# Patient Record
Sex: FEMALE | Race: WHITE | NOT HISPANIC OR LATINO | Employment: OTHER | URBAN - METROPOLITAN AREA
[De-identification: names, ages, dates, MRNs, and addresses within clinical notes are randomized per-mention and may not be internally consistent; named-entity substitution may affect disease eponyms.]

---

## 2023-10-03 ENCOUNTER — TELEPHONE (OUTPATIENT)
Dept: PRIMARY CARE | Facility: CLINIC | Age: 68
End: 2023-10-03
Payer: MEDICARE

## 2023-10-03 NOTE — TELEPHONE ENCOUNTER
Pt ended up getting an appt for Friday but is requesting something be called in if possible until then for the pain

## 2023-10-04 PROBLEM — N17.9 ACUTE RENAL FAILURE SYNDROME (CMS-HCC): Status: ACTIVE | Noted: 2023-10-04

## 2023-10-04 PROBLEM — J44.9 COPD (CHRONIC OBSTRUCTIVE PULMONARY DISEASE) (MULTI): Status: ACTIVE | Noted: 2023-10-04

## 2023-10-04 PROBLEM — Z51.5 PALLIATIVE CARE PATIENT: Status: ACTIVE | Noted: 2023-10-04

## 2023-10-04 PROBLEM — K21.9 CHRONIC GERD: Status: ACTIVE | Noted: 2023-10-04

## 2023-10-04 PROBLEM — S42.309A CLOSED FRACTURE OF HUMERUS: Status: ACTIVE | Noted: 2023-10-04

## 2023-10-04 PROBLEM — S49.90XA SHOULDER INJURY: Status: ACTIVE | Noted: 2023-10-04

## 2023-10-04 PROBLEM — S06.6XAA TRAUMATIC INTRACRANIAL SUBARACHNOID HEMORRHAGE (MULTI): Status: ACTIVE | Noted: 2023-10-04

## 2023-10-04 PROBLEM — J20.9 ACUTE BRONCHITIS WITH CHRONIC OBSTRUCTIVE PULMONARY DISEASE (COPD) (MULTI): Status: ACTIVE | Noted: 2023-10-04

## 2023-10-04 PROBLEM — R11.2 NAUSEA & VOMITING: Status: ACTIVE | Noted: 2023-10-04

## 2023-10-04 PROBLEM — I65.23 CAROTID STENOSIS, BILATERAL: Status: ACTIVE | Noted: 2023-10-04

## 2023-10-04 PROBLEM — R10.9 ABDOMINAL PAIN: Status: ACTIVE | Noted: 2023-10-04

## 2023-10-04 PROBLEM — G89.29 OTHER CHRONIC PAIN: Status: ACTIVE | Noted: 2023-10-04

## 2023-10-04 PROBLEM — D50.9 IRON DEFICIENCY ANEMIA: Status: ACTIVE | Noted: 2023-10-04

## 2023-10-04 PROBLEM — G25.0 ESSENTIAL TREMOR: Status: ACTIVE | Noted: 2023-10-04

## 2023-10-04 PROBLEM — M54.50 BACK PAIN, LUMBOSACRAL: Status: ACTIVE | Noted: 2023-10-04

## 2023-10-04 PROBLEM — R06.00 DYSPNEA: Status: ACTIVE | Noted: 2023-10-04

## 2023-10-04 PROBLEM — F34.1 DYSTHYMIA: Status: ACTIVE | Noted: 2023-10-04

## 2023-10-04 PROBLEM — J96.00 ACUTE RESPIRATORY FAILURE (MULTI): Status: ACTIVE | Noted: 2023-10-04

## 2023-10-04 PROBLEM — R20.0 LOWER EXTREMITY NUMBNESS: Status: ACTIVE | Noted: 2023-10-04

## 2023-10-04 PROBLEM — I10 PRIMARY HYPERTENSION: Status: ACTIVE | Noted: 2023-10-04

## 2023-10-04 PROBLEM — I47.29 NONSUSTAINED VENTRICULAR TACHYCARDIA (MULTI): Status: ACTIVE | Noted: 2023-10-04

## 2023-10-04 PROBLEM — F32.A DEPRESSION: Status: ACTIVE | Noted: 2023-10-04

## 2023-10-04 PROBLEM — R63.4 ABNORMAL WEIGHT LOSS: Status: ACTIVE | Noted: 2023-10-04

## 2023-10-04 PROBLEM — J96.12 CHRONIC HYPERCAPNIC RESPIRATORY FAILURE (MULTI): Status: ACTIVE | Noted: 2023-10-04

## 2023-10-04 PROBLEM — F10.20 CONTINUOUS CHRONIC ALCOHOLISM (MULTI): Status: ACTIVE | Noted: 2023-10-04

## 2023-10-04 PROBLEM — F41.1 GAD (GENERALIZED ANXIETY DISORDER): Status: ACTIVE | Noted: 2023-10-04

## 2023-10-04 PROBLEM — Z78.9 MEDICAL HOME PATIENT: Status: ACTIVE | Noted: 2023-10-04

## 2023-10-04 PROBLEM — N39.0 URINARY TRACT INFECTION: Status: ACTIVE | Noted: 2023-10-04

## 2023-10-04 PROBLEM — J43.9 PULMONARY EMPHYSEMA (MULTI): Status: ACTIVE | Noted: 2023-10-04

## 2023-10-04 PROBLEM — J44.1 ACUTE EXACERBATION OF CHRONIC OBSTRUCTIVE AIRWAYS DISEASE (MULTI): Status: ACTIVE | Noted: 2023-10-04

## 2023-10-04 PROBLEM — F41.9 ANXIETY: Status: ACTIVE | Noted: 2023-10-04

## 2023-10-04 PROBLEM — T50.902A SUICIDE BY DRUG OVERDOSE (MULTI): Status: ACTIVE | Noted: 2023-10-04

## 2023-10-04 PROBLEM — J44.0 ACUTE BRONCHITIS WITH CHRONIC OBSTRUCTIVE PULMONARY DISEASE (COPD) (MULTI): Status: ACTIVE | Noted: 2023-10-04

## 2023-10-04 PROBLEM — Z86.79 HISTORY OF HYPERTENSION: Status: ACTIVE | Noted: 2023-10-04

## 2023-10-04 PROBLEM — R91.1 PULMONARY NODULE: Status: ACTIVE | Noted: 2023-10-04

## 2023-10-04 PROBLEM — I35.9 AORTIC VALVE DISORDER: Status: ACTIVE | Noted: 2023-10-04

## 2023-10-04 PROBLEM — I35.0 AORTIC VALVE STENOSIS: Status: ACTIVE | Noted: 2023-10-04

## 2023-10-04 PROBLEM — F17.200 TOBACCO DEPENDENCY: Status: ACTIVE | Noted: 2023-10-04

## 2023-10-04 PROBLEM — E87.1 HYPONATREMIA: Status: ACTIVE | Noted: 2023-10-04

## 2023-10-04 PROBLEM — D64.9 ANEMIA: Status: ACTIVE | Noted: 2023-10-04

## 2023-10-04 PROBLEM — K27.9 PEPTIC ULCER DISEASE: Status: ACTIVE | Noted: 2023-10-04
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
## 2023-10-04 PROBLEM — R09.02 HYPOXIA: Status: ACTIVE | Noted: 2023-10-04

## 2023-10-04 PROBLEM — F39 MOOD DISORDER (CMS-HCC): Status: ACTIVE | Noted: 2023-10-04

## 2023-10-04 PROBLEM — I82.4Z1 ACUTE DEEP VEIN THROMBOSIS (DVT) OF DISTAL VEIN OF RIGHT LOWER EXTREMITY (MULTI): Status: ACTIVE | Noted: 2023-10-04

## 2023-10-04 PROBLEM — T50.902A INTENTIONAL DRUG OVERDOSE (MULTI): Status: ACTIVE | Noted: 2023-10-04

## 2023-10-04 PROBLEM — F41.9 CHRONIC ANXIETY: Status: ACTIVE | Noted: 2023-10-04

## 2023-10-04 PROBLEM — E87.6 HYPOKALEMIA: Status: ACTIVE | Noted: 2023-10-04

## 2023-10-04 RX ORDER — MIRTAZAPINE 15 MG/1
15 TABLET, FILM COATED ORAL NIGHTLY
COMMUNITY
End: 2024-01-28 | Stop reason: HOSPADM

## 2023-10-04 RX ORDER — PRIMIDONE 50 MG/1
4 TABLET ORAL 2 TIMES DAILY
COMMUNITY
End: 2023-11-15

## 2023-10-04 RX ORDER — VARENICLINE TARTRATE 1 MG/1
TABLET, FILM COATED ORAL
COMMUNITY
End: 2024-01-28 | Stop reason: HOSPADM

## 2023-10-04 RX ORDER — AMLODIPINE BESYLATE 10 MG/1
10 TABLET ORAL DAILY
COMMUNITY
Start: 2023-06-09

## 2023-10-04 RX ORDER — ESCITALOPRAM OXALATE 10 MG/1
10 TABLET ORAL DAILY
COMMUNITY
End: 2024-05-02

## 2023-10-04 RX ORDER — ERGOCALCIFEROL 1.25 MG/1
50000 CAPSULE ORAL
COMMUNITY
Start: 2023-01-08 | End: 2024-02-06 | Stop reason: WASHOUT

## 2023-10-04 RX ORDER — FLUTICASONE FUROATE, UMECLIDINIUM BROMIDE AND VILANTEROL TRIFENATATE 200; 62.5; 25 UG/1; UG/1; UG/1
1 POWDER RESPIRATORY (INHALATION) DAILY
COMMUNITY
End: 2024-01-28 | Stop reason: HOSPADM

## 2023-10-04 RX ORDER — ACETAMINOPHEN 325 MG/1
325 TABLET ORAL EVERY 6 HOURS
COMMUNITY
End: 2024-02-06 | Stop reason: WASHOUT

## 2023-10-04 RX ORDER — GABAPENTIN 300 MG/1
300 CAPSULE ORAL 3 TIMES DAILY
COMMUNITY
End: 2024-05-09 | Stop reason: SDUPTHER

## 2023-10-04 RX ORDER — ONDANSETRON 4 MG/1
4 TABLET, ORALLY DISINTEGRATING ORAL EVERY 8 HOURS PRN
COMMUNITY
Start: 2022-05-03 | End: 2024-01-28 | Stop reason: HOSPADM

## 2023-10-04 RX ORDER — ALBUTEROL SULFATE 90 UG/1
1 AEROSOL, METERED RESPIRATORY (INHALATION) EVERY 4 HOURS PRN
COMMUNITY
End: 2024-01-28 | Stop reason: HOSPADM

## 2023-10-04 RX ORDER — LISINOPRIL 10 MG/1
1 TABLET ORAL DAILY
COMMUNITY
Start: 2017-06-15 | End: 2024-01-28 | Stop reason: HOSPADM

## 2023-10-04 RX ORDER — FAMOTIDINE 20 MG/1
20 TABLET, FILM COATED ORAL NIGHTLY
COMMUNITY
End: 2024-03-18 | Stop reason: ALTCHOICE

## 2023-10-04 RX ORDER — ASPIRIN 81 MG/1
81 TABLET ORAL DAILY
COMMUNITY
End: 2024-01-28 | Stop reason: HOSPADM

## 2023-10-04 RX ORDER — ATORVASTATIN CALCIUM 40 MG/1
40 TABLET, FILM COATED ORAL DAILY
COMMUNITY
Start: 2017-11-07 | End: 2024-01-28 | Stop reason: HOSPADM

## 2023-10-04 RX ORDER — ACETAMINOPHEN 500 MG
500 TABLET ORAL 4 TIMES DAILY
COMMUNITY
Start: 2017-06-15

## 2023-10-04 RX ORDER — ACLIDINIUM BROMIDE 400 UG/1
1 POWDER, METERED RESPIRATORY (INHALATION)
COMMUNITY
End: 2024-01-28 | Stop reason: HOSPADM

## 2023-10-04 RX ORDER — AMLODIPINE BESYLATE 5 MG/1
5 TABLET ORAL EVERY 12 HOURS
COMMUNITY
End: 2024-01-28 | Stop reason: HOSPADM

## 2023-10-04 RX ORDER — AZITHROMYCIN 250 MG/1
TABLET, FILM COATED ORAL
COMMUNITY
Start: 2023-06-09 | End: 2024-01-28 | Stop reason: HOSPADM

## 2023-10-04 RX ORDER — BUDESONIDE, GLYCOPYRROLATE, AND FORMOTEROL FUMARATE 160; 9; 4.8 UG/1; UG/1; UG/1
2 AEROSOL, METERED RESPIRATORY (INHALATION)
COMMUNITY
Start: 2023-03-13 | End: 2024-01-28 | Stop reason: HOSPADM

## 2023-10-04 RX ORDER — LANOLIN ALCOHOL/MO/W.PET/CERES
100 CREAM (GRAM) TOPICAL DAILY
COMMUNITY
End: 2024-01-28 | Stop reason: HOSPADM

## 2023-10-04 RX ORDER — FOLIC ACID 1 MG/1
1 TABLET ORAL DAILY
COMMUNITY
End: 2024-01-28 | Stop reason: HOSPADM

## 2023-10-04 RX ORDER — MOMETASONE FUROATE AND FORMOTEROL FUMARATE DIHYDRATE 200; 5 UG/1; UG/1
2 AEROSOL RESPIRATORY (INHALATION) 2 TIMES DAILY
COMMUNITY
End: 2024-01-28 | Stop reason: HOSPADM

## 2023-10-04 RX ORDER — OMEPRAZOLE 40 MG/1
40 CAPSULE, DELAYED RELEASE ORAL
COMMUNITY
Start: 2023-04-24 | End: 2024-01-28 | Stop reason: HOSPADM

## 2023-10-06 ENCOUNTER — OFFICE VISIT (OUTPATIENT)
Dept: PRIMARY CARE | Facility: CLINIC | Age: 68
End: 2023-10-06
Payer: MEDICARE

## 2023-10-06 VITALS
OXYGEN SATURATION: 94 % | HEART RATE: 98 BPM | HEIGHT: 66 IN | WEIGHT: 119 LBS | DIASTOLIC BLOOD PRESSURE: 78 MMHG | BODY MASS INDEX: 19.13 KG/M2 | SYSTOLIC BLOOD PRESSURE: 142 MMHG

## 2023-10-06 DIAGNOSIS — Z23 NEED FOR VACCINATION: ICD-10-CM

## 2023-10-06 DIAGNOSIS — M16.11 PRIMARY LOCALIZED OSTEOARTHRITIS OF RIGHT HIP: ICD-10-CM

## 2023-10-06 DIAGNOSIS — J42 CHRONIC BRONCHITIS, UNSPECIFIED CHRONIC BRONCHITIS TYPE (MULTI): Primary | ICD-10-CM

## 2023-10-06 PROCEDURE — 1159F MED LIST DOCD IN RCRD: CPT | Performed by: FAMILY MEDICINE

## 2023-10-06 PROCEDURE — 1125F AMNT PAIN NOTED PAIN PRSNT: CPT | Performed by: FAMILY MEDICINE

## 2023-10-06 PROCEDURE — 90662 IIV NO PRSV INCREASED AG IM: CPT | Performed by: FAMILY MEDICINE

## 2023-10-06 PROCEDURE — 3078F DIAST BP <80 MM HG: CPT | Performed by: FAMILY MEDICINE

## 2023-10-06 PROCEDURE — 4004F PT TOBACCO SCREEN RCVD TLK: CPT | Performed by: FAMILY MEDICINE

## 2023-10-06 PROCEDURE — 99214 OFFICE O/P EST MOD 30 MIN: CPT | Mod: 25 | Performed by: FAMILY MEDICINE

## 2023-10-06 PROCEDURE — 99214 OFFICE O/P EST MOD 30 MIN: CPT | Performed by: FAMILY MEDICINE

## 2023-10-06 PROCEDURE — 3077F SYST BP >= 140 MM HG: CPT | Performed by: FAMILY MEDICINE

## 2023-10-06 RX ORDER — VARENICLINE TARTRATE 1 MG/1
TABLET, FILM COATED ORAL
Qty: 60 TABLET | Refills: 0 | Status: SHIPPED | OUTPATIENT
Start: 2023-10-06 | End: 2023-10-26

## 2023-10-06 RX ORDER — ACETAMINOPHEN AND CODEINE PHOSPHATE 300; 30 MG/1; MG/1
1 TABLET ORAL EVERY 6 HOURS PRN
Qty: 28 TABLET | Refills: 0 | Status: SHIPPED | OUTPATIENT
Start: 2023-10-06 | End: 2023-10-13

## 2023-10-06 ASSESSMENT — ENCOUNTER SYMPTOMS
LOSS OF SENSATION IN FEET: 0
INABILITY TO BEAR WEIGHT: 1
DEPRESSION: 0
OCCASIONAL FEELINGS OF UNSTEADINESS: 1
WHEEZING: 1
LOSS OF MOTION: 1
HIP PAIN: 1
CHEST TIGHTNESS: 1
SHORTNESS OF BREATH: 1
COUGH: 1

## 2023-10-06 ASSESSMENT — PATIENT HEALTH QUESTIONNAIRE - PHQ9
SUM OF ALL RESPONSES TO PHQ9 QUESTIONS 1 AND 2: 0
2. FEELING DOWN, DEPRESSED OR HOPELESS: NOT AT ALL
1. LITTLE INTEREST OR PLEASURE IN DOING THINGS: NOT AT ALL

## 2023-10-06 ASSESSMENT — PAIN SCALES - GENERAL: PAINLEVEL: 10-WORST PAIN EVER

## 2023-10-06 NOTE — PROGRESS NOTES
"Subjective   Patient ID: Kamila Jones is a 68 y.o. female who presents for Hip Pain (Pt is having severe hip pain in both hips/dmw).    Pt here with her daughter.      COPD-  has reduced smoking.  3L NC.  No recent illness. Did she wants to try chantix.     Hip Pain   The pain is present in the right hip. The quality of the pain is described as stabbing. The pain is severe. The pain has been Constant since onset. Associated symptoms include an inability to bear weight and a loss of motion. The symptoms are aggravated by movement. She has tried NSAIDs, acetaminophen and non-weight bearing for the symptoms. The treatment provided mild relief.        Review of Systems   Respiratory:  Positive for cough, chest tightness, shortness of breath and wheezing.    Cardiovascular:  Negative for chest pain.       Objective   /78   Pulse 98   Ht 1.676 m (5' 6\")   Wt 54 kg (119 lb) Comment: 119lbs  SpO2 94%   BMI 19.21 kg/m²     Physical Exam  Constitutional:       Comments: Chronic ill appearing.    Cardiovascular:      Rate and Rhythm: Normal rate and regular rhythm.   Pulmonary:      Effort: Respiratory distress present.   Neurological:      Mental Status: She is alert.         Assessment/Plan   Diagnoses and all orders for this visit:  Chronic bronchitis, unspecified chronic bronchitis type (CMS/HCC)  -     varenicline (Chantix) 1 mg tablet; Take 0.5 tablets (0.5 mg) by mouth once daily for 3 days, THEN 0.5 tablets (0.5 mg) 2 times a day for 4 days, THEN 1 tablet (1 mg) 2 times a day for 23 days. Take with full glass of water..  Primary localized osteoarthritis of right hip  -     acetaminophen-codeine (Tylenol w/ Codeine #3) 300-30 mg tablet; Take 1 tablet by mouth every 6 hours if needed for severe pain (7 - 10) for up to 7 days.  -     Referral to Orthopaedic Surgery; Future    Oarrs reviewed.  Minimal use only for codeine.  Risk of respiratory supression reviewd.      "

## 2023-10-25 ENCOUNTER — TELEPHONE (OUTPATIENT)
Dept: PRIMARY CARE | Facility: CLINIC | Age: 68
End: 2023-10-25
Payer: MEDICARE

## 2023-10-25 DIAGNOSIS — M16.11 PRIMARY LOCALIZED OSTEOARTHRITIS OF RIGHT HIP: Primary | ICD-10-CM

## 2023-10-25 RX ORDER — HYDROCODONE BITARTRATE AND ACETAMINOPHEN 5; 325 MG/1; MG/1
1 TABLET ORAL EVERY 6 HOURS PRN
Qty: 12 TABLET | Refills: 0 | Status: SHIPPED | OUTPATIENT
Start: 2023-10-25 | End: 2023-10-28

## 2023-10-25 NOTE — TELEPHONE ENCOUNTER
Pt said she has an appointment with orthopedics on 11/21/2023 but is in severe pain (right hip), the Tylenol takes the edge off but is still not helping that much. She said she has been taking two tablets in the morning and one in the evening - she is requesting something stronger for the pain.

## 2023-10-26 DIAGNOSIS — J42 CHRONIC BRONCHITIS, UNSPECIFIED CHRONIC BRONCHITIS TYPE (MULTI): ICD-10-CM

## 2023-10-26 RX ORDER — VARENICLINE TARTRATE 1 MG/1
1 TABLET, FILM COATED ORAL 2 TIMES DAILY
Qty: 60 TABLET | Refills: 1 | Status: SHIPPED | OUTPATIENT
Start: 2023-10-26 | End: 2024-01-18

## 2023-11-06 ENCOUNTER — TELEPHONE (OUTPATIENT)
Dept: PRIMARY CARE | Facility: CLINIC | Age: 68
End: 2023-11-06
Payer: MEDICARE

## 2023-11-06 DIAGNOSIS — M16.11 PRIMARY LOCALIZED OSTEOARTHRITIS OF RIGHT HIP: Primary | ICD-10-CM

## 2023-11-06 RX ORDER — HYDROCODONE BITARTRATE AND ACETAMINOPHEN 5; 325 MG/1; MG/1
1 TABLET ORAL EVERY 6 HOURS PRN
Qty: 28 TABLET | Refills: 0 | Status: SHIPPED | OUTPATIENT
Start: 2023-11-06 | End: 2023-11-13

## 2023-11-06 NOTE — TELEPHONE ENCOUNTER
Patient has a pending appointment with orthopedics on 11/21/2023, she is asking for something else to help her with the pain until then?

## 2023-11-13 DIAGNOSIS — G25.0 ESSENTIAL TREMOR: ICD-10-CM

## 2023-11-15 RX ORDER — PRIMIDONE 50 MG/1
TABLET ORAL
Qty: 720 TABLET | Refills: 1 | Status: SHIPPED | OUTPATIENT
Start: 2023-11-15 | End: 2024-05-14

## 2023-12-06 ENCOUNTER — TELEPHONE (OUTPATIENT)
Dept: PRIMARY CARE | Facility: CLINIC | Age: 68
End: 2023-12-06
Payer: MEDICARE

## 2023-12-06 DIAGNOSIS — M16.11 PRIMARY LOCALIZED OSTEOARTHRITIS OF RIGHT HIP: Primary | ICD-10-CM

## 2023-12-06 DIAGNOSIS — M16.11 PRIMARY LOCALIZED OSTEOARTHRITIS OF RIGHT HIP: ICD-10-CM

## 2023-12-06 RX ORDER — HYDROCODONE BITARTRATE AND ACETAMINOPHEN 5; 325 MG/1; MG/1
1 TABLET ORAL EVERY 6 HOURS PRN
Qty: 20 TABLET | Refills: 0 | Status: SHIPPED | OUTPATIENT
Start: 2023-12-06 | End: 2023-12-11 | Stop reason: SDUPTHER

## 2023-12-06 NOTE — TELEPHONE ENCOUNTER
Patient is requesting another refill on her pain medication, she said she had her xray done and is pending CT for possible fracure, pharmacy verified.

## 2023-12-11 RX ORDER — HYDROCODONE BITARTRATE AND ACETAMINOPHEN 5; 325 MG/1; MG/1
1 TABLET ORAL EVERY 6 HOURS PRN
Qty: 20 TABLET | Refills: 0 | Status: SHIPPED | OUTPATIENT
Start: 2023-12-11 | End: 2023-12-16

## 2023-12-15 ENCOUNTER — HOSPITAL ENCOUNTER (OUTPATIENT)
Dept: RADIOLOGY | Facility: HOSPITAL | Age: 68
Discharge: HOME | End: 2023-12-15
Payer: MEDICARE

## 2023-12-15 DIAGNOSIS — M25.551 PAIN IN RIGHT HIP: ICD-10-CM

## 2023-12-15 PROCEDURE — 73700 CT LOWER EXTREMITY W/O DYE: CPT | Mod: RT

## 2024-01-03 DIAGNOSIS — M54.50 BACK PAIN, LUMBOSACRAL: Primary | ICD-10-CM

## 2024-01-03 RX ORDER — HYDROCODONE BITARTRATE AND ACETAMINOPHEN 5; 325 MG/1; MG/1
1 TABLET ORAL EVERY 8 HOURS PRN
COMMUNITY
Start: 2023-12-22 | End: 2024-01-03 | Stop reason: SDUPTHER

## 2024-01-03 NOTE — TELEPHONE ENCOUNTER
See attached rx, last appt was 10/06/2023. She said she scheduled hip surgery for 04/03/2024, has a pending appt with you on 03/04/2024.

## 2024-01-06 RX ORDER — HYDROCODONE BITARTRATE AND ACETAMINOPHEN 5; 325 MG/1; MG/1
1 TABLET ORAL EVERY 8 HOURS PRN
Qty: 15 TABLET | Refills: 0 | Status: SHIPPED | OUTPATIENT
Start: 2024-01-06 | End: 2024-01-11

## 2024-01-18 DIAGNOSIS — J42 CHRONIC BRONCHITIS, UNSPECIFIED CHRONIC BRONCHITIS TYPE (MULTI): ICD-10-CM

## 2024-01-18 RX ORDER — VARENICLINE TARTRATE 1 MG/1
1 TABLET, FILM COATED ORAL 2 TIMES DAILY
Qty: 60 TABLET | Refills: 1 | Status: SHIPPED | OUTPATIENT
Start: 2024-01-18 | End: 2024-01-28 | Stop reason: HOSPADM

## 2024-01-25 ENCOUNTER — HOSPITAL ENCOUNTER (INPATIENT)
Facility: HOSPITAL | Age: 69
LOS: 3 days | Discharge: HOME | DRG: 378 | End: 2024-01-28
Attending: EMERGENCY MEDICINE | Admitting: INTERNAL MEDICINE
Payer: MEDICARE

## 2024-01-25 ENCOUNTER — APPOINTMENT (OUTPATIENT)
Dept: RADIOLOGY | Facility: HOSPITAL | Age: 69
DRG: 378 | End: 2024-01-25
Payer: MEDICARE

## 2024-01-25 ENCOUNTER — TELEPHONE (OUTPATIENT)
Dept: PRIMARY CARE | Facility: CLINIC | Age: 69
End: 2024-01-25
Payer: MEDICARE

## 2024-01-25 DIAGNOSIS — K92.1 HEMATOCHEZIA: Primary | ICD-10-CM

## 2024-01-25 DIAGNOSIS — D64.9 ANEMIA, UNSPECIFIED TYPE: ICD-10-CM

## 2024-01-25 DIAGNOSIS — K57.31 DIVERTICULAR HEMORRHAGE: ICD-10-CM

## 2024-01-25 LAB
ABO GROUP (TYPE) IN BLOOD: NORMAL
ABO GROUP (TYPE) IN BLOOD: NORMAL
ALBUMIN SERPL-MCNC: 3.8 G/DL (ref 3.5–5)
ALP BLD-CCNC: 70 U/L (ref 35–125)
ALT SERPL-CCNC: 12 U/L (ref 5–40)
ANION GAP SERPL CALC-SCNC: 11 MMOL/L
ANTIBODY SCREEN: NORMAL
APPEARANCE UR: ABNORMAL
AST SERPL-CCNC: 15 U/L (ref 5–40)
BACTERIA #/AREA URNS AUTO: ABNORMAL /HPF
BILIRUB SERPL-MCNC: <0.2 MG/DL (ref 0.1–1.2)
BILIRUB UR STRIP.AUTO-MCNC: NEGATIVE MG/DL
BLOOD EXPIRATION DATE: NORMAL
BLOOD EXPIRATION DATE: NORMAL
BUN SERPL-MCNC: 31 MG/DL (ref 8–25)
CALCIUM SERPL-MCNC: 8.3 MG/DL (ref 8.5–10.4)
CHLORIDE SERPL-SCNC: 103 MMOL/L (ref 97–107)
CO2 SERPL-SCNC: 22 MMOL/L (ref 24–31)
COLOR UR: ABNORMAL
CREAT SERPL-MCNC: 1 MG/DL (ref 0.4–1.6)
DISPENSE STATUS: NORMAL
DISPENSE STATUS: NORMAL
EGFRCR SERPLBLD CKD-EPI 2021: 61 ML/MIN/1.73M*2
ERYTHROCYTE [DISTWIDTH] IN BLOOD BY AUTOMATED COUNT: 15.3 % (ref 11.5–14.5)
GLUCOSE SERPL-MCNC: 121 MG/DL (ref 65–99)
GLUCOSE UR STRIP.AUTO-MCNC: NORMAL MG/DL
HCT VFR BLD AUTO: 21.9 % (ref 36–46)
HCT VFR BLD AUTO: 29.3 % (ref 36–46)
HGB BLD-MCNC: 7.3 G/DL (ref 12–16)
HGB BLD-MCNC: 9.9 G/DL (ref 12–16)
HYALINE CASTS #/AREA URNS AUTO: ABNORMAL /LPF
INR PPP: 1.1 (ref 0.9–1.2)
KETONES UR STRIP.AUTO-MCNC: NEGATIVE MG/DL
LEUKOCYTE ESTERASE UR QL STRIP.AUTO: NEGATIVE
LIPASE SERPL-CCNC: 45 U/L (ref 16–63)
MCH RBC QN AUTO: 35.3 PG (ref 26–34)
MCHC RBC AUTO-ENTMCNC: 33.3 G/DL (ref 32–36)
MCV RBC AUTO: 106 FL (ref 80–100)
MUCOUS THREADS #/AREA URNS AUTO: ABNORMAL /LPF
NITRITE UR QL STRIP.AUTO: NEGATIVE
NRBC BLD-RTO: 0 /100 WBCS (ref 0–0)
PH UR STRIP.AUTO: 5 [PH]
PLATELET # BLD AUTO: 250 X10*3/UL (ref 150–450)
POTASSIUM SERPL-SCNC: 4.2 MMOL/L (ref 3.4–5.1)
PRODUCT BLOOD TYPE: 6200
PRODUCT BLOOD TYPE: 6200
PRODUCT CODE: NORMAL
PRODUCT CODE: NORMAL
PROT SERPL-MCNC: 6.1 G/DL (ref 5.9–7.9)
PROT UR STRIP.AUTO-MCNC: ABNORMAL MG/DL
PROTHROMBIN TIME: 11.4 SECONDS (ref 9.3–12.7)
RBC # BLD AUTO: 2.07 X10*6/UL (ref 4–5.2)
RBC # UR STRIP.AUTO: ABNORMAL /UL
RBC #/AREA URNS AUTO: ABNORMAL /HPF
RH FACTOR (ANTIGEN D): NORMAL
RH FACTOR (ANTIGEN D): NORMAL
SODIUM SERPL-SCNC: 136 MMOL/L (ref 133–145)
SP GR UR STRIP.AUTO: 1.03
UNIT ABO: NORMAL
UNIT ABO: NORMAL
UNIT NUMBER: NORMAL
UNIT NUMBER: NORMAL
UNIT RH: NORMAL
UNIT RH: NORMAL
UNIT VOLUME: 350
UNIT VOLUME: 350
UROBILINOGEN UR STRIP.AUTO-MCNC: NORMAL MG/DL
WBC # BLD AUTO: 9.7 X10*3/UL (ref 4.4–11.3)
WBC #/AREA URNS AUTO: ABNORMAL /HPF
XM INTEP: NORMAL
XM INTEP: NORMAL

## 2024-01-25 PROCEDURE — 81001 URINALYSIS AUTO W/SCOPE: CPT | Performed by: NURSE PRACTITIONER

## 2024-01-25 PROCEDURE — 83690 ASSAY OF LIPASE: CPT | Performed by: NURSE PRACTITIONER

## 2024-01-25 PROCEDURE — 2550000001 HC RX 255 CONTRASTS: Performed by: EMERGENCY MEDICINE

## 2024-01-25 PROCEDURE — 74177 CT ABD & PELVIS W/CONTRAST: CPT

## 2024-01-25 PROCEDURE — P9016 RBC LEUKOCYTES REDUCED: HCPCS

## 2024-01-25 PROCEDURE — 36430 TRANSFUSION BLD/BLD COMPNT: CPT

## 2024-01-25 PROCEDURE — 85018 HEMOGLOBIN: CPT | Mod: 91 | Performed by: EMERGENCY MEDICINE

## 2024-01-25 PROCEDURE — 80053 COMPREHEN METABOLIC PANEL: CPT | Performed by: NURSE PRACTITIONER

## 2024-01-25 PROCEDURE — 36415 COLL VENOUS BLD VENIPUNCTURE: CPT | Performed by: NURSE PRACTITIONER

## 2024-01-25 PROCEDURE — 99285 EMERGENCY DEPT VISIT HI MDM: CPT | Mod: 25 | Performed by: EMERGENCY MEDICINE

## 2024-01-25 PROCEDURE — 86920 COMPATIBILITY TEST SPIN: CPT | Mod: 91

## 2024-01-25 PROCEDURE — 2060000001 HC INTERMEDIATE ICU ROOM DAILY

## 2024-01-25 PROCEDURE — 86900 BLOOD TYPING SEROLOGIC ABO: CPT | Mod: 91 | Performed by: NURSE PRACTITIONER

## 2024-01-25 PROCEDURE — 85027 COMPLETE CBC AUTOMATED: CPT | Performed by: NURSE PRACTITIONER

## 2024-01-25 PROCEDURE — 85610 PROTHROMBIN TIME: CPT | Performed by: INTERNAL MEDICINE

## 2024-01-25 RX ORDER — IPRATROPIUM BROMIDE AND ALBUTEROL SULFATE 2.5; .5 MG/3ML; MG/3ML
3 SOLUTION RESPIRATORY (INHALATION) EVERY 4 HOURS PRN
Status: DISCONTINUED | OUTPATIENT
Start: 2024-01-25 | End: 2024-01-28 | Stop reason: HOSPADM

## 2024-01-25 RX ORDER — PANTOPRAZOLE SODIUM 40 MG/10ML
40 INJECTION, POWDER, LYOPHILIZED, FOR SOLUTION INTRAVENOUS EVERY 12 HOURS
Status: DISCONTINUED | OUTPATIENT
Start: 2024-01-26 | End: 2024-01-26

## 2024-01-25 RX ADMIN — PANTOPRAZOLE SODIUM 40 MG: 40 INJECTION, POWDER, FOR SOLUTION INTRAVENOUS at 23:41

## 2024-01-25 RX ADMIN — IOHEXOL 75 ML: 350 INJECTION, SOLUTION INTRAVENOUS at 19:04

## 2024-01-25 ASSESSMENT — COLUMBIA-SUICIDE SEVERITY RATING SCALE - C-SSRS
1. IN THE PAST MONTH, HAVE YOU WISHED YOU WERE DEAD OR WISHED YOU COULD GO TO SLEEP AND NOT WAKE UP?: NO
2. HAVE YOU ACTUALLY HAD ANY THOUGHTS OF KILLING YOURSELF?: NO
6. HAVE YOU EVER DONE ANYTHING, STARTED TO DO ANYTHING, OR PREPARED TO DO ANYTHING TO END YOUR LIFE?: NO

## 2024-01-25 ASSESSMENT — ENCOUNTER SYMPTOMS
HEMATOLOGIC/LYMPHATIC NEGATIVE: 1
ENDOCRINE NEGATIVE: 1
NEUROLOGICAL NEGATIVE: 1
CONSTITUTIONAL NEGATIVE: 1
RESPIRATORY NEGATIVE: 1
ALLERGIC/IMMUNOLOGIC NEGATIVE: 1
EYES NEGATIVE: 1
PSYCHIATRIC NEGATIVE: 1
CARDIOVASCULAR NEGATIVE: 1
MUSCULOSKELETAL NEGATIVE: 1
ANAL BLEEDING: 1

## 2024-01-25 ASSESSMENT — PAIN - FUNCTIONAL ASSESSMENT: PAIN_FUNCTIONAL_ASSESSMENT: 0-10

## 2024-01-25 ASSESSMENT — PAIN DESCRIPTION - PROGRESSION: CLINICAL_PROGRESSION: NOT CHANGED

## 2024-01-25 ASSESSMENT — PAIN SCALES - GENERAL
PAINLEVEL_OUTOF10: 0 - NO PAIN
PAINLEVEL_OUTOF10: 0 - NO PAIN

## 2024-01-25 NOTE — TELEPHONE ENCOUNTER
Patient has been trying to move around without using much pain medication, however she is having a terrible time trying to get around her home. She has only been taking extra strength Tylenol (taking up to 12 pills per day)

## 2024-01-25 NOTE — TELEPHONE ENCOUNTER
Pt called back stating she is having rectal bleeding and diarrhea.Pt is scared and do not know what to do. That started today.

## 2024-01-25 NOTE — ED PROVIDER NOTES
HPI   No chief complaint on file.      HPI  See my MDM                  No data recorded                Patient History   Past Medical History:   Diagnosis Date    Alcohol dependence, in remission (CMS/HCC)     History of alcohol dependence    Personal history of other malignant neoplasm of large intestine     History of malignant neoplasm of colon    Personal history of other venous thrombosis and embolism     History of deep venous thrombosis    Personal history of suicidal behavior     History of suicide attempt     Past Surgical History:   Procedure Laterality Date    MR HEAD ANGIO WO IV CONTRAST  10/24/2017    MR HEAD ANGIO WO IV CONTRAST [facility]    MR NECK ANGIO WO IV CONTRAST  10/24/2017    MR NECK ANGIO WO IV CONTRAST [facility]    OTHER SURGICAL HISTORY  06/15/2017    Treatment Of Ankle Fracture    TONSILLECTOMY  06/15/2017    Tonsillectomy     Family History   Problem Relation Name Age of Onset    Accidental death Mother      Stroke Father      Other (cerbral hemmorrhage) Father      Cancer Sister      Lung cancer Sister      Colon cancer Brother      Cancer Brother      Lung disease Brother       Social History     Tobacco Use    Smoking status: Every Day     Packs/day: 0.25     Years: 15.00     Additional pack years: 0.00     Total pack years: 3.75     Types: Cigarettes     Passive exposure: Past    Smokeless tobacco: Never   Substance Use Topics    Alcohol use: Yes     Alcohol/week: 10.0 standard drinks of alcohol     Types: 10 Cans of beer per week     Comment: per week    Drug use: Never       Physical Exam   ED Triage Vitals   Temp Pulse Resp BP   -- -- -- --      SpO2 Temp src Heart Rate Source Patient Position   -- -- -- --      BP Location FiO2 (%)     -- --       Physical Exam  CONSTITUTIONAL: Vital signs reviewed as charted, well-developed and in no distress  Eyes: Extraocular muscles are intact. Pupils equal round and reactive to light. Conjunctiva are pink.    ENT:  Mucous membranes are moist. Tongue in the midline. Pharynx was without erythema or exudates, uvula midline  LUNGS: Breath sounds equal and clear to auscultation. Good air exchange, no wheezes rales or retractions, pulse oximetry is charted.  HEART: Regular rate and rhythm without murmur thrill or rub, strong tones, auscultation is normal.  ABDOMEN: Soft and nontender without guarding rebound rigidity or mass. Bowel sounds are present and normal in all quadrants. There is no palpable masses or aneurysms identified. No hepatosplenomegaly, normal abdominal exam.  Neuro: The patient is awake, alert and oriented ×3. Moving all 4 extremities and answering questions appropriately.   MUSCULOSKELETAL: The calves are nontender to palpation. Full gross active range of motion.   PSYCH: Awake alert oriented, normal mood and affect.  Skin:  Dry, normal color, warm to the touch, no rash present.      ED Course & MDM   ED Course as of 01/25/24 2146   Thu Jan 25, 2024   1821 Patient with large bloody bowel movement here in the emergency department, systolic blood pressure down to 95, type and screen added, IV fluids given [RJ]   1838 Patient typed and crossed for 2 units to be transfused [RJ]   2044 Consulted Dr. Wells, neurology, discussed case and he does recommend transfer to [facility] or other facility with interventional radiology capability as this would likely be the next step patient decompensates. [WU]   2128 Consulted Dr. Driscoll, GI @ [facility] who agrees patient will need GI and IR and accepts in transfer, uncertain if any beds will be available, however she did feel patient can be monitored here and to call back with any decompensation but does not currently need ER to ER transfer. [WU]      ED Course User Index  [WU] Polly Day MD  [RJ] See Gavin, APRN-CNP         Diagnoses as of 01/25/24 2146   Hematochezia   Anemia, unspecified type   Diverticular hemorrhage       Medical Decision Making  History obtained  from: patient    Vital signs, nursing notes, current medications, past medical history, Surgical history, allergies, social history, family History were reviewed.         HPI:  Patient 68-year-old female presenting emergency room today chief complaint of rectal bleeding.  States had a little bit yesterday but worse today.  Has had some lower abdominal cramping.  No fever chills or night sweats.  No nausea vomiting diarrhea.  No anticoagulants.  Patient was having active bowel movement and was noted to examine her, large amount of blood present.      10 point ROS was reviewed and negative except Noted above in HPI.  DDX: as listed above      Medications administered during this visit (name and route):       MDM Summary/considerations:  EMERGENCY DEPARTMENT COURSE and DIFFERENTIAL DIAGNOSIS/MDM:        The patient presented with a chief complaint of GI bleeding. The differential diagnosis associated with this patient's presentation includes colitis, diverticulitis, mass, hemorrhoids.       Vitals:    Vitals:    01/25/24 1915 01/25/24 1945 01/25/24 1955 01/25/24 2000   BP: 152/73 138/55 141/68 139/56   Pulse: 79 77 84 81   Resp: 17  17    SpO2: 97% 98% 99% 99%   Weight:       Height:           ED Course as of 01/25/24 2146   Thu Jan 25, 2024   1821 Patient with large bloody bowel movement here in the emergency department, systolic blood pressure down to 95, type and screen added, IV fluids given [RJ]   1838 Patient typed and crossed for 2 units to be transfused [RJ]   2044 Consulted Dr. Wells, neurology, discussed case and he does recommend transfer to [facility] or other facility with interventional radiology capability as this would likely be the next step patient decompensates. [WU]   2128 Consulted Dr. Driscoll, GI @ [facility] who agrees patient will need GI and IR and accepts in transfer, uncertain if any beds will be available, however she did feel patient can be monitored here and to call back with any  decompensation but does not currently need ER to ER transfer. [WU]      ED Course User Index  [WU] Polly Day MD  [RJ] MOISES Peterson-CNP         Diagnoses as of 01/25/24 2146   Hematochezia   Anemia, unspecified type   Diverticular hemorrhage       History Limited by:    None    Independent history obtained from:    None      External records reviewed:    None      Diagnostics interpreted by me:    CT Scan(s) of the abdomen and pelvis:  Impression:    No acute abdominopelvic pathology.  There is diffuse colonic diverticulosis without evidence of  diverticulitis. Unchanged abdominal aortic ectasia measuring up to  3.3 cm.                  Discussions with other clinicians:    Hospitalist/Admitting Team Cam      Chronic conditions impacting care:    COPD      Social determinants of health affecting care:    None    Diagnostic tests considered but not performed: none    ED Medications managed:    Medications   iohexol (OMNIPaque) 350 mg iodine/mL solution 75 mL (75 mL intravenous Given 1/25/24 1904)         Prescription drugs considered:    None    After speaking with the admitting physician here in the ED is asked we consult GI who recommended we do transfer as we do not have interventional radiology present here if need be.  Attending physician did speak with GI at [facility] who accepted the patient for transfer.      Patient with lower GI bleeding, hemoglobin less than 8, for the last bloody bowel movement she did have a bout of hypotension where she was systolic of 95 2 units of blood were typed and crossed and administered.  Consent was given from the patient.  CT scan grossly unremarkable.  Patient will be admitted for further evaluation and care.    I saw this patient in conjunction with Dr. Day, please see her supervision note.    Critical Care: CRITICAL CARE NOTE     The patient was reevaluated/re-examined multiple times during the visit. Critical care time includes management at  bedside, discussion with other providers and consultants, family counseling and answering questions, and documentation. Care involves decision making of high complexity to assess, manipulate, and support vital organ system failure and/or to prevent further life threatening deterioration of the patient's condition. Failure to initiate these interventions on an urgent basis would likely result in sudden, clinically significant or life threatening deterioration in the patient's condition of acute GI bleeding       Critical care time total at least 35 minutes of non concurrent critical care time provided by myself. This did not include any separate billable procedures.              Prescriptions provided include: none    This chart was completed using voice recognition transcription software. Please excuse any errors of transcription including grammatical, punctuation, syntax and spelling errors.  Please contact me with any questions regarding this chart.    Procedure  Procedures     MOISES Peterson-CNP  01/25/24 3308

## 2024-01-26 LAB
ALBUMIN SERPL-MCNC: 3.6 G/DL (ref 3.5–5)
ALP BLD-CCNC: 65 U/L (ref 35–125)
ALT SERPL-CCNC: 11 U/L (ref 5–40)
ANION GAP SERPL CALC-SCNC: 7 MMOL/L
AST SERPL-CCNC: 13 U/L (ref 5–40)
BASOPHILS # BLD AUTO: 0.03 X10*3/UL (ref 0–0.1)
BASOPHILS NFR BLD AUTO: 0.5 %
BILIRUB SERPL-MCNC: 0.4 MG/DL (ref 0.1–1.2)
BUN SERPL-MCNC: 29 MG/DL (ref 8–25)
CALCIUM SERPL-MCNC: 8.2 MG/DL (ref 8.5–10.4)
CHLORIDE SERPL-SCNC: 108 MMOL/L (ref 97–107)
CO2 SERPL-SCNC: 22 MMOL/L (ref 24–31)
CREAT SERPL-MCNC: 1 MG/DL (ref 0.4–1.6)
EGFRCR SERPLBLD CKD-EPI 2021: 61 ML/MIN/1.73M*2
EOSINOPHIL # BLD AUTO: 0.09 X10*3/UL (ref 0–0.7)
EOSINOPHIL NFR BLD AUTO: 1.4 %
ERYTHROCYTE [DISTWIDTH] IN BLOOD BY AUTOMATED COUNT: 19.9 % (ref 11.5–14.5)
GLUCOSE SERPL-MCNC: 99 MG/DL (ref 65–99)
HCT VFR BLD AUTO: 26.8 % (ref 36–46)
HGB BLD-MCNC: 9 G/DL (ref 12–16)
IMM GRANULOCYTES # BLD AUTO: 0.03 X10*3/UL (ref 0–0.7)
IMM GRANULOCYTES NFR BLD AUTO: 0.5 % (ref 0–0.9)
LYMPHOCYTES # BLD AUTO: 1.3 X10*3/UL (ref 1.2–4.8)
LYMPHOCYTES NFR BLD AUTO: 20.2 %
MCH RBC QN AUTO: 31.8 PG (ref 26–34)
MCHC RBC AUTO-ENTMCNC: 33.6 G/DL (ref 32–36)
MCV RBC AUTO: 95 FL (ref 80–100)
MONOCYTES # BLD AUTO: 0.5 X10*3/UL (ref 0.1–1)
MONOCYTES NFR BLD AUTO: 7.8 %
NEUTROPHILS # BLD AUTO: 4.47 X10*3/UL (ref 1.2–7.7)
NEUTROPHILS NFR BLD AUTO: 69.6 %
NRBC BLD-RTO: 0 /100 WBCS (ref 0–0)
PLATELET # BLD AUTO: 174 X10*3/UL (ref 150–450)
POTASSIUM SERPL-SCNC: 4.6 MMOL/L (ref 3.4–5.1)
PROT SERPL-MCNC: 5.7 G/DL (ref 5.9–7.9)
RBC # BLD AUTO: 2.83 X10*6/UL (ref 4–5.2)
SODIUM SERPL-SCNC: 137 MMOL/L (ref 133–145)
WBC # BLD AUTO: 6.4 X10*3/UL (ref 4.4–11.3)

## 2024-01-26 PROCEDURE — 85025 COMPLETE CBC W/AUTO DIFF WBC: CPT | Performed by: INTERNAL MEDICINE

## 2024-01-26 PROCEDURE — 1100000001 HC PRIVATE ROOM DAILY

## 2024-01-26 PROCEDURE — 2500000004 HC RX 250 GENERAL PHARMACY W/ HCPCS (ALT 636 FOR OP/ED): Performed by: INTERNAL MEDICINE

## 2024-01-26 PROCEDURE — 97165 OT EVAL LOW COMPLEX 30 MIN: CPT | Mod: GO

## 2024-01-26 PROCEDURE — C9113 INJ PANTOPRAZOLE SODIUM, VIA: HCPCS | Performed by: INTERNAL MEDICINE

## 2024-01-26 PROCEDURE — 2500000004 HC RX 250 GENERAL PHARMACY W/ HCPCS (ALT 636 FOR OP/ED): Mod: MUE

## 2024-01-26 PROCEDURE — 97162 PT EVAL MOD COMPLEX 30 MIN: CPT | Mod: GP

## 2024-01-26 PROCEDURE — 36415 COLL VENOUS BLD VENIPUNCTURE: CPT | Performed by: INTERNAL MEDICINE

## 2024-01-26 PROCEDURE — 80053 COMPREHEN METABOLIC PANEL: CPT | Performed by: INTERNAL MEDICINE

## 2024-01-26 RX ORDER — PANTOPRAZOLE SODIUM 40 MG/1
40 TABLET, DELAYED RELEASE ORAL
Status: DISCONTINUED | OUTPATIENT
Start: 2024-01-27 | End: 2024-01-26

## 2024-01-26 RX ORDER — PANTOPRAZOLE SODIUM 40 MG/1
40 TABLET, DELAYED RELEASE ORAL
Status: DISCONTINUED | OUTPATIENT
Start: 2024-01-26 | End: 2024-01-28 | Stop reason: HOSPADM

## 2024-01-26 RX ORDER — ONDANSETRON HYDROCHLORIDE 2 MG/ML
4 INJECTION, SOLUTION INTRAVENOUS EVERY 6 HOURS PRN
Status: DISCONTINUED | OUTPATIENT
Start: 2024-01-26 | End: 2024-01-28 | Stop reason: HOSPADM

## 2024-01-26 RX ADMIN — PANTOPRAZOLE SODIUM 40 MG: 40 TABLET, DELAYED RELEASE ORAL at 13:48

## 2024-01-26 RX ADMIN — IRON SUCROSE 200 MG: 20 INJECTION, SOLUTION INTRAVENOUS at 13:48

## 2024-01-26 RX ADMIN — ONDANSETRON 4 MG: 2 INJECTION INTRAMUSCULAR; INTRAVENOUS at 23:12

## 2024-01-26 SDOH — SOCIAL STABILITY: SOCIAL INSECURITY: ARE THERE ANY APPARENT SIGNS OF INJURIES/BEHAVIORS THAT COULD BE RELATED TO ABUSE/NEGLECT?: NO

## 2024-01-26 SDOH — SOCIAL STABILITY: SOCIAL INSECURITY: HAVE YOU HAD THOUGHTS OF HARMING ANYONE ELSE?: NO

## 2024-01-26 SDOH — SOCIAL STABILITY: SOCIAL INSECURITY: WERE YOU ABLE TO COMPLETE ALL THE BEHAVIORAL HEALTH SCREENINGS?: YES

## 2024-01-26 SDOH — SOCIAL STABILITY: SOCIAL INSECURITY: HAS ANYONE EVER THREATENED TO HURT YOUR FAMILY OR YOUR PETS?: NO

## 2024-01-26 SDOH — SOCIAL STABILITY: SOCIAL INSECURITY: ABUSE: ADULT

## 2024-01-26 SDOH — SOCIAL STABILITY: SOCIAL INSECURITY: DO YOU FEEL ANYONE HAS EXPLOITED OR TAKEN ADVANTAGE OF YOU FINANCIALLY OR OF YOUR PERSONAL PROPERTY?: NO

## 2024-01-26 SDOH — SOCIAL STABILITY: SOCIAL INSECURITY: DO YOU FEEL UNSAFE GOING BACK TO THE PLACE WHERE YOU ARE LIVING?: NO

## 2024-01-26 SDOH — SOCIAL STABILITY: SOCIAL INSECURITY: ARE YOU OR HAVE YOU BEEN THREATENED OR ABUSED PHYSICALLY, EMOTIONALLY, OR SEXUALLY BY ANYONE?: NO

## 2024-01-26 SDOH — SOCIAL STABILITY: SOCIAL INSECURITY: DOES ANYONE TRY TO KEEP YOU FROM HAVING/CONTACTING OTHER FRIENDS OR DOING THINGS OUTSIDE YOUR HOME?: NO

## 2024-01-26 SDOH — SOCIAL STABILITY: SOCIAL INSECURITY: POSSIBLE ABUSE REPORTED TO:: ADVOCATE

## 2024-01-26 ASSESSMENT — ACTIVITIES OF DAILY LIVING (ADL)
TOILETING: INDEPENDENT
JUDGMENT_ADEQUATE_SAFELY_COMPLETE_DAILY_ACTIVITIES: YES
WALKS IN HOME: NEEDS ASSISTANCE
PATIENT'S MEMORY ADEQUATE TO SAFELY COMPLETE DAILY ACTIVITIES?: YES
ASSISTIVE_DEVICE: WALKER
HEARING - LEFT EAR: FUNCTIONAL
ADL_ASSISTANCE: INDEPENDENT
FEEDING YOURSELF: INDEPENDENT
GROOMING: INDEPENDENT
DRESSING YOURSELF: INDEPENDENT
BATHING_ASSISTANCE: STAND BY
BATHING: INDEPENDENT
ADEQUATE_TO_COMPLETE_ADL: YES
ADL_ASSISTANCE: INDEPENDENT
HEARING - RIGHT EAR: FUNCTIONAL
ADLS_ADDRESSED: YES

## 2024-01-26 ASSESSMENT — LIFESTYLE VARIABLES
HOW OFTEN DURING THE LAST YEAR HAVE YOU HAD A FEELING OF GUILT OR REMORSE AFTER DRINKING: NEVER
HOW OFTEN DURING THE LAST YEAR HAVE YOU FOUND THAT YOU WERE NOT ABLE TO STOP DRINKING ONCE YOU HAD STARTED: NEVER
HOW OFTEN DO YOU HAVE 6 OR MORE DRINKS ON ONE OCCASION: NEVER
HOW OFTEN DO YOU HAVE A DRINK CONTAINING ALCOHOL: 4 OR MORE TIMES A WEEK
AUDIT TOTAL SCORE: 5
HAVE YOU OR SOMEONE ELSE BEEN INJURED AS A RESULT OF YOUR DRINKING: NO
HOW OFTEN DURING THE LAST YEAR HAVE YOU NEEDED AN ALCOHOLIC DRINK FIRST THING IN THE MORNING TO GET YOURSELF GOING AFTER A NIGHT OF HEAVY DRINKING: NEVER
HOW OFTEN DURING THE LAST YEAR HAVE YOU FAILED TO DO WHAT WAS NORMALLY EXPECTED FROM YOU BECAUSE OF DRINKING: NEVER
HOW MANY STANDARD DRINKS CONTAINING ALCOHOL DO YOU HAVE ON A TYPICAL DAY: 3 OR 4
PRESCIPTION_ABUSE_PAST_12_MONTHS: NO
AUDIT-C TOTAL SCORE: 5
SUBSTANCE_ABUSE_PAST_12_MONTHS: NO
SKIP TO QUESTIONS 9-10: 0
HAS A RELATIVE, FRIEND, DOCTOR, OR ANOTHER HEALTH PROFESSIONAL EXPRESSED CONCERN ABOUT YOUR DRINKING OR SUGGESTED YOU CUT DOWN: NO
AUDIT TOTAL SCORE: 0
AUDIT-C TOTAL SCORE: 5
HOW OFTEN DURING THE LAST YEAR HAVE YOU BEEN UNABLE TO REMEMBER WHAT HAPPENED THE NIGHT BEFORE BECAUSE YOU HAD BEEN DRINKING: NEVER

## 2024-01-26 ASSESSMENT — COGNITIVE AND FUNCTIONAL STATUS - GENERAL
HELP NEEDED FOR BATHING: A LITTLE
DRESSING REGULAR LOWER BODY CLOTHING: A LITTLE
DAILY ACTIVITIY SCORE: 18
CLIMB 3 TO 5 STEPS WITH RAILING: A LITTLE
MOVING TO AND FROM BED TO CHAIR: A LITTLE
TURNING FROM BACK TO SIDE WHILE IN FLAT BAD: A LITTLE
TOILETING: A LITTLE
DRESSING REGULAR UPPER BODY CLOTHING: A LITTLE
HELP NEEDED FOR BATHING: A LITTLE
DRESSING REGULAR UPPER BODY CLOTHING: A LITTLE
EATING MEALS: A LITTLE
WALKING IN HOSPITAL ROOM: A LITTLE
PATIENT BASELINE BEDBOUND: NO
MOVING TO AND FROM BED TO CHAIR: A LITTLE
PERSONAL GROOMING: A LITTLE
PERSONAL GROOMING: A LITTLE
DRESSING REGULAR LOWER BODY CLOTHING: A LITTLE
MOBILITY SCORE: 20
STANDING UP FROM CHAIR USING ARMS: A LITTLE
CLIMB 3 TO 5 STEPS WITH RAILING: A LOT
TOILETING: A LITTLE
WALKING IN HOSPITAL ROOM: A LITTLE
DAILY ACTIVITIY SCORE: 19
STANDING UP FROM CHAIR USING ARMS: A LITTLE
MOBILITY SCORE: 18

## 2024-01-26 ASSESSMENT — ENCOUNTER SYMPTOMS
CARDIOVASCULAR NEGATIVE: 1
EYES NEGATIVE: 1
BLOOD IN STOOL: 1
RESPIRATORY NEGATIVE: 1
ALLERGIC/IMMUNOLOGIC NEGATIVE: 1
HEMATOLOGIC/LYMPHATIC NEGATIVE: 1
MUSCULOSKELETAL NEGATIVE: 1
NEUROLOGICAL NEGATIVE: 1
PSYCHIATRIC NEGATIVE: 1
ENDOCRINE NEGATIVE: 1
CONSTITUTIONAL NEGATIVE: 1

## 2024-01-26 ASSESSMENT — PAIN - FUNCTIONAL ASSESSMENT
PAIN_FUNCTIONAL_ASSESSMENT: 0-10

## 2024-01-26 ASSESSMENT — PAIN SCALES - GENERAL
PAINLEVEL_OUTOF10: 0 - NO PAIN
PAINLEVEL_OUTOF10: 0 - NO PAIN
PAINLEVEL_OUTOF10: 10 - WORST POSSIBLE PAIN
PAINLEVEL_OUTOF10: 10 - WORST POSSIBLE PAIN

## 2024-01-26 ASSESSMENT — PATIENT HEALTH QUESTIONNAIRE - PHQ9
SUM OF ALL RESPONSES TO PHQ9 QUESTIONS 1 & 2: 0
2. FEELING DOWN, DEPRESSED OR HOPELESS: NOT AT ALL
1. LITTLE INTEREST OR PLEASURE IN DOING THINGS: NOT AT ALL

## 2024-01-26 NOTE — H&P
History Of Present Illness  Kamila Jones is a 68 y.o. female presenting with rectal bleeding.  This patient started having some rectal bleeding yesterday today she did have some dizziness upon standing presented emergency room where she had a large bloody bowel movement before 7 PM blood count was down to 7 when previously a few months ago was above 10.  2 units of blood were ordered 10 transfused by ER she feels much better now.  In view of ongoing bleeding and limited capabilities in this facility patient was arranged and accepted to be transferred to [facility].  There is no available but however right now and she is boarding in ED so I am asked to admit her here in the meantime.  The patient denies abdominal pain or nausea denies chest pain shortness of breath     Past Medical History  She has a past medical history of Alcohol dependence, in remission (CMS/HCC), Personal history of other malignant neoplasm of large intestine, Personal history of other venous thrombosis and embolism, and Personal history of suicidal behavior.  History of COPD  Surgical History  She has a past surgical history that includes Tonsillectomy (06/15/2017); Other surgical history (06/15/2017); MR angio head wo IV contrast (10/24/2017); and MR angio neck wo IV contrast (10/24/2017).  Reviewed  Social History  She reports that she has been smoking cigarettes. She has a 3.75 pack-year smoking history. She has been exposed to tobacco smoke. She has never used smokeless tobacco. She reports current alcohol use of about 10.0 standard drinks of alcohol per week. She reports that she does not use drugs.  Reviewed  Family History  Family History   Problem Relation Name Age of Onset    Accidental death Mother      Stroke Father      Other (cerbral hemmorrhage) Father      Cancer Sister      Lung cancer Sister      Colon cancer Brother      Cancer Brother      Lung disease Brother          Allergies  Propranolol hcl    ROS  Review of  Systems   Constitutional: Negative.    HENT: Negative.     Eyes: Negative.    Respiratory: Negative.     Cardiovascular: Negative.    Gastrointestinal:  Positive for anal bleeding.   Endocrine: Negative.    Genitourinary: Negative.    Musculoskeletal: Negative.    Skin: Negative.    Allergic/Immunologic: Negative.    Neurological: Negative.    Hematological: Negative.    Psychiatric/Behavioral: Negative.     All other systems reviewed and are negative.       Last Recorded Vitals  /81   Pulse 84   Temp 36.8 °C (98.2 °F)   Resp 13   Wt 58.7 kg (129 lb 6.6 oz)   SpO2 100%     Physical Exam  I saw this patient emergency room bed #13.  Alert oriented x 3 cooperative  female no distress most recent blood pressure is 170/90  Normocephalic atraumatic EOMI PERRLA  Chest clear no wheezing  Heart regular  Abdomen soft nontender rectal exam was deferred  Extremities nonperforating good pedal pulses  Neurologic examination is motor sensor nonfocal    Relevant Results  Reviewed    ASSESSMENT/PLAN  Assessment/Plan   Principal Problem:    Hematochezia    Repeat H&H posttransfusion has been drawn we will follow-up these results will initiate Protonix to cover the unlikely possibility of brisk upper GI bleed.  Otherwise I feel this is lower GI bleed CT imaging with contrast was negative for active bleeding though.  Will continue supportive treatment and await transfer to tertiary care center       Nancy Levy MD

## 2024-01-26 NOTE — ED PROVIDER NOTES
The patient was seen in conjunction with the advanced practice provider, and I performed a substantive portion of the visit. I personally saw the patient and made/approved the management plan and take responsibility for the patient management. All medical decision making was performed by me. All laboratory studies, radiology, and EKGs were reviewed by me.     History: 68-year-old female presents for bright red blood per rectum.  States that she had some bleeding started last night thought it could be bleeding from her hemorrhoids and then today started having very large voluminous bloody bowel movements really containing no stool.  She is having some lower abdominal cramping.  No vomiting.  She does not take anticoagulants.  Patient did have a very large dark red blood and clot filled bowel movement in the bedside commode upon arrival  Physical exam:  Constitutional:  Awake, alert, pale appearing, borderline hypotensive SBP 95  HEENT:  Normocephalic, atraumatic, buccal mucosa  Neck: Trachea midline, no stridor  Respiratory/Chest:  Clear to auscultation bilaterally, no wheezes, rhonchi, or rales  CV:  Regular rate and regular rhythm, no murmurs, gallops, or rubs  Abdomen:  Soft, mild tenderness in the suprapubic left lower quadrant, nondistended, normal bowel sounds, no peritoneal signs, digital rectal exam with no fissure or external hemorrhoids, no palpable masses, dark red bloody stool, empty rectal vault otherwise  Neuro: A/O, normal speech  Skin:  Warm and dry    MDM: 68-year-old female presents for bright red blood per rectum.  Most likely lower GI bleed although brisk upper GI bleed was considered feel this is somewhat less likely.  Not on anticoagulants but is quite pale appearing borderline blood pressure suspect significant blood loss anemia therefore will consent for blood transfusion.  Consider colitis given lower abdominal pain less likely ischemic as there is no pain out of proportion.  Abs obtained  showing hemoglobin 7.3 down from baseline of 10.8 several months ago.  BUN 31, creatinine 1 also more suggestive of lower GI bleed probably diverticular.  CT abdomen pelvis shows no acute process or visualized active extravasation.  Patient ordered 2 units blood transfusion will monitor hemoglobin and situation closely.  Did consult gastroenterology, who recommends transfer to [facility] or other facility that has both GI and IR capabilities.  [facility] currently boarding therefore transfer center states [facility] has some intermittent bed availability.  Consulted gastroenterology at [facility] and patient was accepted in transfer for further management pending bed availability.    CRITICAL CARE NOTE:    Upon my evaluation, this patient had a high probability of imminent or life-threatening deterioration due to acute blood loss anemia requiring transfusion secondary to lower GI bleed, which required my direct attention, intervention, and personal management    37 total minutes of critical care were personally provided which excludes and was non concurrent with other providers and all other billable procedures.  This was for time at the bedside, re-evaluations, interpretation of lab and imaging results, discussions with consultants, and monitoring for potential decompensation.  Intervention were performed as documented above.         Polly Day MD  01/25/24 6642

## 2024-01-26 NOTE — PROGRESS NOTES
Occupational Therapy    Evaluation    Patient Name: Kamila Jones  MRN: [id number]  Today's Date: 1/26/2024  Time Calculation  Start Time: 1353  Stop Time: 1407  Time Calculation (min): 14 min    Assessment  IP OT Assessment  OT Assessment: Patient is a 68 year old female admitted with hematochezia. Patient is presenting with a decline in strength, balance, activity tolerance, and function, resulting in an increased need for assistance with ADL/IADL tasks. Skilled OT to address the above deficits in order to increase patient's safety and independence with daily tasks.  Prognosis: Good  Evaluation/Treatment Tolerance: Patient tolerated treatment well  End of Session Communication: Bedside nurse  End of Session Patient Position: Bed, 3 rail up, Alarm on  Plan:  Treatment Interventions: ADL retraining, Functional transfer training, Endurance training, UE strengthening/ROM, Patient/family training, Neuromuscular reeducation, Compensatory technique education  OT Frequency: 2 times per week  OT Discharge Recommendations: Low intensity level of continued care  OT Recommended Transfer Status: Assist of 1  OT - OK to Discharge: Yes    Subjective   Current Problem:  1. Hematochezia        2. Anemia, unspecified type        3. Diverticular hemorrhage          General:  General  Reason for Referral: decline in ADLs  Referred By: Dr. Simms  Past Medical History Relevant to Rehab: Patient is a 68 year old female admitted with hematochezia. PMH: anxiety, HTN, acute renal failure, anemia, COPD  Prior to Session Communication: Bedside nurse  Patient Position Received: Bed, 3 rail up  Preferred Learning Style: verbal  General Comment: Patient cleared by nursing for therapy. Patient in bed upon arrival and agreeable to participate  Precautions:  Medical Precautions: Fall precautions, Oxygen therapy device and L/min (3L O2 via NC)  Pain:  Pain Assessment  Pain Assessment: 0-10  Pain Score: 10 - Worst possible pain  Pain Type:  Chronic pain  Pain Location: Hip  Pain Orientation: Left    Objective   Cognition:  Overall Cognitive Status: Within Functional Limits     Home Living:  Type of Home: House  Lives With: Adult children (daughter and ALAINA)  Home Adaptive Equipment: Walker rolling or standard (rollator)  Home Layout: Multi-level, Able to live on main level with bedroom/bathroom  Home Access: Stairs to enter without rails  Entrance Stairs-Rails: None  Entrance Stairs-Number of Steps: 2  Bathroom Shower/Tub: Tub/shower unit  Bathroom Toilet: Standard  Bathroom Equipment: Shower chair with back   Prior Function:  Level of [place]: Independent with ADLs and functional transfers, Needs assistance with homemaking  Receives Help From: Family  ADL Assistance: Independent  Homemaking Assistance: Independent  Ambulatory Assistance: Independent  Prior Function Comments: patient wears 3L O2 via NC  IADL History:  Homemaking Responsibilities: Yes  IADL Comments: patient reports light housework, light cleaning  ADL:  Eating Assistance: Stand by  Eating Deficit: Setup (seated, items within reach)  Grooming Assistance: Stand by  Grooming Deficit: Setup, Supervision/safety (per clinical judgement)  Bathing Assistance: Stand by  Bathing Deficit: Setup, Increased time to complete , Supervision/safety (per clinical judgement)  UE Dressing Assistance: Stand by  UE Dressing Deficit: Setup (per clinical judgement)  LE Dressing Assistance: Stand by  LE Dressing Deficit: Setup, Requires assistive device for steadying, Increased time to complete, Supervision/safety (per clinical judgement)  Toileting Assistance with Device: Stand by  Toileting Deficit: Setup, Supervison/safety, Increased time to complete (per clinical judgement)  Activity Tolerance:  Endurance: Decreased tolerance for upright activites  Bed Mobility/Transfers: Bed Mobility  Bed Mobility: Yes  Bed Mobility 1  Bed Mobility 1: Supine to sitting  Level of Assistance 1: Distant supervision  Bed  Mobility 2  Bed Mobility  2: Sitting to supine  Level of Assistance 2: Distant supervision    Transfers  Transfer: Yes  Transfer 1  Transfer From 1: Bed to  Transfer to 1: Stand  Technique 1: Sit to stand  Transfer Device 1: Walker  Transfer Level of Assistance 1: Contact guard  Trials/Comments 1: x1 trial. ~4 steps forward/backwards with CGA and cues for safety    IADL's:   Homemaking Responsibilities: Yes  IADL Comments: patient reports light housework, light cleaning  Vision: Vision - Basic Assessment  Current Vision: Wears glasses only for reading  Sensation:  Sensation Comment: patient denies numbness/tinlging  Strength:  Strength Comments: B UE at least >/= 3/5 grossly. observed functionally  Extremities: RUE   RUE : Exceptions to WFL (chronic) and LUE   LUE: Within Functional Limits    Outcome Measures: [facility] Daily Activity  Putting on and taking off regular lower body clothing: A little  Bathing (including washing, rinsing, drying): A little  Putting on and taking off regular upper body clothing: A little  Toileting, which includes using toilet, bedpan or urinal: A little  Taking care of personal grooming such as brushing teeth: A little  Eating Meals: A little  Daily Activity - Total Score: 18    Education Documentation  Body Mechanics, taught by Irasema Madden OT at 1/26/2024  2:30 PM.  Learner: Patient  Readiness: Acceptance  Method: Explanation, Demonstration  Response: Needs Reinforcement    Precautions, taught by Irasema Madden OT at 1/26/2024  2:30 PM.  Learner: Patient  Readiness: Acceptance  Method: Explanation, Demonstration  Response: Needs Reinforcement    Education Comments  No comments found.      Goals:   Encounter Problems       Encounter Problems (Active)       OT Goals       ADLs (Progressing)       Start:  01/26/24    Expected End:  02/23/24       Patient will complete ADL tasks with [facility] in order to safely return to [facility].         Functional Transfers (Progressing)       Start:   01/26/24    Expected End:  02/23/24       Patient will complete functional transfers with Mod I in order to increase safety and independence with daily tasks.         B UE Strengthening (Progressing)       Start:  01/26/24    Expected End:  02/23/24       Patient will increase B UE strength to 4/5 for functional transfers.         Functional Mobility (Progressing)       Start:  01/26/24    Expected End:  02/23/24       Patient will demonstrate the ability to complete item retrieval and functional mobility with Mod I in order to increase safety and independence with daily tasks.

## 2024-01-26 NOTE — PROGRESS NOTES
"Kamila Jones is a 68 y.o. female on day 1 of admission presenting with Hematochezia.    Subjective   Seen and examined GI input reviewed and appreciated patient longer actively bleeding advance diet to full liquid anticipate advancement of diet to regular tomorrow adjust medications and fluids discontinue telemetry monitoring patient is medically stable for transfer to regular nursing floor crease PT OT and physical activity       Objective     Physical Exam  Vitals and nursing note reviewed.   Constitutional:       Appearance: Normal appearance.   HENT:      Head: Normocephalic and atraumatic.      Mouth/Throat:      Mouth: Mucous membranes are moist.   Eyes:      Extraocular Movements: Extraocular movements intact.      Pupils: Pupils are equal, round, and reactive to light.   Cardiovascular:      Rate and Rhythm: Normal rate and regular rhythm.      Pulses: Normal pulses.      Heart sounds: Normal heart sounds.   Pulmonary:      Effort: Pulmonary effort is normal.      Breath sounds: Normal breath sounds.   Abdominal:      General: Abdomen is flat.      Palpations: Abdomen is soft.   Musculoskeletal:         General: Normal range of motion.      Cervical back: Normal range of motion and neck supple.   Skin:     General: Skin is warm and dry.      Capillary Refill: Capillary refill takes less than 2 seconds.   Neurological:      General: No focal deficit present.      Mental Status: She is alert and oriented to person, place, and time. Mental status is at baseline.   Psychiatric:         Mood and Affect: Mood normal.         Last Recorded Vitals  Blood pressure 147/70, pulse 78, temperature 36.8 °C (98.2 °F), temperature source Temporal, resp. rate 11, height 1.67 m (5' 5.75\"), weight 58.5 kg (128 lb 15.5 oz), SpO2 99 %.  Intake/Output last 3 Shifts:  I/O last 3 completed shifts:  In: 1400 (23.9 mL/kg) [Blood:1400]  Out: - (0 mL/kg)   Weight: 58.5 kg     Relevant Results              Results for orders placed " or performed during the hospital encounter of 01/25/24 (from the past 24 hour(s))   CBC   Result Value Ref Range    WBC 9.7 4.4 - 11.3 x10*3/uL    nRBC 0.0 0.0 - 0.0 /100 WBCs    RBC 2.07 (L) 4.00 - 5.20 x10*6/uL    Hemoglobin 7.3 (L) 12.0 - 16.0 g/dL    Hematocrit 21.9 (L) 36.0 - 46.0 %     (H) 80 - 100 fL    MCH 35.3 (H) 26.0 - 34.0 pg    MCHC 33.3 32.0 - 36.0 g/dL    RDW 15.3 (H) 11.5 - 14.5 %    Platelets 250 150 - 450 x10*3/uL   Comprehensive Metabolic Panel   Result Value Ref Range    Glucose 121 (H) 65 - 99 mg/dL    Sodium 136 133 - 145 mmol/L    Potassium 4.2 3.4 - 5.1 mmol/L    Chloride 103 97 - 107 mmol/L    Bicarbonate 22 (L) 24 - 31 mmol/L    Urea Nitrogen 31 (H) 8 - 25 mg/dL    Creatinine 1.00 0.40 - 1.60 mg/dL    eGFR 61 >60 mL/min/1.73m*2    Calcium 8.3 (L) 8.5 - 10.4 mg/dL    Albumin 3.8 3.5 - 5.0 g/dL    Alkaline Phosphatase 70 35 - 125 U/L    Total Protein 6.1 5.9 - 7.9 g/dL    AST 15 5 - 40 U/L    Bilirubin, Total <0.2 0.1 - 1.2 mg/dL    ALT 12 5 - 40 U/L    Anion Gap 11 <=19 mmol/L   Lipase   Result Value Ref Range    Lipase 45 16 - 63 U/L   Urinalysis with Reflex Microscopic   Result Value Ref Range    Color, Urine Light-Orange (N) Light-Yellow, Yellow, Dark-Yellow    Appearance, Urine Turbid (N) Clear    Specific Gravity, Urine 1.026 1.005 - 1.035    pH, Urine 5.0 5.0, 5.5, 6.0, 6.5, 7.0, 7.5, 8.0    Protein, Urine 70 (1+) (A) NEGATIVE, 10 (TRACE), 20 (TRACE) mg/dL    Glucose, Urine Normal Normal mg/dL    Blood, Urine OVER (3+) (A) NEGATIVE    Ketones, Urine NEGATIVE NEGATIVE mg/dL    Bilirubin, Urine NEGATIVE NEGATIVE    Urobilinogen, Urine Normal Normal mg/dL    Nitrite, Urine NEGATIVE NEGATIVE    Leukocyte Esterase, Urine NEGATIVE NEGATIVE   Microscopic Only, Urine   Result Value Ref Range    WBC, Urine 1-5 1-5, NONE /HPF    RBC, Urine 3-5 NONE, 1-2, 3-5 /HPF    Bacteria, Urine 1+ (A) NONE SEEN /HPF    Mucus, Urine FEW Reference range not established. /LPF    Hyaline Casts, Urine 1+  (A) NONE /LPF   Type And Screen   Result Value Ref Range    ABO TYPE A     Rh TYPE POS     ANTIBODY SCREEN NEG    Prepare RBC: 2 Units   Result Value Ref Range    PRODUCT CODE F7811L50     Unit Number [id number]     Unit ABO A     Unit RH POS     XM INTEP COMP     Dispense Status TR     Blood Expiration Date January 27, 2024 23:59 EST     PRODUCT BLOOD TYPE 6200     UNIT VOLUME 350     PRODUCT CODE M8783S63     Unit Number [id number]     Unit ABO A     Unit RH POS     XM INTEP COMP     Dispense Status TR     Blood Expiration Date February 07, 2024 23:59 EST     PRODUCT BLOOD TYPE 6200     UNIT VOLUME 350    VERIFY ABO/Rh Group Test   Result Value Ref Range    ABO TYPE A     Rh TYPE POS    Hemoglobin and hematocrit, blood   Result Value Ref Range    Hemoglobin 9.9 (L) 12.0 - 16.0 g/dL    Hematocrit 29.3 (L) 36.0 - 46.0 %   Protime-INR   Result Value Ref Range    Protime 11.4 9.3 - 12.7 seconds    INR 1.1 0.9 - 1.2   Comprehensive metabolic panel   Result Value Ref Range    Glucose 99 65 - 99 mg/dL    Sodium 137 133 - 145 mmol/L    Potassium 4.6 3.4 - 5.1 mmol/L    Chloride 108 (H) 97 - 107 mmol/L    Bicarbonate 22 (L) 24 - 31 mmol/L    Urea Nitrogen 29 (H) 8 - 25 mg/dL    Creatinine 1.00 0.40 - 1.60 mg/dL    eGFR 61 >60 mL/min/1.73m*2    Calcium 8.2 (L) 8.5 - 10.4 mg/dL    Albumin 3.6 3.5 - 5.0 g/dL    Alkaline Phosphatase 65 35 - 125 U/L    Total Protein 5.7 (L) 5.9 - 7.9 g/dL    AST 13 5 - 40 U/L    Bilirubin, Total 0.4 0.1 - 1.2 mg/dL    ALT 11 5 - 40 U/L    Anion Gap 7 <=19 mmol/L   CBC and Auto Differential   Result Value Ref Range    WBC 6.4 4.4 - 11.3 x10*3/uL    nRBC 0.0 0.0 - 0.0 /100 WBCs    RBC 2.83 (L) 4.00 - 5.20 x10*6/uL    Hemoglobin 9.0 (L) 12.0 - 16.0 g/dL    Hematocrit 26.8 (L) 36.0 - 46.0 %    MCV 95 80 - 100 fL    MCH 31.8 26.0 - 34.0 pg    MCHC 33.6 32.0 - 36.0 g/dL    RDW 19.9 (H) 11.5 - 14.5 %    Platelets 174 150 - 450 x10*3/uL    Neutrophils % 69.6 40.0 - 80.0 %    Immature  Granulocytes %, Automated 0.5 0.0 - 0.9 %    Lymphocytes % 20.2 13.0 - 44.0 %    Monocytes % 7.8 2.0 - 10.0 %    Eosinophils % 1.4 0.0 - 6.0 %    Basophils % 0.5 0.0 - 2.0 %    Neutrophils Absolute 4.47 1.20 - 7.70 x10*3/uL    Immature Granulocytes Absolute, Automated 0.03 0.00 - 0.70 x10*3/uL    Lymphocytes Absolute 1.30 1.20 - 4.80 x10*3/uL    Monocytes Absolute 0.50 0.10 - 1.00 x10*3/uL    Eosinophils Absolute 0.09 0.00 - 0.70 x10*3/uL    Basophils Absolute 0.03 0.00 - 0.10 x10*3/uL                      Assessment/Plan   Principal Problem:    Hematochezia    Diverticulosis with diverticular bleed  Posthemorrhagic anemia monitor H&H transfuse iron  De-escalate to regular nursing floor discontinue telemetry monitoring increase PT OT and activity dissipating discharge in 48 hours       I spent 30 minutes in the professional and overall care of this patient.      Brian Simms DO

## 2024-01-26 NOTE — PROGRESS NOTES
Physical Therapy    Physical Therapy Evaluation    Patient Name: Kamila Jones  MRN: [id number]  Today's Date: 1/26/2024   Time Calculation  Start Time: 1445  Stop Time: 1504  Time Calculation (min): 19 min    Assessment/Plan   PT Assessment  PT Assessment Results: Decreased strength, Decreased endurance, Impaired balance, Decreased mobility, Pain  Rehab Prognosis: Good  Evaluation/Treatment Tolerance: Patient limited by fatigue  Medical Staff Made Aware: Yes  Strengths: Ability to acquire knowledge, Attitude of self  Barriers to Participation: Comorbidities  End of Session Communication: Bedside nurse  Assessment Comment: Pt is a 68 y.o. male adm for hematochezia. Pt received 2 units of blood, reports feeling better, still weak. Pt shaky, reports is due to being off med schedule. Pt required GCA for limited activity, gives good effort. Pt would benefit from continued skilled PT services to progress strength, endurance and safe functional mobility.  End of Session Patient Position: Up in chair  IP OR SWING BED PT PLAN  Inpatient or Swing Bed: Inpatient  PT Plan  Treatment/Interventions: Transfer training, Gait training, Stair training, Balance training, Strengthening, Endurance training, Therapeutic exercise, Therapeutic activity, Home exercise program  PT Plan: Skilled PT  PT Frequency: 4 times per week  PT Discharge Recommendations: Low intensity level of continued care  Equipment Recommended upon Discharge: Wheeled walker  PT Recommended Transfer Status: Assist x1, Assistive device  PT - OK to Discharge: Yes      Subjective   General Visit Information:  General  Reason for Referral: impaired mobility  Referred By: Dr. Simms  Past Medical History Relevant to Rehab: COPD, ETOH, DVT, colon cancer, suicidal ideation, tobacco abuse  Family/Caregiver Present: No  Prior to Session Communication: Bedside nurse  Patient Position Received: Bed, 3 rail up  Preferred Learning Style: verbal  General Comment: Pt agreeable  to PT eval, needing to use bathroom.  Home Living:  Home Living  Type of Home: House  Lives With: Adult children (Dtr, S-I-L)  Home Adaptive Equipment: Walker rolling or standard (rollator)  Home Layout: Multi-level, Able to live on main level with bedroom/bathroom  Home Access: Stairs to enter without rails  Entrance Stairs-Rails: None  Entrance Stairs-Number of Steps: 2  Bathroom Shower/Tub: Tub/shower unit  Bathroom Toilet: Standard  Bathroom Equipment: Shower chair with back  Prior Level of Function:  Prior Function Per Pt/Caregiver Report  Level of [place]: Independent with ADLs and functional transfers, Needs assistance with homemaking  Receives Help From: Family  ADL Assistance: Independent  Homemaking Assistance: Independent  Ambulatory Assistance: Independent  Prior Function Comments: Pt does not drive, denied falls in the past 6 month, reports1 in past year, 3L O2 at baseline  Precautions:  Precautions  Medical Precautions: Fall precautions, Oxygen therapy device and L/min (3L O2)    Objective   Pain:  Pain Assessment  Pain Assessment: 0-10  Pain Score: 10 - Worst possible pain  Pain Type: Chronic pain  Pain Location: Hip (also Lt shoulder: 8/10)  Pain Orientation: Right  Cognition:  Cognition  Overall Cognitive Status: Within Functional Limits    General Assessments:  Activity Tolerance  Endurance: Decreased tolerance for upright activites    Sensation  Sensation Comment: patient denies numbness/tinlging    Strength  Strength Comments: BLEs 3+/5 or > through observation of function  Strength  Strength Comments: BLEs 3+/5 or > through observation of function    Postural Control  Posture Comment: forward rounded posture    Dynamic Standing Balance  Dynamic Standing-Balance Support: Bilateral upper extremity supported  Dynamic Standing-Balance:  (amb)  Dynamic Standing-Comments: Fair/Fair-, shaky, reports has not had med for tremors, no LOB  Functional Assessments:  ADL  ADL's Addressed: Yes  ADL  Comments: Pt IND w/ hygiene after using commode, supervision at sink, no LOB    Bed Mobility  Bed Mobility: Yes  Bed Mobility 1  Bed Mobility 1: Supine to sitting  Level of Assistance 1: Distant supervision  Bed Mobility Comments 1: to EOB Lt, HOB elevated slightly    Transfers  Transfer: Yes  Transfer 1  Technique 1: Sit to stand, Stand to sit  Transfer Device 1: Walker  Transfer Level of Assistance 1: Contact guard  Trials/Comments 1: Performed from EOB to/from commode, to bedside chair. Cues for safe hand placement    Ambulation/Gait Training  Ambulation/Gait Training Performed: Yes  Ambulation/Gait Training 1  Surface 1: Level tile  Device 1: Rolling walker  Assistance 1: Contact guard  Comments/Distance (ft) 1: Pt amb ~ 15' x 2, slow pace shaky, no dizziness or LOB.  Extremity/Trunk Assessments:  RLE   RLE : Within Functional Limits  LLE   LLE : Within Functional Limits  Outcome Measures:  [facility] Basic Mobility  Turning from your back to your side while in a flat bed without using bedrails: None  Moving from lying on your back to sitting on the side of a flat bed without using bedrails: A little  Moving to and from bed to chair (including a wheelchair): A little  Standing up from a chair using your arms (e.g. wheelchair or bedside chair): A little  To walk in hospital room: A little  Climbing 3-5 steps with railing: A lot  Basic Mobility - Total Score: 18    Encounter Problems       Encounter Problems (Active)       Balance       LTG - Patient will maintain balance to allow for safe mobility (Progressing)       Start:  01/26/24    Expected End:  02/02/24               Mobility       STG - Patient will ambulate 60' w/ RW and supervision  (Progressing)       Start:  01/26/24    Expected End:  02/02/24            STG - Patient will ascend and descend two to four stairs w/o rails, min assist (Progressing)       Start:  01/26/24    Expected End:  02/02/24               Transfers       STG - Patient will transfer sit  to and from stand MOD I w/ to RW (Progressing)       Start:  01/26/24    Expected End:  02/02/24                   Education Documentation  Precautions, taught by Judy Miles, PT at 1/26/2024  6:06 PM.  Learner: Patient  Readiness: Acceptance  Method: Explanation  Response: Verbalizes Understanding    Mobility Training, taught by Judy Miles, PT at 1/26/2024  6:06 PM.  Learner: Patient  Readiness: Acceptance  Method: Explanation  Response: Verbalizes Understanding    Education Comments  No comments found.

## 2024-01-26 NOTE — ED NOTES
Accepted [facility], awaiting bed     Polly Day MD  01/25/24 1843

## 2024-01-26 NOTE — NURSING NOTE
Patient arrived from ED. Patient is A&Ox3 and is on 3L nc. Patient denies pain other then her chronic hip pain. Call light in reach. Patient has been oriented to the room. Patient is normal sinus on monitor @ 94bpm.

## 2024-01-26 NOTE — PROGRESS NOTES
Kamila Jones is a 68 y.o. female on day 1 of admission presenting with Hematochezia.     Patient seen at bedside, introduced myself and reason for visit. Patient currently lives with daughter/family in a Multi-level home (Able to live on main level with bedroom/bathroom). Uses 2 steps to enter without rails. Uses a Walker rolling or standard (rollator). Has a standard shower with a shower chair with back. Level of [place]: Independent with ADLs and functional transfers, Needs assistance with homemaking. Receives help from her family.  Patient has home oxygen and patient wears 3L O2 via NC. Patient also wears a c-pap.  IADL Comments: patient reports light housework, light cleaning. Patient has [employer] come to her home and help with showering 2x/week. Patient will return home with family and with help of [employer].     PLAN: Discharge to home with family and with the help of [employer].     Mary Jaffe RN

## 2024-01-26 NOTE — CONSULTS
Inpatient consult to gastroenterology  Consult performed by: Melissa Green, APRN-CNP  Consult ordered by: Nancy Levy MD      Reason For Consult  Hematochezia    History Of Present Illness  Kamila Jones is a 68 y.o. female presenting with BRBPR  yesterday. She also did have some dizziness. She had an episode of large bloody bowel movement  in ED.  2 units of blood were ordered 10 transfused by ER she feels much better now.  ER workup showed initial hemoglobin of 7.3, 2 units of PRBCs were transfused.  Current hemoglobin is 9.  CT abdomen pelvis showed diffuse colonic diverticulosis without evidence evidence of diverticulitis.  Patient currently denies taking any blood thinners.  Patient states she is feeling better today, had 1 episode of bloody stool this morning.  Denies any abdominal pain, nausea, vomiting.  Patient reports he had an EGD/colon a few years ago, however unable to view records at this time.  She states that she had 2 polyps removed, EGD was normal.      Past Medical History  She has a past medical history of Alcohol dependence, in remission (CMS/HCC), Personal history of other malignant neoplasm of large intestine, Personal history of other venous thrombosis and embolism, and Personal history of suicidal behavior.    Surgical History  She has a past surgical history that includes Tonsillectomy (06/15/2017); Other surgical history (06/15/2017); MR angio head wo IV contrast (10/24/2017); and MR angio neck wo IV contrast (10/24/2017).     Social History  She reports that she has been smoking cigarettes. She has a 3.75 pack-year smoking history. She has been exposed to tobacco smoke. She has never used smokeless tobacco. She reports current alcohol use of about 10.0 standard drinks of alcohol per week. She reports that she does not use drugs.    Family History  Family History   Problem Relation Name Age of Onset    Accidental death Mother      Stroke Father      Other (cerbral  "hemmorrhage) Father      Cancer Sister      Lung cancer Sister      Colon cancer Brother      Cancer Brother      Lung disease Brother          Allergies  Propranolol hcl    Review of Systems   Constitutional: Negative.    HENT: Negative.     Eyes: Negative.    Respiratory: Negative.     Cardiovascular: Negative.    Gastrointestinal:  Positive for blood in stool.   Endocrine: Negative.    Genitourinary: Negative.    Musculoskeletal: Negative.    Skin: Negative.    Allergic/Immunologic: Negative.    Neurological: Negative.    Hematological: Negative.    Psychiatric/Behavioral: Negative.          Physical Exam     Last Recorded Vitals  Blood pressure 165/66, pulse 91, temperature 36.5 °C (97.7 °F), temperature source Temporal, resp. rate 20, height 1.67 m (5' 5.75\"), weight 58.5 kg (128 lb 15.5 oz), SpO2 100 %.    Relevant Results  CT abdomen pelvis w IV contrast    Result Date: 1/25/2024  Interpreted By:  Mayank Flores, STUDY: CT ABDOMEN PELVIS W IV CONTRAST; 1/25/2024 7:03 pm   INDICATION: Signs/Symptoms:rectal bleeding, abd pain;   COMPARISON: 2022   ACCESSION NUMBER(S): [id number]   ORDERING CLINICIAN: JANETH LINN   TECHNIQUE: Contiguous axial images of the abdomen/pelvis were performed with IV contrast. 75 ml of Omnipaque 350 was utilized. All CT examinations are performed with 1 or more of the following dose reduction techniques: Automated exposure control, adjustment of mA and/or kv according to patient's size, or use of iterative reconstruction techniques.   FINDINGS: The liver, gallbladder,  common bile duct, pancreas, spleen, and adrenal glands are unremarkable.   The kidneys enhance symmetrically.  No urolithiasis is seen. No hydroureteronephrosis is seen.   Unchanged ectasia of the infrarenal abdominal aorta measuring 3.3 cm.   The small bowel is not dilated. The appendix is normal. Insert tech   No free intraperitoneal air or fluid is seen.   The bladder is well distended with no gross wall " thickening.   The visualized osseous structures are intact.   Limited images of the lower thorax are unremarkable.       No acute abdominopelvic pathology. There is diffuse colonic diverticulosis without evidence of diverticulitis. Unchanged abdominal aortic ectasia measuring up to 3.3 cm.   MACRO: None   Signed by: Mayank Flores 1/25/2024 7:39 PM Dictation workstation:   [id number]    Scheduled medications  pantoprazole, 40 mg, intravenous, q12h      Continuous medications     PRN medications  PRN medications: ipratropium-albuteroL  Results for orders placed or performed during the hospital encounter of 01/25/24 (from the past 24 hour(s))   CBC   Result Value Ref Range    WBC 9.7 4.4 - 11.3 x10*3/uL    nRBC 0.0 0.0 - 0.0 /100 WBCs    RBC 2.07 (L) 4.00 - 5.20 x10*6/uL    Hemoglobin 7.3 (L) 12.0 - 16.0 g/dL    Hematocrit 21.9 (L) 36.0 - 46.0 %     (H) 80 - 100 fL    MCH 35.3 (H) 26.0 - 34.0 pg    MCHC 33.3 32.0 - 36.0 g/dL    RDW 15.3 (H) 11.5 - 14.5 %    Platelets 250 150 - 450 x10*3/uL   Comprehensive Metabolic Panel   Result Value Ref Range    Glucose 121 (H) 65 - 99 mg/dL    Sodium 136 133 - 145 mmol/L    Potassium 4.2 3.4 - 5.1 mmol/L    Chloride 103 97 - 107 mmol/L    Bicarbonate 22 (L) 24 - 31 mmol/L    Urea Nitrogen 31 (H) 8 - 25 mg/dL    Creatinine 1.00 0.40 - 1.60 mg/dL    eGFR 61 >60 mL/min/1.73m*2    Calcium 8.3 (L) 8.5 - 10.4 mg/dL    Albumin 3.8 3.5 - 5.0 g/dL    Alkaline Phosphatase 70 35 - 125 U/L    Total Protein 6.1 5.9 - 7.9 g/dL    AST 15 5 - 40 U/L    Bilirubin, Total <0.2 0.1 - 1.2 mg/dL    ALT 12 5 - 40 U/L    Anion Gap 11 <=19 mmol/L   Lipase   Result Value Ref Range    Lipase 45 16 - 63 U/L   Urinalysis with Reflex Microscopic   Result Value Ref Range    Color, Urine Light-Orange (N) Light-Yellow, Yellow, Dark-Yellow    Appearance, Urine Turbid (N) Clear    Specific Gravity, Urine 1.026 1.005 - 1.035    pH, Urine 5.0 5.0, 5.5, 6.0, 6.5, 7.0, 7.5, 8.0    Protein, Urine 70 (1+) (A)  NEGATIVE, 10 (TRACE), 20 (TRACE) mg/dL    Glucose, Urine Normal Normal mg/dL    Blood, Urine OVER (3+) (A) NEGATIVE    Ketones, Urine NEGATIVE NEGATIVE mg/dL    Bilirubin, Urine NEGATIVE NEGATIVE    Urobilinogen, Urine Normal Normal mg/dL    Nitrite, Urine NEGATIVE NEGATIVE    Leukocyte Esterase, Urine NEGATIVE NEGATIVE   Microscopic Only, Urine   Result Value Ref Range    WBC, Urine 1-5 1-5, NONE /HPF    RBC, Urine 3-5 NONE, 1-2, 3-5 /HPF    Bacteria, Urine 1+ (A) NONE SEEN /HPF    Mucus, Urine FEW Reference range not established. /LPF    Hyaline Casts, Urine 1+ (A) NONE /LPF   Type And Screen   Result Value Ref Range    ABO TYPE A     Rh TYPE POS     ANTIBODY SCREEN NEG    Prepare RBC: 2 Units   Result Value Ref Range    PRODUCT CODE A1521V84     Unit Number [id number]     Unit ABO A     Unit RH POS     XM INTEP COMP     Dispense Status TR     Blood Expiration Date January 27, 2024 23:59 EST     PRODUCT BLOOD TYPE 6200     UNIT VOLUME 350     PRODUCT CODE K9712P24     Unit Number [id number]     Unit ABO A     Unit RH POS     XM INTEP COMP     Dispense Status TR     Blood Expiration Date February 07, 2024 23:59 EST     PRODUCT BLOOD TYPE 6200     UNIT VOLUME 350    VERIFY ABO/Rh Group Test   Result Value Ref Range    ABO TYPE A     Rh TYPE POS    Hemoglobin and hematocrit, blood   Result Value Ref Range    Hemoglobin 9.9 (L) 12.0 - 16.0 g/dL    Hematocrit 29.3 (L) 36.0 - 46.0 %   Protime-INR   Result Value Ref Range    Protime 11.4 9.3 - 12.7 seconds    INR 1.1 0.9 - 1.2   Comprehensive metabolic panel   Result Value Ref Range    Glucose 99 65 - 99 mg/dL    Sodium 137 133 - 145 mmol/L    Potassium 4.6 3.4 - 5.1 mmol/L    Chloride 108 (H) 97 - 107 mmol/L    Bicarbonate 22 (L) 24 - 31 mmol/L    Urea Nitrogen 29 (H) 8 - 25 mg/dL    Creatinine 1.00 0.40 - 1.60 mg/dL    eGFR 61 >60 mL/min/1.73m*2    Calcium 8.2 (L) 8.5 - 10.4 mg/dL    Albumin 3.6 3.5 - 5.0 g/dL    Alkaline Phosphatase 65 35 - 125 U/L    Total  Protein 5.7 (L) 5.9 - 7.9 g/dL    AST 13 5 - 40 U/L    Bilirubin, Total 0.4 0.1 - 1.2 mg/dL    ALT 11 5 - 40 U/L    Anion Gap 7 <=19 mmol/L   CBC and Auto Differential   Result Value Ref Range    WBC 6.4 4.4 - 11.3 x10*3/uL    nRBC 0.0 0.0 - 0.0 /100 WBCs    RBC 2.83 (L) 4.00 - 5.20 x10*6/uL    Hemoglobin 9.0 (L) 12.0 - 16.0 g/dL    Hematocrit 26.8 (L) 36.0 - 46.0 %    MCV 95 80 - 100 fL    MCH 31.8 26.0 - 34.0 pg    MCHC 33.6 32.0 - 36.0 g/dL    RDW 19.9 (H) 11.5 - 14.5 %    Platelets 174 150 - 450 x10*3/uL    Neutrophils % 69.6 40.0 - 80.0 %    Immature Granulocytes %, Automated 0.5 0.0 - 0.9 %    Lymphocytes % 20.2 13.0 - 44.0 %    Monocytes % 7.8 2.0 - 10.0 %    Eosinophils % 1.4 0.0 - 6.0 %    Basophils % 0.5 0.0 - 2.0 %    Neutrophils Absolute 4.47 1.20 - 7.70 x10*3/uL    Immature Granulocytes Absolute, Automated 0.03 0.00 - 0.70 x10*3/uL    Lymphocytes Absolute 1.30 1.20 - 4.80 x10*3/uL    Monocytes Absolute 0.50 0.10 - 1.00 x10*3/uL    Eosinophils Absolute 0.09 0.00 - 0.70 x10*3/uL    Basophils Absolute 0.03 0.00 - 0.10 x10*3/uL        Assessment/Plan     Hematochezia  Most likely etiology is diverticular bleeding  -CT abdomen pelvis showed diffuse colonic diverticulosis.  No diverticulitis seen  Reported multiple episodes of bright red bleeding per rectum  - afebrile, hemodynamically stable.  Current hemoglobin at 9  -Clear liquid diet, advance as tolerated  -No urgent indication for inpatient scope.   - monitor labs, H&H keep hemoglobin above 7 transfuse if falls below  - IVF   -Bleeding should resolve on its own, will monitor H&H closely.    -May consider scopes if symptoms persist  -Monitor stool output  - continue supportive care    Melissa Green, APRN-CNP

## 2024-01-26 NOTE — NURSING NOTE
Received report from edward hobbs rn, patiet on floor sitting comfortable in bed, no complaints, call light in reach.

## 2024-01-27 LAB
ALBUMIN SERPL-MCNC: 3.7 G/DL (ref 3.5–5)
ALP BLD-CCNC: 71 U/L (ref 35–125)
ALT SERPL-CCNC: 10 U/L (ref 5–40)
ANION GAP SERPL CALC-SCNC: 6 MMOL/L
AST SERPL-CCNC: 14 U/L (ref 5–40)
BASOPHILS # BLD AUTO: 0.02 X10*3/UL (ref 0–0.1)
BASOPHILS NFR BLD AUTO: 0.4 %
BILIRUB SERPL-MCNC: 0.2 MG/DL (ref 0.1–1.2)
BUN SERPL-MCNC: 19 MG/DL (ref 8–25)
CALCIUM SERPL-MCNC: 8.7 MG/DL (ref 8.5–10.4)
CHLORIDE SERPL-SCNC: 106 MMOL/L (ref 97–107)
CO2 SERPL-SCNC: 26 MMOL/L (ref 24–31)
CREAT SERPL-MCNC: 0.9 MG/DL (ref 0.4–1.6)
EGFRCR SERPLBLD CKD-EPI 2021: 69 ML/MIN/1.73M*2
EOSINOPHIL # BLD AUTO: 0.12 X10*3/UL (ref 0–0.7)
EOSINOPHIL NFR BLD AUTO: 2.4 %
ERYTHROCYTE [DISTWIDTH] IN BLOOD BY AUTOMATED COUNT: 19.9 % (ref 11.5–14.5)
GLUCOSE SERPL-MCNC: 104 MG/DL (ref 65–99)
HCT VFR BLD AUTO: 24.1 % (ref 36–46)
HGB BLD-MCNC: 8.3 G/DL (ref 12–16)
IMM GRANULOCYTES # BLD AUTO: 0.03 X10*3/UL (ref 0–0.7)
IMM GRANULOCYTES NFR BLD AUTO: 0.6 % (ref 0–0.9)
LYMPHOCYTES # BLD AUTO: 1.01 X10*3/UL (ref 1.2–4.8)
LYMPHOCYTES NFR BLD AUTO: 20.6 %
MCH RBC QN AUTO: 32.9 PG (ref 26–34)
MCHC RBC AUTO-ENTMCNC: 34.4 G/DL (ref 32–36)
MCV RBC AUTO: 96 FL (ref 80–100)
MONOCYTES # BLD AUTO: 0.44 X10*3/UL (ref 0.1–1)
MONOCYTES NFR BLD AUTO: 9 %
NEUTROPHILS # BLD AUTO: 3.28 X10*3/UL (ref 1.2–7.7)
NEUTROPHILS NFR BLD AUTO: 67 %
NRBC BLD-RTO: 0 /100 WBCS (ref 0–0)
PLATELET # BLD AUTO: 181 X10*3/UL (ref 150–450)
POTASSIUM SERPL-SCNC: 4.2 MMOL/L (ref 3.4–5.1)
PROT SERPL-MCNC: 5.9 G/DL (ref 5.9–7.9)
RBC # BLD AUTO: 2.52 X10*6/UL (ref 4–5.2)
SODIUM SERPL-SCNC: 138 MMOL/L (ref 133–145)
WBC # BLD AUTO: 4.9 X10*3/UL (ref 4.4–11.3)

## 2024-01-27 PROCEDURE — 85025 COMPLETE CBC W/AUTO DIFF WBC: CPT | Performed by: INTERNAL MEDICINE

## 2024-01-27 PROCEDURE — 2500000004 HC RX 250 GENERAL PHARMACY W/ HCPCS (ALT 636 FOR OP/ED): Performed by: INTERNAL MEDICINE

## 2024-01-27 PROCEDURE — 1100000001 HC PRIVATE ROOM DAILY

## 2024-01-27 PROCEDURE — 2500000001 HC RX 250 WO HCPCS SELF ADMINISTERED DRUGS (ALT 637 FOR MEDICARE OP)

## 2024-01-27 PROCEDURE — 2500000001 HC RX 250 WO HCPCS SELF ADMINISTERED DRUGS (ALT 637 FOR MEDICARE OP): Performed by: INTERNAL MEDICINE

## 2024-01-27 PROCEDURE — 36415 COLL VENOUS BLD VENIPUNCTURE: CPT | Performed by: INTERNAL MEDICINE

## 2024-01-27 PROCEDURE — 80053 COMPREHEN METABOLIC PANEL: CPT | Performed by: INTERNAL MEDICINE

## 2024-01-27 PROCEDURE — 2500000004 HC RX 250 GENERAL PHARMACY W/ HCPCS (ALT 636 FOR OP/ED): Mod: MUE

## 2024-01-27 RX ORDER — AMLODIPINE BESYLATE 10 MG/1
10 TABLET ORAL DAILY
Status: DISCONTINUED | OUTPATIENT
Start: 2024-01-27 | End: 2024-01-27

## 2024-01-27 RX ORDER — AMLODIPINE BESYLATE 10 MG/1
10 TABLET ORAL DAILY
Status: DISCONTINUED | OUTPATIENT
Start: 2024-01-27 | End: 2024-01-28 | Stop reason: HOSPADM

## 2024-01-27 RX ORDER — MIRTAZAPINE 15 MG/1
15 TABLET, FILM COATED ORAL NIGHTLY
Status: DISCONTINUED | OUTPATIENT
Start: 2024-01-27 | End: 2024-01-28 | Stop reason: HOSPADM

## 2024-01-27 RX ADMIN — MIRTAZAPINE 15 MG: 15 TABLET, FILM COATED ORAL at 20:34

## 2024-01-27 RX ADMIN — PANTOPRAZOLE SODIUM 40 MG: 40 TABLET, DELAYED RELEASE ORAL at 05:21

## 2024-01-27 RX ADMIN — IRON SUCROSE 200 MG: 20 INJECTION, SOLUTION INTRAVENOUS at 05:21

## 2024-01-27 RX ADMIN — AMLODIPINE BESYLATE 10 MG: 10 TABLET ORAL at 15:08

## 2024-01-27 ASSESSMENT — COGNITIVE AND FUNCTIONAL STATUS - GENERAL
MOVING TO AND FROM BED TO CHAIR: A LITTLE
DRESSING REGULAR LOWER BODY CLOTHING: A LITTLE
MOBILITY SCORE: 23
STANDING UP FROM CHAIR USING ARMS: A LITTLE
WALKING IN HOSPITAL ROOM: A LITTLE
TOILETING: A LITTLE
HELP NEEDED FOR BATHING: A LITTLE
TURNING FROM BACK TO SIDE WHILE IN FLAT BAD: A LITTLE
PERSONAL GROOMING: A LITTLE
DRESSING REGULAR UPPER BODY CLOTHING: A LITTLE
CLIMB 3 TO 5 STEPS WITH RAILING: A LITTLE
EATING MEALS: A LITTLE
MOBILITY SCORE: 18
DAILY ACTIVITIY SCORE: 18
CLIMB 3 TO 5 STEPS WITH RAILING: A LOT

## 2024-01-27 ASSESSMENT — PAIN SCALES - GENERAL
PAINLEVEL_OUTOF10: 0 - NO PAIN
PAINLEVEL_OUTOF10: 0 - NO PAIN

## 2024-01-27 ASSESSMENT — PAIN SCALES - WONG BAKER: WONGBAKER_NUMERICALRESPONSE: NO HURT

## 2024-01-27 NOTE — CARE PLAN
Problem: Fall/Injury  Goal: Not fall by end of shift  Outcome: Progressing  Goal: Be free from injury by end of the shift  Outcome: Progressing  Goal: Verbalize understanding of personal risk factors for fall in the hospital  Outcome: Progressing  Goal: Verbalize understanding of risk factor reduction measures to prevent injury from fall in the home  Outcome: Progressing  Goal: Use assistive devices by end of the shift  Outcome: Progressing  Goal: Pace activities to prevent fatigue by end of the shift  Outcome: Progressing   The patient's goals for the shift include      The clinical goals for the shift include monitor labs

## 2024-01-27 NOTE — PROGRESS NOTES
"Kamila Jones is a 68 y.o. female on day 2 of admission presenting with Hematochezia.    Subjective      Patient resting comfortably in bed, she reports she had a small bowel movement this a.m. with no blood.     Objective     Physical Exam  HENT:      Head: Normocephalic.      Nose: Nose normal.      Mouth/Throat:      Mouth: Mucous membranes are moist.   Eyes:      Pupils: Pupils are equal, round, and reactive to light.   Cardiovascular:      Rate and Rhythm: Normal rate.   Pulmonary:      Effort: Pulmonary effort is normal.   Abdominal:      General: Abdomen is flat.   Musculoskeletal:      Cervical back: Normal range of motion.   Skin:     General: Skin is warm.   Neurological:      General: No focal deficit present.      Mental Status: She is alert.   Psychiatric:         Mood and Affect: Mood normal.         Last Recorded Vitals  Blood pressure 147/66, pulse 77, temperature 36.7 °C (98.1 °F), temperature source Temporal, resp. rate 17, height 1.67 m (5' 5.75\"), weight 58.5 kg (128 lb 15.5 oz), SpO2 100 %.  Intake/Output last 3 Shifts:  I/O last 3 completed shifts:  In: 1600 (27.4 mL/kg) [P.O.:200; Blood:1400]  Out: - (0 mL/kg)   Weight: 58.5 kg     Relevant Results  CT abdomen pelvis w IV contrast    Result Date: 1/25/2024  Interpreted By:  Mayank Flores, STUDY: CT ABDOMEN PELVIS W IV CONTRAST; 1/25/2024 7:03 pm   INDICATION: Signs/Symptoms:rectal bleeding, abd pain;   COMPARISON: 2022   ACCESSION NUMBER(S): [id number]   ORDERING CLINICIAN: JANETH LINN   TECHNIQUE: Contiguous axial images of the abdomen/pelvis were performed with IV contrast. 75 ml of Omnipaque 350 was utilized. All CT examinations are performed with 1 or more of the following dose reduction techniques: Automated exposure control, adjustment of mA and/or kv according to patient's size, or use of iterative reconstruction techniques.   FINDINGS: The liver, gallbladder,  common bile duct, pancreas, spleen, and adrenal glands are " unremarkable.   The kidneys enhance symmetrically.  No urolithiasis is seen. No hydroureteronephrosis is seen.   Unchanged ectasia of the infrarenal abdominal aorta measuring 3.3 cm.   The small bowel is not dilated. The appendix is normal. Insert tech   No free intraperitoneal air or fluid is seen.   The bladder is well distended with no gross wall thickening.   The visualized osseous structures are intact.   Limited images of the lower thorax are unremarkable.       No acute abdominopelvic pathology. There is diffuse colonic diverticulosis without evidence of diverticulitis. Unchanged abdominal aortic ectasia measuring up to 3.3 cm.   MACRO: None   Signed by: Mayank Flores 1/25/2024 7:39 PM Dictation workstation:   [id number]      Scheduled medications  amLODIPine, 10 mg, oral, Daily  iron sucrose, 200 mg, intravenous, Daily  mirtazapine, 15 mg, oral, Nightly  pantoprazole, 40 mg, oral, Daily before breakfast      Continuous medications     PRN medications  PRN medications: ipratropium-albuteroL, ondansetron  Results for orders placed or performed during the hospital encounter of 01/25/24 (from the past 24 hour(s))   CBC and Auto Differential   Result Value Ref Range    WBC 4.9 4.4 - 11.3 x10*3/uL    nRBC 0.0 0.0 - 0.0 /100 WBCs    RBC 2.52 (L) 4.00 - 5.20 x10*6/uL    Hemoglobin 8.3 (L) 12.0 - 16.0 g/dL    Hematocrit 24.1 (L) 36.0 - 46.0 %    MCV 96 80 - 100 fL    MCH 32.9 26.0 - 34.0 pg    MCHC 34.4 32.0 - 36.0 g/dL    RDW 19.9 (H) 11.5 - 14.5 %    Platelets 181 150 - 450 x10*3/uL    Neutrophils % 67.0 40.0 - 80.0 %    Immature Granulocytes %, Automated 0.6 0.0 - 0.9 %    Lymphocytes % 20.6 13.0 - 44.0 %    Monocytes % 9.0 2.0 - 10.0 %    Eosinophils % 2.4 0.0 - 6.0 %    Basophils % 0.4 0.0 - 2.0 %    Neutrophils Absolute 3.28 1.20 - 7.70 x10*3/uL    Immature Granulocytes Absolute, Automated 0.03 0.00 - 0.70 x10*3/uL    Lymphocytes Absolute 1.01 (L) 1.20 - 4.80 x10*3/uL    Monocytes Absolute 0.44 0.10 - 1.00  x10*3/uL    Eosinophils Absolute 0.12 0.00 - 0.70 x10*3/uL    Basophils Absolute 0.02 0.00 - 0.10 x10*3/uL   Comprehensive Metabolic Panel   Result Value Ref Range    Glucose 104 (H) 65 - 99 mg/dL    Sodium 138 133 - 145 mmol/L    Potassium 4.2 3.4 - 5.1 mmol/L    Chloride 106 97 - 107 mmol/L    Bicarbonate 26 24 - 31 mmol/L    Urea Nitrogen 19 8 - 25 mg/dL    Creatinine 0.90 0.40 - 1.60 mg/dL    eGFR 69 >60 mL/min/1.73m*2    Calcium 8.7 8.5 - 10.4 mg/dL    Albumin 3.7 3.5 - 5.0 g/dL    Alkaline Phosphatase 71 35 - 125 U/L    Total Protein 5.9 5.9 - 7.9 g/dL    AST 14 5 - 40 U/L    Bilirubin, Total 0.2 0.1 - 1.2 mg/dL    ALT 10 5 - 40 U/L    Anion Gap 6 <=19 mmol/L           Assessment/Plan   Principal Problem:    Hematochezia    Hematochezia  Most likely etiology is diverticular bleeding  -CT abdomen pelvis showed diffuse colonic diverticulosis.  No diverticulitis seen  Reported multiple episodes of bright red bleeding per rectum  - afebrile, hemodynamically stable.  Current hemoglobin at 8.3  -Low fiber diet  -No urgent indication for inpatient scope.   - monitor labs, H&H keep hemoglobin above 7 transfuse if falls below  - IVF   -Bleeding should resolve on its own, will monitor H&H closely.    -Monitor stool output  - continue supportive care    Bleeding seems to be resolving with no new reported events today. Advised about avoiding NSAIDs and constipation.  She was instructed to take MiraLAX daily.  She may be discharged from GI standpoint.     Melissa Green, APRN-CNP

## 2024-01-27 NOTE — CARE PLAN
The patient's goals for the shift include      The clinical goals for the shift include monitor patient's vitals and labs      Problem: Fall/Injury  Goal: Not fall by end of shift  Outcome: Progressing  Goal: Be free from injury by end of the shift  Outcome: Progressing  Goal: Verbalize understanding of personal risk factors for fall in the hospital  Outcome: Progressing  Goal: Verbalize understanding of risk factor reduction measures to prevent injury from fall in the home  Outcome: Progressing  Goal: Use assistive devices by end of the shift  Outcome: Progressing  Goal: Pace activities to prevent fatigue by end of the shift  Outcome: Progressing

## 2024-01-27 NOTE — PROGRESS NOTES
Kamila Jones is a 68 y.o. female on day 2 of admission presenting with Hematochezia.      Subjective   68-year-old and denies any abdominal pain admits to mild abdominal discomfort left lower quadrant.  Has episode of blood in stool but has not had any bladder or bowel movements today.  Required 2 units of blood transfused.  Patient denies any chest pain or shortness of breath.  Patient denies any nausea or vomiting.  Readily admits that past diet including eating a lot of popcorn.       Objective          Vitals 24HR  Heart Rate:  []   Temp:  [36.5 °C (97.7 °F)-36.9 °C (98.4 °F)]   Resp:  [17-20]   BP: (147-188)/(66-85)   SpO2:  [96 %-100 %]         Intake/Output last 3 Shifts:    Intake/Output Summary (Last 24 hours) at 1/27/2024 1408  Last data filed at 1/27/2024 0900  Gross per 24 hour   Intake 450 ml   Output 0 ml   Net 450 ml       Physical Exam  Constitutional:       Appearance: Normal appearance.   HENT:      Head: Normocephalic and atraumatic.   Eyes:      Pupils: Pupils are equal, round, and reactive to light.   Cardiovascular:      Rate and Rhythm: Normal rate and regular rhythm.      Pulses: Normal pulses.   Abdominal:      General: Abdomen is flat. Bowel sounds are normal.      Palpations: Abdomen is soft.   Musculoskeletal:         General: Normal range of motion.   Skin:     General: Skin is warm.   Neurological:      Mental Status: She is alert.   Psychiatric:         Mood and Affect: Mood normal.         Relevant Results               Assessment/Plan              Principal Problem:    Hematochezia    1.  Hematochezia likely secondary to colonic diverticular bleeding.  CBC appears stable received 2 units packed red blood cells transfusion continue monitor CBC continue Protonix   2.  Hypertension continue Norvasc oral daily  3.  Plan of care like discharge in 1 to 2 days.       I spent less than 30 minutes in the professional and overall care of this patient.      Daryl Garcia MD

## 2024-01-28 VITALS
BODY MASS INDEX: 20.73 KG/M2 | WEIGHT: 128.97 LBS | DIASTOLIC BLOOD PRESSURE: 75 MMHG | RESPIRATION RATE: 18 BRPM | SYSTOLIC BLOOD PRESSURE: 156 MMHG | TEMPERATURE: 97.9 F | HEART RATE: 84 BPM | OXYGEN SATURATION: 99 % | HEIGHT: 66 IN

## 2024-01-28 LAB
ALBUMIN SERPL-MCNC: 3.8 G/DL (ref 3.5–5)
ALP BLD-CCNC: 75 U/L (ref 35–125)
ALT SERPL-CCNC: 11 U/L (ref 5–40)
ANION GAP SERPL CALC-SCNC: 7 MMOL/L
AST SERPL-CCNC: 17 U/L (ref 5–40)
BASOPHILS # BLD AUTO: 0.02 X10*3/UL (ref 0–0.1)
BASOPHILS NFR BLD AUTO: 0.4 %
BILIRUB SERPL-MCNC: 0.2 MG/DL (ref 0.1–1.2)
BUN SERPL-MCNC: 14 MG/DL (ref 8–25)
CALCIUM SERPL-MCNC: 8.7 MG/DL (ref 8.5–10.4)
CHLORIDE SERPL-SCNC: 106 MMOL/L (ref 97–107)
CO2 SERPL-SCNC: 27 MMOL/L (ref 24–31)
CREAT SERPL-MCNC: 0.9 MG/DL (ref 0.4–1.6)
EGFRCR SERPLBLD CKD-EPI 2021: 69 ML/MIN/1.73M*2
EOSINOPHIL # BLD AUTO: 0.15 X10*3/UL (ref 0–0.7)
EOSINOPHIL NFR BLD AUTO: 2.7 %
ERYTHROCYTE [DISTWIDTH] IN BLOOD BY AUTOMATED COUNT: 18.7 % (ref 11.5–14.5)
GLUCOSE SERPL-MCNC: 97 MG/DL (ref 65–99)
HCT VFR BLD AUTO: 27 % (ref 36–46)
HGB BLD-MCNC: 8.9 G/DL (ref 12–16)
IMM GRANULOCYTES # BLD AUTO: 0.04 X10*3/UL (ref 0–0.7)
IMM GRANULOCYTES NFR BLD AUTO: 0.7 % (ref 0–0.9)
LYMPHOCYTES # BLD AUTO: 1.14 X10*3/UL (ref 1.2–4.8)
LYMPHOCYTES NFR BLD AUTO: 20.8 %
MCH RBC QN AUTO: 31.9 PG (ref 26–34)
MCHC RBC AUTO-ENTMCNC: 33 G/DL (ref 32–36)
MCV RBC AUTO: 97 FL (ref 80–100)
MONOCYTES # BLD AUTO: 0.46 X10*3/UL (ref 0.1–1)
MONOCYTES NFR BLD AUTO: 8.4 %
NEUTROPHILS # BLD AUTO: 3.67 X10*3/UL (ref 1.2–7.7)
NEUTROPHILS NFR BLD AUTO: 67 %
NRBC BLD-RTO: 0 /100 WBCS (ref 0–0)
PLATELET # BLD AUTO: 195 X10*3/UL (ref 150–450)
POTASSIUM SERPL-SCNC: 4.4 MMOL/L (ref 3.4–5.1)
PROT SERPL-MCNC: 6.2 G/DL (ref 5.9–7.9)
RBC # BLD AUTO: 2.79 X10*6/UL (ref 4–5.2)
SODIUM SERPL-SCNC: 140 MMOL/L (ref 133–145)
WBC # BLD AUTO: 5.5 X10*3/UL (ref 4.4–11.3)

## 2024-01-28 PROCEDURE — 85025 COMPLETE CBC W/AUTO DIFF WBC: CPT | Performed by: INTERNAL MEDICINE

## 2024-01-28 PROCEDURE — 2500000004 HC RX 250 GENERAL PHARMACY W/ HCPCS (ALT 636 FOR OP/ED): Performed by: INTERNAL MEDICINE

## 2024-01-28 PROCEDURE — 36415 COLL VENOUS BLD VENIPUNCTURE: CPT | Performed by: INTERNAL MEDICINE

## 2024-01-28 PROCEDURE — 2500000004 HC RX 250 GENERAL PHARMACY W/ HCPCS (ALT 636 FOR OP/ED)

## 2024-01-28 PROCEDURE — 84075 ASSAY ALKALINE PHOSPHATASE: CPT | Performed by: INTERNAL MEDICINE

## 2024-01-28 PROCEDURE — 2500000001 HC RX 250 WO HCPCS SELF ADMINISTERED DRUGS (ALT 637 FOR MEDICARE OP)

## 2024-01-28 RX ADMIN — PANTOPRAZOLE SODIUM 40 MG: 40 TABLET, DELAYED RELEASE ORAL at 06:01

## 2024-01-28 RX ADMIN — AMLODIPINE BESYLATE 10 MG: 10 TABLET ORAL at 08:43

## 2024-01-28 RX ADMIN — IRON SUCROSE 200 MG: 20 INJECTION, SOLUTION INTRAVENOUS at 06:01

## 2024-01-28 ASSESSMENT — COGNITIVE AND FUNCTIONAL STATUS - GENERAL
DAILY ACTIVITIY SCORE: 18
MOBILITY SCORE: 23
DRESSING REGULAR UPPER BODY CLOTHING: A LITTLE
TOILETING: A LITTLE
DRESSING REGULAR LOWER BODY CLOTHING: A LITTLE
PERSONAL GROOMING: A LITTLE
CLIMB 3 TO 5 STEPS WITH RAILING: A LITTLE
HELP NEEDED FOR BATHING: A LITTLE
EATING MEALS: A LITTLE

## 2024-01-28 ASSESSMENT — PAIN SCALES - GENERAL: PAINLEVEL_OUTOF10: 0 - NO PAIN

## 2024-01-28 ASSESSMENT — ACTIVITIES OF DAILY LIVING (ADL): LACK_OF_TRANSPORTATION: NO

## 2024-01-28 ASSESSMENT — PAIN - FUNCTIONAL ASSESSMENT: PAIN_FUNCTIONAL_ASSESSMENT: 0-10

## 2024-01-28 NOTE — DISCHARGE SUMMARY
Discharge Diagnosis  Hematochezia    Issues Requiring Follow-Up  Follow-up with [facility] primary care physician and to see patient is to be restarted back on Xarelto    Test Results Pending At Discharge  Pending Labs       No current pending labs.            Hospital Course   On admission history and physical:Kamila Jones is a 68 y.o. female presenting with rectal bleeding.  This patient started having some rectal bleeding yesterday today she did have some dizziness upon standing presented emergency room where she had a large bloody bowel movement before 7 PM blood count was down to 7 when previously a few months ago was above 10.  2 units of blood were ordered 10 transfused by ER she feels much better now.  In view of ongoing bleeding and limited capabilities in this facility patient was arranged and accepted to be transferred to [facility].  There is no available but however right now and she is boarding in ED so I am asked to admit her here in the meantime.  The patient denies abdominal pain or nausea denies chest pain shortness of breath    General hospitalization: Patient brought by GI service and had 2 units of packed red blood cells transfused.  Her hemoglobin improved from 7.3 to day of discharge 8.9.  Patient had no chest pain or abdominal pain evaluated by GI service advised to continue Pepcid and avoid anticoagulation.  Patient does not take her lisinopril or her atorvastatin.  Patient reports that she ate a lot of popcorn which could have triggered her GI bleeding.  Patient vies not to eat any popcorn on discharge.  On day of discharge physical exam lungs clear to auscultation.  Abdomen soft nontender to palpation.  Patient advised to follow-up with the primary care physician.    Pertinent Physical Exam At Time of Discharge  Physical Exam  Lungs clear to auscultation.  Abdomen soft nontender palpation prior bowels all quadrants condition on discharge stable  Outpatient Follow-Up  Future Appointments    Date Time Provider Department [place]   3/4/2024  1:00 PM Toan Pires MD [id number]   3/6/2024  3:00 PM Karon Romano MD [id number]       Daryl Garcia MD

## 2024-01-28 NOTE — CARE PLAN
The patient's goals for the shift include discharge 1/28/2024    The clinical goals for the shift include safety      Problem: Fall/Injury  Goal: Not fall by end of shift  Outcome: Progressing     Problem: Fall/Injury  Goal: Be free from injury by end of the shift  Outcome: Progressing     Problem: Fall/Injury  Goal: Verbalize understanding of personal risk factors for fall in the hospital  Outcome: Progressing     Problem: Fall/Injury  Goal: Use assistive devices by end of the shift  Outcome: Progressing

## 2024-01-28 NOTE — CARE PLAN
Problem: Fall/Injury  Goal: Not fall by end of shift  Outcome: Progressing  Goal: Be free from injury by end of the shift  Outcome: Progressing  Goal: Verbalize understanding of personal risk factors for fall in the hospital  Outcome: Progressing  Goal: Verbalize understanding of risk factor reduction measures to prevent injury from fall in the home  Outcome: Progressing  Goal: Use assistive devices by end of the shift  Outcome: Progressing  Goal: Pace activities to prevent fatigue by end of the shift  Outcome: Progressing   The patient's goals for the shift include      The clinical goals for the shift include safety    Over the shift, the patient did not make progress toward the following goals. Barriers to progression include weakness. Recommendations to address these barriers include therapy.

## 2024-01-28 NOTE — CARE PLAN
Problem: OT Goals  Goal: ADLs  Outcome: Adequate for Discharge  Goal: Functional Transfers  Outcome: Adequate for Discharge  Goal: B UE Strengthening  Outcome: Adequate for Discharge  Goal: Functional Mobility  Outcome: Adequate for Discharge     Problem: Balance  Goal: LTG - Patient will maintain balance to allow for safe mobility  Outcome: Adequate for Discharge     Problem: Mobility  Goal: STG - Patient will ambulate 60' w/ RW and supervision   Outcome: Adequate for Discharge  Goal: STG - Patient will ascend and descend two to four stairs w/o rails, min assist  Outcome: Adequate for Discharge     Problem: Transfers  Goal: STG - Patient will transfer sit to and from stand MOD I w/ to RW  Outcome: Adequate for Discharge     Problem: Fall/Injury  Goal: Not fall by end of shift  1/28/2024 1102 by Aimee Pereira RN  Outcome: Adequate for Discharge  1/28/2024 1101 by Aimee Pereira RN  Outcome: Progressing  Goal: Be free from injury by end of the shift  1/28/2024 1102 by Aimee Pereira RN  Outcome: Adequate for Discharge  1/28/2024 1101 by Aimee Pereira RN  Outcome: Progressing  Goal: Verbalize understanding of personal risk factors for fall in the hospital  1/28/2024 1102 by Aimee Pereira RN  Outcome: Adequate for Discharge  1/28/2024 1101 by Aimee Pereira RN  Outcome: Progressing  Goal: Verbalize understanding of risk factor reduction measures to prevent injury from fall in the home  1/28/2024 1102 by Aimee Pereira RN  Outcome: Adequate for Discharge  1/28/2024 1101 by Aimee Pereira RN  Outcome: Progressing  Goal: Use assistive devices by end of the shift  1/28/2024 1102 by Aimee Pereira RN  Outcome: Adequate for Discharge  1/28/2024 1101 by Aimee Pereira RN  Outcome: Progressing  Goal: Pace activities to prevent fatigue by end of the shift  1/28/2024 1102 by Aimee Pereira RN  Outcome: Adequate for Discharge  1/28/2024 1101 by Aimee Pereira RN  Outcome: Progressing   The patient's goals for the shift  include      The clinical goals for the shift include safety    Over the shift, the patient did not make progress toward the following goals. Barriers to progression include. Recommendations to address these barriers include.

## 2024-01-29 ENCOUNTER — DOCUMENTATION (OUTPATIENT)
Dept: PRIMARY CARE | Facility: CLINIC | Age: 69
End: 2024-01-29
Payer: MEDICARE

## 2024-01-29 ENCOUNTER — PATIENT OUTREACH (OUTPATIENT)
Dept: PRIMARY CARE | Facility: CLINIC | Age: 69
End: 2024-01-29
Payer: MEDICARE

## 2024-01-29 NOTE — PROGRESS NOTES
Discharge Facility: [facility]     Discharge Diagnosis:    Discharge Diagnosis  Hematochezia     Issues Requiring Follow-Up  Follow-up with [facility] primary care physician and to see patient is to be restarted back on Xarelto        Admission Date: 1/26/2024   Discharge Date:  1/28/2024     PCP Appointment Date: Tasked to office     Specialist Appointment Date:  NA     Hospital Encounter and Summary: Linked     See discharge assessment below for further details     Engagement  Call Start Time: 1142 (1/29/2024 11:42 AM)    Medications  Medications reviewed with patient/caregiver?: Yes (1/29/2024 11:42 AM)  Is the patient having any side effects they believe may be caused by any medication additions or changes?: No (1/29/2024 11:42 AM)  Does the patient have all medications ordered at discharge?: Yes (1/29/2024 11:42 AM)  Care Management Interventions: No intervention needed (1/29/2024 11:42 AM)  Prescription Comments: Patient states she has AVS looking at medication instruction and following as instructed (1/29/2024 11:42 AM)  Is the patient taking all medications as directed (includes completed medication regime)?: -- (Patient aware of what not to take in AVS) (1/29/2024 11:42 AM)  Care Management Interventions: Provided patient education (1/29/2024 11:42 AM)  Medication Comments: Patient states she will call PCP office as she is requestring something for pain in hip (1/29/2024 11:42 AM)    Appointments  Does the patient have a primary care provider?: Yes (1/29/2024 11:42 AM)  Care Management Interventions: Educated patient on importance of making appointment (Tasked to  office) (1/29/2024 11:42 AM)  Has the patient kept scheduled appointments due by today?: Yes (1/29/2024 11:42 AM)  Care Management Interventions: -- (NA) (1/29/2024 11:42 AM)    Self Management  What is the home health agency?: NA (1/29/2024 11:42 AM)  What Durable Medical Equipment (DME) was ordered?: NA (1/29/2024 11:42 AM)    Patient Teaching  Does the  patient have access to their discharge instructions?: Yes (1/29/2024 11:42 AM)  Care Management Interventions: Reviewed instructions with patient (1/29/2024 11:42 AM)  What is the patient's perception of their health status since discharge?: Improving (1/29/2024 11:42 AM)  Is the patient/caregiver able to teach back the hierarchy of who to call/visit for symptoms/problems? PCP, Specialist, Home Health nurse, Urgent Care, ED, 911: Yes (1/29/2024 11:42 AM)  Patient/Caregiver Education Comments: Patient aware to call providers for any questions concerns or chnage in condition (1/29/2024 11:42 AM)

## 2024-01-30 NOTE — DOCUMENTATION CLARIFICATION NOTE
"    PATIENT:               ARTI BRANDT  ACCT #:                  [id number]  MRN:                       [id number]  :                       1955  ADMIT DATE:       2024 5:53 PM  DISCH DATE:        2024 12:35 PM  RESPONDING PROVIDER #:        [id number]          PROVIDER RESPONSE TEXT:    Acute blood loss anemia    CDI QUERY TEXT:    [facility]_Anemia Specificity        Instruction:    Based on your assessment of the patient and the clinical information, please provide the requested documentation by clicking on the appropriate radio button and enter any additional information if prompted.    Question: Please further clarify the diagnosis of anemia as    When answering this query, please exercise your independent professional judgment. The fact that a question is being asked, does not imply that any particular answer is desired or expected.    The patient's clinical indicators include:  Clinical Information: 68-year-old female presents for bright red blood per rectum.    Clinical Indicators:    : Lab resultsL Hgb 7.3, Hct 21.9    \"Lab history: 23: Hgb 10.8, Hct 33.7\"    : HP: \"Hematochezia\"    : GI consult: \"Hematochezia: Most likely etiology is diverticular bleeding. Reported multiple episodes of bright red bleeding per rectum. CT abdomen pelvis showed diffuse colonic diverticulosis without evidence evidence of diverticulitis.\"    Treatment: 2 units PRBCs, IV fluid resuscitation, GI consult, Monitor labs daily, Monitor stool output.    Risk Factors: ETOH, Malignant neoplasm large intestine  Options provided:  -- Acute blood loss anemia  -- Acute on chronic blood loss anemia  -- Chronic blood loss anemia  -- Other - I will add my own diagnosis  -- Refer to Clinical Documentation Reviewer    Query created by: Jamie Maria on 2024 10:54 AM      Electronically signed by:  RUPESH GAONA MD 2024 10:37 PM          "

## 2024-02-01 ENCOUNTER — PATIENT OUTREACH (OUTPATIENT)
Dept: PRIMARY CARE | Facility: CLINIC | Age: 69
End: 2024-02-01
Payer: COMMERCIAL

## 2024-02-01 NOTE — PROGRESS NOTES
Call regarding F/U   At time of outreach call the patient feels as if their condition has  improved since last visit, Patient has not called PCP regarding questions on pain.     I will send message to NP [facility] , to inquire regarding services

## 2024-02-02 ENCOUNTER — TELEPHONE (OUTPATIENT)
Dept: INPATIENT UNIT | Facility: HOSPITAL | Age: 69
End: 2024-02-02

## 2024-02-06 ENCOUNTER — TELEMEDICINE (OUTPATIENT)
Dept: PRIMARY CARE | Facility: CLINIC | Age: 69
End: 2024-02-06
Payer: COMMERCIAL

## 2024-02-06 VITALS — HEIGHT: 66 IN | BODY MASS INDEX: 20.73 KG/M2 | WEIGHT: 129 LBS

## 2024-02-06 DIAGNOSIS — M16.11 PRIMARY LOCALIZED OSTEOARTHRITIS OF RIGHT HIP: Primary | ICD-10-CM

## 2024-02-06 PROCEDURE — 1157F ADVNC CARE PLAN IN RCRD: CPT | Performed by: FAMILY MEDICINE

## 2024-02-06 PROCEDURE — 1159F MED LIST DOCD IN RCRD: CPT | Performed by: FAMILY MEDICINE

## 2024-02-06 PROCEDURE — 1125F AMNT PAIN NOTED PAIN PRSNT: CPT | Performed by: FAMILY MEDICINE

## 2024-02-06 PROCEDURE — 99442 PR PHYS/QHP TELEPHONE EVALUATION 11-20 MIN: CPT | Performed by: FAMILY MEDICINE

## 2024-02-06 PROCEDURE — 1111F DSCHRG MED/CURRENT MED MERGE: CPT | Performed by: FAMILY MEDICINE

## 2024-02-06 RX ORDER — VARENICLINE TARTRATE 1 MG/1
1 TABLET, FILM COATED ORAL 2 TIMES DAILY
COMMUNITY
End: 2024-03-18 | Stop reason: ALTCHOICE

## 2024-02-06 RX ORDER — MIRTAZAPINE 15 MG/1
45 TABLET, FILM COATED ORAL NIGHTLY
COMMUNITY
End: 2024-05-09 | Stop reason: SDUPTHER

## 2024-02-06 RX ORDER — OXYCODONE HYDROCHLORIDE 5 MG/1
5 TABLET ORAL EVERY 6 HOURS PRN
Qty: 28 TABLET | Refills: 0 | Status: SHIPPED | OUTPATIENT
Start: 2024-02-06 | End: 2024-02-15 | Stop reason: SDUPTHER

## 2024-02-06 ASSESSMENT — ENCOUNTER SYMPTOMS
NAUSEA: 0
ARTHRALGIAS: 1
MYALGIAS: 1
ABDOMINAL PAIN: 0
NERVOUS/ANXIOUS: 1
DIARRHEA: 0
SHORTNESS OF BREATH: 1

## 2024-02-06 ASSESSMENT — PATIENT HEALTH QUESTIONNAIRE - PHQ9
2. FEELING DOWN, DEPRESSED OR HOPELESS: NOT AT ALL
1. LITTLE INTEREST OR PLEASURE IN DOING THINGS: NOT AT ALL
SUM OF ALL RESPONSES TO PHQ9 QUESTIONS 1 AND 2: 0

## 2024-02-06 ASSESSMENT — PAIN SCALES - GENERAL: PAINLEVEL: 10-WORST PAIN EVER

## 2024-02-06 NOTE — PROGRESS NOTES
[facility]: [facility]  E/M EVALUATION    Kamila Jones is a 68 y.o. female who presents for hospital followup (Patient is following up after being at [facility] for bleeding, dizziness and weakness - she would also like to discuss prescription for pain medication/dd) and [phone].    Subjective   Telemed- phone only.  Permission only.    PT states she is in so much pain,  had her home aid bath her today.  Will be having surgery April 3.  Hurts to use walker.      Taking 15 500mg tylenol per day. Had 4 beers on Saturday.            Review of Systems   Respiratory:  Positive for shortness of breath.    Gastrointestinal:  Negative for abdominal pain, diarrhea and nausea.   Musculoskeletal:  Positive for arthralgias, gait problem and myalgias.   Psychiatric/Behavioral:  Negative for self-injury. The patient is nervous/anxious.        Objective   There were no vitals filed for this visit.  Physical Exam      Assessment/Plan      Patient Active Problem List   Diagnosis    Intentional drug overdose (CMS/[facility])    Palliative care patient    Medical home patient    Abnormal weight loss    Accidental fall    Acute deep vein thrombosis (DVT) of distal vein of right lower extremity (CMS/[facility])    Acute renal failure syndrome (CMS/[facility])    Continuous chronic alcoholism (CMS/[facility])    Anemia    Anxiety    Aortic valve disorder    Aortic valve stenosis    Abdominal pain    Back pain, lumbosacral    Carotid stenosis, bilateral    Chronic anxiety    Chronic GERD    Chronic hypercapnic respiratory failure (CMS/HCC)    Closed fracture of humerus    Acute exacerbation of chronic obstructive airways disease (CMS/HCC)    Acute respiratory failure (CMS/HCC)    Acute bronchitis with chronic obstructive pulmonary disease (COPD) (CMS/HCC)    COPD (chronic obstructive pulmonary disease) (CMS/[facility])    Pulmonary emphysema (CMS/[facility])    Depression    Dysthymia    ISABELA (generalized anxiety disorder)    Mood disorder (CMS/[facility])     Dyspnea    Essential tremor    History of hypertension    Primary hypertension    Hypokalemia    Hyponatremia    Hypoxia    Iron deficiency anemia    Lower extremity numbness    Nonsustained ventricular tachycardia (CMS/HCC)    Other chronic pain    Nausea & vomiting    Peptic ulcer disease    Pulmonary nodule    Shoulder injury    Suicide by drug overdose (CMS/HCC)    Tobacco dependency    Traumatic intracranial subarachnoid hemorrhage (CMS/HCC)    Urinary tract infection    Hematochezia       Diagnoses and all orders for this visit:  Primary localized osteoarthritis of right hip  Reviewed risk of accidental death on tylenol.  She will reduce to 7 per day and we removed tylenol from the pain medications.  Encouraged alcohol cessation.  Will treat until surgery.  Oarrs reviewed.      The patient was encouraged to ensure that any/all documentation is accurate and up to date, and that our office be provided a copy in the event that anything changes.         Toan Pires MD

## 2024-02-07 ENCOUNTER — PATIENT OUTREACH (OUTPATIENT)
Dept: PRIMARY CARE | Facility: CLINIC | Age: 69
End: 2024-02-07
Payer: COMMERCIAL

## 2024-02-07 NOTE — PROGRESS NOTES
Call regarding appt. with PCP on after 2/6/2024   At time of outreach call the patient feels as if their condition has improved since last visit.  Reviewed the PCP appointment with the pt and addressed any questions or concerns.

## 2024-02-15 DIAGNOSIS — M16.11 PRIMARY LOCALIZED OSTEOARTHRITIS OF RIGHT HIP: ICD-10-CM

## 2024-02-16 RX ORDER — OXYCODONE HYDROCHLORIDE 5 MG/1
5 TABLET ORAL EVERY 6 HOURS PRN
Qty: 28 TABLET | Refills: 0 | Status: SHIPPED | OUTPATIENT
Start: 2024-02-16 | End: 2024-02-29 | Stop reason: SDUPTHER

## 2024-02-23 ENCOUNTER — PATIENT OUTREACH (OUTPATIENT)
Dept: PRIMARY CARE | Facility: CLINIC | Age: 69
End: 2024-02-23
Payer: COMMERCIAL

## 2024-02-28 ENCOUNTER — HOSPITAL ENCOUNTER (OUTPATIENT)
Facility: HOSPITAL | Age: 69
Setting detail: OUTPATIENT SURGERY
End: 2024-02-28
Attending: ORTHOPAEDIC SURGERY | Admitting: ORTHOPAEDIC SURGERY
Payer: COMMERCIAL

## 2024-02-29 ENCOUNTER — TELEPHONE (OUTPATIENT)
Dept: PRIMARY CARE | Facility: CLINIC | Age: 69
End: 2024-02-29
Payer: COMMERCIAL

## 2024-02-29 DIAGNOSIS — M16.11 PRIMARY LOCALIZED OSTEOARTHRITIS OF RIGHT HIP: ICD-10-CM

## 2024-02-29 DIAGNOSIS — J42 CHRONIC BRONCHITIS, UNSPECIFIED CHRONIC BRONCHITIS TYPE (MULTI): Primary | ICD-10-CM

## 2024-02-29 PROBLEM — I60.9 SUBARACHNOID HEMORRHAGE (MULTI): Status: ACTIVE | Noted: 2019-05-20

## 2024-02-29 PROBLEM — S06.5XAA SUBDURAL HEMATOMA (MULTI): Status: ACTIVE | Noted: 2019-05-20

## 2024-02-29 PROBLEM — R26.81 UNSTEADY GAIT: Status: ACTIVE | Noted: 2019-05-21

## 2024-02-29 PROBLEM — M25.551 PAIN OF RIGHT HIP JOINT: Status: ACTIVE | Noted: 2024-02-29

## 2024-02-29 PROBLEM — S42.211A FRACTURE OF HUMERUS NECK, RIGHT, CLOSED, INITIAL ENCOUNTER: Status: ACTIVE | Noted: 2019-05-20

## 2024-02-29 RX ORDER — OXYCODONE HYDROCHLORIDE 5 MG/1
5 TABLET ORAL EVERY 6 HOURS PRN
Qty: 28 TABLET | Refills: 0 | Status: SHIPPED | OUTPATIENT
Start: 2024-02-29 | End: 2024-03-11 | Stop reason: SDUPTHER

## 2024-02-29 RX ORDER — LORAZEPAM 0.5 MG/1
TABLET ORAL
COMMUNITY
End: 2024-03-18 | Stop reason: ALTCHOICE

## 2024-02-29 NOTE — TELEPHONE ENCOUNTER
Pt having a lot of right hip pain and she is asking for some more pain medication. Pt is schedule for hip surgery on 4/3/2024.Pt also have up coming appt with you for medical clearance 3/4/24.

## 2024-03-04 ENCOUNTER — TELEPHONE (OUTPATIENT)
Dept: PRIMARY CARE | Facility: CLINIC | Age: 69
End: 2024-03-04

## 2024-03-04 ENCOUNTER — PROCEDURE VISIT (OUTPATIENT)
Dept: PRIMARY CARE | Facility: CLINIC | Age: 69
End: 2024-03-04
Payer: COMMERCIAL

## 2024-03-04 VITALS
SYSTOLIC BLOOD PRESSURE: 162 MMHG | DIASTOLIC BLOOD PRESSURE: 86 MMHG | HEIGHT: 66 IN | BODY MASS INDEX: 21.21 KG/M2 | WEIGHT: 132 LBS | OXYGEN SATURATION: 99 % | HEART RATE: 87 BPM

## 2024-03-04 DIAGNOSIS — J42 CHRONIC BRONCHITIS, UNSPECIFIED CHRONIC BRONCHITIS TYPE (MULTI): ICD-10-CM

## 2024-03-04 DIAGNOSIS — R82.71 BACTERIURIA: ICD-10-CM

## 2024-03-04 DIAGNOSIS — M16.11 PRIMARY LOCALIZED OSTEOARTHRITIS OF RIGHT HIP: Primary | ICD-10-CM

## 2024-03-04 DIAGNOSIS — G25.0 ESSENTIAL TREMOR: ICD-10-CM

## 2024-03-04 DIAGNOSIS — Z01.818 PRE-OP EXAM: ICD-10-CM

## 2024-03-04 PROCEDURE — 99214 OFFICE O/P EST MOD 30 MIN: CPT | Performed by: FAMILY MEDICINE

## 2024-03-04 ASSESSMENT — ENCOUNTER SYMPTOMS
SHORTNESS OF BREATH: 1
ACTIVITY CHANGE: 1
ARTHRALGIAS: 1
COUGH: 1
PALPITATIONS: 0

## 2024-03-04 ASSESSMENT — PATIENT HEALTH QUESTIONNAIRE - PHQ9
SUM OF ALL RESPONSES TO PHQ9 QUESTIONS 1 AND 2: 0
1. LITTLE INTEREST OR PLEASURE IN DOING THINGS: NOT AT ALL
2. FEELING DOWN, DEPRESSED OR HOPELESS: NOT AT ALL

## 2024-03-04 ASSESSMENT — PAIN SCALES - GENERAL: PAINLEVEL: 10-WORST PAIN EVER

## 2024-03-04 NOTE — TELEPHONE ENCOUNTER
Patient of Dr. Pires, referred to [facility] chronic bronchitis.  Hospitalized 1/25/24 thru 1/28/24.  Patient admitted with dx: Hematochezia.  Received blood transfusion.

## 2024-03-04 NOTE — PROGRESS NOTES
[facility]: [facility]  PRESURGICAL EVALUATION    Kamila Jones is a 68 y.o. female that is presenting today for surgical clearance.      Subjective   HPI    pre-operative surgical clearance:              Surgery planned... right total hip              Date of surgery...4/3/24              A letter requesting clearance has been recieved by ....              The patients past medical history is absent for CAD, MI, CVA, CHF, DM, CKD.              Patients functional capacity is rated at <4 mets based on the patients reported activites of...              Recent cardiac stress or re-vascularization -              Prior complications to anesthesia or surgery -.        mets:             1 met - can you take care of yourself? eat dress or use the toilet? walk indoors around the house? walk 1 or 2 blocks on level ground at 2-3mph?             <4 mets - can you do light work around the house, such as dusting or washing dishes?             >4 mets - can you climb a flight of stairs or walk up a hill? walk on level ground at 4mph? run a short distance? do heavy work around the house, such as scrubbing floors or lifting or moving heavy furniture? participate in moderate recreational activities, such as golf, bowling, dancing, double tennis, or throwing a baseball or football?             >10 mets - can you participate in strenuous sports, such as swimming, single tennis, football, basketball, or skiing?    Review of Systems   Constitutional:  Positive for activity change.   Respiratory:  Positive for cough and shortness of breath.    Cardiovascular:  Negative for chest pain and palpitations.   Musculoskeletal:  Positive for arthralgias and gait problem.       Objective   Vitals:    03/04/24 1303   BP: 162/86   Pulse: 87   SpO2: 99%      Physical Exam  Constitutional:       Appearance: Normal appearance.   Cardiovascular:      Rate and Rhythm: Normal rate and regular rhythm.      Heart sounds: No  murmur heard.  Pulmonary:      Effort: Pulmonary effort is normal.      Breath sounds: Normal breath sounds.   Musculoskeletal:      Right lower leg: No edema.      Left lower leg: No edema.   Neurological:      Mental Status: She is alert.         Assessment/Plan    Patient Active Problem List   Diagnosis    Intentional drug overdose (CMS/[facility])    Palliative care patient    Medical home patient    Abnormal weight loss    Accidental fall    Acute deep vein thrombosis (DVT) of distal vein of right lower extremity (CMS/[facility])    Acute renal failure syndrome (CMS/HCC)    Continuous chronic alcoholism (CMS/[facility])    Anemia    Anxiety    Aortic valve disorder    Aortic valve stenosis    Abdominal pain    Back pain, lumbosacral    Carotid stenosis, bilateral    Chronic anxiety    Chronic GERD    Chronic hypercapnic respiratory failure (CMS/HCC)    Closed fracture of humerus    Acute exacerbation of chronic obstructive airways disease (CMS/HCC)    Acute respiratory failure (CMS/[facility])    Acute bronchitis with chronic obstructive pulmonary disease (COPD) (CMS/HCC)    COPD (chronic obstructive pulmonary disease) (CMS/HCC)    Pulmonary emphysema (CMS/HCC)    Depression    Dysthymia    ISABELA (generalized anxiety disorder)    Mood disorder (CMS/[facility])    Dyspnea    Essential tremor    History of hypertension    Primary hypertension    Hypokalemia    Hyponatremia    Hypoxia    Iron deficiency anemia    Lower extremity numbness    Nonsustained ventricular tachycardia (CMS/HCC)    Other chronic pain    Nausea & vomiting    Peptic ulcer disease    Pulmonary nodule    Shoulder injury    Suicide by drug overdose (CMS/HCC)    Tobacco dependency    Traumatic intracranial subarachnoid hemorrhage (CMS/[facility])    Urinary tract infection    Hematochezia    Fracture of humerus neck, right, closed, initial encounter    Osteoarthritis of right hip    Pain of right hip joint    Subarachnoid hemorrhage (CMS/HCC)    Unsteady gait    Subdural hematoma  (CMS/[facility])       Diagnoses and all orders for this visit:  Primary localized osteoarthritis of right hip  Essential tremor  Chronic bronchitis, unspecified chronic bronchitis type (CMS/[facility])  -     CBC and Auto Differential; Future  -     Basic Metabolic Panel; Future  Pre-op exam  -     CBC and Auto Differential; Future  -     Basic Metabolic Panel; Future  Bacteriuria  -     Urine Culture; Future  Needs cardiology and pulmonary for final clearance.   Will refill pain meds when needed.      Preoperative clearance [Z01.818]

## 2024-03-04 NOTE — TELEPHONE ENCOUNTER
Call placed to patient number as listed, patient states she is able to get to MD office, patient states she at this time doesn't need in home Primary care services, is awaiting surgery for her hip in April. Information regarding the house calls program services sent to patient via [employer] mail.  Patient at this time does not feel [facility] program is necessary, will call if things change.  [facility] will not see this patient.

## 2024-03-06 ENCOUNTER — LAB (OUTPATIENT)
Dept: LAB | Facility: LAB | Age: 69
End: 2024-03-06
Payer: COMMERCIAL

## 2024-03-06 ENCOUNTER — OFFICE VISIT (OUTPATIENT)
Dept: CARDIOLOGY | Facility: CLINIC | Age: 69
End: 2024-03-06
Payer: COMMERCIAL

## 2024-03-06 VITALS
OXYGEN SATURATION: 100 % | RESPIRATION RATE: 14 BRPM | TEMPERATURE: 98.9 F | SYSTOLIC BLOOD PRESSURE: 210 MMHG | HEIGHT: 66 IN | WEIGHT: 133 LBS | HEART RATE: 83 BPM | DIASTOLIC BLOOD PRESSURE: 99 MMHG | BODY MASS INDEX: 21.38 KG/M2

## 2024-03-06 DIAGNOSIS — R06.02 SOB (SHORTNESS OF BREATH): ICD-10-CM

## 2024-03-06 DIAGNOSIS — Z01.811 PRE-OP CHEST EXAM: Primary | ICD-10-CM

## 2024-03-06 DIAGNOSIS — Z01.818 PRE-OP EXAM: ICD-10-CM

## 2024-03-06 DIAGNOSIS — I10 PRIMARY HYPERTENSION: ICD-10-CM

## 2024-03-06 DIAGNOSIS — Z86.79 HISTORY OF HYPERTENSION: ICD-10-CM

## 2024-03-06 DIAGNOSIS — J42 CHRONIC BRONCHITIS, UNSPECIFIED CHRONIC BRONCHITIS TYPE (MULTI): ICD-10-CM

## 2024-03-06 LAB
ANION GAP SERPL CALC-SCNC: 15 MMOL/L (ref 10–20)
BASOPHILS # BLD AUTO: 0.03 X10*3/UL (ref 0–0.1)
BASOPHILS NFR BLD AUTO: 0.6 %
BUN SERPL-MCNC: 24 MG/DL (ref 6–23)
CALCIUM SERPL-MCNC: 9 MG/DL (ref 8.6–10.3)
CHLORIDE SERPL-SCNC: 99 MMOL/L (ref 98–107)
CO2 SERPL-SCNC: 28 MMOL/L (ref 21–32)
CREAT SERPL-MCNC: 1.14 MG/DL (ref 0.5–1.05)
EGFRCR SERPLBLD CKD-EPI 2021: 52 ML/MIN/1.73M*2
EOSINOPHIL # BLD AUTO: 0.1 X10*3/UL (ref 0–0.7)
EOSINOPHIL NFR BLD AUTO: 1.9 %
ERYTHROCYTE [DISTWIDTH] IN BLOOD BY AUTOMATED COUNT: 15.2 % (ref 11.5–14.5)
GLUCOSE SERPL-MCNC: 78 MG/DL (ref 74–99)
HCT VFR BLD AUTO: 35.8 % (ref 36–46)
HGB BLD-MCNC: 11.2 G/DL (ref 12–16)
IMM GRANULOCYTES # BLD AUTO: 0.01 X10*3/UL (ref 0–0.7)
IMM GRANULOCYTES NFR BLD AUTO: 0.2 % (ref 0–0.9)
LYMPHOCYTES # BLD AUTO: 1.39 X10*3/UL (ref 1.2–4.8)
LYMPHOCYTES NFR BLD AUTO: 26.3 %
MCH RBC QN AUTO: 32.7 PG (ref 26–34)
MCHC RBC AUTO-ENTMCNC: 31.3 G/DL (ref 32–36)
MCV RBC AUTO: 105 FL (ref 80–100)
MONOCYTES # BLD AUTO: 0.4 X10*3/UL (ref 0.1–1)
MONOCYTES NFR BLD AUTO: 7.6 %
NEUTROPHILS # BLD AUTO: 3.36 X10*3/UL (ref 1.2–7.7)
NEUTROPHILS NFR BLD AUTO: 63.4 %
NRBC BLD-RTO: 0 /100 WBCS (ref 0–0)
PLATELET # BLD AUTO: 331 X10*3/UL (ref 150–450)
POTASSIUM SERPL-SCNC: 4.5 MMOL/L (ref 3.5–5.3)
RBC # BLD AUTO: 3.42 X10*6/UL (ref 4–5.2)
SODIUM SERPL-SCNC: 137 MMOL/L (ref 136–145)
WBC # BLD AUTO: 5.3 X10*3/UL (ref 4.4–11.3)

## 2024-03-06 PROCEDURE — 85025 COMPLETE CBC W/AUTO DIFF WBC: CPT

## 2024-03-06 PROCEDURE — 36415 COLL VENOUS BLD VENIPUNCTURE: CPT

## 2024-03-06 PROCEDURE — 1159F MED LIST DOCD IN RCRD: CPT | Performed by: INTERNAL MEDICINE

## 2024-03-06 PROCEDURE — 93000 ELECTROCARDIOGRAM COMPLETE: CPT | Performed by: INTERNAL MEDICINE

## 2024-03-06 PROCEDURE — 3080F DIAST BP >= 90 MM HG: CPT | Performed by: INTERNAL MEDICINE

## 2024-03-06 PROCEDURE — 1157F ADVNC CARE PLAN IN RCRD: CPT | Performed by: INTERNAL MEDICINE

## 2024-03-06 PROCEDURE — 99204 OFFICE O/P NEW MOD 45 MIN: CPT | Performed by: INTERNAL MEDICINE

## 2024-03-06 PROCEDURE — 3077F SYST BP >= 140 MM HG: CPT | Performed by: INTERNAL MEDICINE

## 2024-03-06 PROCEDURE — 1125F AMNT PAIN NOTED PAIN PRSNT: CPT | Performed by: INTERNAL MEDICINE

## 2024-03-06 PROCEDURE — 1036F TOBACCO NON-USER: CPT | Performed by: INTERNAL MEDICINE

## 2024-03-06 PROCEDURE — 80048 BASIC METABOLIC PNL TOTAL CA: CPT

## 2024-03-06 RX ORDER — REGADENOSON 0.08 MG/ML
0.4 INJECTION, SOLUTION INTRAVENOUS
OUTPATIENT
Start: 2024-03-06

## 2024-03-06 ASSESSMENT — PATIENT HEALTH QUESTIONNAIRE - PHQ9
2. FEELING DOWN, DEPRESSED OR HOPELESS: NOT AT ALL
SUM OF ALL RESPONSES TO PHQ9 QUESTIONS 1 AND 2: 0
1. LITTLE INTEREST OR PLEASURE IN DOING THINGS: NOT AT ALL

## 2024-03-06 ASSESSMENT — PAIN SCALES - GENERAL: PAINLEVEL: 6

## 2024-03-06 NOTE — PROGRESS NOTES
Consulting Physician:  Dr. Charlton    Reason for the consult:  Preop for left hip surgery    History of present illness:  This is a very pleasant 68-year-old female referred to my office for preop evaluation for left hip replacement.  Patient has history of COPD on home oxygen.  Patient had no prior cardiac history.  Denies any chest pain.  She has been on oxygen for the last 8 months.    Past Medical History:   Diagnosis Date    Alcohol dependence, in remission (CMS/HCC)     History of alcohol dependence    Personal history of other malignant neoplasm of large intestine     History of malignant neoplasm of colon    Personal history of other venous thrombosis and embolism     History of deep venous thrombosis    Personal history of suicidal behavior     History of suicide attempt       Past Surgical History:   Procedure Laterality Date    MR HEAD ANGIO WO IV CONTRAST  10/24/2017    MR HEAD ANGIO WO IV CONTRAST [facility]    MR NECK ANGIO WO IV CONTRAST  10/24/2017    MR NECK ANGIO WO IV CONTRAST [facility]    OTHER SURGICAL HISTORY  06/15/2017    Treatment Of Ankle Fracture    TONSILLECTOMY  06/15/2017    Tonsillectomy       Allergies   Allergen Reactions    Propranolol Hcl Shortness of breath        reports that she quit smoking about 4 months ago. Her smoking use included cigarettes. She started smoking about 54 years ago. She has a 3.75 pack-year smoking history. She has never been exposed to tobacco smoke. She has never used smokeless tobacco. She reports current alcohol use of about 10.0 standard drinks of alcohol per week. She reports that she does not use drugs.    Family History   Problem Relation Name Age of Onset    Accidental death Mother      Stroke Father      Other (cerbral hemmorrhage) Father      Cancer Sister      Lung cancer Sister      Colon cancer Brother      Cancer Brother      Lung disease Brother         Patient's Medications   New Prescriptions    No medications on file    Previous Medications    ACETAMINOPHEN (TYLENOL EXTRA STRENGTH) 500 MG TABLET    Take 1 tablet (500 mg) by mouth every 4 hours if needed. TAKE 1 TABLET EVERY 4 TO 6 HOURS AS NEEDED.    AMLODIPINE (NORVASC) 10 MG TABLET    Take 1 tablet (10 mg) by mouth once daily.    ESCITALOPRAM (LEXAPRO) 10 MG TABLET    Take 1 tablet (10 mg) by mouth once daily.    FAMOTIDINE (PEPCID) 20 MG TABLET    Take 1 tablet (20 mg) by mouth once daily at bedtime.    GABAPENTIN (NEURONTIN) 300 MG CAPSULE    Take 1 capsule (300 mg) by mouth 3 times a day.    LORAZEPAM (ATIVAN) 0.5 MG TABLET    Take by mouth.    MIRTAZAPINE (REMERON) 15 MG TABLET    Take 1 tablet (15 mg) by mouth once daily at bedtime.    OXYCODONE (ROXICODONE) 5 MG IMMEDIATE RELEASE TABLET    Take 1 tablet (5 mg) by mouth every 6 hours if needed for moderate pain (4 - 6) for up to 7 days. Do not exceed more than 7 acetaminophen per day    PRIMIDONE (MYSOLINE) 50 MG TABLET    TAKE 4 TABLETS BY MOUTH EVERY MORNING AND 4 TABLETS IN THE EVENING AS DIRECTED    VARENICLINE (CHANTIX) 1 MG TABLET    Take 1 tablet (1 mg) by mouth 2 times a day. Take with full glass of water.   Modified Medications    No medications on file   Discontinued Medications    No medications on file         Review of Systems  10 point review of systems otherwise negative     Objective   Physical Exam  General: Patient in no acute distress   HEENT: Atraumatic normocephalic.  Neck: Supple, jugular venous pressure within normal limit.  No bruits  Lungs: Clear to auscultation bilaterally  Cardiovascular: Regular rate and rhythm, normal heart sounds, no murmurs rubs or gallops  Abdomen: Soft nontender nondistended.  Normal bowel sounds.  Extremities: Warm to touch, no edema.  Neurologic examination: Patient is awake alert oriented x3.  Psychiatric examination: Patient denies being depressed or suicidal.  Good insight  Vascular examination: Pulses intact bilaterally       Lab Review   Admission on 01/25/2024,  Discharged on 01/28/2024   Component Date Value    WBC 01/25/2024 9.7     nRBC 01/25/2024 0.0     RBC 01/25/2024 2.07 (L)     Hemoglobin 01/25/2024 7.3 (L)     Hematocrit 01/25/2024 21.9 (L)     MCV 01/25/2024 106 (H)     MCH 01/25/2024 35.3 (H)     MCHC 01/25/2024 33.3     RDW 01/25/2024 15.3 (H)     Platelets 01/25/2024 250     Glucose 01/25/2024 121 (H)     Sodium 01/25/2024 136     Potassium 01/25/2024 4.2     Chloride 01/25/2024 103     Bicarbonate 01/25/2024 22 (L)     Urea Nitrogen 01/25/2024 31 (H)     Creatinine 01/25/2024 1.00     eGFR 01/25/2024 61     Calcium 01/25/2024 8.3 (L)     Albumin 01/25/2024 3.8     Alkaline Phosphatase 01/25/2024 70     Total Protein 01/25/2024 6.1     AST 01/25/2024 15     Bilirubin, Total 01/25/2024 <0.2     ALT 01/25/2024 12     Anion Gap 01/25/2024 11     Lipase 01/25/2024 45     Color, Urine 01/25/2024 Light-[place] (N)     Appearance, Urine 01/25/2024 Turbid (N)     Specific Gravity, Urine 01/25/2024 1.026     pH, Urine 01/25/2024 5.0     Protein, Urine 01/25/2024 70 (1+) (A)     Glucose, Urine 01/25/2024 Normal     Blood, Urine 01/25/2024 OVER (3+) (A)     Ketones, Urine 01/25/2024 NEGATIVE     Bilirubin, Urine 01/25/2024 NEGATIVE     Urobilinogen, Urine 01/25/2024 Normal     Nitrite, Urine 01/25/2024 NEGATIVE     Leukocyte Esterase, Urine 01/25/2024 NEGATIVE     ABO TYPE 01/25/2024 A     Rh TYPE 01/25/2024 POS     ANTIBODY SCREEN 01/25/2024 NEG     WBC, Urine 01/25/2024 1-5     RBC, Urine 01/25/2024 3-5     Bacteria, Urine 01/25/2024 1+ (A)     Mucus, Urine 01/25/2024 FEW     Hyaline Casts, Urine 01/25/2024 1+ (A)     PRODUCT CODE 01/25/2024 [id number]     Unit Number 01/25/2024 [id number]     Unit ABO 01/25/2024 A     Unit RH 01/25/2024 POS     XM INTEP 01/25/2024 COMP     Dispense Status 01/25/2024 TR     Blood Expiration Date 01/25/2024 January 27, 2024 23:59 EST     PRODUCT BLOOD TYPE 01/25/2024 6200     UNIT VOLUME 01/25/2024 350     PRODUCT CODE 01/25/2024  [id number]     Unit Number 01/25/2024 [id number]     Unit ABO 01/25/2024 A     Unit RH 01/25/2024 POS     XM INTEP 01/25/2024 COMP     Dispense Status 01/25/2024 TR     Blood Expiration Date 01/25/2024 February 07, 2024 23:59 EST     PRODUCT BLOOD TYPE 01/25/2024 6200     UNIT VOLUME 01/25/2024 350     ABO TYPE 01/25/2024 A     Rh TYPE 01/25/2024 POS     Hemoglobin 01/25/2024 9.9 (L)     Hematocrit 01/25/2024 29.3 (L)     Protime 01/25/2024 11.4     INR 01/25/2024 1.1     Glucose 01/26/2024 99     Sodium 01/26/2024 137     Potassium 01/26/2024 4.6     Chloride 01/26/2024 108 (H)     Bicarbonate 01/26/2024 22 (L)     Urea Nitrogen 01/26/2024 29 (H)     Creatinine 01/26/2024 1.00     eGFR 01/26/2024 61     Calcium 01/26/2024 8.2 (L)     Albumin 01/26/2024 3.6     Alkaline Phosphatase 01/26/2024 65     Total Protein 01/26/2024 5.7 (L)     AST 01/26/2024 13     Bilirubin, Total 01/26/2024 0.4     ALT 01/26/2024 11     Anion Gap 01/26/2024 7     WBC 01/26/2024 6.4     nRBC 01/26/2024 0.0     RBC 01/26/2024 2.83 (L)     Hemoglobin 01/26/2024 9.0 (L)     Hematocrit 01/26/2024 26.8 (L)     MCV 01/26/2024 95     MCH 01/26/2024 31.8     MCHC 01/26/2024 33.6     RDW 01/26/2024 19.9 (H)     Platelets 01/26/2024 174     Neutrophils % 01/26/2024 69.6     Immature Granulocytes %,* 01/26/2024 0.5     Lymphocytes % 01/26/2024 20.2     Monocytes % 01/26/2024 7.8     Eosinophils % 01/26/2024 1.4     Basophils % 01/26/2024 0.5     Neutrophils Absolute 01/26/2024 4.47     Immature Granulocytes Ab* 01/26/2024 0.03     Lymphocytes Absolute 01/26/2024 1.30     Monocytes Absolute 01/26/2024 0.50     Eosinophils Absolute 01/26/2024 0.09     Basophils Absolute 01/26/2024 0.03     WBC 01/27/2024 4.9     nRBC 01/27/2024 0.0     RBC 01/27/2024 2.52 (L)     Hemoglobin 01/27/2024 8.3 (L)     Hematocrit 01/27/2024 24.1 (L)     MCV 01/27/2024 96     MCH 01/27/2024 32.9     MCHC 01/27/2024 34.4     RDW 01/27/2024 19.9 (H)     Platelets 01/27/2024  181     Neutrophils % 01/27/2024 67.0     Immature Granulocytes %,* 01/27/2024 0.6     Lymphocytes % 01/27/2024 20.6     Monocytes % 01/27/2024 9.0     Eosinophils % 01/27/2024 2.4     Basophils % 01/27/2024 0.4     Neutrophils Absolute 01/27/2024 3.28     Immature Granulocytes Ab* 01/27/2024 0.03     Lymphocytes Absolute 01/27/2024 1.01 (L)     Monocytes Absolute 01/27/2024 0.44     Eosinophils Absolute 01/27/2024 0.12     Basophils Absolute 01/27/2024 0.02     Glucose 01/27/2024 104 (H)     Sodium 01/27/2024 138     Potassium 01/27/2024 4.2     Chloride 01/27/2024 106     Bicarbonate 01/27/2024 26     Urea Nitrogen 01/27/2024 19     Creatinine 01/27/2024 0.90     eGFR 01/27/2024 69     Calcium 01/27/2024 8.7     Albumin 01/27/2024 3.7     Alkaline Phosphatase 01/27/2024 71     Total Protein 01/27/2024 5.9     AST 01/27/2024 14     Bilirubin, Total 01/27/2024 0.2     ALT 01/27/2024 10     Anion Gap 01/27/2024 6     WBC 01/28/2024 5.5     nRBC 01/28/2024 0.0     RBC 01/28/2024 2.79 (L)     Hemoglobin 01/28/2024 8.9 (L)     Hematocrit 01/28/2024 27.0 (L)     MCV 01/28/2024 97     MCH 01/28/2024 31.9     MCHC 01/28/2024 33.0     RDW 01/28/2024 18.7 (H)     Platelets 01/28/2024 195     Neutrophils % 01/28/2024 67.0     Immature Granulocytes %,* 01/28/2024 0.7     Lymphocytes % 01/28/2024 20.8     Monocytes % 01/28/2024 8.4     Eosinophils % 01/28/2024 2.7     Basophils % 01/28/2024 0.4     Neutrophils Absolute 01/28/2024 3.67     Immature Granulocytes Ab* 01/28/2024 0.04     Lymphocytes Absolute 01/28/2024 1.14 (L)     Monocytes Absolute 01/28/2024 0.46     Eosinophils Absolute 01/28/2024 0.15     Basophils Absolute 01/28/2024 0.02     Glucose 01/28/2024 97     Sodium 01/28/2024 140     Potassium 01/28/2024 4.4     Chloride 01/28/2024 106     Bicarbonate 01/28/2024 27     Urea Nitrogen 01/28/2024 14     Creatinine 01/28/2024 0.90     eGFR 01/28/2024 69     Calcium 01/28/2024 8.7     Albumin 01/28/2024 3.8     Alkaline  Phosphatase 01/28/2024 75     Total Protein 01/28/2024 6.2     AST 01/28/2024 17     Bilirubin, Total 01/28/2024 0.2     ALT 01/28/2024 11     Anion Gap 01/28/2024 7         Assessment/Plan    This is a very pleasant 68-year-old female referred to my office for preop evaluation for left hip replacement.  Patient has history of COPD on home oxygen.  Patient had no prior cardiac history.  Denies any chest pain.  She has been on oxygen for the last 8 months.  Denies nausea vomiting diaphoresis.  EKG showing sinus tachycardia nonspecific ST abnormalities.  Blood pressure elevated today because she is nervous and she did not take her blood pressure medications this morning.  From cardiac standpoint I do not see any absolute contraindication for surgery I do recommend stress test Lexiscan for preoperative evaluation and the Lexiscan looks okay or low risk then she may proceed with surgery.  However she does need pulmonary evaluation prior to her surgery since she is on oxygen at home as she will be high risk for pulmonary complications.  Dr. Davis following up on that and she had pulmonary function test done recently in his office.  Otherwise he stable from my standpoint we will follow-up intermittently.    Karon Romano MD

## 2024-03-11 DIAGNOSIS — M16.11 PRIMARY LOCALIZED OSTEOARTHRITIS OF RIGHT HIP: ICD-10-CM

## 2024-03-11 NOTE — TELEPHONE ENCOUNTER
----- Message from Toan Pires MD sent at 3/10/2024 11:27 AM EDT -----  Labs are ok, kidney test changed slightly repeat next visit

## 2024-03-12 ENCOUNTER — TELEPHONE (OUTPATIENT)
Dept: PRIMARY CARE | Facility: CLINIC | Age: 69
End: 2024-03-12
Payer: COMMERCIAL

## 2024-03-12 RX ORDER — OXYCODONE HYDROCHLORIDE 5 MG/1
5 TABLET ORAL EVERY 6 HOURS PRN
Qty: 28 TABLET | Refills: 0 | Status: SHIPPED | OUTPATIENT
Start: 2024-03-12 | End: 2024-03-19 | Stop reason: SINTOL

## 2024-03-18 ENCOUNTER — PRE-ADMISSION TESTING (OUTPATIENT)
Dept: PREADMISSION TESTING | Facility: HOSPITAL | Age: 69
End: 2024-03-18
Payer: COMMERCIAL

## 2024-03-18 ENCOUNTER — TELEPHONE (OUTPATIENT)
Dept: PRIMARY CARE | Facility: CLINIC | Age: 69
End: 2024-03-18

## 2024-03-18 VITALS
WEIGHT: 130.95 LBS | RESPIRATION RATE: 22 BRPM | DIASTOLIC BLOOD PRESSURE: 82 MMHG | HEIGHT: 66 IN | TEMPERATURE: 98.2 F | BODY MASS INDEX: 21.05 KG/M2 | HEART RATE: 74 BPM | OXYGEN SATURATION: 99 % | SYSTOLIC BLOOD PRESSURE: 159 MMHG

## 2024-03-18 DIAGNOSIS — Z01.818 PREOPERATIVE EXAMINATION: Primary | ICD-10-CM

## 2024-03-18 DIAGNOSIS — J44.9 CHRONIC OBSTRUCTIVE PULMONARY DISEASE, UNSPECIFIED COPD TYPE (MULTI): ICD-10-CM

## 2024-03-18 LAB
APPEARANCE UR: ABNORMAL
BILIRUB UR STRIP.AUTO-MCNC: NEGATIVE MG/DL
COLOR UR: YELLOW
GLUCOSE UR STRIP.AUTO-MCNC: NEGATIVE MG/DL
HYALINE CASTS #/AREA URNS AUTO: ABNORMAL /LPF
KETONES UR STRIP.AUTO-MCNC: ABNORMAL MG/DL
LEUKOCYTE ESTERASE UR QL STRIP.AUTO: NEGATIVE
MUCOUS THREADS #/AREA URNS AUTO: ABNORMAL /LPF
NITRITE UR QL STRIP.AUTO: NEGATIVE
PH UR STRIP.AUTO: 5 [PH]
PROT UR STRIP.AUTO-MCNC: ABNORMAL MG/DL
RBC # UR STRIP.AUTO: NEGATIVE /UL
RBC #/AREA URNS AUTO: ABNORMAL /HPF
SP GR UR STRIP.AUTO: 1.04
SQUAMOUS #/AREA URNS AUTO: ABNORMAL /HPF
UROBILINOGEN UR STRIP.AUTO-MCNC: <2 MG/DL
WBC #/AREA URNS AUTO: ABNORMAL /HPF

## 2024-03-18 PROCEDURE — 99204 OFFICE O/P NEW MOD 45 MIN: CPT

## 2024-03-18 PROCEDURE — 87081 CULTURE SCREEN ONLY: CPT | Mod: GEALAB

## 2024-03-18 PROCEDURE — 99203 OFFICE O/P NEW LOW 30 MIN: CPT

## 2024-03-18 PROCEDURE — 81001 URINALYSIS AUTO W/SCOPE: CPT

## 2024-03-18 RX ORDER — CHLORHEXIDINE GLUCONATE ORAL RINSE 1.2 MG/ML
15 SOLUTION DENTAL DAILY
Qty: 473 ML | Refills: 0 | Status: SHIPPED | OUTPATIENT
Start: 2024-03-18 | End: 2024-06-16

## 2024-03-18 ASSESSMENT — DUKE ACTIVITY SCORE INDEX (DASI)
CAN YOU DO MODERATE WORK AROUND THE HOUSE LIKE VACUUMING, SWEEPING FLOORS OR CARRYING GROCERIES: NO
CAN YOU WALK A BLOCK OR TWO ON LEVEL GROUND: NO
CAN YOU DO YARD WORK LIKE RAKING LEAVES, WEEDING OR PUSHING A MOWER: NO
CAN YOU DO LIGHT WORK AROUND THE HOUSE LIKE DUSTING OR WASHING DISHES: YES
CAN YOU RUN A SHORT DISTANCE: NO
CAN YOU HAVE SEXUAL RELATIONS: NO
TOTAL_SCORE: 7.2
DASI METS SCORE: 3.6
CAN YOU WALK INDOORS, SUCH AS AROUND YOUR HOUSE: YES
CAN YOU TAKE CARE OF YOURSELF (EAT, DRESS, BATHE, OR USE TOILET): YES
CAN YOU PARTICIPATE IN STRENOUS SPORTS LIKE SWIMMING, SINGLES TENNIS, FOOTBALL, BASKETBALL, OR SKIING: NO
CAN YOU CLIMB A FLIGHT OF STAIRS OR WALK UP A HILL: NO
CAN YOU PARTICIPATE IN MODERATE RECREATIONAL ACTIVITIES LIKE GOLF, BOWLING, DANCING, DOUBLES TENNIS OR THROWING A BASEBALL OR FOOTBALL: NO
CAN YOU DO HEAVY WORK AROUND THE HOUSE LIKE SCRUBBING FLOORS OR LIFTING AND MOVING HEAVY FURNITURE: NO

## 2024-03-18 ASSESSMENT — CHADS2 SCORE
PRIOR STROKE OR TIA OR THROMBOEMBOLISM: NO
HYPERTENSION: YES
DIABETES: NO
CHF: NO
CHADS2 SCORE: 1
AGE GREATER THAN OR EQUAL TO 75: NO

## 2024-03-18 ASSESSMENT — ENCOUNTER SYMPTOMS
CHILLS: 0
DIARRHEA: 0
NAUSEA: 0
ARTHRALGIAS: 1
SHORTNESS OF BREATH: 0
WOUND: 0
FEVER: 0
VOMITING: 0
NUMBNESS: 0
DIFFICULTY URINATING: 0
ABDOMINAL PAIN: 0

## 2024-03-18 ASSESSMENT — PAIN - FUNCTIONAL ASSESSMENT: PAIN_FUNCTIONAL_ASSESSMENT: 0-10

## 2024-03-18 ASSESSMENT — ACTIVITIES OF DAILY LIVING (ADL): ADL_SCORE: 3

## 2024-03-18 ASSESSMENT — LIFESTYLE VARIABLES: SMOKING_STATUS: NONSMOKER

## 2024-03-18 ASSESSMENT — PAIN SCALES - GENERAL: PAINLEVEL_OUTOF10: 8

## 2024-03-18 NOTE — PREPROCEDURE INSTRUCTIONS
NPO Instructions:    Do not eat any food after midnight the night before your surgery/procedure.  You may have 10 ounces of clear liquids until TWO hours before arrival for surgery/procedure. This includes water, black tea/coffee, (no milk or cream) apple juice and electrolyte drinks (Gatorade).  You may chew gum up to TWO hours before your surgery/procedure.    Additional Instructions:     The Day before Surgery:  Review your medication instructions, stop indicated medications  You will be contacted in the evening regarding the time of your arrival to facility and surgery time    Day of Surgery:  Review your medication instructions, take indicated medications  Wear  comfortable loose fitting clothing  Do not use moisturizers, creams, lotions or perfume  All jewelry and valuables should be left at home

## 2024-03-18 NOTE — TELEPHONE ENCOUNTER
Patient said the Oxycodone is giving her an upset stomach the last two times you prescribed it. She is asking if you could prescribe her the Hydrocodone instead?

## 2024-03-18 NOTE — CPM/PAT H&P
CPM/PAT Evaluation       Name: Kamila Jones (Kamila Jones)  /Age: 1955/68 y.o.     Visit Type:   In-Person       Chief Complaint: Preoperative examination    Preoperative examination for ARTHROPLLASTY TOTAL HIP, ANTERIOR APPROACH on 4/3/24 with Dr. Charlton      Past Medical History:   Diagnosis Date    Alcohol dependence, in remission (CMS/HCC)     History of alcohol dependence    Arthritis     COPD (chronic obstructive pulmonary disease) (CMS/HCC)     Oxygen deficiency     uses home O2    Personal history of other malignant neoplasm of large intestine     History of malignant neoplasm of colon    Personal history of other venous thrombosis and embolism     History of deep venous thrombosis    Personal history of suicidal behavior     History of suicide attempt       Past Surgical History:   Procedure Laterality Date    MR HEAD ANGIO WO IV CONTRAST  10/24/2017    MR HEAD ANGIO WO IV CONTRAST [facility]    MR NECK ANGIO WO IV CONTRAST  10/24/2017    MR NECK ANGIO WO IV CONTRAST [facility]    OTHER SURGICAL HISTORY  06/15/2017    Treatment Of Ankle Fracture    TONSILLECTOMY  06/15/2017    Tonsillectomy       Patient  reports that she is not currently sexually active.    Family History   Problem Relation Name Age of Onset    Accidental death Mother      Stroke Father      Other (cerbral hemmorrhage) Father      Cancer Sister      Lung cancer Sister      Colon cancer Brother      Cancer Brother      Lung disease Brother         Allergies   Allergen Reactions    Propranolol Hcl Shortness of breath       Prior to Admission medications    Medication Sig Start Date End Date Taking? Authorizing Provider   acetaminophen (Tylenol Extra Strength) 500 mg tablet Take 1 tablet (500 mg) by mouth every 4 hours if needed. TAKE 1 TABLET EVERY 4 TO 6 HOURS AS NEEDED. 6/15/17   Historical Provider, MD   amLODIPine (Norvasc) 10 mg tablet Take 1 tablet (10 mg) by mouth once daily. 23   Historical  Provider, MD   escitalopram (Lexapro) 10 mg tablet Take 1 tablet (10 mg) by mouth once daily.    Historical Provider, MD   famotidine (Pepcid) 20 mg tablet Take 1 tablet (20 mg) by mouth once daily at bedtime.    Historical Provider, MD   gabapentin (Neurontin) 300 mg capsule Take 1 capsule (300 mg) by mouth 3 times a day.    Historical Provider, MD   LORazepam (Ativan) 0.5 mg tablet Take by mouth.    Historical Provider, MD   mirtazapine (Remeron) 15 mg tablet Take 1 tablet (15 mg) by mouth once daily at bedtime.    Historical Provider, MD   oxyCODONE (Roxicodone) 5 mg immediate release tablet Take 1 tablet (5 mg) by mouth every 6 hours if needed for moderate pain (4 - 6) for up to 7 days. Do not exceed more than 7 acetaminophen per day 3/12/24 3/19/24  Toan Pires MD   primidone (Mysoline) 50 mg tablet TAKE 4 TABLETS BY MOUTH EVERY MORNING AND 4 TABLETS IN THE EVENING AS DIRECTED  Patient taking differently: Takes 3 tablets in the morning and 3 tablets at night 11/15/23   Toan Pires MD   varenicline (Chantix) 1 mg tablet Take 1 tablet (1 mg) by mouth 2 times a day. Take with full glass of water.    Historical Provider, MD        PAT ROS:   Constitutional:    no fever   no chills  Neuro/Psych:    no numbness  Eyes:    use of corrective lenses  Ears:   neg    Nose:   neg    Mouth:   neg    Throat:   Neck:   Cardio:    no chest pain  Respiratory:    no shortness of breath  Endocrine:   GI:    no abdominal pain   no diarrhea   no nausea   no vomiting  :    no difficulty urinating  Musculoskeletal:    arthralgias  Hematologic:    history of blood transfusion   blood clots  Skin:   no sores/wound   no rash      Physical Exam  Vitals reviewed.   Constitutional:       Appearance: Normal appearance.   HENT:      Mouth/Throat:      Mouth: Mucous membranes are moist.   Eyes:      Extraocular Movements: Extraocular movements intact.   Cardiovascular:      Rate and Rhythm: Normal rate and regular rhythm.      Heart  sounds: Normal heart sounds.   Pulmonary:      Effort: Pulmonary effort is normal.      Comments: Breath sound diminished bilaterally  Abdominal:      Palpations: Abdomen is soft.   Musculoskeletal:      Right lower leg: No edema.      Left lower leg: No edema.   Skin:     General: Skin is warm and dry.   Neurological:      General: No focal deficit present.      Mental Status: She is alert and oriented to person, place, and time.   Psychiatric:         Mood and Affect: Mood normal.         Thought Content: Thought content normal.          PAT AIRWAY:   Airway:     Mallampati::  II    Neck ROM::  Full      Visit Vitals  /82   Pulse 74   Temp 36.8 °C (98.2 °F)   Resp 22       DASI Risk Score      Flowsheet Row Most Recent Value   DASI SCORE 7.2   METS Score (Will be calculated only when all the questions are answered) 3.6          Caprini DVT Assessment    No data to display       Modified Frailty Index    No data to display       CHADS2 Stroke Risk  Current as of today        N/A 3 - 100%: High Risk   2 - 3%: Medium Risk   0 - 2%: Low Risk     Last Change: N/A          This score determines the patient's risk of having a stroke if the patient has atrial fibrillation.        This score is not applicable to this patient. Components are not calculated.          Revised Cardiac Risk Index    No data to display       Apfel Simplified Score    No data to display       Risk Analysis Index Results This Encounter    No data found in the last 1 encounters.       Stop Bang Score      Flowsheet Row Most Recent Value   Do you snore loudly? 1   Do you often feel tired or fatigued after your sleep? 1   Has anyone ever observed you stop breathing in your sleep? 1   Do you have or are you being treated for high blood pressure? 1   Recent BMI (Calculated) 21.5   Is BMI greater than 35 kg/m2? 0=No   Age older than 50 years old? 1=Yes   Is your neck circumference greater than 17 inches (Male) or 16 inches (Female)? 0   Gender -  "Male 0=No   STOP-BANG Total Score 5            Assessment and Plan:     Neuro:  normal     HEENT/Airway:  normal HEENT, normal airway    Cardiovascular:  History of HTN, hold amlodipine on morning of surgery. Pt had stress test on 3/25/24, 1. Patient does have poorly controlled hypertension will start on lisinopril 10 mg oral daily. 2. Adequate level of stress achieved. 3. Normal ECG. 4. Normal Stress Test.  5. Nuclear image results are reported separately. EKG 3/6/24- SR, rate 76 bpm. Cardiac clearance is pending from Dr. Romano after stress test completion. No additional preoperative testing is currently indicated.    Pulmonary:  history of COPD, wears 3 liters of oxygen continuously, quit smoking 11/2023.  High risk pulmonary clearance was obtained from Dr. Davis, \"FEV1 stable last few years\". Preoperative deep breathing educational handout provided to patient.    Renal: negative     Endocrine:  negative    Hematologic:  history of right leg DVT 2016, unrelated to surgery, completed anticoagulation therapy.  Received blood transfusion during GI bleed without reaction. Preoperative DVT educational handout provided to patient.    Gastrointestinal:  History of GI bleed.      Infectious disease:  negative      Musculoskeletal:  history of osteoarthritis       Labs ordered by this provider- MRSA, urinalysis. CBC & BMP from 3/6/24          "

## 2024-03-19 DIAGNOSIS — M16.11 PRIMARY LOCALIZED OSTEOARTHRITIS OF RIGHT HIP: Primary | ICD-10-CM

## 2024-03-19 LAB — HOLD SPECIMEN: NORMAL

## 2024-03-19 RX ORDER — HYDROCODONE BITARTRATE AND ACETAMINOPHEN 5; 325 MG/1; MG/1
1 TABLET ORAL EVERY 6 HOURS PRN
Qty: 20 TABLET | Refills: 0 | Status: SHIPPED | OUTPATIENT
Start: 2024-03-19 | End: 2024-04-01 | Stop reason: SDUPTHER

## 2024-03-20 LAB — STAPHYLOCOCCUS SPEC CULT: NORMAL

## 2024-03-22 ENCOUNTER — APPOINTMENT (OUTPATIENT)
Dept: RADIOLOGY | Facility: HOSPITAL | Age: 69
End: 2024-03-22
Payer: COMMERCIAL

## 2024-03-25 ENCOUNTER — HOSPITAL ENCOUNTER (OUTPATIENT)
Dept: RADIOLOGY | Facility: HOSPITAL | Age: 69
Discharge: HOME | End: 2024-03-25
Payer: COMMERCIAL

## 2024-03-25 ENCOUNTER — HOSPITAL ENCOUNTER (OUTPATIENT)
Dept: CARDIOLOGY | Facility: HOSPITAL | Age: 69
Discharge: HOME | End: 2024-03-25
Payer: COMMERCIAL

## 2024-03-25 DIAGNOSIS — R06.02 SOB (SHORTNESS OF BREATH): ICD-10-CM

## 2024-03-25 DIAGNOSIS — Z01.811 PRE-OP CHEST EXAM: ICD-10-CM

## 2024-03-25 DIAGNOSIS — I10 PRIMARY HYPERTENSION: Primary | ICD-10-CM

## 2024-03-25 DIAGNOSIS — Z01.810 ENCOUNTER FOR PREPROCEDURAL CARDIOVASCULAR EXAMINATION: ICD-10-CM

## 2024-03-25 PROCEDURE — 93017 CV STRESS TEST TRACING ONLY: CPT

## 2024-03-25 PROCEDURE — 93016 CV STRESS TEST SUPVJ ONLY: CPT | Performed by: INTERNAL MEDICINE

## 2024-03-25 PROCEDURE — 3430000001 HC RX 343 DIAGNOSTIC RADIOPHARMACEUTICALS: Performed by: INTERNAL MEDICINE

## 2024-03-25 PROCEDURE — 78452 HT MUSCLE IMAGE SPECT MULT: CPT

## 2024-03-25 PROCEDURE — 2500000004 HC RX 250 GENERAL PHARMACY W/ HCPCS (ALT 636 FOR OP/ED)

## 2024-03-25 PROCEDURE — A9502 TC99M TETROFOSMIN: HCPCS | Performed by: INTERNAL MEDICINE

## 2024-03-25 PROCEDURE — 93018 CV STRESS TEST I&R ONLY: CPT | Performed by: INTERNAL MEDICINE

## 2024-03-25 PROCEDURE — 78452 HT MUSCLE IMAGE SPECT MULT: CPT | Performed by: STUDENT IN AN ORGANIZED HEALTH CARE EDUCATION/TRAINING PROGRAM

## 2024-03-25 RX ORDER — REGADENOSON 0.08 MG/ML
INJECTION, SOLUTION INTRAVENOUS
Status: COMPLETED
Start: 2024-03-25 | End: 2024-03-25

## 2024-03-25 RX ORDER — LISINOPRIL 10 MG/1
10 TABLET ORAL DAILY
Qty: 90 TABLET | Refills: 3 | Status: SHIPPED | OUTPATIENT
Start: 2024-03-25 | End: 2025-03-25

## 2024-03-25 RX ADMIN — REGADENOSON: 0.08 INJECTION, SOLUTION INTRAVENOUS at 11:09

## 2024-03-25 RX ADMIN — TETROFOSMIN 30.7 MILLICURIE: 0.23 INJECTION, POWDER, LYOPHILIZED, FOR SOLUTION INTRAVENOUS at 11:10

## 2024-03-25 RX ADMIN — TETROFOSMIN 10.5 MILLICURIE: 0.23 INJECTION, POWDER, LYOPHILIZED, FOR SOLUTION INTRAVENOUS at 09:32

## 2024-03-25 NOTE — TELEPHONE ENCOUNTER
Patient blood pressure high during the stress test will add lisinopril 10 mg oral daily for better blood pressure control

## 2024-03-26 ENCOUNTER — TELEPHONE (OUTPATIENT)
Dept: CARDIOLOGY | Facility: CLINIC | Age: 69
End: 2024-03-26
Payer: COMMERCIAL

## 2024-03-26 NOTE — TELEPHONE ENCOUNTER
Patient states that she is going to get a blood pressure cuff and will notify us as soon as she has a few readings to give us. She will get one asap. She also stated her surgery was cancelled and she has a second opinion appt this week.

## 2024-03-26 NOTE — TELEPHONE ENCOUNTER
----- Message from Kaorn Romano MD sent at 3/25/2024  3:04 PM EDT -----  Tell patient that her stress test was okay.  I prescribed her lisinopril tell her to call me next week to tell me about her blood pressure if it is better.  From our standpoint she could have surgery she needs clearance with a letter tell me where to send it thank you  ----- Message -----  From: Interface, Radiology Results In  Sent: 3/25/2024   2:40 PM EDT  To: Karon Romano MD

## 2024-03-28 ENCOUNTER — OFFICE VISIT (OUTPATIENT)
Dept: ORTHOPEDIC SURGERY | Facility: CLINIC | Age: 69
End: 2024-03-28
Payer: COMMERCIAL

## 2024-03-28 ENCOUNTER — HOSPITAL ENCOUNTER (OUTPATIENT)
Dept: RADIOLOGY | Facility: CLINIC | Age: 69
Discharge: HOME | End: 2024-03-28
Payer: COMMERCIAL

## 2024-03-28 VITALS — BODY MASS INDEX: 22.53 KG/M2 | WEIGHT: 132 LBS | HEIGHT: 64 IN

## 2024-03-28 DIAGNOSIS — M25.551 RIGHT HIP PAIN: Primary | ICD-10-CM

## 2024-03-28 DIAGNOSIS — M25.551 RIGHT HIP PAIN: ICD-10-CM

## 2024-03-28 PROCEDURE — 99205 OFFICE O/P NEW HI 60 MIN: CPT | Performed by: STUDENT IN AN ORGANIZED HEALTH CARE EDUCATION/TRAINING PROGRAM

## 2024-03-28 PROCEDURE — 73502 X-RAY EXAM HIP UNI 2-3 VIEWS: CPT | Mod: RIGHT SIDE | Performed by: STUDENT IN AN ORGANIZED HEALTH CARE EDUCATION/TRAINING PROGRAM

## 2024-03-28 PROCEDURE — G2212 PROLONG OUTPT/OFFICE VIS: HCPCS | Performed by: STUDENT IN AN ORGANIZED HEALTH CARE EDUCATION/TRAINING PROGRAM

## 2024-03-28 PROCEDURE — 1159F MED LIST DOCD IN RCRD: CPT | Performed by: STUDENT IN AN ORGANIZED HEALTH CARE EDUCATION/TRAINING PROGRAM

## 2024-03-28 PROCEDURE — 73502 X-RAY EXAM HIP UNI 2-3 VIEWS: CPT | Mod: RT

## 2024-03-28 PROCEDURE — 1157F ADVNC CARE PLAN IN RCRD: CPT | Performed by: STUDENT IN AN ORGANIZED HEALTH CARE EDUCATION/TRAINING PROGRAM

## 2024-03-28 ASSESSMENT — PAIN SCALES - GENERAL: PAINLEVEL_OUTOF10: 10 - WORST POSSIBLE PAIN

## 2024-03-28 ASSESSMENT — PAIN DESCRIPTION - DESCRIPTORS: DESCRIPTORS: ACHING

## 2024-03-28 ASSESSMENT — PAIN - FUNCTIONAL ASSESSMENT: PAIN_FUNCTIONAL_ASSESSMENT: 0-10

## 2024-03-28 NOTE — PROGRESS NOTES
AMINA/TKA Related Summary           L hip: N  L knee: N  R hip: N  R knee: N  Lumbar surgery: N            CC/SUBJECTIVE/HPI            PCP: Toan Pires MD  Referring provider: No ref. 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 female presenting for R hip pain. At this point, it is limiting activities of daily living. As detailed below, Kamila has been dealing with this pain for some time despite treatment.  She had a R AMINA scheduled with Dr. Charlton on 4/3/2024, but this was canceled due to anesthesia concerns at [facility].    R hip  Symptoms  Pain: 10/10  Onset: chronic/gradual  Duration: >2yrs  Location: groin, side of hip, and back/spine  Quality: sharp/stab and radiate  Limitations: pain after activity, limp, weakness, feeling unstable, difficulty with stairs, difficulty in/out of chair/car, and difficulty with socks/shoes/clothes  Ambulation limit due to pain: 100-500ft  Other symptoms: none  Treatment  Tried: activity modification, OTCs, and narcotics  Most recent injection <3mo ago? na  Assistive device: cane and walker  Treatment attempted for 1-2yrs and is no longer effective            HISTORIES (System Generated and Pt Reported on Form Today)       Dental  Pt reported: No active issues     PMH  Pt reported: Denies heart/lung/kidney/liver issues, DM, stroke, seizure, bariatric surgery, anticoag, MRSA, cancer, personal/familial coagulopathies except: none in addition to below  System generated (PMH, problem list both included since EMR change caused discrepancies):   Past Medical History:   Diagnosis Date    Alcohol dependence, in remission (CMS/[facility])     History of alcohol dependence    Arthritis     COPD (chronic obstructive pulmonary disease) (CMS/[facility])     Dyspnea     Edentulous     History of blood transfusion     1/2024 lower GI bleed    no reaction    RAMON on CPAP     with O2 3L n/c    Oxygen deficiency     uses home O2  3L n/c continuously    Personal history of other venous thrombosis and embolism     History of deep venous thrombosis right leg    Personal history of suicidal behavior     History of suicide attempt    Uses roller walker      Patient Active Problem List   Diagnosis    Intentional drug overdose (CMS/HCC)    Palliative care patient    Medical home patient    Abnormal weight loss    Accidental fall    Acute deep vein thrombosis (DVT) of distal vein of right lower extremity (CMS/HCC)    Acute renal failure syndrome (CMS/HCC)    Continuous chronic alcoholism (CMS/HCC)    Anemia    Anxiety    Aortic valve disorder    Aortic valve stenosis    Abdominal pain    Back pain, lumbosacral    Carotid stenosis, bilateral    Chronic anxiety    Chronic GERD    Chronic hypercapnic respiratory failure (CMS/HCC)    Closed fracture of humerus    Acute exacerbation of chronic obstructive airways disease (CMS/HCC)    Acute respiratory failure (CMS/HCC)    Acute bronchitis with chronic obstructive pulmonary disease (COPD) (CMS/HCC)    COPD (chronic obstructive pulmonary disease) (CMS/HCC)    Pulmonary emphysema (CMS/HCC)    Depression    Dysthymia    ISABELA (generalized anxiety disorder)    Mood disorder (CMS/HCC)    Dyspnea    Essential tremor    History of hypertension    Primary hypertension    Hypokalemia    Hyponatremia    Hypoxia    Iron deficiency anemia    Lower extremity numbness    Nonsustained ventricular tachycardia (CMS/HCC)    Other chronic pain    Nausea & vomiting    Peptic ulcer disease    Pulmonary nodule    Shoulder injury    Suicide by drug overdose (CMS/HCC)    Tobacco dependency    Traumatic intracranial subarachnoid hemorrhage (CMS/HCC)    Urinary tract infection    Hematochezia    Fracture of humerus neck, right, closed, initial encounter    Osteoarthritis of right hip    Pain of right hip joint    Subarachnoid hemorrhage (CMS/HCC)    Unsteady gait    Subdural hematoma (CMS/HCC)     PSH  Pt reported: Per above.    System generated:   Past Surgical History:   Procedure Laterality Date    ANKLE FRACTURE SURGERY      MR HEAD ANGIO WO IV CONTRAST  10/24/2017    MR HEAD ANGIO WO IV CONTRAST [facility]    MR NECK ANGIO WO IV CONTRAST  10/24/2017    MR NECK ANGIO WO IV CONTRAST [facility]    OTHER SURGICAL HISTORY Right 06/15/2017    Treatment Of Ankle Fracture    TONSILLECTOMY  06/15/2017    Tonsillectomy     Family Hx  Pt reported clot/coagulopathies: none  System generated:   Family History   Problem Relation Name Age of Onset    Accidental death Mother      Stroke Father      Other (cerbral hemmorrhage) Father      Cancer Sister      Lung cancer Sister      Colon cancer Brother      Cancer Brother      Lung disease Brother      Heart attack Brother       Social Hx  Pt reported substance use: EtOH (4+ drink(s) about 3x/wk). Quit smoking 5mo ago  System generated:   Social History     Tobacco Use    Smoking status: Former     Packs/day: 0.25     Years: 15.00     Additional pack years: 0.00     Total pack years: 3.75     Types: Cigarettes     Start date:      Quit date: 10/28/2023     Years since quittin.3     Passive exposure: Never    Smokeless tobacco: Never   Vaping Use    Vaping Use: Never used   Substance Use Topics    Alcohol use: Not Currently     Comment: 4-6 beers daily   hx alcohol dependence    Drug use: Never     Allergies  Pt reported (meds, metals): N  System generated:   Allergies   Allergen Reactions    Propranolol Hcl Shortness of breath     Current Meds  System generated:   Current Outpatient Medications:     acetaminophen (Tylenol Extra Strength) 500 mg tablet, Take 1 tablet (500 mg) by mouth 4 times a day., Disp: , Rfl:     amLODIPine (Norvasc) 10 mg tablet, Take 1 tablet (10 mg) by mouth once daily., Disp: , Rfl:     chlorhexidine (Peridex) 0.12 % solution, Use 15 mL in the mouth or throat once daily. Swish and spit one capful the night before surgery and morning  of surgery  "(Patient not taking: Reported on 3/18/2024), Disp: 473 mL, Rfl: 0    escitalopram (Lexapro) 10 mg tablet, Take 1 tablet (10 mg) by mouth once daily., Disp: , Rfl:     gabapentin (Neurontin) 300 mg capsule, Take 1 capsule (300 mg) by mouth 3 times a day., Disp: , Rfl:     HYDROcodone-acetaminophen (Norco) 5-325 mg tablet, Take 1 tablet by mouth every 6 hours if needed for severe pain (7 - 10) for up to 7 days., Disp: 20 tablet, Rfl: 0    mirtazapine (Remeron) 15 mg tablet, Take 3 tablets (45 mg) by mouth once daily at bedtime., Disp: , Rfl:     primidone (Mysoline) 50 mg tablet, TAKE 4 TABLETS BY MOUTH EVERY MORNING AND 4 TABLETS IN THE EVENING AS DIRECTED (Patient taking differently: Takes 3 tablets in the morning and 3 tablets at night), Disp: 720 tablet, Rfl: 1    ROS: Neg except HPI            OBJECTIVE            Physical exam  Estimated body mass index is 21.14 kg/m² as calculated from the following:    Height as of 3/18/24: 1.676 m (5' 6\").    Weight as of 3/18/24: 59.4 kg (130 lb 15.3 oz).  Gen/psych: NAD, conversational, appropriate    Ambulation  Gait: antalgic  Assistive device: walker  Spine  Lumbar tenderness: pos  Limited ROM: pos, with no radiation or pain with flex/ex, lateral bending  SLR test: neg    Focused MSK exam: R  Hip  Trendelenberg test: Unable to test.  Abductors 4/5  Tenderness: peritrochanteric, gluteal, proximal thigh, groin, and lumbar paraspinal muscles  Pain with log roll: pos  Stinchfield: pos  ROM: limited, painful  Flexion: 100º  IR: <10º  ER: 30º  Abd: 10º  Neurovascular    Strength: 4+/5 hip/knee/ankle flexion and extension  Sensory (L2-S1): SILT throughout lower extremity  Edema/stasis: no pitting edema  Pulse: DP 1+, PT 1+     Imaging  3/21/2024 R hip radiographs (AP Pelvis, AP/Lateral) on my read: Joint space narrowing (severe) with osteophytes, sclerosis, and subchondral cysts.  12/15/2024 R hip CT on my read: Severe right hip osteoarthritis with substantial subcortical cysts " "in both the acetabulum and the femoral head.            ASSESSMENT & PLAN           Patient verbalized understanding of below A&P. All questions answered.  R hip DJD  Differential includes radicular pain from spine. H&P most consistent with intra-articular pain from hip degeneration.  General information  Evaluation, imaging, diagnosis, and treatment options (conservative and surgical as well as risks, benefits, recovery, and outcomes) discussed. Conservative options should be maximized and include activity modification, bracing, weight loss, PT, home exercise, local pain control (ice, heat, topicals), OTCs, and assistive devices. Once exhausted, injections may provide relief. If these fail to improve pain, function, and quality of life, elective arthroplasty may be an option.  Shared decision making   I had a long discussion with patient and her granddaughter regarding treatment options.  She is interested in surgery, not injections.    Unfortunately, we had a difficult conversation regarding her extensive risk factors and how arthroplasty is, in the end, an elective surgery.  As result, I would recommend continued conservative treatment.  We discussed that different providers have different risk threshold for proceeding with surgery, and she is certainly welcome to seek other opinions.  I am, of course, happy to see her back at any time.  PMH, PSH, other considerations discussed  Significant PMH would require preop clearance: COPD on 3 L home O2, chronic hypercapnic respiratory failure, emphysema, acute renal failure syndrome, nonsustained ventricular tachycardia, aortic valve stenosis, bilateral carotid stenosis  RAMON on CPAP  Would require consideration for postoperative anticoagulation plan: RLE DVT, peptic ulcer disease, lower GI bleed requiring blood transfusion  History of suicide attempt by overdose \"many\" yrs ago  Alcohol dependence: Continues to drink 4+ beers/day about 3x/wk, which is both a health and a " "fall risk.  Taking 15 x 500mg tylenol per day  Requires home health aide for bathing  \"Abnormal weight loss\" the patient attributes to her COPD last year.  Anemia (Hgb 11.2 on 3/6/2024, previously around 9.0)  Lumbosacral back pain  Lower extremity numbness: Sensate on exam today, but comments that her legs go numb occasionally.  Chronic pain increases chance of unsatisfactory surgical outcomes    Time: I spent 75min preparing to see pt, obtaining/reviewing separately obtained hx, performing necessary/appropriate PE/eval, independently interpreting results and communicating them with counseling/education to pt/fam/caregiver, placing orders, communicating/coordinating care with other providers, and documenting in EMR.  "

## 2024-03-29 ENCOUNTER — TELEPHONE (OUTPATIENT)
Dept: PRIMARY CARE | Facility: CLINIC | Age: 69
End: 2024-03-29
Payer: COMMERCIAL

## 2024-03-29 NOTE — TELEPHONE ENCOUNTER
Patient is requesting to speak with you, she said she got turned down by another hip surgeon and is unsure what to do from here; she is in a lot of pain.

## 2024-04-01 DIAGNOSIS — M16.11 PRIMARY OSTEOARTHRITIS OF RIGHT HIP: Primary | ICD-10-CM

## 2024-04-01 DIAGNOSIS — M16.11 PRIMARY LOCALIZED OSTEOARTHRITIS OF RIGHT HIP: ICD-10-CM

## 2024-04-01 RX ORDER — HYDROCODONE BITARTRATE AND ACETAMINOPHEN 5; 325 MG/1; MG/1
1 TABLET ORAL EVERY 6 HOURS PRN
Qty: 20 TABLET | Refills: 0 | Status: SHIPPED | OUTPATIENT
Start: 2024-04-01 | End: 2024-04-05 | Stop reason: WASHOUT

## 2024-04-01 NOTE — TELEPHONE ENCOUNTER
Spoke with patient, she said she is in so much pain she is unable to do anything.  She is requesting something else to take for pain, she is is out of options but is going to try and make an appointment with a physician at the [facility] for a second opinion. She said they cancelled her surgery because she came back as too much of a risk.

## 2024-04-05 DIAGNOSIS — M16.11 PRIMARY LOCALIZED OSTEOARTHRITIS OF RIGHT HIP: ICD-10-CM

## 2024-04-05 RX ORDER — OXYCODONE HYDROCHLORIDE 5 MG/1
5 TABLET ORAL EVERY 6 HOURS PRN
Qty: 28 TABLET | Refills: 0 | Status: SHIPPED | OUTPATIENT
Start: 2024-04-05 | End: 2024-04-15 | Stop reason: SDUPTHER

## 2024-04-05 NOTE — TELEPHONE ENCOUNTER
See attached rx, last appt was 03/06/2024 - patient also wanted to let you know that she has an appointment next week with pain management for evaluation.

## 2024-04-11 ENCOUNTER — APPOINTMENT (OUTPATIENT)
Dept: OPERATING ROOM | Facility: HOSPITAL | Age: 69
End: 2024-04-11
Payer: COMMERCIAL

## 2024-04-11 ENCOUNTER — OFFICE VISIT (OUTPATIENT)
Dept: PAIN MEDICINE | Facility: CLINIC | Age: 69
End: 2024-04-11
Payer: COMMERCIAL

## 2024-04-11 VITALS
HEIGHT: 64 IN | BODY MASS INDEX: 22.53 KG/M2 | WEIGHT: 132 LBS | DIASTOLIC BLOOD PRESSURE: 90 MMHG | HEART RATE: 86 BPM | RESPIRATION RATE: 22 BRPM | SYSTOLIC BLOOD PRESSURE: 148 MMHG

## 2024-04-11 DIAGNOSIS — M25.551 CHRONIC RIGHT HIP PAIN: ICD-10-CM

## 2024-04-11 DIAGNOSIS — M16.11 PRIMARY LOCALIZED OSTEOARTHRITIS OF RIGHT HIP: Primary | ICD-10-CM

## 2024-04-11 DIAGNOSIS — G89.29 CHRONIC RIGHT HIP PAIN: ICD-10-CM

## 2024-04-11 PROCEDURE — 99204 OFFICE O/P NEW MOD 45 MIN: CPT | Performed by: STUDENT IN AN ORGANIZED HEALTH CARE EDUCATION/TRAINING PROGRAM

## 2024-04-11 PROCEDURE — 99214 OFFICE O/P EST MOD 30 MIN: CPT | Performed by: STUDENT IN AN ORGANIZED HEALTH CARE EDUCATION/TRAINING PROGRAM

## 2024-04-11 PROCEDURE — 1160F RVW MEDS BY RX/DR IN RCRD: CPT | Performed by: STUDENT IN AN ORGANIZED HEALTH CARE EDUCATION/TRAINING PROGRAM

## 2024-04-11 PROCEDURE — 1125F AMNT PAIN NOTED PAIN PRSNT: CPT | Performed by: STUDENT IN AN ORGANIZED HEALTH CARE EDUCATION/TRAINING PROGRAM

## 2024-04-11 PROCEDURE — 3080F DIAST BP >= 90 MM HG: CPT | Performed by: STUDENT IN AN ORGANIZED HEALTH CARE EDUCATION/TRAINING PROGRAM

## 2024-04-11 PROCEDURE — 1157F ADVNC CARE PLAN IN RCRD: CPT | Performed by: STUDENT IN AN ORGANIZED HEALTH CARE EDUCATION/TRAINING PROGRAM

## 2024-04-11 PROCEDURE — 1036F TOBACCO NON-USER: CPT | Performed by: STUDENT IN AN ORGANIZED HEALTH CARE EDUCATION/TRAINING PROGRAM

## 2024-04-11 PROCEDURE — 1159F MED LIST DOCD IN RCRD: CPT | Performed by: STUDENT IN AN ORGANIZED HEALTH CARE EDUCATION/TRAINING PROGRAM

## 2024-04-11 PROCEDURE — 3077F SYST BP >= 140 MM HG: CPT | Performed by: STUDENT IN AN ORGANIZED HEALTH CARE EDUCATION/TRAINING PROGRAM

## 2024-04-11 RX ORDER — METHOCARBAMOL 750 MG/1
750 TABLET, FILM COATED ORAL 4 TIMES DAILY
Qty: 60 TABLET | Refills: 3 | Status: SHIPPED | OUTPATIENT
Start: 2024-04-11 | End: 2024-06-10

## 2024-04-11 ASSESSMENT — LIFESTYLE VARIABLES: TOTAL SCORE: 5

## 2024-04-11 ASSESSMENT — PAIN SCALES - GENERAL
PAINLEVEL: 10-WORST PAIN EVER
PAINLEVEL_OUTOF10: 10 - WORST POSSIBLE PAIN

## 2024-04-11 ASSESSMENT — PAIN - FUNCTIONAL ASSESSMENT: PAIN_FUNCTIONAL_ASSESSMENT: 0-10

## 2024-04-11 ASSESSMENT — PAIN DESCRIPTION - DESCRIPTORS: DESCRIPTORS: SHARP;THROBBING;BURNING

## 2024-04-11 NOTE — PROGRESS NOTES
[facility]  New Patient Office Visit Note 2024    Patient Information: Kamila Jones, MRN: [id number], : 1955   Primary Care/Referring Physician: Toan Pires MD, [id number] [id number] / [place] [address]     Chief Complaint: Right groin pain  History of Pain: Ms. Kamila Jones is a 68 y.o. female with a PMHx of HTN, COPD (on 3L O2), bilateral carotid stenosis, hx of DVT, GERD, PUD (hx of recent GI bleed), CKD, EtOH use disorder, anxiety, depression  who presents for right groin pain.    She reports onset of pain 1.5 years ago which has been progressively worsening with time. She describes right lateral hip and groin pain. This pain is constant and significantly worsens with walking, bending forward. She gets some radiation down into her right anterior thigh. She reports some numbness/tingling in the entirety of both legs, most noticeable at night. She has a right hip xray showing severe OA. She was planning to undergo right hip AMINA with Dr. Charlton, but this was cancelled due to anesthesia concerns regarding her multiple medical co-morbidities. She was then evaluated by Dr. Simmons who also believe hip replacement was too risky. She is planning to see surgeon at [facility] in September for another opinion    She is currently taking 2 oxycodone, 2 tylenol, and 2 gabapentin in the morning just to get out of bed.     Current Pain Medications: Using excessive Tylenol, Gabapentin 300 mg TID - she states she is taking 600/300  Previously Tried Pain Medications: Oxycodone, Norco    Relevant Surgeries: Denies hip surgeries in the past  Injections: Denies any hip injection   Physical/Occupational Therapy: She denies any recent PT    Medications:   Current Outpatient Medications   Medication Instructions    acetaminophen (TYLENOL EXTRA STRENGTH) 500 mg, oral, 4 times daily    amLODIPine (NORVASC) 10 mg, oral, Daily    chlorhexidine (Peridex) 0.12 % solution 15 mL, Mouth/Throat, Daily, Swish and spit one  capful the night before surgery and morning  of surgery    escitalopram (LEXAPRO) 10 mg, oral, Daily    gabapentin (NEURONTIN) 300 mg, oral, 3 times daily    lisinopril 10 mg, oral, Daily    methocarbamol (ROBAXIN-750) 750 mg, oral, 4 times daily    mirtazapine (REMERON) 45 mg, oral, Nightly    oxyCODONE (ROXICODONE) 5 mg, oral, Every 6 hours PRN    primidone (Mysoline) 50 mg tablet TAKE 4 TABLETS BY MOUTH EVERY MORNING AND 4 TABLETS IN THE EVENING AS DIRECTED      Allergies:   Allergies   Allergen Reactions    Propranolol Hcl Shortness of breath       Past Medical & Surgical History:  Past Medical History:   Diagnosis Date    Alcohol dependence, in remission (CMS/[facility])     History of alcohol dependence    Arthritis     COPD (chronic obstructive pulmonary disease) (CMS/[facility])     Dyspnea     Edentulous     History of blood transfusion     1/2024 lower GI bleed    no reaction    RAMON on CPAP     with O2 3L n/c    Oxygen deficiency     uses home O2 3L n/c continuously    Personal history of other venous thrombosis and embolism     History of deep venous thrombosis right leg    Personal history of suicidal behavior     History of suicide attempt    Uses roller walker       Past Surgical History:   Procedure Laterality Date    ANKLE FRACTURE SURGERY      MR HEAD ANGIO WO IV CONTRAST  10/24/2017    MR HEAD ANGIO WO IV CONTRAST [facility]    MR NECK ANGIO WO IV CONTRAST  10/24/2017    MR NECK ANGIO WO IV CONTRAST [facility]    OTHER SURGICAL HISTORY Right 06/15/2017    Treatment Of Ankle Fracture    TONSILLECTOMY  06/15/2017    Tonsillectomy       Family History   Problem Relation Name Age of Onset    Accidental death Mother      Stroke Father      Other (cerbral hemmorrhage) Father      Cancer Sister      Lung cancer Sister      Colon cancer Brother      Cancer Brother      Lung disease Brother      Heart attack Brother       Social History     Socioeconomic History    Marital status:      Spouse  name: Not on file    Number of children: Not on file    Years of education: Not on file    Highest education level: Not on file   Occupational History    Not on file   Tobacco Use    Smoking status: Former     Current packs/day: 0.00     Average packs/day: 0.3 packs/day for 53.8 years (13.5 ttl pk-yrs)     Types: Cigarettes     Start date:      Quit date: 10/28/2023     Years since quittin.4     Passive exposure: Never    Smokeless tobacco: Never   Vaping Use    Vaping status: Never Used   Substance and Sexual Activity    Alcohol use: Not Currently     Comment: 3-5 beers 3-4x a week    Drug use: Never    Sexual activity: Not Currently   Other Topics Concern    Not on file   Social History Narrative    Not on file     Social Determinants of Health     Financial Resource Strain: Low Risk  (2024)    Overall Financial Resource Strain (CARDIA)     Difficulty of Paying Living Expenses: Not very hard   Food Insecurity: Not on file   Transportation Needs: No Transportation Needs (2024)    PRAPARE - Transportation     Lack of Transportation (Medical): No     Lack of Transportation (Non-Medical): No   Physical Activity: Not on file   Stress: Not on file   Social Connections: Not on file   Intimate Partner Violence: Not on file   Housing Stability: Low Risk  (2024)    Housing Stability Vital Sign     Unable to Pay for Housing in the Last Year: No     Number of Places Lived in the Last Year: 1     Unstable Housing in the Last Year: No       Problems, Past medical history, past surgical history, Medications, allergies, social and family history reviewed and as per the electronic medical record from today's encounter    Review of Systems:  CONST: No fever, chills, fatigue, weight changes  EYES: No loss of vision  ENT: No hearing loss, tinnitus  CV: No chest pain, palpitations  RESP: Reports shortness of breath at baseline  GI: No stool incontinence, nausea, vomiting  : No urinary incontinence  MSK: No  "joint swelling  SKIN: No rash, no hives  NEURO: No headache, dizziness  PSYCH: Reports anxiety, depression  HEM/LYMPH: No easy bruising or bleeding  All other systems reviewed are negative     Physical Exam:  Vitals: /90   Pulse 86   Resp 22   Ht 1.626 m (5' 4\")   Wt 59.9 kg (132 lb)   LMP  (LMP Unknown)   BMI 22.66 kg/m²   General: No apparent distress. Alert, appropriate, oriented x 3. Intermittently tearful during today's visit. Speaking in full sentences.   HENT: Normocephalic, atraumatic. Hearing intact.  Eyes: Pupils equal and round  Neck: Supple, trachea midline  Lungs: Symmetric respiratory excursion on visual exam, nonlabored breathing.   Extremities: No cyanosis or edema noted in extremities.  Skin: No rashes, lesions noted.  Neuro: Alert and appropriate. Ambulates slowly with an antalgic gait. Bulk and tone within normal limits.    Laboratory Data:  The following laboratory data were reviewed during this visit:   Lab Results   Component Value Date    WBC 5.3 03/06/2024    RBC 3.42 (L) 03/06/2024    HGB 11.2 (L) 03/06/2024    HCT 35.8 (L) 03/06/2024     03/06/2024      Lab Results   Component Value Date    INR 1.1 01/25/2024     Lab Results   Component Value Date    CREATININE 1.14 (H) 03/06/2024       Imaging:  The following imaging impressions were reviewed by me during this visit:    -3/28/24 right hip xray shows advanced right hip osteoarthritis     I also personally reviewed the images from the above studies myself. These images and my interpretation of them contributed to the management and decision making of the patient's medical plan.    ASSESSMENT:  Ms. Kamila Jones is a 68 y.o. female with right groin pain that is consistent with:    1. Primary localized osteoarthritis of right hip    2. Chronic right hip pain        PLAN:    Diagnostics:   - I reviewed her right hip imaging which shows very advanced hip joint osteoarthritis. No further diagnostics are indicated at this " time.    Physical Therapy and Rehabilitation:     - She is unlikely to tolerate physical therapy given the severity of her pain    Psychologically:  - No needs at this time    Medications  - We had a very long discussion about mediation options today. Unfortunately her medical history significantly limits her options for medications. She would likely benefit from an NSAID but has a history of CKD and GI bleed. Given her active EtOH use disorder as well as significant lung disease I do not believe chronic opioid therapy is a reasonable option to manage her pain.   - Recommend trial of Methocarbamol 750 mg QID. Education on this medication provided today. Side effects reviewed  - Would consider increasing Gabapentin in the future    Duration  - 1.5 years    Interventions:  - She has very advanced degenerative changes of her right hip and will likely need a right AMINA for pain relief. Her multiple medical co-morbidities make this operation high risk and she is having difficulty finding a surgeon willing to do this. She is planning to see a surgeon at [facility] in 09/2024. To provide some pain relief in the interim I recommend a right hip joint intra-articular steroid injection utilizing live fluoroscopy and local anesthesia. She would like to proceed      Sincerely,  Roel Watson MD  [facility] - [place]

## 2024-04-12 ENCOUNTER — TELEPHONE (OUTPATIENT)
Dept: PAIN MEDICINE | Facility: CLINIC | Age: 69
End: 2024-04-12
Payer: COMMERCIAL

## 2024-04-15 DIAGNOSIS — M16.11 PRIMARY OSTEOARTHRITIS OF RIGHT HIP: ICD-10-CM

## 2024-04-15 DIAGNOSIS — M16.11 PRIMARY LOCALIZED OSTEOARTHRITIS OF RIGHT HIP: ICD-10-CM

## 2024-04-15 RX ORDER — HYDROCODONE BITARTRATE AND ACETAMINOPHEN 5; 325 MG/1; MG/1
1 TABLET ORAL EVERY 6 HOURS PRN
Qty: 20 TABLET | Refills: 0 | OUTPATIENT
Start: 2024-04-15 | End: 2024-04-22

## 2024-04-15 RX ORDER — OXYCODONE HYDROCHLORIDE 5 MG/1
5 TABLET ORAL EVERY 6 HOURS PRN
Qty: 28 TABLET | Refills: 0 | Status: SHIPPED | OUTPATIENT
Start: 2024-04-15 | End: 2024-04-22

## 2024-04-15 NOTE — TELEPHONE ENCOUNTER
Patient is calling for refill on attached medication. She said she went and saw pain management and was given prescription for muscle relaxant which is not doing anything for her. He is doing a block on her Friday and she is asking if something can be called in for her until Friday.

## 2024-04-19 ENCOUNTER — ANCILLARY PROCEDURE (OUTPATIENT)
Dept: RADIOLOGY | Facility: EXTERNAL LOCATION | Age: 69
End: 2024-04-19
Payer: COMMERCIAL

## 2024-04-19 DIAGNOSIS — M16.11 UNILATERAL PRIMARY OSTEOARTHRITIS, RIGHT HIP: ICD-10-CM

## 2024-04-19 PROCEDURE — 20610 DRAIN/INJ JOINT/BURSA W/O US: CPT | Performed by: STUDENT IN AN ORGANIZED HEALTH CARE EDUCATION/TRAINING PROGRAM

## 2024-04-19 NOTE — PROGRESS NOTES
Procedure Note: 2024    PROCEDURE NAME: Right hip joint intra-articular steroid injection    Patient Information: Kamila Jones, MRN (from [facility]) [id number], : 1955  Chief Complaint: Right groin pain    Pain Diagnosis: The diagnosis of Unilateral primary osteoarthritis, right hip  has been made given the patient's clinical evaluation at prior evaluation.      DESCRIPTION OF PROCEDURE:    Kamila Jones was brought to the procedure room. The assistant obtained vital signs, which were found to be stable. She was then informed of the procedure, its benefits, and its risks, and her questions were answered regarding the procedure. Written informed consent was obtained from the patient. Immediately prior to starting the procedure, a time-out safety check was conducted and the patient's identification, procedure name, and procedure site were confirmed with the patient.    Right Femoroacetabular Joint Injection  After informed written consent was obtained, the patient was placed in supine position. Monitoring of pulse oximetry, heart rate, and blood pressure was done pre-procedure, and post-procedure. During the procedure the patient was monitored with continuous pulse-ox. A time out was performed before the beginning of the procedure. The correct site and laterality were marked. The skin was prepped with chlorhexidine, allowed to dry for 3 minutes, and draped in a sterile fashion    AP views were used to identify the right hip under fluoroscopy and the area overlying the great trochanter. A marker was used to identify the skin entry site laterally near the junction of the femoral neck head. The skin and subcutaneous tissue was anesthetized using 3 mL of 1% plain lidocaine with 1.5-inch 25-gauge needle at this site. A 3.5-inch 22-gauge spinal needle was slowly advanced towards femoral head until resistance was met. The needle was then slightly retracted. After negative aspiration was confirmed, 1 mL of Omnipaque  contrast dye was injected under live fluoroscopy showing spread within the joint space. A total volume of 4 ml of 40 mg of methylprednisolone (40 mg/m) diluted in 3 mL of 0.2% ropivacaine was then injected. The needle was then removed    Anesthesia: local anesthesia   Complications: none      Roel Watson MD

## 2024-04-26 ENCOUNTER — PATIENT OUTREACH (OUTPATIENT)
Dept: PRIMARY CARE | Facility: CLINIC | Age: 69
End: 2024-04-26
Payer: COMMERCIAL

## 2024-04-26 NOTE — PROGRESS NOTES
Patient has met target of no readmission for (90) days post hospital discharge and is graduated from Transitional Care Management program at this time. Unable to reach patient phone states wireless customer is unavailable.

## 2024-05-02 DIAGNOSIS — F32.9 CURRENT EPISODE OF MAJOR DEPRESSIVE DISORDER WITHOUT PRIOR EPISODE, UNSPECIFIED DEPRESSION EPISODE SEVERITY: Primary | ICD-10-CM

## 2024-05-02 RX ORDER — ESCITALOPRAM OXALATE 10 MG/1
10 TABLET ORAL DAILY
Qty: 90 TABLET | Refills: 1 | Status: SHIPPED | OUTPATIENT
Start: 2024-05-02

## 2024-05-08 ENCOUNTER — APPOINTMENT (OUTPATIENT)
Dept: PAIN MEDICINE | Facility: CLINIC | Age: 69
End: 2024-05-08
Payer: COMMERCIAL

## 2024-05-09 ENCOUNTER — OFFICE VISIT (OUTPATIENT)
Dept: PAIN MEDICINE | Facility: CLINIC | Age: 69
End: 2024-05-09
Payer: COMMERCIAL

## 2024-05-09 VITALS
OXYGEN SATURATION: 97 % | WEIGHT: 132 LBS | HEIGHT: 64 IN | HEART RATE: 99 BPM | RESPIRATION RATE: 22 BRPM | SYSTOLIC BLOOD PRESSURE: 144 MMHG | BODY MASS INDEX: 22.53 KG/M2 | DIASTOLIC BLOOD PRESSURE: 90 MMHG

## 2024-05-09 DIAGNOSIS — F41.9 ANXIETY: ICD-10-CM

## 2024-05-09 DIAGNOSIS — M54.50 BACK PAIN, LUMBOSACRAL: Primary | ICD-10-CM

## 2024-05-09 DIAGNOSIS — M25.551 CHRONIC RIGHT HIP PAIN: ICD-10-CM

## 2024-05-09 DIAGNOSIS — M16.11 PRIMARY LOCALIZED OSTEOARTHRITIS OF RIGHT HIP: Primary | ICD-10-CM

## 2024-05-09 DIAGNOSIS — J44.9 CHRONIC OBSTRUCTIVE PULMONARY DISEASE, UNSPECIFIED (MULTI): ICD-10-CM

## 2024-05-09 DIAGNOSIS — G89.29 CHRONIC RIGHT HIP PAIN: ICD-10-CM

## 2024-05-09 PROCEDURE — 1036F TOBACCO NON-USER: CPT | Performed by: STUDENT IN AN ORGANIZED HEALTH CARE EDUCATION/TRAINING PROGRAM

## 2024-05-09 PROCEDURE — 3080F DIAST BP >= 90 MM HG: CPT | Performed by: STUDENT IN AN ORGANIZED HEALTH CARE EDUCATION/TRAINING PROGRAM

## 2024-05-09 PROCEDURE — 1125F AMNT PAIN NOTED PAIN PRSNT: CPT | Performed by: STUDENT IN AN ORGANIZED HEALTH CARE EDUCATION/TRAINING PROGRAM

## 2024-05-09 PROCEDURE — 1159F MED LIST DOCD IN RCRD: CPT | Performed by: STUDENT IN AN ORGANIZED HEALTH CARE EDUCATION/TRAINING PROGRAM

## 2024-05-09 PROCEDURE — 1160F RVW MEDS BY RX/DR IN RCRD: CPT | Performed by: STUDENT IN AN ORGANIZED HEALTH CARE EDUCATION/TRAINING PROGRAM

## 2024-05-09 PROCEDURE — 99214 OFFICE O/P EST MOD 30 MIN: CPT | Performed by: STUDENT IN AN ORGANIZED HEALTH CARE EDUCATION/TRAINING PROGRAM

## 2024-05-09 PROCEDURE — 1157F ADVNC CARE PLAN IN RCRD: CPT | Performed by: STUDENT IN AN ORGANIZED HEALTH CARE EDUCATION/TRAINING PROGRAM

## 2024-05-09 PROCEDURE — 3077F SYST BP >= 140 MM HG: CPT | Performed by: STUDENT IN AN ORGANIZED HEALTH CARE EDUCATION/TRAINING PROGRAM

## 2024-05-09 RX ORDER — MIRTAZAPINE 15 MG/1
15 TABLET, FILM COATED ORAL NIGHTLY
Qty: 30 TABLET | Refills: 11 | Status: SHIPPED | OUTPATIENT
Start: 2024-05-09 | End: 2025-05-09

## 2024-05-09 RX ORDER — MELOXICAM 15 MG/1
15 TABLET ORAL DAILY
Qty: 30 TABLET | Refills: 0 | Status: SHIPPED | OUTPATIENT
Start: 2024-05-09 | End: 2024-06-03

## 2024-05-09 RX ORDER — GABAPENTIN 300 MG/1
CAPSULE ORAL
Qty: 90 CAPSULE | Refills: 5 | Status: SHIPPED | OUTPATIENT
Start: 2024-05-09 | End: 2024-11-05

## 2024-05-09 ASSESSMENT — PAIN - FUNCTIONAL ASSESSMENT: PAIN_FUNCTIONAL_ASSESSMENT: 0-10

## 2024-05-09 ASSESSMENT — PAIN DESCRIPTION - DESCRIPTORS: DESCRIPTORS: ACHING;SHARP

## 2024-05-09 ASSESSMENT — PAIN SCALES - GENERAL
PAINLEVEL: 10-WORST PAIN EVER
PAINLEVEL_OUTOF10: 10 - WORST POSSIBLE PAIN

## 2024-05-09 NOTE — PROGRESS NOTES
[facility] Management  Follow Up Office Visit Note 2024    Patient Information: Kamila Jones, MRN: [id number], : 1955   Primary Care/Referring Physician: Toan Pires MD, [id number] [address] / [place] [address]     Chief Complaint: Right groin pain    Interval History: Ms. Jones presents for follow up after right hip injection. I also started Methcarbamol at her last appointment    Today she reports 80-90% pain relief from this injection, but it only lasted for 2-3 weeks. She has been using the Methocarbamol but does not feel it is providing much pain relief.    Brief History of Pain: Ms. Kamila Jones is a 68 y.o. female with a PMHx of HTN, COPD (on 3L O2), bilateral carotid stenosis, hx of DVT, GERD, PUD (hx of recent GI bleed), CKD, EtOH use disorder, anxiety, depression  who presents for right groin pain.    She reports onset of pain 1.5 years ago which has been progressively worsening with time. She describes right lateral hip and groin pain. This pain is constant and significantly worsens with walking, bending forward. She gets some radiation down into her right anterior thigh. She reports some numbness/tingling in the entirety of both legs, most noticeable at night. She has a right hip xray showing severe OA. She was planning to undergo right hip AMINA with Dr. Charlton, but this was cancelled due to anesthesia concerns regarding her multiple medical co-morbidities. She was then evaluated by Dr. Simmons who also believe hip replacement was too risky. She is planning to see surgeon at [facility] in September for another opinion    She is currently taking 2 oxycodone, 2 tylenol, and 2 gabapentin in the morning just to get out of bed.     Current Pain Medications: Using excessive Tylenol, Gabapentin 300 mg TID - she states she is taking 600/300, Methocarbamol 750 mg QID  Previously Tried Pain Medications: Oxycodone, Norco    Relevant Surgeries: Denies hip surgeries in the past  Injections: Denies any  hip injection   Physical/Occupational Therapy: She denies any recent PT    Medications:   Current Outpatient Medications   Medication Instructions    acetaminophen (TYLENOL EXTRA STRENGTH) 500 mg, oral, 4 times daily    amLODIPine (NORVASC) 10 mg, oral, Daily    chlorhexidine (Peridex) 0.12 % solution 15 mL, Mouth/Throat, Daily, Swish and spit one capful the night before surgery and morning  of surgery    escitalopram (LEXAPRO) 10 mg, oral, Daily    gabapentin (Neurontin) 300 mg capsule TAKE 1 CAPSULE BY MOUTH 3 TIMES A DAY FOR 30 DAYS    lisinopril 10 mg, oral, Daily    meloxicam (MOBIC) 15 mg, oral, Daily    methocarbamol (ROBAXIN-750) 750 mg, oral, 4 times daily    mirtazapine (REMERON) 15 mg, oral, Nightly    primidone (Mysoline) 50 mg tablet TAKE 4 TABLETS BY MOUTH EVERY MORNING AND 4 TABLETS IN THE EVENING AS DIRECTED      Allergies:   Allergies   Allergen Reactions    Propranolol Hcl Shortness of breath       Past Medical & Surgical History:  Past Medical History:   Diagnosis Date    Alcohol dependence, in remission (Multi)     History of alcohol dependence    Arthritis     COPD (chronic obstructive pulmonary disease) (Multi)     Dyspnea     Edentulous     History of blood transfusion     1/2024 lower GI bleed    no reaction    RAMON on CPAP     with O2 3L n/c    Oxygen deficiency     uses home O2 3L n/c continuously    Personal history of other venous thrombosis and embolism     History of deep venous thrombosis right leg    Personal history of suicidal behavior     History of suicide attempt    Uses roller walker       Past Surgical History:   Procedure Laterality Date    ANKLE FRACTURE SURGERY      MR HEAD ANGIO WO IV CONTRAST  10/24/2017    MR HEAD ANGIO WO IV CONTRAST [facility]    MR NECK ANGIO WO IV CONTRAST  10/24/2017    MR NECK ANGIO WO IV CONTRAST [facility]    OTHER SURGICAL HISTORY Right 06/15/2017    Treatment Of Ankle Fracture    TONSILLECTOMY  06/15/2017    Tonsillectomy        Family History   Problem Relation Name Age of Onset    Accidental death Mother      Stroke Father      Other (cerbral hemmorrhage) Father      Cancer Sister      Lung cancer Sister      Colon cancer Brother      Cancer Brother      Lung disease Brother      Heart attack Brother       Social History     Socioeconomic History    Marital status:      Spouse name: Not on file    Number of children: Not on file    Years of education: Not on file    Highest education level: Not on file   Occupational History    Not on file   Tobacco Use    Smoking status: Former     Current packs/day: 0.00     Average packs/day: 0.3 packs/day for 53.8 years (13.5 ttl pk-yrs)     Types: Cigarettes     Start date:      Quit date: 10/28/2023     Years since quittin.5     Passive exposure: Never    Smokeless tobacco: Never   Vaping Use    Vaping status: Never Used   Substance and Sexual Activity    Alcohol use: Not Currently     Comment: 3-5 beers 3-4x a week    Drug use: Never    Sexual activity: Not Currently   Other Topics Concern    Not on file   Social History Narrative    Not on file     Social Determinants of Health     Financial Resource Strain: Low Risk  (2024)    Overall Financial Resource Strain (CARDIA)     Difficulty of Paying Living Expenses: Not very hard   Food Insecurity: Not on file   Transportation Needs: No Transportation Needs (2024)    PRAPARE - Transportation     Lack of Transportation (Medical): No     Lack of Transportation (Non-Medical): No   Physical Activity: Not on file   Stress: Not on file   Social Connections: Not on file   Intimate Partner Violence: Not on file   Housing Stability: Low Risk  (2024)    Housing Stability Vital Sign     Unable to Pay for Housing in the Last Year: No     Number of Places Lived in the Last Year: 1     Unstable Housing in the Last Year: No       Problems, Past medical history, past surgical history, Medications, allergies, social and family  "history reviewed and as per the electronic medical record from today's encounter    Review of Systems:  CONST: No fever, chills, fatigue, weight changes  EYES: No loss of vision  ENT: No hearing loss, tinnitus  CV: No chest pain, palpitations  RESP: Reports shortness of breath at baseline  GI: No stool incontinence, nausea, vomiting  : No urinary incontinence  MSK: No joint swelling  SKIN: No rash, no hives  NEURO: No headache, dizziness  PSYCH: Reports anxiety, depression  HEM/LYMPH: No easy bruising or bleeding  All other systems reviewed are negative     Physical Exam:  Vitals: /90   Pulse 99   Resp 22   Ht 1.626 m (5' 4\")   Wt 59.9 kg (132 lb)   LMP  (LMP Unknown)   SpO2 97%   BMI 22.66 kg/m²   General: No apparent distress. Alert, appropriate, oriented x 3. Intermittently tearful during today's visit. Speaking in full sentences.   HENT: Normocephalic, atraumatic. Hearing intact.  Eyes: Pupils equal and round  Neck: Supple, trachea midline  Lungs: Symmetric respiratory excursion on visual exam, nonlabored breathing.   Extremities: No cyanosis or edema noted in extremities.  Skin: No rashes, lesions noted.  Neuro: Alert and appropriate. Ambulates slowly with an antalgic gait. Bulk and tone within normal limits.    Laboratory Data:  The following laboratory data were reviewed during this visit:   Lab Results   Component Value Date    WBC 5.3 03/06/2024    RBC 3.42 (L) 03/06/2024    HGB 11.2 (L) 03/06/2024    HCT 35.8 (L) 03/06/2024     03/06/2024      Lab Results   Component Value Date    INR 1.1 01/25/2024     Lab Results   Component Value Date    CREATININE 1.14 (H) 03/06/2024       Imaging:  The following imaging impressions were reviewed by me during this visit:    -3/28/24 right hip xray shows advanced right hip osteoarthritis     I also personally reviewed the images from the above studies myself. These images and my interpretation of them contributed to the management and decision " making of the patient's medical plan.    ASSESSMENT:  Ms. Kamila Jones is a 68 y.o. female with right groin pain that is consistent with:    1. Primary localized osteoarthritis of right hip    2. Chronic right hip pain          PLAN:    Diagnostics:   - I reviewed her right hip imaging which shows very advanced hip joint osteoarthritis. No further diagnostics are indicated at this time.    Physical Therapy and Rehabilitation:     - She is unlikely to tolerate physical therapy given the severity of her pain    Psychologically:  - No needs at this time    Medications  - We had a very long discussion about mediation options today. Unfortunately her medical history significantly limits her options for medications. Given her EtOH use disorder as well as significant lung disease I do not believe chronic opioid therapy is a reasonable option to manage her pain.   - Continue Methocarbamol 750 mg QID.  - Would consider increasing Gabapentin in the future  - Today we did discuss cautious trial of Meloxicam 15 mg daily. Education on this medication provided today. Side effects reviewed. She does have a history of mild CKD and possible GI bleed, likely from diverticulosis. If she has any abdominal pain or additional bleeding she should stop the medication and contact me. If her pain does not improve after 2 weeks of use of this medication she should discontinue it.     Duration  - 1.5 years    Interventions:  - She has very advanced degenerative changes of her right hip and will likely need a right AMINA for pain relief. Her multiple medical co-morbidities make this operation high risk and she is having difficulty finding a surgeon willing to do this. She is planning to see a surgeon at [facility] in 09/2024. To provide some pain relief in the interim I performed a right hip joint intra-articular steroid injection with 80-90% pain relief but only for 2-3 weeks.  No plan for additional intervention at this time      Sincerely,  Roel  Walter FERGUSON  [facility] - [place]

## 2024-05-14 DIAGNOSIS — G25.0 ESSENTIAL TREMOR: ICD-10-CM

## 2024-05-14 RX ORDER — PRIMIDONE 50 MG/1
TABLET ORAL
Qty: 720 TABLET | Refills: 1 | Status: SHIPPED | OUTPATIENT
Start: 2024-05-14

## 2024-05-19 DIAGNOSIS — G89.29 CHRONIC RIGHT HIP PAIN: ICD-10-CM

## 2024-05-19 DIAGNOSIS — M16.11 PRIMARY LOCALIZED OSTEOARTHRITIS OF RIGHT HIP: ICD-10-CM

## 2024-05-19 DIAGNOSIS — M25.551 CHRONIC RIGHT HIP PAIN: ICD-10-CM

## 2024-05-20 RX ORDER — MELOXICAM 15 MG/1
15 TABLET ORAL DAILY
Qty: 30 TABLET | Refills: 0 | OUTPATIENT
Start: 2024-05-20

## 2024-05-22 ENCOUNTER — TELEPHONE (OUTPATIENT)
Dept: PAIN MEDICINE | Facility: CLINIC | Age: 69
End: 2024-05-22
Payer: COMMERCIAL

## 2024-05-22 NOTE — TELEPHONE ENCOUNTER
Phone call from the patient stating that her hip pain is horrible and nothing she has tried has helped. She reports her pain is at a 20 and doesn't know what to do. Please advise.

## 2024-05-30 ENCOUNTER — TELEPHONE (OUTPATIENT)
Dept: PRIMARY CARE | Facility: CLINIC | Age: 69
End: 2024-05-30
Payer: COMMERCIAL

## 2024-05-30 DIAGNOSIS — M16.11 OSTEOARTHRITIS OF RIGHT HIP, UNSPECIFIED OSTEOARTHRITIS TYPE: ICD-10-CM

## 2024-05-30 DIAGNOSIS — M16.11 OSTEOARTHRITIS OF RIGHT HIP, UNSPECIFIED OSTEOARTHRITIS TYPE: Primary | ICD-10-CM

## 2024-05-30 RX ORDER — OXYCODONE HYDROCHLORIDE 5 MG/1
5 TABLET ORAL EVERY 6 HOURS PRN
Qty: 28 TABLET | Refills: 0 | Status: SHIPPED | OUTPATIENT
Start: 2024-05-30 | End: 2024-05-30 | Stop reason: SDUPTHER

## 2024-05-30 RX ORDER — OXYCODONE HYDROCHLORIDE 5 MG/1
5 TABLET ORAL EVERY 6 HOURS PRN
Qty: 28 TABLET | Refills: 0 | Status: SHIPPED | OUTPATIENT
Start: 2024-05-30 | End: 2024-06-06

## 2024-05-30 NOTE — TELEPHONE ENCOUNTER
Pt called back stating the medication went to [facility].

## 2024-05-30 NOTE — TELEPHONE ENCOUNTER
Patient said she has been seeing pain management and they doing nothing to help her. She is in agony everyday and constant discomfort. She is asking if you can continue filling her pain medication?

## 2024-05-30 NOTE — TELEPHONE ENCOUNTER
Spoke with the Pt she asking if you can send the rx to [facility] in [place] because she is with her daughter right now.Pt states the injection was good and only lasted about 3 weeks. [facility] [place]  not in data base called Pt back to get a different pharmacy not answer.

## 2024-06-02 DIAGNOSIS — M16.11 PRIMARY LOCALIZED OSTEOARTHRITIS OF RIGHT HIP: ICD-10-CM

## 2024-06-02 DIAGNOSIS — M25.551 CHRONIC RIGHT HIP PAIN: ICD-10-CM

## 2024-06-02 DIAGNOSIS — G89.29 CHRONIC RIGHT HIP PAIN: ICD-10-CM

## 2024-06-03 RX ORDER — MELOXICAM 15 MG/1
15 TABLET ORAL DAILY
Qty: 30 TABLET | Refills: 0 | Status: SHIPPED | OUTPATIENT
Start: 2024-06-03

## 2024-06-10 NOTE — PROGRESS NOTES
"[facility] Pain Management  Follow Up Office Visit Note 2024    Patient Information: Kamila Jones, MRN: [id number], : 1955   Primary Care/Referring Physician: Toan Pires MD, [address] [address] / [place] [address]     Chief Complaint: Right groin pain    Interval History: Ms. Jones presents for follow up. At her last visit I started Meloxicam    Today she reports only very mild benefit from this medication, but she continues to be debilitated by pain. She feels the Meloxicam makes her feel \"twitchy\"    Brief History of Pain: Ms. Kamila Jones is a 68 y.o. female with a PMHx of HTN, COPD (on 3L O2), bilateral carotid stenosis, hx of DVT, GERD, PUD (hx of recent GI bleed), CKD, EtOH use disorder, anxiety, depression  who presents for right groin pain.    She reports onset of pain 1.5 years ago which has been progressively worsening with time. She describes right lateral hip and groin pain. This pain is constant and significantly worsens with walking, bending forward. She gets some radiation down into her right anterior thigh. She reports some numbness/tingling in the entirety of both legs, most noticeable at night. She has a right hip xray showing severe OA. She was planning to undergo right hip AMINA with Dr. Charlton, but this was cancelled due to anesthesia concerns regarding her multiple medical co-morbidities. She was then evaluated by Dr. Simmons who also believe hip replacement was too risky. She is planning to see surgeon at [facility] in September for another opinion    She is currently taking 2 oxycodone, 2 tylenol, and 2 gabapentin in the morning just to get out of bed.     Current Pain Medications: Using excessive Tylenol, Gabapentin 300 mg TID - she states she is taking 600/300, Methocarbamol 750 mg QID, Meloxicam 15 mg daily  Previously Tried Pain Medications: Oxycodone, Norco    Relevant Surgeries: Denies hip surgeries in the past  Injections: Right hip injection with 90% pain relief but only for " 2-3 weeks  Physical/Occupational Therapy: She denies any recent PT    Medications:   Current Outpatient Medications   Medication Instructions    acetaminophen (TYLENOL EXTRA STRENGTH) 500 mg, oral, 4 times daily    amLODIPine (NORVASC) 10 mg, oral, Daily    chlorhexidine (Peridex) 0.12 % solution 15 mL, Mouth/Throat, Daily, Swish and spit one capful the night before surgery and morning  of surgery    escitalopram (LEXAPRO) 10 mg, oral, Daily    gabapentin (Neurontin) 300 mg capsule TAKE 1 CAPSULE BY MOUTH 3 TIMES A DAY FOR 30 DAYS    lisinopril 10 mg, oral, Daily    meloxicam (MOBIC) 15 mg, oral, Daily    methocarbamol (ROBAXIN-750) 750 mg, oral, 4 times daily    mirtazapine (REMERON) 15 mg, oral, Nightly    primidone (Mysoline) 50 mg tablet TAKE 4 TABLETS BY MOUTH EVERY MORNING AND 4 TABLETS IN THE EVENING AS DIRECTED      Allergies:   Allergies   Allergen Reactions    Propranolol Hcl Shortness of breath       Past Medical & Surgical History:  Past Medical History:   Diagnosis Date    Alcohol dependence, in remission (Multi)     History of alcohol dependence    Arthritis     COPD (chronic obstructive pulmonary disease) (Multi)     Dyspnea     Edentulous     History of blood transfusion     1/2024 lower GI bleed    no reaction    RAMON on CPAP     with O2 3L n/c    Oxygen deficiency     uses home O2 3L n/c continuously    Personal history of other venous thrombosis and embolism     History of deep venous thrombosis right leg    Personal history of suicidal behavior     History of suicide attempt    Uses roller walker       Past Surgical History:   Procedure Laterality Date    ANKLE FRACTURE SURGERY      MR HEAD ANGIO WO IV CONTRAST  10/24/2017    MR HEAD ANGIO WO IV CONTRAST [facility]    MR NECK ANGIO WO IV CONTRAST  10/24/2017    MR NECK ANGIO WO IV CONTRAST [facility]    OTHER SURGICAL HISTORY Right 06/15/2017    Treatment Of Ankle Fracture    TONSILLECTOMY  06/15/2017    Tonsillectomy        Family History   Problem Relation Name Age of Onset    Accidental death Mother      Stroke Father      Other (cerbral hemmorrhage) Father      Cancer Sister      Lung cancer Sister      Colon cancer Brother      Cancer Brother      Lung disease Brother      Heart attack Brother       Social History     Socioeconomic History    Marital status:      Spouse name: Not on file    Number of children: Not on file    Years of education: Not on file    Highest education level: Not on file   Occupational History    Not on file   Tobacco Use    Smoking status: Some Days     Current packs/day: 0.00     Average packs/day: 0.3 packs/day for 53.8 years (13.5 ttl pk-yrs)     Types: Cigarettes     Start date:      Last attempt to quit: 10/28/2023     Years since quittin.6     Passive exposure: Never    Smokeless tobacco: Never   Vaping Use    Vaping status: Never Used   Substance and Sexual Activity    Alcohol use: Yes     Comment: 3-5 beers 3-4x a week    Drug use: Never    Sexual activity: Not Currently   Other Topics Concern    Not on file   Social History Narrative    Not on file     Social Determinants of Health     Financial Resource Strain: Low Risk  (2024)    Overall Financial Resource Strain (CARDIA)     Difficulty of Paying Living Expenses: Not very hard   Food Insecurity: Not on file   Transportation Needs: No Transportation Needs (2024)    PRAPARE - Transportation     Lack of Transportation (Medical): No     Lack of Transportation (Non-Medical): No   Physical Activity: Not on file   Stress: Not on file   Social Connections: Not on file   Intimate Partner Violence: Not on file   Housing Stability: Low Risk  (2024)    Housing Stability Vital Sign     Unable to Pay for Housing in the Last Year: No     Number of Places Lived in the Last Year: 1     Unstable Housing in the Last Year: No       Problems, Past medical history, past surgical history, Medications, allergies, social and family  "history reviewed and as per the electronic medical record from today's encounter    Review of Systems:  CONST: No fever, chills, fatigue, weight changes  EYES: No loss of vision  ENT: No hearing loss, tinnitus  CV: No chest pain, palpitations  RESP: Reports shortness of breath at baseline  GI: No stool incontinence, nausea, vomiting  : No urinary incontinence  MSK: No joint swelling  SKIN: No rash, no hives  NEURO: No headache, dizziness  PSYCH: Reports anxiety, depression  HEM/LYMPH: No easy bruising or bleeding  All other systems reviewed are negative     Physical Exam:  Vitals: /72   Pulse 62   Resp 20   Ht 1.626 m (5' 4\")   Wt 59.9 kg (132 lb)   LMP  (LMP Unknown)   SpO2 98%   BMI 22.66 kg/m²   General: No apparent distress. Alert, appropriate, oriented x 3. Intermittently tearful during today's visit. Speaking in full sentences.   HENT: Normocephalic, atraumatic. Hearing intact.  Eyes: Pupils equal and round  Neck: Supple, trachea midline  Lungs: Symmetric respiratory excursion on visual exam, nonlabored breathing.   Extremities: No cyanosis or edema noted in extremities.  Skin: No rashes, lesions noted.  Neuro: Alert and appropriate. Ambulates slowly with an antalgic gait. Bulk and tone within normal limits.    Laboratory Data:  The following laboratory data were reviewed during this visit:   Lab Results   Component Value Date    WBC 5.3 03/06/2024    RBC 3.42 (L) 03/06/2024    HGB 11.2 (L) 03/06/2024    HCT 35.8 (L) 03/06/2024     03/06/2024      Lab Results   Component Value Date    INR 1.1 01/25/2024     Lab Results   Component Value Date    CREATININE 1.14 (H) 03/06/2024       Imaging:  The following imaging impressions were reviewed by me during this visit:    -3/28/24 right hip xray shows advanced right hip osteoarthritis     I also personally reviewed the images from the above studies myself. These images and my interpretation of them contributed to the management and decision " making of the patient's medical plan.    ASSESSMENT:  Ms. Kamila Jones is a 68 y.o. female with right groin pain that is consistent with:    1. Primary osteoarthritis of right hip    2. Chronic right hip pain          PLAN:    Diagnostics:   - I reviewed her right hip imaging which shows very advanced hip joint osteoarthritis. No further diagnostics are indicated at this time.    Physical Therapy and Rehabilitation:     - She is unlikely to tolerate physical therapy given the severity of her pain    Psychologically:  - No needs at this time    Medications  - We had a very long discussion about mediation options today. Unfortunately her medical history significantly limits her options for medications. Given her EtOH use disorder as well as significant lung disease I do not believe chronic opioid therapy is a reasonable option to manage her pain.   - Continue Methocarbamol 750 mg QID.  - Would consider increasing Gabapentin in the future  - Continue cautious trial of Meloxicam 15 mg daily. She does have a history of mild CKD and possible GI bleed, likely from diverticulosis. If she has any abdominal pain or additional bleeding she should stop the medication and contact me. Will continue for now but may discontinue at her next visit     Duration  - 1.5 years    Interventions:  - She has very advanced degenerative changes of her right hip and will likely need a right AMINA for pain relief. Her multiple medical co-morbidities make this operation high risk and she is having difficulty finding a surgeon willing to do this. She is planning to see a surgeon at [facility] in 09/2024. To provide some pain relief in the interim I performed a right hip joint intra-articular steroid injection with 80-90% pain relief but only for 2-3 weeks.  I will repeat this once more utilizing live fluoroscopy and local anesthesia, but if it again only provides a few weeks of relief I do not think it will be a viable way to manage her pain.      Sincerely,  Roel Watson MD  [facility] - [place]

## 2024-06-11 ENCOUNTER — OFFICE VISIT (OUTPATIENT)
Dept: PAIN MEDICINE | Facility: CLINIC | Age: 69
End: 2024-06-11
Payer: COMMERCIAL

## 2024-06-11 VITALS
OXYGEN SATURATION: 98 % | SYSTOLIC BLOOD PRESSURE: 130 MMHG | WEIGHT: 132 LBS | DIASTOLIC BLOOD PRESSURE: 72 MMHG | HEART RATE: 62 BPM | HEIGHT: 64 IN | RESPIRATION RATE: 20 BRPM | BODY MASS INDEX: 22.53 KG/M2

## 2024-06-11 DIAGNOSIS — M16.11 PRIMARY OSTEOARTHRITIS OF RIGHT HIP: Primary | ICD-10-CM

## 2024-06-11 DIAGNOSIS — G89.29 CHRONIC RIGHT HIP PAIN: ICD-10-CM

## 2024-06-11 DIAGNOSIS — M25.551 CHRONIC RIGHT HIP PAIN: ICD-10-CM

## 2024-06-11 PROCEDURE — 1160F RVW MEDS BY RX/DR IN RCRD: CPT | Performed by: STUDENT IN AN ORGANIZED HEALTH CARE EDUCATION/TRAINING PROGRAM

## 2024-06-11 PROCEDURE — 3078F DIAST BP <80 MM HG: CPT | Performed by: STUDENT IN AN ORGANIZED HEALTH CARE EDUCATION/TRAINING PROGRAM

## 2024-06-11 PROCEDURE — 1159F MED LIST DOCD IN RCRD: CPT | Performed by: STUDENT IN AN ORGANIZED HEALTH CARE EDUCATION/TRAINING PROGRAM

## 2024-06-11 PROCEDURE — 1125F AMNT PAIN NOTED PAIN PRSNT: CPT | Performed by: STUDENT IN AN ORGANIZED HEALTH CARE EDUCATION/TRAINING PROGRAM

## 2024-06-11 PROCEDURE — 99214 OFFICE O/P EST MOD 30 MIN: CPT | Performed by: STUDENT IN AN ORGANIZED HEALTH CARE EDUCATION/TRAINING PROGRAM

## 2024-06-11 PROCEDURE — 1157F ADVNC CARE PLAN IN RCRD: CPT | Performed by: STUDENT IN AN ORGANIZED HEALTH CARE EDUCATION/TRAINING PROGRAM

## 2024-06-11 PROCEDURE — 3075F SYST BP GE 130 - 139MM HG: CPT | Performed by: STUDENT IN AN ORGANIZED HEALTH CARE EDUCATION/TRAINING PROGRAM

## 2024-06-11 ASSESSMENT — PAIN DESCRIPTION - DESCRIPTORS: DESCRIPTORS: SHARP;SHOOTING

## 2024-06-11 ASSESSMENT — PAIN SCALES - GENERAL
PAINLEVEL_OUTOF10: 10 - WORST POSSIBLE PAIN
PAINLEVEL: 10-WORST PAIN EVER

## 2024-06-11 ASSESSMENT — PAIN - FUNCTIONAL ASSESSMENT: PAIN_FUNCTIONAL_ASSESSMENT: 0-10

## 2024-06-12 DIAGNOSIS — M16.11 OSTEOARTHRITIS OF RIGHT HIP, UNSPECIFIED OSTEOARTHRITIS TYPE: ICD-10-CM

## 2024-06-12 RX ORDER — OXYCODONE HYDROCHLORIDE 5 MG/1
5 TABLET ORAL EVERY 6 HOURS PRN
Qty: 28 TABLET | Refills: 0 | Status: CANCELLED | OUTPATIENT
Start: 2024-06-12 | End: 2024-06-19

## 2024-06-12 RX ORDER — OXYCODONE HYDROCHLORIDE 5 MG/1
5 TABLET ORAL EVERY 6 HOURS PRN
Qty: 28 TABLET | Refills: 0 | Status: SHIPPED | OUTPATIENT
Start: 2024-06-12 | End: 2024-06-19

## 2024-06-15 DIAGNOSIS — M25.551 CHRONIC RIGHT HIP PAIN: ICD-10-CM

## 2024-06-15 DIAGNOSIS — G89.29 CHRONIC RIGHT HIP PAIN: ICD-10-CM

## 2024-06-15 DIAGNOSIS — M16.11 PRIMARY LOCALIZED OSTEOARTHRITIS OF RIGHT HIP: ICD-10-CM

## 2024-06-18 RX ORDER — MELOXICAM 15 MG/1
15 TABLET ORAL DAILY
Qty: 30 TABLET | Refills: 0 | Status: SHIPPED | OUTPATIENT
Start: 2024-06-18

## 2024-06-19 ENCOUNTER — TELEPHONE (OUTPATIENT)
Dept: PAIN MEDICINE | Facility: CLINIC | Age: 69
End: 2024-06-19
Payer: COMMERCIAL

## 2024-06-24 ENCOUNTER — TELEPHONE (OUTPATIENT)
Dept: PRIMARY CARE | Facility: CLINIC | Age: 69
End: 2024-06-24
Payer: COMMERCIAL

## 2024-06-24 DIAGNOSIS — M16.11 OSTEOARTHRITIS OF RIGHT HIP, UNSPECIFIED OSTEOARTHRITIS TYPE: ICD-10-CM

## 2024-06-24 RX ORDER — OXYCODONE HYDROCHLORIDE 5 MG/1
5 TABLET ORAL EVERY 6 HOURS PRN
Qty: 28 TABLET | Refills: 0 | Status: SHIPPED | OUTPATIENT
Start: 2024-06-24 | End: 2024-06-25 | Stop reason: SDUPTHER

## 2024-06-24 NOTE — TELEPHONE ENCOUNTER
Patient has an appt with pain management for nerve block on Friday and she said she is not going to make it until then without pain medication.    Please send this to [facility].   
Negative

## 2024-06-25 DIAGNOSIS — M16.11 OSTEOARTHRITIS OF RIGHT HIP, UNSPECIFIED OSTEOARTHRITIS TYPE: ICD-10-CM

## 2024-06-25 NOTE — TELEPHONE ENCOUNTER
Patients said the RX that was sent yesterday was sent to the wrong pharmacy, please resend to [facility].

## 2024-06-26 RX ORDER — OXYCODONE HYDROCHLORIDE 5 MG/1
5 TABLET ORAL EVERY 6 HOURS PRN
Qty: 28 TABLET | Refills: 0 | Status: SHIPPED | OUTPATIENT
Start: 2024-06-26 | End: 2024-07-03

## 2024-06-28 ENCOUNTER — ANCILLARY PROCEDURE (OUTPATIENT)
Dept: RADIOLOGY | Facility: EXTERNAL LOCATION | Age: 69
End: 2024-06-28
Payer: COMMERCIAL

## 2024-06-28 DIAGNOSIS — M16.11 OSTEOARTHRITIS OF RIGHT HIP, UNSPECIFIED OSTEOARTHRITIS TYPE: ICD-10-CM

## 2024-06-28 DIAGNOSIS — M16.11 UNILATERAL PRIMARY OSTEOARTHRITIS, RIGHT HIP: ICD-10-CM

## 2024-06-28 PROCEDURE — 20610 DRAIN/INJ JOINT/BURSA W/O US: CPT | Performed by: STUDENT IN AN ORGANIZED HEALTH CARE EDUCATION/TRAINING PROGRAM

## 2024-06-28 NOTE — PROGRESS NOTES
Procedure Note: 2024    PROCEDURE NAME: Right hip joint intra-articular steroid injection    Patient Information: Kamila Jones, MRN (from [facility]) [id number], : 1955  Chief Complaint: Right groin pain    Pain Diagnosis: The diagnosis of Unilateral primary osteoarthritis, right hip  has been made given the patient's clinical evaluation at prior evaluation.      DESCRIPTION OF PROCEDURE:    Kamila Jones was brought to the procedure room. The assistant obtained vital signs, which were found to be stable. She was then informed of the procedure, its benefits, and its risks, and her questions were answered regarding the procedure. Written informed consent was obtained from the patient. Immediately prior to starting the procedure, a time-out safety check was conducted and the patient's identification, procedure name, and procedure site were confirmed with the patient.    Right Femoroacetabular Joint Injection  After informed written consent was obtained, the patient was placed in supine position. Monitoring of pulse oximetry, heart rate, and blood pressure was done pre-procedure, and post-procedure. During the procedure the patient was monitored with continuous pulse-ox. A time out was performed before the beginning of the procedure. The correct site and laterality were marked. The skin was prepped with chlorhexidine, allowed to dry for 3 minutes, and draped in a sterile fashion    AP views were used to identify the right hip under fluoroscopy and the area overlying the great trochanter. A marker was used to identify the skin entry site laterally near the junction of the femoral neck head. The skin and subcutaneous tissue was anesthetized using 4 mL of 1% plain lidocaine with 1.5-inch 25-gauge needle at this site. A 3.5-inch 22-gauge spinal needle was slowly advanced towards femoral head until resistance was met. The needle was then slightly retracted. After negative aspiration was confirmed, 1.5 mL of Omnipaque  contrast dye was injected under live fluoroscopy showing spread within the joint space. A total volume of 5 ml of 40 mg of triamcinolone (40 mg/m) diluted in 4 mL of 0.2% ropivacaine was then injected. The needle was then removed    Anesthesia: local anesthesia   Complications: none      Roel Watson MD

## 2024-06-29 RX ORDER — OXYCODONE HYDROCHLORIDE 5 MG/1
5 TABLET ORAL EVERY 6 HOURS PRN
Qty: 28 TABLET | Refills: 0 | OUTPATIENT
Start: 2024-06-29 | End: 2024-07-06

## 2024-07-01 DIAGNOSIS — M16.11 OSTEOARTHRITIS OF RIGHT HIP, UNSPECIFIED OSTEOARTHRITIS TYPE: ICD-10-CM

## 2024-07-01 NOTE — TELEPHONE ENCOUNTER
Pt called stating that her pain medication was sent to the wrong pharmacy. Please send medication to [facility] in corner of [facility]. Pharmacy updated.

## 2024-07-02 RX ORDER — OXYCODONE HYDROCHLORIDE 5 MG/1
5 TABLET ORAL EVERY 6 HOURS PRN
Qty: 28 TABLET | Refills: 0 | Status: SHIPPED | OUTPATIENT
Start: 2024-07-02 | End: 2024-07-09

## 2024-07-08 DIAGNOSIS — M16.11 OSTEOARTHRITIS OF RIGHT HIP, UNSPECIFIED OSTEOARTHRITIS TYPE: ICD-10-CM

## 2024-07-08 RX ORDER — OXYCODONE HYDROCHLORIDE 5 MG/1
5 TABLET ORAL EVERY 6 HOURS PRN
Qty: 28 TABLET | Refills: 0 | Status: SHIPPED | OUTPATIENT
Start: 2024-07-08 | End: 2024-07-15

## 2024-07-19 ENCOUNTER — TELEPHONE (OUTPATIENT)
Dept: PRIMARY CARE | Facility: CLINIC | Age: 69
End: 2024-07-19
Payer: COMMERCIAL

## 2024-07-19 DIAGNOSIS — F17.200 TOBACCO USE DISORDER: Primary | ICD-10-CM

## 2024-07-19 RX ORDER — VARENICLINE TARTRATE 0.5 MG/1
TABLET, FILM COATED ORAL
Qty: 60 TABLET | Refills: 0 | Status: SHIPPED | OUTPATIENT
Start: 2024-07-19

## 2024-07-21 DIAGNOSIS — M25.551 CHRONIC RIGHT HIP PAIN: ICD-10-CM

## 2024-07-21 DIAGNOSIS — M16.11 PRIMARY LOCALIZED OSTEOARTHRITIS OF RIGHT HIP: ICD-10-CM

## 2024-07-21 DIAGNOSIS — G89.29 CHRONIC RIGHT HIP PAIN: ICD-10-CM

## 2024-07-22 RX ORDER — MELOXICAM 15 MG/1
15 TABLET ORAL DAILY
Qty: 30 TABLET | Refills: 0 | Status: SHIPPED | OUTPATIENT
Start: 2024-07-22

## 2024-07-22 RX ORDER — METHOCARBAMOL 750 MG/1
750 TABLET, FILM COATED ORAL 4 TIMES DAILY
Qty: 60 TABLET | Refills: 3 | Status: SHIPPED | OUTPATIENT
Start: 2024-07-22 | End: 2024-09-20

## 2024-07-24 ENCOUNTER — APPOINTMENT (OUTPATIENT)
Dept: PAIN MEDICINE | Facility: CLINIC | Age: 69
End: 2024-07-24
Payer: COMMERCIAL

## 2024-08-04 DIAGNOSIS — I10 PRIMARY HYPERTENSION: Primary | ICD-10-CM

## 2024-08-05 NOTE — TELEPHONE ENCOUNTER
Pt called stating she is having pain in both hips - she is scheduled for Rt hip replacement 09/17/24 -- She is requesting pain medication for this -  she rates her pain 10/10 - she states Pain Management could not help her     Last seen: 02/06/24 - pamela

## 2024-08-06 RX ORDER — AMLODIPINE BESYLATE 5 MG/1
5 TABLET ORAL EVERY 12 HOURS
Qty: 180 TABLET | Refills: 4 | Status: SHIPPED | OUTPATIENT
Start: 2024-08-06

## 2024-08-14 ENCOUNTER — APPOINTMENT (OUTPATIENT)
Dept: RADIOLOGY | Facility: HOSPITAL | Age: 69
End: 2024-08-14
Payer: COMMERCIAL

## 2024-08-14 ENCOUNTER — HOSPITAL ENCOUNTER (INPATIENT)
Facility: HOSPITAL | Age: 69
LOS: 3 days | Discharge: NURSING, REHAB OR LONG-TERM CARE | End: 2024-08-17
Attending: STUDENT IN AN ORGANIZED HEALTH CARE EDUCATION/TRAINING PROGRAM | Admitting: INTERNAL MEDICINE
Payer: COMMERCIAL

## 2024-08-14 ENCOUNTER — APPOINTMENT (OUTPATIENT)
Dept: CARDIOLOGY | Facility: HOSPITAL | Age: 69
End: 2024-08-14
Payer: COMMERCIAL

## 2024-08-14 DIAGNOSIS — M54.50 BACK PAIN, LUMBOSACRAL: ICD-10-CM

## 2024-08-14 DIAGNOSIS — R53.1 WEAKNESS: ICD-10-CM

## 2024-08-14 DIAGNOSIS — J18.9 PNEUMONIA OF LEFT LUNG DUE TO INFECTIOUS ORGANISM, UNSPECIFIED PART OF LUNG: Primary | ICD-10-CM

## 2024-08-14 PROBLEM — J90 PLEURAL EFFUSION: Status: ACTIVE | Noted: 2024-08-14

## 2024-08-14 LAB
ALBUMIN SERPL-MCNC: 3.5 G/DL (ref 3.5–5)
ALP BLD-CCNC: 121 U/L (ref 35–125)
ALT SERPL-CCNC: 7 U/L (ref 5–40)
ANION GAP SERPL CALC-SCNC: 13 MMOL/L
AST SERPL-CCNC: 18 U/L (ref 5–40)
BASOPHILS # BLD AUTO: 0.02 X10*3/UL (ref 0–0.1)
BASOPHILS NFR BLD AUTO: 0.2 %
BILIRUB SERPL-MCNC: <0.2 MG/DL (ref 0.1–1.2)
BUN SERPL-MCNC: 32 MG/DL (ref 8–25)
CALCIUM SERPL-MCNC: 8.9 MG/DL (ref 8.5–10.4)
CHLORIDE SERPL-SCNC: 106 MMOL/L (ref 97–107)
CO2 SERPL-SCNC: 20 MMOL/L (ref 24–31)
CREAT SERPL-MCNC: 1.3 MG/DL (ref 0.4–1.6)
EGFRCR SERPLBLD CKD-EPI 2021: 44 ML/MIN/1.73M*2
EOSINOPHIL # BLD AUTO: 0.1 X10*3/UL (ref 0–0.7)
EOSINOPHIL NFR BLD AUTO: 1.1 %
ERYTHROCYTE [DISTWIDTH] IN BLOOD BY AUTOMATED COUNT: 14.9 % (ref 11.5–14.5)
GLUCOSE SERPL-MCNC: 91 MG/DL (ref 65–99)
HCT VFR BLD AUTO: 31.5 % (ref 36–46)
HGB BLD-MCNC: 9.8 G/DL (ref 12–16)
IMM GRANULOCYTES # BLD AUTO: 0.07 X10*3/UL (ref 0–0.7)
IMM GRANULOCYTES NFR BLD AUTO: 0.7 % (ref 0–0.9)
LYMPHOCYTES # BLD AUTO: 1.06 X10*3/UL (ref 1.2–4.8)
LYMPHOCYTES NFR BLD AUTO: 11.2 %
MCH RBC QN AUTO: 32 PG (ref 26–34)
MCHC RBC AUTO-ENTMCNC: 31.1 G/DL (ref 32–36)
MCV RBC AUTO: 103 FL (ref 80–100)
MONOCYTES # BLD AUTO: 0.73 X10*3/UL (ref 0.1–1)
MONOCYTES NFR BLD AUTO: 7.7 %
NEUTROPHILS # BLD AUTO: 7.46 X10*3/UL (ref 1.2–7.7)
NEUTROPHILS NFR BLD AUTO: 79.1 %
NRBC BLD-RTO: 0 /100 WBCS (ref 0–0)
NT-PROBNP SERPL-MCNC: 2653 PG/ML (ref 0–353)
PLATELET # BLD AUTO: 434 X10*3/UL (ref 150–450)
POTASSIUM SERPL-SCNC: 4.7 MMOL/L (ref 3.4–5.1)
PROT SERPL-MCNC: 7.4 G/DL (ref 5.9–7.9)
RBC # BLD AUTO: 3.06 X10*6/UL (ref 4–5.2)
SARS-COV-2 RNA RESP QL NAA+PROBE: NOT DETECTED
SODIUM SERPL-SCNC: 139 MMOL/L (ref 133–145)
TROPONIN T SERPL-MCNC: 18 NG/L
TROPONIN T SERPL-MCNC: 24 NG/L
WBC # BLD AUTO: 9.4 X10*3/UL (ref 4.4–11.3)

## 2024-08-14 PROCEDURE — 71250 CT THORAX DX C-: CPT

## 2024-08-14 PROCEDURE — 87635 SARS-COV-2 COVID-19 AMP PRB: CPT | Performed by: STUDENT IN AN ORGANIZED HEALTH CARE EDUCATION/TRAINING PROGRAM

## 2024-08-14 PROCEDURE — 93005 ELECTROCARDIOGRAM TRACING: CPT

## 2024-08-14 PROCEDURE — 84484 ASSAY OF TROPONIN QUANT: CPT | Performed by: STUDENT IN AN ORGANIZED HEALTH CARE EDUCATION/TRAINING PROGRAM

## 2024-08-14 PROCEDURE — 71045 X-RAY EXAM CHEST 1 VIEW: CPT

## 2024-08-14 PROCEDURE — 71250 CT THORAX DX C-: CPT | Performed by: STUDENT IN AN ORGANIZED HEALTH CARE EDUCATION/TRAINING PROGRAM

## 2024-08-14 PROCEDURE — 96365 THER/PROPH/DIAG IV INF INIT: CPT

## 2024-08-14 PROCEDURE — 93010 ELECTROCARDIOGRAM REPORT: CPT | Performed by: INTERNAL MEDICINE

## 2024-08-14 PROCEDURE — 85025 COMPLETE CBC W/AUTO DIFF WBC: CPT | Performed by: STUDENT IN AN ORGANIZED HEALTH CARE EDUCATION/TRAINING PROGRAM

## 2024-08-14 PROCEDURE — 96361 HYDRATE IV INFUSION ADD-ON: CPT

## 2024-08-14 PROCEDURE — 36415 COLL VENOUS BLD VENIPUNCTURE: CPT | Performed by: STUDENT IN AN ORGANIZED HEALTH CARE EDUCATION/TRAINING PROGRAM

## 2024-08-14 PROCEDURE — 70450 CT HEAD/BRAIN W/O DYE: CPT | Performed by: RADIOLOGY

## 2024-08-14 PROCEDURE — 71045 X-RAY EXAM CHEST 1 VIEW: CPT | Performed by: RADIOLOGY

## 2024-08-14 PROCEDURE — 2500000001 HC RX 250 WO HCPCS SELF ADMINISTERED DRUGS (ALT 637 FOR MEDICARE OP): Performed by: STUDENT IN AN ORGANIZED HEALTH CARE EDUCATION/TRAINING PROGRAM

## 2024-08-14 PROCEDURE — 2500000004 HC RX 250 GENERAL PHARMACY W/ HCPCS (ALT 636 FOR OP/ED): Performed by: STUDENT IN AN ORGANIZED HEALTH CARE EDUCATION/TRAINING PROGRAM

## 2024-08-14 PROCEDURE — 87040 BLOOD CULTURE FOR BACTERIA: CPT | Mod: TRILAB | Performed by: STUDENT IN AN ORGANIZED HEALTH CARE EDUCATION/TRAINING PROGRAM

## 2024-08-14 PROCEDURE — 83880 ASSAY OF NATRIURETIC PEPTIDE: CPT | Performed by: STUDENT IN AN ORGANIZED HEALTH CARE EDUCATION/TRAINING PROGRAM

## 2024-08-14 PROCEDURE — 1100000001 HC PRIVATE ROOM DAILY

## 2024-08-14 PROCEDURE — 80053 COMPREHEN METABOLIC PANEL: CPT | Performed by: STUDENT IN AN ORGANIZED HEALTH CARE EDUCATION/TRAINING PROGRAM

## 2024-08-14 PROCEDURE — 99285 EMERGENCY DEPT VISIT HI MDM: CPT | Mod: 25

## 2024-08-14 PROCEDURE — 70450 CT HEAD/BRAIN W/O DYE: CPT

## 2024-08-14 RX ORDER — IPRATROPIUM BROMIDE AND ALBUTEROL SULFATE 2.5; .5 MG/3ML; MG/3ML
3 SOLUTION RESPIRATORY (INHALATION)
Status: DISCONTINUED | OUTPATIENT
Start: 2024-08-15 | End: 2024-08-14

## 2024-08-14 RX ORDER — POLYETHYLENE GLYCOL 3350 17 G/17G
17 POWDER, FOR SOLUTION ORAL DAILY PRN
Status: DISCONTINUED | OUTPATIENT
Start: 2024-08-14 | End: 2024-08-17 | Stop reason: HOSPADM

## 2024-08-14 RX ORDER — PRIMIDONE 50 MG/1
200 TABLET ORAL 2 TIMES DAILY
Status: DISCONTINUED | OUTPATIENT
Start: 2024-08-14 | End: 2024-08-17 | Stop reason: HOSPADM

## 2024-08-14 RX ORDER — ACETAMINOPHEN 325 MG/1
650 TABLET ORAL EVERY 4 HOURS PRN
Status: DISCONTINUED | OUTPATIENT
Start: 2024-08-14 | End: 2024-08-17 | Stop reason: HOSPADM

## 2024-08-14 RX ORDER — FAMOTIDINE 20 MG/1
20 TABLET, FILM COATED ORAL DAILY
Status: DISCONTINUED | OUTPATIENT
Start: 2024-08-15 | End: 2024-08-17 | Stop reason: HOSPADM

## 2024-08-14 RX ORDER — IPRATROPIUM BROMIDE AND ALBUTEROL SULFATE 2.5; .5 MG/3ML; MG/3ML
3 SOLUTION RESPIRATORY (INHALATION) EVERY 6 HOURS PRN
Status: DISCONTINUED | OUTPATIENT
Start: 2024-08-14 | End: 2024-08-17 | Stop reason: HOSPADM

## 2024-08-14 RX ORDER — ONDANSETRON 4 MG/1
4 TABLET, ORALLY DISINTEGRATING ORAL EVERY 8 HOURS PRN
Status: DISCONTINUED | OUTPATIENT
Start: 2024-08-14 | End: 2024-08-17 | Stop reason: HOSPADM

## 2024-08-14 RX ORDER — ESCITALOPRAM OXALATE 10 MG/1
10 TABLET ORAL DAILY
Status: DISCONTINUED | OUTPATIENT
Start: 2024-08-15 | End: 2024-08-17 | Stop reason: HOSPADM

## 2024-08-14 RX ORDER — VARENICLINE TARTRATE 0.5 MG/1
0.5 TABLET, FILM COATED ORAL DAILY
Status: DISCONTINUED | OUTPATIENT
Start: 2024-08-15 | End: 2024-08-17 | Stop reason: HOSPADM

## 2024-08-14 RX ORDER — GUAIFENESIN/DEXTROMETHORPHAN 100-10MG/5
5 SYRUP ORAL EVERY 4 HOURS PRN
Status: DISCONTINUED | OUTPATIENT
Start: 2024-08-14 | End: 2024-08-17 | Stop reason: HOSPADM

## 2024-08-14 RX ORDER — SODIUM CHLORIDE 9 MG/ML
75 INJECTION, SOLUTION INTRAVENOUS CONTINUOUS
Status: ACTIVE | OUTPATIENT
Start: 2024-08-14 | End: 2024-08-15

## 2024-08-14 RX ORDER — LISINOPRIL 10 MG/1
10 TABLET ORAL DAILY
Status: DISCONTINUED | OUTPATIENT
Start: 2024-08-15 | End: 2024-08-16

## 2024-08-14 RX ORDER — AZITHROMYCIN 500 MG/1
500 TABLET, FILM COATED ORAL ONCE
Status: COMPLETED | OUTPATIENT
Start: 2024-08-14 | End: 2024-08-14

## 2024-08-14 RX ORDER — ACETAMINOPHEN 650 MG/1
650 SUPPOSITORY RECTAL EVERY 4 HOURS PRN
Status: DISCONTINUED | OUTPATIENT
Start: 2024-08-14 | End: 2024-08-17 | Stop reason: HOSPADM

## 2024-08-14 RX ORDER — AMLODIPINE BESYLATE 10 MG/1
10 TABLET ORAL DAILY
Status: DISCONTINUED | OUTPATIENT
Start: 2024-08-15 | End: 2024-08-15

## 2024-08-14 RX ORDER — ONDANSETRON HYDROCHLORIDE 2 MG/ML
4 INJECTION, SOLUTION INTRAVENOUS EVERY 8 HOURS PRN
Status: DISCONTINUED | OUTPATIENT
Start: 2024-08-14 | End: 2024-08-17 | Stop reason: HOSPADM

## 2024-08-14 RX ORDER — GUAIFENESIN 600 MG/1
600 TABLET, EXTENDED RELEASE ORAL EVERY 12 HOURS PRN
Status: DISCONTINUED | OUTPATIENT
Start: 2024-08-14 | End: 2024-08-17 | Stop reason: HOSPADM

## 2024-08-14 RX ORDER — CEFTRIAXONE 1 G/50ML
1 INJECTION, SOLUTION INTRAVENOUS ONCE
Status: COMPLETED | OUTPATIENT
Start: 2024-08-14 | End: 2024-08-14

## 2024-08-14 RX ORDER — ACETAMINOPHEN 160 MG/5ML
650 SOLUTION ORAL EVERY 4 HOURS PRN
Status: DISCONTINUED | OUTPATIENT
Start: 2024-08-14 | End: 2024-08-17 | Stop reason: HOSPADM

## 2024-08-14 ASSESSMENT — CURB65 SCORE
CURB65 SCORE: 2
HAS CONFUSION: NO
BUN IS GREATER THAN 19 MG/DL: YES
AGE IS GREATER THAN OR EQUAL TO 65: YES
SBP LESS THAN 90 OR DBNP LESS THAN 60: NO
RESPIRATORY RATE GREATER THAN OR EQUAL TO 30: NO

## 2024-08-14 ASSESSMENT — PAIN SCALES - GENERAL
PAINLEVEL_OUTOF10: 0 - NO PAIN
PAINLEVEL_OUTOF10: 0 - NO PAIN

## 2024-08-14 ASSESSMENT — PAIN - FUNCTIONAL ASSESSMENT: PAIN_FUNCTIONAL_ASSESSMENT: 0-10

## 2024-08-14 NOTE — ED PROVIDER NOTES
HPI   Chief Complaint   Patient presents with    Weakness, Gen       Patient presents with weakness for the last 5 days.  She reports that she has not felt very well.  She has had some lower extremity weakness as well as shaking.  She did have some episodes of vomiting but has not vomited for several days.  She has had no appetite and has not eaten anything for several days.  She reports that she could not get out of bed yesterday with which was okay because she did not need to go to the bathroom at all yesterday.  She urinated today but only a very small amount.  She normally uses 3 L/min by nasal cannula.  She has had slight cough.  She denies any abdominal pain.              Patient History   Past Medical History:   Diagnosis Date    Alcohol dependence, in remission (Multi)     History of alcohol dependence    Arthritis     COPD (chronic obstructive pulmonary disease) (Multi)     Dyspnea     Edentulous     History of blood transfusion     1/2024 lower GI bleed    no reaction    RAMON on CPAP     with O2 3L n/c    Oxygen deficiency     uses home O2 3L n/c continuously    Personal history of other venous thrombosis and embolism     History of deep venous thrombosis right leg    Personal history of suicidal behavior     History of suicide attempt    Uses roller walker      Past Surgical History:   Procedure Laterality Date    ANKLE FRACTURE SURGERY      MR HEAD ANGIO WO IV CONTRAST  10/24/2017    MR HEAD ANGIO WO IV CONTRAST [facility]    MR NECK ANGIO WO IV CONTRAST  10/24/2017    MR NECK ANGIO WO IV CONTRAST [facility]    OTHER SURGICAL HISTORY Right 06/15/2017    Treatment Of Ankle Fracture    TONSILLECTOMY  06/15/2017    Tonsillectomy     Family History   Problem Relation Name Age of Onset    Accidental death Mother      Stroke Father      Other (cerbral hemmorrhage) Father      Cancer Sister      Lung cancer Sister      Colon cancer Brother      Cancer Brother      Lung disease Brother       Heart attack Brother       Social History     Tobacco Use    Smoking status: Some Days     Current packs/day: 0.00     Average packs/day: 0.3 packs/day for 53.8 years (13.5 ttl pk-yrs)     Types: Cigarettes     Start date:      Last attempt to quit: 10/28/2023     Years since quittin.7     Passive exposure: Never    Smokeless tobacco: Never   Vaping Use    Vaping status: Never Used   Substance Use Topics    Alcohol use: Yes     Comment: 3-5 beers 3-4x a week    Drug use: Never       Physical Exam   ED Triage Vitals [24 1712]   Temperature Heart Rate Respirations BP   36.6 °C (97.9 °F) (!) 104 20 150/83      Pulse Ox Temp Source Heart Rate Source Patient Position   97 % Temporal Monitor --      BP Location FiO2 (%)     -- --       Physical Exam  HENT:      Head: Normocephalic.   Eyes:      General: No scleral icterus.  Cardiovascular:      Rate and Rhythm: Tachycardia present.   Pulmonary:      Effort: Pulmonary effort is normal.      Breath sounds: Normal breath sounds.   Abdominal:      Comments: Soft, nontender to palpation   Neurological:      Mental Status: She is alert.      Comments: Patient is awake, alert, answers questions appropriately.  Able to move all extremities but somewhat weak in all extremities.           ED Course & MDM   ED Course as of 08/14/24 2316   Wed Aug 14, 2024   1905 EKG interpreted by me: Normal sinus rhythm, rate 96.  Normal axis.  No significant ST or T wave abnormalities [ML]      ED Course User Index  [ML] Bishop Hamm MD         Diagnoses as of 24   Weakness   Pneumonia of left lung due to infectious organism, unspecified part of lung                 No data recorded     Steve Coma Scale Score: 15 (24 : Shazia Liriano, GAMA)                           Medical Decision Making  Chest x-ray suggestive of airspace opacity.  CT obtained for further evaluation.  This is indicative of pneumonia.  Laboratory studies reveal mildly elevated creatinine  level.  Patient felt to be significantly dehydrated based on examination.  Patient was given IV fluids.  Patient given antibiotics for pneumonia.  In setting of elevated curb 65 score, patient accepted to hospitalist service for further management of pneumonia.  Parts of this chart were completed with dictation software, please excuse any errors in transcription.        Procedure  Procedures     Bishop Hamm MD  08/14/24 0559

## 2024-08-15 ENCOUNTER — APPOINTMENT (OUTPATIENT)
Dept: CARDIOLOGY | Facility: HOSPITAL | Age: 69
End: 2024-08-15
Payer: COMMERCIAL

## 2024-08-15 LAB
ALBUMIN SERPL-MCNC: 3.1 G/DL (ref 3.5–5)
ALP BLD-CCNC: 102 U/L (ref 35–125)
ALT SERPL-CCNC: 10 U/L (ref 5–40)
ANION GAP SERPL CALC-SCNC: 10 MMOL/L
APPEARANCE UR: CLEAR
AST SERPL-CCNC: 25 U/L (ref 5–40)
ATRIAL RATE: 153 BPM
ATRIAL RATE: 96 BPM
BASOPHILS # BLD AUTO: 0.02 X10*3/UL (ref 0–0.1)
BASOPHILS NFR BLD AUTO: 0.3 %
BILIRUB SERPL-MCNC: <0.2 MG/DL (ref 0.1–1.2)
BILIRUB UR STRIP.AUTO-MCNC: NEGATIVE MG/DL
BUN SERPL-MCNC: 25 MG/DL (ref 8–25)
CALCIUM SERPL-MCNC: 8.1 MG/DL (ref 8.5–10.4)
CHLORIDE SERPL-SCNC: 110 MMOL/L (ref 97–107)
CO2 SERPL-SCNC: 21 MMOL/L (ref 24–31)
COLOR UR: ABNORMAL
CREAT SERPL-MCNC: 0.7 MG/DL (ref 0.4–1.6)
EGFRCR SERPLBLD CKD-EPI 2021: >90 ML/MIN/1.73M*2
EOSINOPHIL # BLD AUTO: 0.16 X10*3/UL (ref 0–0.7)
EOSINOPHIL NFR BLD AUTO: 2.1 %
ERYTHROCYTE [DISTWIDTH] IN BLOOD BY AUTOMATED COUNT: 14.6 % (ref 11.5–14.5)
GLUCOSE SERPL-MCNC: 107 MG/DL (ref 65–99)
GLUCOSE UR STRIP.AUTO-MCNC: NORMAL MG/DL
HCT VFR BLD AUTO: 28.9 % (ref 36–46)
HGB BLD-MCNC: 9.1 G/DL (ref 12–16)
HYALINE CASTS #/AREA URNS AUTO: ABNORMAL /LPF
IMM GRANULOCYTES # BLD AUTO: 0.1 X10*3/UL (ref 0–0.7)
IMM GRANULOCYTES NFR BLD AUTO: 1.3 % (ref 0–0.9)
KETONES UR STRIP.AUTO-MCNC: ABNORMAL MG/DL
LEUKOCYTE ESTERASE UR QL STRIP.AUTO: NEGATIVE
LYMPHOCYTES # BLD AUTO: 1.07 X10*3/UL (ref 1.2–4.8)
LYMPHOCYTES NFR BLD AUTO: 13.9 %
MCH RBC QN AUTO: 32.2 PG (ref 26–34)
MCHC RBC AUTO-ENTMCNC: 31.5 G/DL (ref 32–36)
MCV RBC AUTO: 102 FL (ref 80–100)
MONOCYTES # BLD AUTO: 0.47 X10*3/UL (ref 0.1–1)
MONOCYTES NFR BLD AUTO: 6.1 %
NEUTROPHILS # BLD AUTO: 5.86 X10*3/UL (ref 1.2–7.7)
NEUTROPHILS NFR BLD AUTO: 76.3 %
NITRITE UR QL STRIP.AUTO: NEGATIVE
NRBC BLD-RTO: 0 /100 WBCS (ref 0–0)
P AXIS: 87 DEGREES
P OFFSET: 224 MS
P ONSET: 165 MS
PH UR STRIP.AUTO: 6 [PH]
PLATELET # BLD AUTO: 406 X10*3/UL (ref 150–450)
POTASSIUM SERPL-SCNC: 4.2 MMOL/L (ref 3.4–5.1)
PR INTERVAL: 122 MS
PROT SERPL-MCNC: 6.4 G/DL (ref 5.9–7.9)
PROT UR STRIP.AUTO-MCNC: ABNORMAL MG/DL
Q ONSET: 223 MS
Q ONSET: 226 MS
QRS COUNT: 16 BEATS
QRS COUNT: 29 BEATS
QRS DURATION: 74 MS
QRS DURATION: 80 MS
QT INTERVAL: 268 MS
QT INTERVAL: 336 MS
QTC CALCULATION(BAZETT): 424 MS
QTC CALCULATION(BAZETT): 460 MS
QTC FREDERICIA: 384 MS
QTC FREDERICIA: 393 MS
R AXIS: 83 DEGREES
R AXIS: 83 DEGREES
RBC # BLD AUTO: 2.83 X10*6/UL (ref 4–5.2)
RBC # UR STRIP.AUTO: NEGATIVE /UL
RBC #/AREA URNS AUTO: ABNORMAL /HPF
SODIUM SERPL-SCNC: 141 MMOL/L (ref 133–145)
SP GR UR STRIP.AUTO: 1.02
SQUAMOUS #/AREA URNS AUTO: ABNORMAL /HPF
T AXIS: 263 DEGREES
T AXIS: 84 DEGREES
T OFFSET: 357 MS
T OFFSET: 394 MS
TROPONIN T SERPL-MCNC: 19 NG/L
TSH SERPL DL<=0.05 MIU/L-ACNC: 0.6 MIU/L (ref 0.27–4.2)
UROBILINOGEN UR STRIP.AUTO-MCNC: NORMAL MG/DL
VENTRICULAR RATE: 177 BPM
VENTRICULAR RATE: 96 BPM
WBC # BLD AUTO: 7.7 X10*3/UL (ref 4.4–11.3)
WBC #/AREA URNS AUTO: ABNORMAL /HPF

## 2024-08-15 PROCEDURE — 81001 URINALYSIS AUTO W/SCOPE: CPT | Performed by: STUDENT IN AN ORGANIZED HEALTH CARE EDUCATION/TRAINING PROGRAM

## 2024-08-15 PROCEDURE — 9420000001 HC RT PATIENT EDUCATION 5 MIN

## 2024-08-15 PROCEDURE — 2500000002 HC RX 250 W HCPCS SELF ADMINISTERED DRUGS (ALT 637 FOR MEDICARE OP, ALT 636 FOR OP/ED): Performed by: INTERNAL MEDICINE

## 2024-08-15 PROCEDURE — 87899 AGENT NOS ASSAY W/OPTIC: CPT | Mod: TRILAB,WESLAB | Performed by: INTERNAL MEDICINE

## 2024-08-15 PROCEDURE — 2500000001 HC RX 250 WO HCPCS SELF ADMINISTERED DRUGS (ALT 637 FOR MEDICARE OP): Performed by: INTERNAL MEDICINE

## 2024-08-15 PROCEDURE — 2060000001 HC INTERMEDIATE ICU ROOM DAILY

## 2024-08-15 PROCEDURE — 87449 NOS EACH ORGANISM AG IA: CPT | Mod: TRILAB,WESLAB | Performed by: INTERNAL MEDICINE

## 2024-08-15 PROCEDURE — 2500000001 HC RX 250 WO HCPCS SELF ADMINISTERED DRUGS (ALT 637 FOR MEDICARE OP): Performed by: NURSE PRACTITIONER

## 2024-08-15 PROCEDURE — 93005 ELECTROCARDIOGRAM TRACING: CPT

## 2024-08-15 PROCEDURE — 99222 1ST HOSP IP/OBS MODERATE 55: CPT | Performed by: NURSE PRACTITIONER

## 2024-08-15 PROCEDURE — 84484 ASSAY OF TROPONIN QUANT: CPT | Performed by: STUDENT IN AN ORGANIZED HEALTH CARE EDUCATION/TRAINING PROGRAM

## 2024-08-15 PROCEDURE — 93010 ELECTROCARDIOGRAM REPORT: CPT | Performed by: INTERNAL MEDICINE

## 2024-08-15 PROCEDURE — 99232 SBSQ HOSP IP/OBS MODERATE 35: CPT | Performed by: NURSE PRACTITIONER

## 2024-08-15 PROCEDURE — 2500000005 HC RX 250 GENERAL PHARMACY W/O HCPCS: Performed by: INTERNAL MEDICINE

## 2024-08-15 PROCEDURE — 2500000005 HC RX 250 GENERAL PHARMACY W/O HCPCS: Performed by: NURSE PRACTITIONER

## 2024-08-15 PROCEDURE — 99223 1ST HOSP IP/OBS HIGH 75: CPT | Performed by: INTERNAL MEDICINE

## 2024-08-15 PROCEDURE — 84075 ASSAY ALKALINE PHOSPHATASE: CPT | Performed by: INTERNAL MEDICINE

## 2024-08-15 PROCEDURE — 87077 CULTURE AEROBIC IDENTIFY: CPT | Mod: TRILAB,WESLAB | Performed by: INTERNAL MEDICINE

## 2024-08-15 PROCEDURE — 94640 AIRWAY INHALATION TREATMENT: CPT

## 2024-08-15 PROCEDURE — 2500000004 HC RX 250 GENERAL PHARMACY W/ HCPCS (ALT 636 FOR OP/ED): Performed by: NURSE PRACTITIONER

## 2024-08-15 PROCEDURE — 2500000004 HC RX 250 GENERAL PHARMACY W/ HCPCS (ALT 636 FOR OP/ED): Performed by: INTERNAL MEDICINE

## 2024-08-15 PROCEDURE — 85025 COMPLETE CBC W/AUTO DIFF WBC: CPT | Performed by: INTERNAL MEDICINE

## 2024-08-15 PROCEDURE — 97163 PT EVAL HIGH COMPLEX 45 MIN: CPT | Mod: GP

## 2024-08-15 PROCEDURE — 36415 COLL VENOUS BLD VENIPUNCTURE: CPT | Performed by: INTERNAL MEDICINE

## 2024-08-15 PROCEDURE — 36415 COLL VENOUS BLD VENIPUNCTURE: CPT | Performed by: STUDENT IN AN ORGANIZED HEALTH CARE EDUCATION/TRAINING PROGRAM

## 2024-08-15 PROCEDURE — 94760 N-INVAS EAR/PLS OXIMETRY 1: CPT

## 2024-08-15 PROCEDURE — 84443 ASSAY THYROID STIM HORMONE: CPT | Performed by: INTERNAL MEDICINE

## 2024-08-15 PROCEDURE — 97165 OT EVAL LOW COMPLEX 30 MIN: CPT | Mod: GO

## 2024-08-15 RX ORDER — METOPROLOL TARTRATE 1 MG/ML
5 INJECTION, SOLUTION INTRAVENOUS
Status: DISCONTINUED | OUTPATIENT
Start: 2024-08-15 | End: 2024-08-17 | Stop reason: HOSPADM

## 2024-08-15 RX ORDER — BISOPROLOL FUMARATE 5 MG/1
5 TABLET, FILM COATED ORAL 2 TIMES DAILY
Status: DISCONTINUED | OUTPATIENT
Start: 2024-08-15 | End: 2024-08-17 | Stop reason: HOSPADM

## 2024-08-15 RX ORDER — CEFTRIAXONE 1 G/50ML
1 INJECTION, SOLUTION INTRAVENOUS EVERY 24 HOURS
Status: DISCONTINUED | OUTPATIENT
Start: 2024-08-15 | End: 2024-08-17 | Stop reason: HOSPADM

## 2024-08-15 RX ORDER — DILTIAZEM HCL/D5W 125 MG/125
5-15 PLASTIC BAG, INJECTION (ML) INTRAVENOUS CONTINUOUS
Status: DISCONTINUED | OUTPATIENT
Start: 2024-08-15 | End: 2024-08-16

## 2024-08-15 RX ORDER — DILTIAZEM HYDROCHLORIDE 5 MG/ML
10 INJECTION INTRAVENOUS ONCE
Status: DISCONTINUED | OUTPATIENT
Start: 2024-08-15 | End: 2024-08-15

## 2024-08-15 SDOH — SOCIAL STABILITY: SOCIAL INSECURITY: WERE YOU ABLE TO COMPLETE ALL THE BEHAVIORAL HEALTH SCREENINGS?: YES

## 2024-08-15 SDOH — SOCIAL STABILITY: SOCIAL INSECURITY: DOES ANYONE TRY TO KEEP YOU FROM HAVING/CONTACTING OTHER FRIENDS OR DOING THINGS OUTSIDE YOUR HOME?: NO

## 2024-08-15 SDOH — SOCIAL STABILITY: SOCIAL INSECURITY: ABUSE: ADULT

## 2024-08-15 SDOH — SOCIAL STABILITY: SOCIAL INSECURITY: HAS ANYONE EVER THREATENED TO HURT YOUR FAMILY OR YOUR PETS?: NO

## 2024-08-15 SDOH — SOCIAL STABILITY: SOCIAL INSECURITY: HAVE YOU HAD ANY THOUGHTS OF HARMING ANYONE ELSE?: NO

## 2024-08-15 SDOH — SOCIAL STABILITY: SOCIAL INSECURITY: DO YOU FEEL ANYONE HAS EXPLOITED OR TAKEN ADVANTAGE OF YOU FINANCIALLY OR OF YOUR PERSONAL PROPERTY?: NO

## 2024-08-15 SDOH — SOCIAL STABILITY: SOCIAL INSECURITY: DO YOU FEEL UNSAFE GOING BACK TO THE PLACE WHERE YOU ARE LIVING?: NO

## 2024-08-15 SDOH — SOCIAL STABILITY: SOCIAL INSECURITY: ARE YOU OR HAVE YOU BEEN THREATENED OR ABUSED PHYSICALLY, EMOTIONALLY, OR SEXUALLY BY ANYONE?: NO

## 2024-08-15 SDOH — SOCIAL STABILITY: SOCIAL INSECURITY: HAVE YOU HAD THOUGHTS OF HARMING ANYONE ELSE?: NO

## 2024-08-15 SDOH — SOCIAL STABILITY: SOCIAL INSECURITY: ARE THERE ANY APPARENT SIGNS OF INJURIES/BEHAVIORS THAT COULD BE RELATED TO ABUSE/NEGLECT?: NO

## 2024-08-15 ASSESSMENT — ACTIVITIES OF DAILY LIVING (ADL)
HEARING - RIGHT EAR: FUNCTIONAL
ASSISTIVE_DEVICE: WALKER
GROOMING: INDEPENDENT
ADL_ASSISTANCE: NEEDS ASSISTANCE
HEARING - LEFT EAR: FUNCTIONAL
WALKS IN HOME: NEEDS ASSISTANCE
BATHING: INDEPENDENT
LACK_OF_TRANSPORTATION: NO
TOILETING: INDEPENDENT
FEEDING YOURSELF: INDEPENDENT
PATIENT'S MEMORY ADEQUATE TO SAFELY COMPLETE DAILY ACTIVITIES?: YES
BATHING_ASSISTANCE: MODERATE
ADEQUATE_TO_COMPLETE_ADL: YES
JUDGMENT_ADEQUATE_SAFELY_COMPLETE_DAILY_ACTIVITIES: YES
LACK_OF_TRANSPORTATION: NO
DRESSING YOURSELF: INDEPENDENT
ADL_ASSISTANCE: NEEDS ASSISTANCE

## 2024-08-15 ASSESSMENT — ENCOUNTER SYMPTOMS
ENDOCRINE NEGATIVE: 1
PSYCHIATRIC NEGATIVE: 1
GASTROINTESTINAL NEGATIVE: 1
CONSTITUTIONAL NEGATIVE: 1
NEUROLOGICAL NEGATIVE: 1
EYES NEGATIVE: 1
MUSCULOSKELETAL NEGATIVE: 1
CARDIOVASCULAR NEGATIVE: 1
HEMATOLOGIC/LYMPHATIC NEGATIVE: 1
RESPIRATORY NEGATIVE: 1
ALLERGIC/IMMUNOLOGIC NEGATIVE: 1

## 2024-08-15 ASSESSMENT — LIFESTYLE VARIABLES
HOW OFTEN DO YOU HAVE 6 OR MORE DRINKS ON ONE OCCASION: NEVER
AUDIT-C TOTAL SCORE: 1
HOW OFTEN DO YOU HAVE A DRINK CONTAINING ALCOHOL: MONTHLY OR LESS
SKIP TO QUESTIONS 9-10: 1
AUDIT-C TOTAL SCORE: 1
HOW MANY STANDARD DRINKS CONTAINING ALCOHOL DO YOU HAVE ON A TYPICAL DAY: 1 OR 2

## 2024-08-15 ASSESSMENT — COGNITIVE AND FUNCTIONAL STATUS - GENERAL
WALKING IN HOSPITAL ROOM: A LOT
TURNING FROM BACK TO SIDE WHILE IN FLAT BAD: A LITTLE
DRESSING REGULAR UPPER BODY CLOTHING: A LOT
HELP NEEDED FOR BATHING: A LITTLE
WALKING IN HOSPITAL ROOM: TOTAL
CLIMB 3 TO 5 STEPS WITH RAILING: A LOT
DAILY ACTIVITIY SCORE: 15
MOVING FROM LYING ON BACK TO SITTING ON SIDE OF FLAT BED WITH BEDRAILS: A LOT
MOBILITY SCORE: 8
DRESSING REGULAR UPPER BODY CLOTHING: A LITTLE
HELP NEEDED FOR BATHING: A LOT
STANDING UP FROM CHAIR USING ARMS: A LITTLE
MOVING TO AND FROM BED TO CHAIR: A LITTLE
TOILETING: A LITTLE
TURNING FROM BACK TO SIDE WHILE IN FLAT BAD: A LOT
PATIENT BASELINE BEDBOUND: NO
CLIMB 3 TO 5 STEPS WITH RAILING: TOTAL
MOVING TO AND FROM BED TO CHAIR: TOTAL
MOBILITY SCORE: 17
TOILETING: A LOT
STANDING UP FROM CHAIR USING ARMS: TOTAL
DRESSING REGULAR LOWER BODY CLOTHING: A LOT
DRESSING REGULAR LOWER BODY CLOTHING: A LITTLE
DAILY ACTIVITIY SCORE: 20
PERSONAL GROOMING: A LITTLE

## 2024-08-15 ASSESSMENT — PAIN - FUNCTIONAL ASSESSMENT
PAIN_FUNCTIONAL_ASSESSMENT: 0-10
PAIN_FUNCTIONAL_ASSESSMENT: FLACC (FACE, LEGS, ACTIVITY, CRY, CONSOLABILITY)
PAIN_FUNCTIONAL_ASSESSMENT: 0-10
PAIN_FUNCTIONAL_ASSESSMENT: 0-10

## 2024-08-15 ASSESSMENT — PAIN SCALES - GENERAL
PAINLEVEL_OUTOF10: 0 - NO PAIN
PAINLEVEL_OUTOF10: 3
PAINLEVEL_OUTOF10: 3
PAINLEVEL_OUTOF10: 0 - NO PAIN
PAINLEVEL_OUTOF10: 0 - NO PAIN

## 2024-08-15 ASSESSMENT — PATIENT HEALTH QUESTIONNAIRE - PHQ9
SUM OF ALL RESPONSES TO PHQ9 QUESTIONS 1 & 2: 0
1. LITTLE INTEREST OR PLEASURE IN DOING THINGS: NOT AT ALL
2. FEELING DOWN, DEPRESSED OR HOPELESS: NOT AT ALL

## 2024-08-15 ASSESSMENT — PAIN DESCRIPTION - LOCATION: LOCATION: HEAD

## 2024-08-15 NOTE — CONSULTS
Consults    Reason For Consult  Spiculated Lung Nodule increased in size     History Of Present Illness  Kamila Jones is a 68 y.o. female with a past medical history that includes but is not limited to COPD; on 3 L nasal cannula xontinueous  who presented to [facility] Emergency Department last night for evaluation of weakness, generalized malaise and decreased appetite for the last 5 days. She denies dyspnea, cough, fevers, chills, nausea, vomiting, diarrhea, chest or abdominal pain.     Past Medical History  Past Medical History:   Diagnosis Date    Alcohol dependence, in remission (Multi)     History of alcohol dependence    Arthritis     COPD (chronic obstructive pulmonary disease) (Multi)     Dyspnea     Edentulous     History of blood transfusion     2024 lower GI bleed    no reaction    RAMON on CPAP     with O2 3L n/c    Oxygen deficiency     uses home O2 3L n/c continuously    Personal history of other venous thrombosis and embolism     History of deep venous thrombosis right leg    Personal history of suicidal behavior     History of suicide attempt    Uses roller walker        Surgical History  Past Surgical History:   Procedure Laterality Date    ANKLE FRACTURE SURGERY      MR HEAD ANGIO WO IV CONTRAST  10/24/2017    MR HEAD ANGIO WO IV CONTRAST [facility]    MR NECK ANGIO WO IV CONTRAST  10/24/2017    MR NECK ANGIO WO IV CONTRAST [facility]    OTHER SURGICAL HISTORY Right 06/15/2017    Treatment Of Ankle Fracture    TONSILLECTOMY  06/15/2017    Tonsillectomy        Social History  Social History     Tobacco Use    Smoking status: Some Days     Current packs/day: 0.00     Average packs/day: 0.3 packs/day for 53.8 years (13.5 ttl pk-yrs)     Types: Cigarettes     Start date:      Last attempt to quit: 10/28/2023     Years since quittin.8     Passive exposure: Never    Smokeless tobacco: Never   Vaping Use    Vaping status: Never Used   Substance Use Topics     Alcohol use: Yes     Comment: 3-5 beers 3-4x a week    Drug use: Never       Family History  Family History   Problem Relation Name Age of Onset    Accidental death Mother      Stroke Father      Other (cerbral hemmorrhage) Father      Cancer Sister      Lung cancer Sister      Colon cancer Brother      Cancer Brother      Lung disease Brother      Heart attack Brother          Allergies  Propranolol hcl    Review of Systems   Constitutional: Negative.    HENT: Negative.     Eyes: Negative.    Respiratory: Negative.     Cardiovascular: Negative.    Gastrointestinal: Negative.    Endocrine: Negative.    Genitourinary: Negative.    Musculoskeletal: Negative.    Allergic/Immunologic: Negative.    Neurological: Negative.    Hematological: Negative.    Psychiatric/Behavioral: Negative.          Physical Exam  Vitals and nursing note reviewed.   Constitutional:       Appearance: Normal appearance.   HENT:      Head: Normocephalic and atraumatic.      Nose: Nose normal.      Mouth/Throat:      Mouth: Mucous membranes are moist.   Eyes:      Extraocular Movements: Extraocular movements intact.      Conjunctiva/sclera: Conjunctivae normal.      Pupils: Pupils are equal, round, and reactive to light.   Cardiovascular:      Rate and Rhythm: Tachycardia present. Rhythm irregular.      Pulses: Normal pulses.      Heart sounds: Normal heart sounds.   Pulmonary:      Effort: Pulmonary effort is normal.      Breath sounds: Normal breath sounds.      Comments: Lungs diminished but clear.   Abdominal:      General: Bowel sounds are normal.      Palpations: Abdomen is soft.   Musculoskeletal:         General: Normal range of motion.   Skin:     General: Skin is warm and dry.      Capillary Refill: Capillary refill takes less than 2 seconds.   Neurological:      General: No focal deficit present.      Mental Status: She is alert and oriented to person, place, and time.   Psychiatric:         Mood and Affect: Mood normal.          "Behavior: Behavior normal.          Vital Signs  Blood pressure 158/89, pulse 107, temperature 36.9 °C (98.4 °F), temperature source Temporal, resp. rate 18, height 1.676 m (5' 6\"), weight 59 kg (130 lb), SpO2 98%.  Oxygen Therapy  SpO2: 98 %  Medical Gas Therapy: Supplemental oxygen  Medical Gas Delivery Method: Nasal cannula  FiO2 (%): 32 %    amLODIPine, 10 mg, oral, Daily  escitalopram, 10 mg, oral, Daily  famotidine, 20 mg, oral, Daily  lisinopril, 10 mg, oral, Daily  mometasone, 1 puff, inhalation, Daily  oxygen, , inhalation, Continuous - Inhalation  primidone, 200 mg, oral, BID  varenicline, 0.5 mg, oral, Daily       PRN medications: acetaminophen **OR** acetaminophen **OR** acetaminophen, benzocaine-menthol, dextromethorphan-guaifenesin, guaiFENesin, ipratropium-albuteroL, ondansetron ODT **OR** ondansetron, polyethylene glycol   sodium chloride 0.9%, 75 mL/hr, Last Rate: 75 mL/hr (08/15/24 0306)       Relevant Results  Results for orders placed or performed during the hospital encounter of 08/14/24 (from the past 24 hour(s))   CBC and Auto Differential   Result Value Ref Range    WBC 9.4 4.4 - 11.3 x10*3/uL    nRBC 0.0 0.0 - 0.0 /100 WBCs    RBC 3.06 (L) 4.00 - 5.20 x10*6/uL    Hemoglobin 9.8 (L) 12.0 - 16.0 g/dL    Hematocrit 31.5 (L) 36.0 - 46.0 %     (H) 80 - 100 fL    MCH 32.0 26.0 - 34.0 pg    MCHC 31.1 (L) 32.0 - 36.0 g/dL    RDW 14.9 (H) 11.5 - 14.5 %    Platelets 434 150 - 450 x10*3/uL    Neutrophils % 79.1 40.0 - 80.0 %    Immature Granulocytes %, Automated 0.7 0.0 - 0.9 %    Lymphocytes % 11.2 13.0 - 44.0 %    Monocytes % 7.7 2.0 - 10.0 %    Eosinophils % 1.1 0.0 - 6.0 %    Basophils % 0.2 0.0 - 2.0 %    Neutrophils Absolute 7.46 1.20 - 7.70 x10*3/uL    Immature Granulocytes Absolute, Automated 0.07 0.00 - 0.70 x10*3/uL    Lymphocytes Absolute 1.06 (L) 1.20 - 4.80 x10*3/uL    Monocytes Absolute 0.73 0.10 - 1.00 x10*3/uL    Eosinophils Absolute 0.10 0.00 - 0.70 x10*3/uL    Basophils " Absolute 0.02 0.00 - 0.10 x10*3/uL   Comprehensive Metabolic Panel   Result Value Ref Range    Glucose 91 65 - 99 mg/dL    Sodium 139 133 - 145 mmol/L    Potassium 4.7 3.4 - 5.1 mmol/L    Chloride 106 97 - 107 mmol/L    Bicarbonate 20 (L) 24 - 31 mmol/L    Urea Nitrogen 32 (H) 8 - 25 mg/dL    Creatinine 1.30 0.40 - 1.60 mg/dL    eGFR 44 (L) >60 mL/min/1.73m*2    Calcium 8.9 8.5 - 10.4 mg/dL    Albumin 3.5 3.5 - 5.0 g/dL    Alkaline Phosphatase 121 35 - 125 U/L    Total Protein 7.4 5.9 - 7.9 g/dL    AST 18 5 - 40 U/L    Bilirubin, Total <0.2 0.1 - 1.2 mg/dL    ALT 7 5 - 40 U/L    Anion Gap 13 <=19 mmol/L   NT-PROBNP   Result Value Ref Range    PROBNP 2,653 (H) 0 - 353 pg/mL   Serial Troponin, Initial ([facility])   Result Value Ref Range    Troponin T, High Sensitivity 24 (HH) <=14 ng/L   ECG 12 Lead   Result Value Ref Range    Ventricular Rate 96 BPM    Atrial Rate 96 BPM    NY Interval 122 ms    QRS Duration 74 ms    QT Interval 336 ms    QTC Calculation(Bazett) 424 ms    P Axis 87 degrees    R Axis 83 degrees    T Axis 84 degrees    QRS Count 16 beats    Q Onset 226 ms    P Onset 165 ms    P Offset 224 ms    T Offset 394 ms    QTC Fredericia 393 ms   Sars-CoV-2 PCR   Result Value Ref Range    Coronavirus 2019, PCR Not Detected Not Detected   Serial Troponin, 2 Hour ([facility])   Result Value Ref Range    Troponin T, High Sensitivity 18 (HH) <=14 ng/L   Serial Troponin, 6 Hour ([facility])   Result Value Ref Range    Troponin T, High Sensitivity 19 (HH) <=14 ng/L   CBC and Auto Differential   Result Value Ref Range    WBC 7.7 4.4 - 11.3 x10*3/uL    nRBC 0.0 0.0 - 0.0 /100 WBCs    RBC 2.83 (L) 4.00 - 5.20 x10*6/uL    Hemoglobin 9.1 (L) 12.0 - 16.0 g/dL    Hematocrit 28.9 (L) 36.0 - 46.0 %     (H) 80 - 100 fL    MCH 32.2 26.0 - 34.0 pg    MCHC 31.5 (L) 32.0 - 36.0 g/dL    RDW 14.6 (H) 11.5 - 14.5 %    Platelets 406 150 - 450 x10*3/uL    Neutrophils % 76.3 40.0 - 80.0 %    Immature Granulocytes %, Automated 1.3 (H) 0.0 - 0.9 %     Lymphocytes % 13.9 13.0 - 44.0 %    Monocytes % 6.1 2.0 - 10.0 %    Eosinophils % 2.1 0.0 - 6.0 %    Basophils % 0.3 0.0 - 2.0 %    Neutrophils Absolute 5.86 1.20 - 7.70 x10*3/uL    Immature Granulocytes Absolute, Automated 0.10 0.00 - 0.70 x10*3/uL    Lymphocytes Absolute 1.07 (L) 1.20 - 4.80 x10*3/uL    Monocytes Absolute 0.47 0.10 - 1.00 x10*3/uL    Eosinophils Absolute 0.16 0.00 - 0.70 x10*3/uL    Basophils Absolute 0.02 0.00 - 0.10 x10*3/uL   Comprehensive metabolic panel   Result Value Ref Range    Glucose 107 (H) 65 - 99 mg/dL    Sodium 141 133 - 145 mmol/L    Potassium 4.2 3.4 - 5.1 mmol/L    Chloride 110 (H) 97 - 107 mmol/L    Bicarbonate 21 (L) 24 - 31 mmol/L    Urea Nitrogen 25 8 - 25 mg/dL    Creatinine 0.70 0.40 - 1.60 mg/dL    eGFR >90 >60 mL/min/1.73m*2    Calcium 8.1 (L) 8.5 - 10.4 mg/dL    Albumin 3.1 (L) 3.5 - 5.0 g/dL    Alkaline Phosphatase 102 35 - 125 U/L    Total Protein 6.4 5.9 - 7.9 g/dL    AST 25 5 - 40 U/L    Bilirubin, Total <0.2 0.1 - 1.2 mg/dL    ALT 10 5 - 40 U/L    Anion Gap 10 <=19 mmol/L   TSH with reflex to Free T4 if abnormal   Result Value Ref Range    Thyroid Stimulating Hormone 0.60 0.27 - 4.20 mIU/L   Urinalysis with Reflex Culture and Microscopic   Result Value Ref Range    Color, Urine Light-Yellow Light-Yellow, Yellow, Dark-Yellow    Appearance, Urine Clear Clear    Specific Gravity, Urine 1.024 1.005 - 1.035    pH, Urine 6.0 5.0, 5.5, 6.0, 6.5, 7.0, 7.5, 8.0    Protein, Urine 70 (1+) (A) NEGATIVE, 10 (TRACE), 20 (TRACE) mg/dL    Glucose, Urine Normal Normal mg/dL    Blood, Urine NEGATIVE NEGATIVE    Ketones, Urine 10 (1+) (A) NEGATIVE mg/dL    Bilirubin, Urine NEGATIVE NEGATIVE    Urobilinogen, Urine Normal Normal mg/dL    Nitrite, Urine NEGATIVE NEGATIVE    Leukocyte Esterase, Urine NEGATIVE NEGATIVE   Urinalysis Microscopic   Result Value Ref Range    WBC, Urine 1-5 1-5, NONE /HPF    RBC, Urine 1-2 NONE, 1-2, 3-5 /HPF    Squamous Epithelial Cells, Urine 1-9 (SPARSE)  Reference range not established. /HPF    Hyaline Casts, Urine 1+ (A) NONE /LPF        CT chest wo IV contrast  Result Date: 8/14/2024  FINDINGS: LUNGS AND AIRWAYS: The trachea and central airways are patent. No endobronchial lesion is present, although small amount of mucus/secretions are noted in the trachea.   New consolidative airspace opacity with small amount of pleural fluid is present in the left lower lobe.   No consolidation or pleural effusion is present in the right lung.   There is a spiculated nodule measuring 1.9 x 1.1 cm in size present in the right lower lobe (series 4, image 237), increased from 1.5 x 0.4 cm on previous imaging.   MEDIASTINUM AND HAIR, LOWER NECK AND AXILLA: The visualized thyroid gland is within normal limits.   No evidence of thoracic lymphadenopathy by CT criteria.   Esophagus appears within normal limits as seen.   HEART AND VESSELS: The thoracic aorta is of normal course and caliber without vascular calcifications.   Main pulmonary artery and its branches are normal in caliber.   Moderate coronary artery calcifications are seen. The study is not optimized for evaluation of coronary arteries.   The cardiac chambers are not enlarged.   No evidence of pericardial effusion.   UPPER ABDOMEN: The visualized subdiaphragmatic structures demonstrate no acute findings.   CHEST WALL AND OSSEOUS STRUCTURES: There are no suspicious osseous lesions. Multilevel degenerative changes are present  1.  Consolidative airspace opacity with small amount of pleural fluid is present in the left lower lobe, most consistent with pneumonia. Repeat imaging to document resolution after appropriate clinical intervention is recommended. 2. A 1.9 x 1.1 cm spiculated nodule is present in the right lower lobe, is slightly increased in size from 1.7 x 0.9 cm in May of 2023. Further assessment with PET-CT is recommended. 3. Moderate to severe coronary artery calcifications.   MACRO: Incidental Finding:  A solid  non-calcified pulmonary nodule measuring greater than 8 mm.  (**-YCF-**)   Instructions:  Consider short term follow up non-contrast Chest CT at 3 months, PET/CT or tissue sampling. Note, PET/CT may be limited in low grade malignancy, nodules <1 cm size or those located close to diaphragm. (Leobardo Nolan et al., Guidelines for management of incidental pulmonary nodules detected on CT images:     CT head wo IV contrast  Result Date: 8/14/2024  FINDINGS: BRAIN PARENCHYMA: Gray-white differentiation is preserved. No mass-effect, midline shift or effacement of cerebral sulci. Patchy periventricular and subcortical white matter hypodensities, nonspecific but often seen in the setting of chronic microangiopathic change.   HEMORRHAGE: No acute intracranial hemorrhage.   VENTRICLES and EXTRA-AXIAL SPACES: The ventricles and sulci are within normal limits for brain volume. No abnormal extra-axial fluid collection.   ORBITS: The visualized orbits and globes are within normal limits.   EXTRACRANIAL SOFT TISSUES: Within normal limits.   PARANASAL SINUSES/MASTOIDS: The visualized paranasal sinuses and mastoid air cells are well aerated.   CALVARIUM: No depressed skull fracture.     1. No acute intracranial abnormality identified.         XR chest 1 view  Result Date: 8/14/2024  Interpreted By:  Adam Coleman, STUDY: XR CHEST 1 VIEW;  8/14/2024 6:18 pm   INDICATION: Signs/Symptoms:weakness.   COMPARISON: Chest radiograph 06/05/2022   ACCESSION NUMBER(S): [id number]   ORDERING CLINICIAN: RETA PAULINO   FINDINGS: Cardiomediastinal contours are within normal limits. Streaky left basilar opacity. No large pleural effusion or discernible pneumothorax. 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 Severe degenerative changes of the glenohumeral joints bilaterally.   1. Left basilar opacity, likely atelectasis versus airspace disease.          Assessment/Plan  Chronic hypoxic respiratory failure  Oxygen at baseline  Continuous  pulse oximetry/pulmonary hygiene    Pneumonia  Continue IV antibiotics   Follow-up cultures  Sputum if able  Procalcitonin    COPD  Continue as needed IBD/ICS  FEV 1 when optimized    Atrial fibrillation with RVE  Cardizem gtt after IV metoprolol x 1 dose  Eliquis  Cardiology follow-up    Spiculated lung nodule right lower lobe measuring 1.9 x 1.1 cm in size increased from 1.5 x 0.4 cm on previous imaging  PET-CT scan as outpatient    Obstructive sleep apnea  CPAP nightly-patient agrees to wear hospital unit    Tobacco Dependence  Smoking cessation    Reviewed CT angio with collaborating physician Dr. Davis  Thank you for this consultation     Emmie Interiano, APRN-CNP

## 2024-08-15 NOTE — CONSULTS
Consults  History Of Present Illness:    Kamila Jones is a 68 y.o. female patient with underlying history of COPD, patient had a negative nuclear stress test with ischemia about 5 months ago.  Came by EMS with complaint of weakness and not feeling well.  Patient found to be pneumonia.  Patient does walk with a walker.  Patient now found to A-fib RVR rate about 180 bpm with diffuse ST changes seen.  Currently on a 5 L nasal cannula oxygen.  Patient for last 5-69 feeling well.  History of some vomiting.  Patient CBC unremarkable with blood chemistry unremarkable.  proBNP mildly elevated to 2653 PG per mL.  Troponins negative x 2 so far.  Chest x-ray is found having left basilar opacity.  CT chest negative for pulmonary embolism.  Now admitted with left lower lobe pneumonia with hypoxia.  Patient now with A-fib RVR rate about 160 bpm.  Last Recorded Vitals:  Vitals:    08/15/24 0752 08/15/24 0959 08/15/24 1032 08/15/24 1056   BP: 158/89 101/62 95/71    BP Location: Left arm Right arm Right arm    Patient Position: Lying Lying Lying    Pulse: 107 (!) 167 55 (!) 148   Resp: 18 20 20    Temp: 36.9 °C (98.4 °F)  36.4 °C (97.5 °F)    TempSrc: Temporal  Oral    SpO2: 98% 97% 97%    Weight:       Height:           Past Medical History:  She has a past medical history of Alcohol dependence, in remission (Multi), Arthritis, COPD (chronic obstructive pulmonary disease) (Multi), Dyspnea, Edentulous, History of blood transfusion, RAMON on CPAP, Oxygen deficiency, Personal history of other venous thrombosis and embolism, Personal history of suicidal behavior, and Uses roller walker.    Past Surgical History:  She has a past surgical history that includes Tonsillectomy (06/15/2017); Other surgical history (Right, 06/15/2017); MR angio head wo IV contrast (10/24/2017); MR angio neck wo IV contrast (10/24/2017); and Ankle fracture surgery.      Social History:  She reports that she has been smoking cigarettes. She started smoking  about 54 years ago. She has a 13.5 pack-year smoking history. She has never been exposed to tobacco smoke. She has never used smokeless tobacco. She reports current alcohol use. She reports that she does not use drugs.    Family History:  Family History   Problem Relation Name Age of Onset    Accidental death Mother      Stroke Father      Other (cerbral hemmorrhage) Father      Cancer Sister      Lung cancer Sister      Colon cancer Brother      Cancer Brother      Lung disease Brother      Heart attack Brother          Allergies:  Propranolol hcl    Inpatient Medications:  Scheduled medications   Medication Dose Route Frequency    amLODIPine  10 mg oral Daily    escitalopram  10 mg oral Daily    famotidine  20 mg oral Daily    lisinopril  10 mg oral Daily    mometasone  1 puff inhalation Daily    oxygen   inhalation Continuous - Inhalation    primidone  200 mg oral BID    varenicline  0.5 mg oral Daily     Outpatient Medications:  Current Outpatient Medications   Medication Instructions    acetaminophen (TYLENOL EXTRA STRENGTH) 500 mg, oral, 4 times daily    amLODIPine (NORVASC) 10 mg, oral, Daily    amLODIPine (NORVASC) 5 mg, oral, Every 12 hours    escitalopram (LEXAPRO) 10 mg, oral, Daily    gabapentin (Neurontin) 300 mg capsule TAKE 1 CAPSULE BY MOUTH 3 TIMES A DAY FOR 30 DAYS    lisinopril 10 mg, oral, Daily    meloxicam (MOBIC) 15 mg, oral, Daily    methocarbamol (ROBAXIN) 750 mg, oral, 4 times daily    mirtazapine (REMERON) 15 mg, oral, Nightly    primidone (Mysoline) 50 mg tablet TAKE 4 TABLETS BY MOUTH EVERY MORNING AND 4 TABLETS IN THE EVENING AS DIRECTED    varenicline (Chantix) 0.5 mg tablet Days 1-3: Take 0.5mg once daily. Days 4-7: 0.5mg twice daily. Day 8 and beyond: 1 mg twice daily       Physical Exam:  HEENT: Normocephalic/atraumatic pupils equal react light  Neck exam mild JVD, no bruit  Lung exam decreased breath sound bilaterally, few crackles at the bases  Cardiac exam is irregular fast  rhythm S1-S2, soft systolic murmur heard.   Abdomen soft nontender, nondistended  Extremities no clubbing, cyanosis but trace edema  Neuro exam grossly intact.  Last Labs:  CBC - 8/15/2024:  5:22 AM  7.7 9.1 406    28.9      CMP - 8/15/2024:  5:22 AM  8.1 6.4 25 --- <0.2   _ 3.1 10 102      PTT - No results in last year.  1.1   11.4 _     LDL Calculated   Date/Time Value Ref Range Status   06/24/2022 01:28 PM 93 65 - 130 MG/DL Final   07/30/2018 10:58 AM 82 65 - 130 MG/DL Final          ECG 12 Lead  Normal sinus rhythm  Septal infarct , age undetermined  Abnormal ECG  No previous ECGs available      Principal Problem:    Pneumonia of left lung due to infectious organism, unspecified part of lung  Active Problems:    COPD (chronic obstructive pulmonary disease) (Multi)    Depression    ISABELA (generalized anxiety disorder)    Mood disorder (CMS-HCC)    Essential tremor    History of hypertension    Iron deficiency anemia    Pulmonary nodule    Subarachnoid hemorrhage (Multi)    Subdural hematoma (Multi)    Pleural effusion    Assessment/Plan   68-year-old female patient with a history of COPD, depression, anxiety disorders.  Patient now with episode of shortness of breath with dyspnea exertion with A-fib RVR.  1.  A-fib RVR: More likely due to underlying hypoxia with underlying acute respiratory failure.  Probably IV Lopressor as well as IV Cardizem might benefit for rate control.  Also start patient on Eliquis.  Eliquis 5 mg tablet p.o. twice daily.  Start Cardizem drip about 5 mg an hour.  2.  Acute respiratory failure: Patient underlying pneumonia left lower lobe pneumonia continue IV antibiotic with bronchodilators and oxygen.  3.  CAD: Patient with a negative nuclear stress test for ischemia about 5 months ago.  Normal ejection fraction.  4.  Hypertension: Currently on amlodipine 10 mg tablet once a day will discontinue once IV Cardizem drip started.  Also start patients on lisinopril 10 mg tablet once a day.  5.   Hyperlipidemia: Will check lipid profile.  Will get patient 2D echocardiogram.  Critical care time is spent at bedside includes review of diagnostic tests, labs, and radiographs, serial assessments and management of hemodynamics, EKGs, old echoes, cardiac work-up and coordination of care.  Assessment, impression and plans are reflected in the note above as well as the orders.    Code Status:  Full Code  I spent 80 minutes in the professional and overall care of this patient.  Dat Conroy MD

## 2024-08-15 NOTE — PROGRESS NOTES
08/15/24 1523   Discharge Planning   Living Arrangements Children  (4 adult children)   Support Systems Children   Assistance Needed Daughter shops and does laundry.   Type of Residence Private residence   Number of Stairs Within Residence   (Multi-level home.  Patient remains on the main level.)   Do you have animals or pets at home? Yes   Type of Animals or Pets 5 cats, 3 dogs   Home or Post Acute Services Post acute facilities (Rehab/SNF/etc)  (Patient has a HHA that comes 2x/week to shower patient.  Patient advised that HHA can go to rehab for her.)   Type of Post Acute Facility Services Skilled nursing  (Reviewing SNF list for choices.)   Type of Home Care Services Home health aide   Expected Discharge Disposition SNF   Does the patient need discharge transport arranged? Yes   RoundTrip coordination needed? Yes   Has discharge transport been arranged? No   Financial Resource Strain   How hard is it for you to pay for the very basics like food, housing, medical care, and heating? Not very   Housing Stability   In the last 12 months, was there a time when you were not able to pay the mortgage or rent on time? N   At any time in the past 12 months, were you homeless or living in a shelter (including now)? N   Transportation Needs   In the past 12 months, has lack of transportation kept you from medical appointments or from getting medications? no  (Daughter transports patient)   In the past 12 months, has lack of transportation kept you from meetings, work, or from getting things needed for daily living? No  (Daughter assists.)   Patient Choice   Provider Choice list and CMS website (https://medicare.gov/care-compare#search) for post-acute Quality and Resource Measure Data were provided and reviewed with: Patient  (SNF list prepared and given to patient.  Awaiting choices.)   Patient / Family choosing to utilize agency / facility established prior to hospitalization No     0591  Spoke with patient's daughter  Deepika [phone] at the request of patient to discuss patient's choice to go to rehab at discharge.  Daughter in agreement with SNF.  SNF list emailed to [email or web address].  Awaiting updates/choices from patient and/or daughter.  Care Transitions following.

## 2024-08-15 NOTE — CARE PLAN
The patient's goals for the shift include Rest    The clinical goals for the shift include patient will achieve hemodynamic stability

## 2024-08-15 NOTE — PROGRESS NOTES
Kamila Jones is a 68 y.o. female on day 1 of admission presenting with Pneumonia of left lung due to infectious organism, unspecified part of lung.      Subjective   Admitted last night for pneumonia and weakness. Pt went into Afib with RVR this am. Now on Cardizem gtt.        Objective     Last Recorded Vitals  BP 95/71 (BP Location: Right arm, Patient Position: Lying)   Pulse (!) 148   Temp 36.4 °C (97.5 °F) (Oral)   Resp 20   Wt 59 kg (130 lb)   SpO2 97%   Intake/Output last 3 Shifts:    Intake/Output Summary (Last 24 hours) at 8/15/2024 1126  Last data filed at 8/15/2024 0752  Gross per 24 hour   Intake 617.5 ml   Output 600 ml   Net 17.5 ml       Admission Weight  Weight: 59 kg (130 lb) (08/14/24 1712)    Daily Weight  08/14/24 : 59 kg (130 lb)    Image Results  ECG 12 Lead  Normal sinus rhythm  Septal infarct , age undetermined  Abnormal ECG  No previous ECGs available      Physical Exam  HENT:      Head: Normocephalic and atraumatic.      Nose: Nose normal.      Mouth/Throat:      Mouth: Mucous membranes are moist.      Pharynx: Oropharynx is clear.   Eyes:      Extraocular Movements: Extraocular movements intact.      Pupils: Pupils are equal, round, and reactive to light.   Cardiovascular:      Rate and Rhythm: Normal rate. Rhythm irregular.      Pulses: Normal pulses.   Pulmonary:      Effort: Pulmonary effort is normal.      Breath sounds: Rales present.   Abdominal:      General: Abdomen is flat. Bowel sounds are normal.      Palpations: Abdomen is soft.   Musculoskeletal:         General: Normal range of motion.   Skin:     General: Skin is warm and dry.      Capillary Refill: Capillary refill takes less than 2 seconds.   Neurological:      General: No focal deficit present.      Mental Status: She is oriented to person, place, and time.   Psychiatric:         Mood and Affect: Mood normal.         Relevant Results  Results for orders placed or performed during the hospital encounter of 08/14/24  (from the past 24 hour(s))   CBC and Auto Differential   Result Value Ref Range    WBC 9.4 4.4 - 11.3 x10*3/uL    nRBC 0.0 0.0 - 0.0 /100 WBCs    RBC 3.06 (L) 4.00 - 5.20 x10*6/uL    Hemoglobin 9.8 (L) 12.0 - 16.0 g/dL    Hematocrit 31.5 (L) 36.0 - 46.0 %     (H) 80 - 100 fL    MCH 32.0 26.0 - 34.0 pg    MCHC 31.1 (L) 32.0 - 36.0 g/dL    RDW 14.9 (H) 11.5 - 14.5 %    Platelets 434 150 - 450 x10*3/uL    Neutrophils % 79.1 40.0 - 80.0 %    Immature Granulocytes %, Automated 0.7 0.0 - 0.9 %    Lymphocytes % 11.2 13.0 - 44.0 %    Monocytes % 7.7 2.0 - 10.0 %    Eosinophils % 1.1 0.0 - 6.0 %    Basophils % 0.2 0.0 - 2.0 %    Neutrophils Absolute 7.46 1.20 - 7.70 x10*3/uL    Immature Granulocytes Absolute, Automated 0.07 0.00 - 0.70 x10*3/uL    Lymphocytes Absolute 1.06 (L) 1.20 - 4.80 x10*3/uL    Monocytes Absolute 0.73 0.10 - 1.00 x10*3/uL    Eosinophils Absolute 0.10 0.00 - 0.70 x10*3/uL    Basophils Absolute 0.02 0.00 - 0.10 x10*3/uL   Comprehensive Metabolic Panel   Result Value Ref Range    Glucose 91 65 - 99 mg/dL    Sodium 139 133 - 145 mmol/L    Potassium 4.7 3.4 - 5.1 mmol/L    Chloride 106 97 - 107 mmol/L    Bicarbonate 20 (L) 24 - 31 mmol/L    Urea Nitrogen 32 (H) 8 - 25 mg/dL    Creatinine 1.30 0.40 - 1.60 mg/dL    eGFR 44 (L) >60 mL/min/1.73m*2    Calcium 8.9 8.5 - 10.4 mg/dL    Albumin 3.5 3.5 - 5.0 g/dL    Alkaline Phosphatase 121 35 - 125 U/L    Total Protein 7.4 5.9 - 7.9 g/dL    AST 18 5 - 40 U/L    Bilirubin, Total <0.2 0.1 - 1.2 mg/dL    ALT 7 5 - 40 U/L    Anion Gap 13 <=19 mmol/L   NT-PROBNP   Result Value Ref Range    PROBNP 2,653 (H) 0 - 353 pg/mL   Serial Troponin, Initial ([facility])   Result Value Ref Range    Troponin T, High Sensitivity 24 (HH) <=14 ng/L   ECG 12 Lead   Result Value Ref Range    Ventricular Rate 96 BPM    Atrial Rate 96 BPM    ID Interval 122 ms    QRS Duration 74 ms    QT Interval 336 ms    QTC Calculation(Bazett) 424 ms    P Axis 87 degrees    R Axis 83 degrees    T Axis  84 degrees    QRS Count 16 beats    Q Onset 226 ms    P Onset 165 ms    P Offset 224 ms    T Offset 394 ms    QTC Fredericia 393 ms   Sars-CoV-2 PCR   Result Value Ref Range    Coronavirus 2019, PCR Not Detected Not Detected   Serial Troponin, 2 Hour ([facility])   Result Value Ref Range    Troponin T, High Sensitivity 18 (HH) <=14 ng/L   Blood Culture    Specimen: Peripheral Venipuncture; Blood culture   Result Value Ref Range    Blood Culture Loaded on Instrument - Culture in progress    Blood Culture    Specimen: Peripheral Venipuncture; Blood culture   Result Value Ref Range    Blood Culture Loaded on Instrument - Culture in progress    Serial Troponin, 6 Hour ([facility])   Result Value Ref Range    Troponin T, High Sensitivity 19 (HH) <=14 ng/L   CBC and Auto Differential   Result Value Ref Range    WBC 7.7 4.4 - 11.3 x10*3/uL    nRBC 0.0 0.0 - 0.0 /100 WBCs    RBC 2.83 (L) 4.00 - 5.20 x10*6/uL    Hemoglobin 9.1 (L) 12.0 - 16.0 g/dL    Hematocrit 28.9 (L) 36.0 - 46.0 %     (H) 80 - 100 fL    MCH 32.2 26.0 - 34.0 pg    MCHC 31.5 (L) 32.0 - 36.0 g/dL    RDW 14.6 (H) 11.5 - 14.5 %    Platelets 406 150 - 450 x10*3/uL    Neutrophils % 76.3 40.0 - 80.0 %    Immature Granulocytes %, Automated 1.3 (H) 0.0 - 0.9 %    Lymphocytes % 13.9 13.0 - 44.0 %    Monocytes % 6.1 2.0 - 10.0 %    Eosinophils % 2.1 0.0 - 6.0 %    Basophils % 0.3 0.0 - 2.0 %    Neutrophils Absolute 5.86 1.20 - 7.70 x10*3/uL    Immature Granulocytes Absolute, Automated 0.10 0.00 - 0.70 x10*3/uL    Lymphocytes Absolute 1.07 (L) 1.20 - 4.80 x10*3/uL    Monocytes Absolute 0.47 0.10 - 1.00 x10*3/uL    Eosinophils Absolute 0.16 0.00 - 0.70 x10*3/uL    Basophils Absolute 0.02 0.00 - 0.10 x10*3/uL   Comprehensive metabolic panel   Result Value Ref Range    Glucose 107 (H) 65 - 99 mg/dL    Sodium 141 133 - 145 mmol/L    Potassium 4.2 3.4 - 5.1 mmol/L    Chloride 110 (H) 97 - 107 mmol/L    Bicarbonate 21 (L) 24 - 31 mmol/L    Urea Nitrogen 25 8 - 25 mg/dL     Creatinine 0.70 0.40 - 1.60 mg/dL    eGFR >90 >60 mL/min/1.73m*2    Calcium 8.1 (L) 8.5 - 10.4 mg/dL    Albumin 3.1 (L) 3.5 - 5.0 g/dL    Alkaline Phosphatase 102 35 - 125 U/L    Total Protein 6.4 5.9 - 7.9 g/dL    AST 25 5 - 40 U/L    Bilirubin, Total <0.2 0.1 - 1.2 mg/dL    ALT 10 5 - 40 U/L    Anion Gap 10 <=19 mmol/L   TSH with reflex to Free T4 if abnormal   Result Value Ref Range    Thyroid Stimulating Hormone 0.60 0.27 - 4.20 mIU/L   Urinalysis with Reflex Culture and Microscopic   Result Value Ref Range    Color, Urine Light-Yellow Light-Yellow, Yellow, Dark-Yellow    Appearance, Urine Clear Clear    Specific Gravity, Urine 1.024 1.005 - 1.035    pH, Urine 6.0 5.0, 5.5, 6.0, 6.5, 7.0, 7.5, 8.0    Protein, Urine 70 (1+) (A) NEGATIVE, 10 (TRACE), 20 (TRACE) mg/dL    Glucose, Urine Normal Normal mg/dL    Blood, Urine NEGATIVE NEGATIVE    Ketones, Urine 10 (1+) (A) NEGATIVE mg/dL    Bilirubin, Urine NEGATIVE NEGATIVE    Urobilinogen, Urine Normal Normal mg/dL    Nitrite, Urine NEGATIVE NEGATIVE    Leukocyte Esterase, Urine NEGATIVE NEGATIVE   Urinalysis Microscopic   Result Value Ref Range    WBC, Urine 1-5 1-5, NONE /HPF    RBC, Urine 1-2 NONE, 1-2, 3-5 /HPF    Squamous Epithelial Cells, Urine 1-9 (SPARSE) Reference range not established. /HPF    Hyaline Casts, Urine 1+ (A) NONE /LPF     Assessment/Plan   Pneumonia  -Continue IV Abx  -Follow all cultures  -Pulmonology to see    Atrial Fibrillation  -Cardiology follows  -Cardizem gtt started  -S/p IV Metoprolol x1  -Start Eliquis, d/w Dr. Conroy    Right lung nodule  -Pulmonology to see  -Will need follow-up PET/CT. Will defer to pulm    DVT Prophylaxis  -Eliquis    Dispo  PT/OT to evaluate  Assessment & Plan  COPD (chronic obstructive pulmonary disease) (Multi)    Pneumonia of left lung due to infectious organism, unspecified part of lung    ISABELA (generalized anxiety disorder)    Mood disorder (CMS-HCC)    Essential tremor    History of hypertension    Iron  deficiency anemia    Pulmonary nodule    Subarachnoid hemorrhage (Multi)    Subdural hematoma (Multi)    Pleural effusion        Esha Carlisle, APRN-CNP

## 2024-08-15 NOTE — H&P
History Of Present Illness      Kamila Jones is a 68 y.o. female presenting with Generalized Weakness.      Pt brought in by ems for weakness and not feeling well. Pt states she walks with a walker at home and has not been able to get up and walk today. Pt is on home O2 due to copd. Pt denies N/V/D or fevers. Pt states she just feels weak       Patient presents with weakness for the last 5 days.  She reports that she has not felt very well.  She has had some lower extremity weakness as well as shaking.  She did have some episodes of vomiting but has not vomited for several days.  She has had no appetite and has not eaten anything for several days.  She reports that she could not get out of bed yesterday with which was okay because she did not need to go to the bathroom at all yesterday.  She urinated today but only a very small amount.  She normally uses 3 L/min by nasal cannula.  She has had slight cough.  She denies any abdominal pain.       Patient's vital signs upon arrival noted for Tmax 36.6, pulse rate 104, respiratory rate 20, /83.  Patient was saturating 97% on supplemental oxygen via nasal cannula.  The patient has chronic respiratory failure and wears 3 L of supplemental oxygen by nasal cannula baseline.      Patient CBC with differential is noted for a normal WBC count of 9.4.  Patient's H&H was noted to be 9.8/31.5.  Her platelet count was noted to be 434.  Coronavirus testing was negative.  Blood chemistry was noted for a low bicarbonate of 20.  The BUN was elevated at 32.  Surprisingly her proBNP was elevated 2653 and her first troponin was 24 followed by 18.  Per ED physician she appeared to be on the dry side despite the previous findings.  Chest x-ray was obtained and read as left basilar opacity, likely atelectasis versus airspace disease.  The patient underwent a CT brain without contrast in the ED which is negative for any acute intracranial normality.  Patient underwent a CT chest  without contrast.  Was noted for the following:      IMPRESSION:    1.  Consolidative airspace opacity with small amount of pleural fluid  is present in the left lower lobe, most consistent with pneumonia.  Repeat imaging to document resolution after appropriate clinical  intervention is recommended.  2. A 1.9 x 1.1 cm spiculated nodule is present in the right lower  lobe, is slightly increased in size from 1.7 x 0.9 cm in May of 2023.  Further assessment with PET-CT is recommended.  3. Moderate to severe coronary artery calcifications.      Blood cultures were obtained in the ED.  Urinalysis is still pending.         Past Medical History      Past Medical History:   Diagnosis Date    Alcohol dependence, in remission (Multi)     History of alcohol dependence    Arthritis     COPD (chronic obstructive pulmonary disease) (Multi)     Dyspnea     Edentulous     History of blood transfusion     1/2024 lower GI bleed    no reaction    RAMON on CPAP     with O2 3L n/c    Oxygen deficiency     uses home O2 3L n/c continuously    Personal history of other venous thrombosis and embolism     History of deep venous thrombosis right leg    Personal history of suicidal behavior     History of suicide attempt    Uses roller walker          Surgical History        Past Surgical History:   Procedure Laterality Date    ANKLE FRACTURE SURGERY      MR HEAD ANGIO WO IV CONTRAST  10/24/2017    MR HEAD ANGIO WO IV CONTRAST [facility]    MR NECK ANGIO WO IV CONTRAST  10/24/2017    MR NECK ANGIO WO IV CONTRAST [facility]    OTHER SURGICAL HISTORY Right 06/15/2017    Treatment Of Ankle Fracture    TONSILLECTOMY  06/15/2017    Tonsillectomy          Social History    She reports that she has been smoking cigarettes. She started smoking about 54 years ago. She has a 13.5 pack-year smoking history. She has never been exposed to tobacco smoke. She has never used smokeless tobacco. She reports current alcohol use. She reports  that she does not use drugs.      Family History      Family History   Problem Relation Name Age of Onset    Accidental death Mother      Stroke Father      Other (cerbral hemmorrhage) Father      Cancer Sister      Lung cancer Sister      Colon cancer Brother      Cancer Brother      Lung disease Brother      Heart attack Brother            Allergies      Propranolol hcl        Review of Systems      14-point ROS otherwise negative, as per HPI/Interval History.    General: No change in weight. Yes weakenss. No Fevers/Chills/Night Sweats   Skin: No skin/hair/nail changes. No rashes or sores.  Head:  No trauma. No Headache/nasuea/vomitting.   Eyes: No visual changes. No tearing. No itching.   Ears: No hearing loss. No tinnitus. No vertigo. No discharge.  Nose, Sinuses: No rhinorrhea, No nasal congestion. No epistaxis.  Mouth, Throat, Neck: No bleeding gums, hoarseness, sore throat or swollen neck  Cardiac: No palpitations. No ALFORD. No PND. No Orthopnea.   Respiratory: No Shortness of Breath. No wheezing. No cough. No hemoptysis.   GI: No nausea/vomiting. No indigestion. No diarrhea. No constipation.   Extremities: No numbness or tingling. No paresthesias.   Urinary: No change in urinary frequency. No change in hesitancy. No hematuria. No incontinence.       Physical Exam        Constitutional:  Pleasant  Eyes: PERRL, EOMI,   ENMT: mucous membranes moist. Wearing supplemental oxygen via nasal cannula.   Head/Neck: Neck supple, No JVD,   Respiratory/Thorax: Diminished breath sounds Left Lung Base.  Cardiovascular: Regular, rate and rhythm, no murmurs  Gastrointestinal: Soft, non-distended, +BS.  Musculoskeletal: ROM intact, no joint swelling, normal strength  Extremities: peripheral pulses intact; no edema  Neurological: Alert and Oriented x 3; no focal deficits; gross motor and sensation intact; CN II-XII intact. No asterixis.  Psychological: Appropriate mood and behavior  Skin: No lesions, No rashes.         Last  "Recorded Vitals  Blood pressure (!) 155/91, pulse 100, temperature 36.6 °C (97.9 °F), temperature source Temporal, resp. rate 18, height 1.676 m (5' 6\"), weight 59 kg (130 lb), SpO2 97%.    Relevant Results    Lab Results   Component Value Date    WBC 9.4 08/14/2024    HGB 9.8 (L) 08/14/2024    HCT 31.5 (L) 08/14/2024     (H) 08/14/2024     08/14/2024       Lab Results   Component Value Date    GLUCOSE 91 08/14/2024    CALCIUM 8.9 08/14/2024     08/14/2024    K 4.7 08/14/2024    CO2 20 (L) 08/14/2024     08/14/2024    BUN 32 (H) 08/14/2024    CREATININE 1.30 08/14/2024       No results found for: \"HGBA1C\"      CT head wo IV contrast    Result Date: 8/14/2024  Interpreted By:  Adam Coleman, STUDY: CT HEAD WO IV CONTRAST;  8/14/2024 6:59 pm   INDICATION: Signs/Symptoms:weakness.   COMPARISON: CT head 05/19/2019   ACCESSION NUMBER(S): [id number]   ORDERING CLINICIAN: RETA PAULINO   TECHNIQUE: Noncontrast axial CT images of head were obtained with coronal and sagittal reconstructed images.   FINDINGS: BRAIN PARENCHYMA: Gray-white differentiation is preserved. No mass-effect, midline shift or effacement of cerebral sulci. Patchy periventricular and subcortical white matter hypodensities, nonspecific but often seen in the setting of chronic microangiopathic change.   HEMORRHAGE: No acute intracranial hemorrhage.   VENTRICLES and EXTRA-AXIAL SPACES: The ventricles and sulci are within normal limits for brain volume. No abnormal extra-axial fluid collection.   ORBITS: The visualized orbits and globes are within normal limits.   EXTRACRANIAL SOFT TISSUES: Within normal limits.   PARANASAL SINUSES/MASTOIDS: The visualized paranasal sinuses and mastoid air cells are well aerated.   CALVARIUM: No depressed skull fracture.         1. No acute intracranial abnormality identified.       MACRO: None   Signed by: Adam Coleman 8/14/2024 7:20 PM Dictation workstation:   [id number]       Scheduled " medications     Continuous medications     PRN medications  PRN medications: ipratropium-albuteroL        Assessment/Plan   Principal Problem:    Pneumonia of left lung due to infectious organism, unspecified part of lung  Active Problems:    COPD (chronic obstructive pulmonary disease) (Multi)    Depression    ISABELA (generalized anxiety disorder)    Mood disorder (CMS-HCC)    Essential tremor    History of hypertension    Iron deficiency anemia    Pulmonary nodule    Subarachnoid hemorrhage (Multi)    Subdural hematoma (Multi)    Pleural effusion        Kamila Jones is a 68 y.o. Female presenting with Generalized Weakness.  Patient admitted for further evaluation and management.        Generalized Weakness and Malaise 2/2 Presumed Community Acquired Pneumonia       Admit to [facility]  CT w/o Contrast appreciated   CT Chest noted for Left Pneumonia and Pleural Effusion  PT/OT  Fall Precautions  Aspiration Precuations  Up w/ Assist   TSH level in AM  U/A obtained and evaluated   Gentle IVF  Sputum culture  Broad-spectrum IV antibiotics  Follow-up blood culture      Right Lung Spiculated Nodule     Pulmonary consultation requested to establish care  Defer repeat imaging to pulmonary service      Dehydration     Continue with gentle volume expansion with IVF      Elevated Troponin Level     Most probably demand physiology secondary to pneumonia      Essential tremor    Continue home primidone dose      Hypertension    Continue home amlodipine and lisinopril dose      Depression/Anxiety    Continue home SSRI medication      Tobacco Dependence    She states she smoked 2-3 weeks ago  She uses Varenicline  Cessation encouraged  Physiologic and physical aspects of tobacco addiction as well as strategies for quitting were discussed.       GI + DVT Prophylaxis    H2 blocker  Bilateral sequential compression devices          The patient has been Instructed by me upon admission to follow-up with their PCP upon discharge to obtain  repeat blood work/CXR and to maintain close medical follow-up for their current disease process.          This Dictation was Transcribed using a Nuance Dragon Voice Recognition System Device (with Compatible Computer + Software) and as such may contain Grammatical Errors and Unintentional Typing Misprints.        I spent 35 minutes in the professional and overall care of this patient.      Vladimir Conroy MD

## 2024-08-15 NOTE — PROGRESS NOTES
Kamila Jones is a 68 y.o. female on day 1 of admission presenting with Pneumonia of left lung due to infectious organism, unspecified part of lung.      Subjective   Admitted for pneumonia. Went into Afib with RVR this am. Now on Cardizem gtt.        Objective     Last Recorded Vitals  BP 85/67 (BP Location: Right arm, Patient Position: Lying)   Pulse (!) 159   Temp 36.9 °C (98.4 °F) (Oral)   Resp 21   Wt 59 kg (130 lb)   SpO2 99%   Intake/Output last 3 Shifts:    Intake/Output Summary (Last 24 hours) at 8/15/2024 1202  Last data filed at 8/15/2024 0752  Gross per 24 hour   Intake 617.5 ml   Output 600 ml   Net 17.5 ml       Admission Weight  Weight: 59 kg (130 lb) (08/14/24 1712)    Daily Weight  08/14/24 : 59 kg (130 lb)    Image Results  ECG 12 Lead  Normal sinus rhythm  Septal infarct , age undetermined  Abnormal ECG  No previous ECGs available      Physical Exam  HENT:      Head: Normocephalic and atraumatic.      Nose: Nose normal.      Mouth/Throat:      Mouth: Mucous membranes are moist.      Pharynx: Oropharynx is clear.   Eyes:      Extraocular Movements: Extraocular movements intact.      Pupils: Pupils are equal, round, and reactive to light.   Cardiovascular:      Rate and Rhythm: Normal rate. Rhythm irregular.      Pulses: Normal pulses.   Pulmonary:      Effort: Pulmonary effort is normal.      Breath sounds: Rales present.   Abdominal:      General: Abdomen is flat. Bowel sounds are normal.      Palpations: Abdomen is soft.   Musculoskeletal:         General: Normal range of motion.   Skin:     General: Skin is warm and dry.      Capillary Refill: Capillary refill takes less than 2 seconds.   Neurological:      General: No focal deficit present.      Mental Status: She is oriented to person, place, and time.   Psychiatric:         Mood and Affect: Mood normal.         Relevant Results  Results for orders placed or performed during the hospital encounter of 08/14/24 (from the past 24 hour(s))    CBC and Auto Differential   Result Value Ref Range    WBC 9.4 4.4 - 11.3 x10*3/uL    nRBC 0.0 0.0 - 0.0 /100 WBCs    RBC 3.06 (L) 4.00 - 5.20 x10*6/uL    Hemoglobin 9.8 (L) 12.0 - 16.0 g/dL    Hematocrit 31.5 (L) 36.0 - 46.0 %     (H) 80 - 100 fL    MCH 32.0 26.0 - 34.0 pg    MCHC 31.1 (L) 32.0 - 36.0 g/dL    RDW 14.9 (H) 11.5 - 14.5 %    Platelets 434 150 - 450 x10*3/uL    Neutrophils % 79.1 40.0 - 80.0 %    Immature Granulocytes %, Automated 0.7 0.0 - 0.9 %    Lymphocytes % 11.2 13.0 - 44.0 %    Monocytes % 7.7 2.0 - 10.0 %    Eosinophils % 1.1 0.0 - 6.0 %    Basophils % 0.2 0.0 - 2.0 %    Neutrophils Absolute 7.46 1.20 - 7.70 x10*3/uL    Immature Granulocytes Absolute, Automated 0.07 0.00 - 0.70 x10*3/uL    Lymphocytes Absolute 1.06 (L) 1.20 - 4.80 x10*3/uL    Monocytes Absolute 0.73 0.10 - 1.00 x10*3/uL    Eosinophils Absolute 0.10 0.00 - 0.70 x10*3/uL    Basophils Absolute 0.02 0.00 - 0.10 x10*3/uL   Comprehensive Metabolic Panel   Result Value Ref Range    Glucose 91 65 - 99 mg/dL    Sodium 139 133 - 145 mmol/L    Potassium 4.7 3.4 - 5.1 mmol/L    Chloride 106 97 - 107 mmol/L    Bicarbonate 20 (L) 24 - 31 mmol/L    Urea Nitrogen 32 (H) 8 - 25 mg/dL    Creatinine 1.30 0.40 - 1.60 mg/dL    eGFR 44 (L) >60 mL/min/1.73m*2    Calcium 8.9 8.5 - 10.4 mg/dL    Albumin 3.5 3.5 - 5.0 g/dL    Alkaline Phosphatase 121 35 - 125 U/L    Total Protein 7.4 5.9 - 7.9 g/dL    AST 18 5 - 40 U/L    Bilirubin, Total <0.2 0.1 - 1.2 mg/dL    ALT 7 5 - 40 U/L    Anion Gap 13 <=19 mmol/L   NT-PROBNP   Result Value Ref Range    PROBNP 2,653 (H) 0 - 353 pg/mL   Serial Troponin, Initial ([facility])   Result Value Ref Range    Troponin T, High Sensitivity 24 (HH) <=14 ng/L   ECG 12 Lead   Result Value Ref Range    Ventricular Rate 96 BPM    Atrial Rate 96 BPM    DE Interval 122 ms    QRS Duration 74 ms    QT Interval 336 ms    QTC Calculation(Bazett) 424 ms    P Axis 87 degrees    R Axis 83 degrees    T Axis 84 degrees    QRS Count 16  beats    Q Onset 226 ms    P Onset 165 ms    P Offset 224 ms    T Offset 394 ms    QTC Fredericia 393 ms   Sars-CoV-2 PCR   Result Value Ref Range    Coronavirus 2019, PCR Not Detected Not Detected   Serial Troponin, 2 Hour ([facility])   Result Value Ref Range    Troponin T, High Sensitivity 18 (HH) <=14 ng/L   Blood Culture    Specimen: Peripheral Venipuncture; Blood culture   Result Value Ref Range    Blood Culture Loaded on Instrument - Culture in progress    Blood Culture    Specimen: Peripheral Venipuncture; Blood culture   Result Value Ref Range    Blood Culture Loaded on Instrument - Culture in progress    Serial Troponin, 6 Hour ([facility])   Result Value Ref Range    Troponin T, High Sensitivity 19 (HH) <=14 ng/L   CBC and Auto Differential   Result Value Ref Range    WBC 7.7 4.4 - 11.3 x10*3/uL    nRBC 0.0 0.0 - 0.0 /100 WBCs    RBC 2.83 (L) 4.00 - 5.20 x10*6/uL    Hemoglobin 9.1 (L) 12.0 - 16.0 g/dL    Hematocrit 28.9 (L) 36.0 - 46.0 %     (H) 80 - 100 fL    MCH 32.2 26.0 - 34.0 pg    MCHC 31.5 (L) 32.0 - 36.0 g/dL    RDW 14.6 (H) 11.5 - 14.5 %    Platelets 406 150 - 450 x10*3/uL    Neutrophils % 76.3 40.0 - 80.0 %    Immature Granulocytes %, Automated 1.3 (H) 0.0 - 0.9 %    Lymphocytes % 13.9 13.0 - 44.0 %    Monocytes % 6.1 2.0 - 10.0 %    Eosinophils % 2.1 0.0 - 6.0 %    Basophils % 0.3 0.0 - 2.0 %    Neutrophils Absolute 5.86 1.20 - 7.70 x10*3/uL    Immature Granulocytes Absolute, Automated 0.10 0.00 - 0.70 x10*3/uL    Lymphocytes Absolute 1.07 (L) 1.20 - 4.80 x10*3/uL    Monocytes Absolute 0.47 0.10 - 1.00 x10*3/uL    Eosinophils Absolute 0.16 0.00 - 0.70 x10*3/uL    Basophils Absolute 0.02 0.00 - 0.10 x10*3/uL   Comprehensive metabolic panel   Result Value Ref Range    Glucose 107 (H) 65 - 99 mg/dL    Sodium 141 133 - 145 mmol/L    Potassium 4.2 3.4 - 5.1 mmol/L    Chloride 110 (H) 97 - 107 mmol/L    Bicarbonate 21 (L) 24 - 31 mmol/L    Urea Nitrogen 25 8 - 25 mg/dL    Creatinine 0.70 0.40 - 1.60 mg/dL     eGFR >90 >60 mL/min/1.73m*2    Calcium 8.1 (L) 8.5 - 10.4 mg/dL    Albumin 3.1 (L) 3.5 - 5.0 g/dL    Alkaline Phosphatase 102 35 - 125 U/L    Total Protein 6.4 5.9 - 7.9 g/dL    AST 25 5 - 40 U/L    Bilirubin, Total <0.2 0.1 - 1.2 mg/dL    ALT 10 5 - 40 U/L    Anion Gap 10 <=19 mmol/L   TSH with reflex to Free T4 if abnormal   Result Value Ref Range    Thyroid Stimulating Hormone 0.60 0.27 - 4.20 mIU/L   Urinalysis with Reflex Culture and Microscopic   Result Value Ref Range    Color, Urine Light-Yellow Light-Yellow, Yellow, Dark-Yellow    Appearance, Urine Clear Clear    Specific Gravity, Urine 1.024 1.005 - 1.035    pH, Urine 6.0 5.0, 5.5, 6.0, 6.5, 7.0, 7.5, 8.0    Protein, Urine 70 (1+) (A) NEGATIVE, 10 (TRACE), 20 (TRACE) mg/dL    Glucose, Urine Normal Normal mg/dL    Blood, Urine NEGATIVE NEGATIVE    Ketones, Urine 10 (1+) (A) NEGATIVE mg/dL    Bilirubin, Urine NEGATIVE NEGATIVE    Urobilinogen, Urine Normal Normal mg/dL    Nitrite, Urine NEGATIVE NEGATIVE    Leukocyte Esterase, Urine NEGATIVE NEGATIVE   Urinalysis Microscopic   Result Value Ref Range    WBC, Urine 1-5 1-5, NONE /HPF    RBC, Urine 1-2 NONE, 1-2, 3-5 /HPF    Squamous Epithelial Cells, Urine 1-9 (SPARSE) Reference range not established. /HPF    Hyaline Casts, Urine 1+ (A) NONE /LPF     Assessment & Plan    Pneumonia  -Continue IV Abx  -Follow all cultures  -Pulmonology to see    Atrial Fibrillation  -Cardiology follows  -Cardizem gtt started  -S/p IV Metoprolol x1  -Start Eliquis, d/w Dr. Conroy    Right lung nodule  -Pulmonology to see  -Will need follow-up PET/CT. Will defer to pulm    DVT Prophylaxis  -Eliquis    Dispo  PT/OT to evaluate       Esha Carlisle, APRN-CNP

## 2024-08-15 NOTE — PROGRESS NOTES
Attempted to see patient ealier this date but was receiving a stat EKG, and then seen by mutliple providers. Patient subsequently transferred to ICU/SDU. Care Transitions will follow up with patient when appropriate.

## 2024-08-15 NOTE — PROGRESS NOTES
Physical Therapy Evaluation    Patient Name: Kamila Jones  MRN: [id number]  Today's Date: 8/15/2024   Time Calculation  Start Time: 0924  Stop Time: 0950  Time Calculation (min): 26 min    Pt found supine in bed and is agreeable to PT eval today. At rest in bed, denies pain but reports concern that if she moves she will have increased pain she attributes to her chronic hip OA. Pt is assisted supine to sitting at EOB. In this sitting position,  Pt begins to grab at chest, rub her sternum, and complain of chest pressure. Pt reports pain radiates chest/ L arm. Pt requesting to stand and get OOB, however PT terminates further activity, calls RN stat, and returns Pt supine in bed. PCA and RN enter room, complete EKG, -180s, and Pt found to be in Afib RVR.     Assessment/Plan   PT Assessment  PT Assessment Results: Decreased strength, Decreased endurance, Impaired balance, Decreased mobility, Decreased coordination, Decreased cognition, Impaired judgement, Decreased safety awareness, Decreased skin integrity, Pain  Rehab Prognosis: Good  Barriers to Discharge: 24/7 care, Assist x1 bed mobility, Pt feels like she needs LTC  Evaluation/Treatment Tolerance: Treatment limited secondary to medical complications (Comment)  Medical Staff Made Aware: Yes  End of Session Communication: Bedside nurse, PCT/NA/CTA, Care Coordinator  Assessment Comment: 68 year old female admits with weakness, presumed PNA, R lung nodule, dehydration, elevated Tp, essential tremor, and HTN. On eval, she demonstrates impaired safe mobility warranting skilled PT care. At DC, moderate intensity rehab is recommended.  End of Session Patient Position: Bed, 3 rail up, Alarm off, caregiver present (RN+PCA in room)  IP OR SWING BED PT PLAN  Inpatient or Swing Bed: Inpatient  PT Plan  Treatment/Interventions: Bed mobility, Transfer training, Gait training, Balance training, Strengthening, Endurance training, Range of motion, Therapeutic exercise,  Therapeutic activity, Home exercise program  PT Plan: Ongoing PT  PT Frequency: 4 times per week  PT Discharge Recommendations: Moderate intensity level of continued care  Equipment Recommended upon Discharge: Wheeled walker  PT Recommended Transfer Status: Assist x1, Assistive device  PT - OK to Discharge: Yes      Subjective   General Visit Information:  General  Reason for Referral: Impaired Mobility  Referred By: Dr. Conroy  Past Medical History Relevant to Rehab: anemia, anxiety, GERD, COPD, hypoxia, continuous chronic alcoholism  Family/Caregiver Present: No  Prior to Session Communication: Bedside nurse  Patient Position Received: Bed, 3 rail up, Alarm on  Preferred Learning Style: verbal  General Comment: 68 year old female admits with weakness, presumed PNA, R lung nodule, dehydration, elevated Tp, essential tremor, and HTN.  Home Living:  Home Living  Type of Home: House  Lives With: Adult children  Home Adaptive Equipment: Walker rolling or standard, Cane  Home Layout: One level  Home Access: Stairs to enter without rails  Entrance Stairs-Rails: None  Entrance Stairs-Number of Steps: 3  Bathroom Shower/Tub: Tub/shower unit  Bathroom Toilet: Handicapped height  Bathroom Equipment: Grab bars in shower, Shower chair with back  Home Living Comments: 3L NC baseline  Prior Level of Function:  Prior Function Per Pt/Caregiver Report  Level of [place]: Needs assistance with ADLs, Needs assistance with homemaking  Receives Help From: Family  ADL Assistance: Needs assistance  Homemaking Assistance: Needs assistance  Ambulatory Assistance:  (Pt reports typicaly using Rollator at home but has been functionally declining. She states she recently has started getting stuck in bed (unable to complete bed mobility by self) and getting stuck in various chairs around the house)  Prior Function Comments: Reports 3 falls/ last 3 months. Worried about falling again  Precautions:  Precautions  Medical Precautions: Fall  precautions    Objective   Pain:  Pain Assessment  Pain Assessment:  (Pt denied pain at rest but reports concern for chronic R hip OA pain if she attempts to move. Upon sitting to EOB, Pt is with sudden CP with radiation to L shoulder rated 5/10. RN immediately called--see comments)  Cognition:  Cognition  Overall Cognitive Status: Within Functional Limits  Orientation Level: Oriented X4  Cognition Comments: Pt seems very depressed and anxious throughout session. Tells me she feels she needs to go to LTC because she is a burden to her DTR at home. Tells me her doctor stopped giving her pain meds because she was drinking alcohol and taking pain meds at home to deal with her OA pain  Insight: Moderate    General Assessments:  Activity Tolerance  Endurance: Decreased tolerance for upright activites    Sensation  Light Touch: No apparent deficits (denies abn)    Strength  Strength Comments: Grossly 3+/5 BLEs. Minimal acceptance of resistance. Very weak  Functional Assessments:  Bed Mobility  Bed Mobility: Yes  Bed Mobility 1  Bed Mobility 1: Supine to sitting  Level of Assistance 1: Moderate assistance  Bed Mobility Comments 1: assist for trunk and BLEs. Cues on technique  Bed Mobility 2  Bed Mobility  2: Sitting to supine  Level of Assistance 2: Maximum assistance  Bed Mobility Comments 2: assist for trunk and BLEs. Cues on technique    Outcome Measures:  [facility] Basic Mobility  Turning from your back to your side while in a flat bed without using bedrails: A lot  Moving from lying on your back to sitting on the side of a flat bed without using bedrails: A lot  Moving to and from bed to chair (including a wheelchair): Total  Standing up from a chair using your arms (e.g. wheelchair or bedside chair): Total  To walk in hospital room: Total  Climbing 3-5 steps with railing: Total  Basic Mobility - Total Score: 8    Encounter Problems       Encounter Problems (Active)       Balance       Sitting Balance (Progressing)        Start:  08/15/24    Expected End:  08/29/24       Pt will demonstrate good sitting balance to promote safe engagement with out of bed activities           Standing Balance (Progressing)       Start:  08/15/24    Expected End:  08/29/24       Pt will demonstrate good static standing balance to promote safe participation with out of bed activity, transfers, and mobility              Mobility       Ambulation (Progressing)       Start:  08/15/24    Expected End:  08/29/24       Pt will ambulate 50' modified independent assist with walker to promote safe home mobility              PT Transfers       Supine to sit (Progressing)       Start:  08/15/24    Expected End:  08/29/24       Pt will transfer supine to sitting at edge of bed with independent assist to promote acute care out of bed activity           Sit to stand (Progressing)       Start:  08/15/24    Expected End:  08/29/24       Pt will transfer sit to standing position with modified independent assist and walker to promote safe out of bed activity           Bed to chair (Progressing)       Start:  08/15/24    Expected End:  08/29/24       Pt will transfer from sitting edge of bed to the chair with modified independent assist and walker to promote out of bed activity and reduce the risks of prolonged acute care bedrest              Pain - Adult          Safety       Safe Mobility Techniques (Progressing)       Start:  08/15/24    Expected End:  08/29/24       Pt will correctly identify and demonstrate safe mobility techniques to reduce their risks for falls during their acute care stay                   Education Documentation  Mobility Training, taught by José Miguel Munoz, PT at 8/15/2024 10:09 AM.  Learner: Patient  Readiness: Acceptance  Method: Explanation, Demonstration  Response: Needs Reinforcement  Comment: safe mobility techniques    Education Comments  No comments found.

## 2024-08-15 NOTE — SIGNIFICANT EVENT
Called to room for afib RVR and chest pain  Patient was admitted overnight for pneumonia and COPD    Subjective:  Patient in bed with shortness of breath however no more than what sh had prior to rhythm change. She does endorse  chest pressure, midsternal non radiating. Patient states she has seen Dr. Romano in the past and a recent nuclear stress test that was negative.     Objective:  Atrial Fibrillation RVR rate 120-190  Vitals 101/62 rate 167 resp 20 (vitals at 0752 36.9 107 158/89)  Assessment: Patient is alert and oriented, pale and thin. She is on 2 liters oxygen and diminished throught. Heart rate irregular and fast (150-170) .     Plan  I call Dr Conroy who recommends metoprolol push and cardizem drip, he will see patient. Patient moved to CCU for medications.

## 2024-08-15 NOTE — CARE PLAN
Problem: Pain - Adult  Goal: Verbalizes/displays adequate comfort level or baseline comfort level  Outcome: Progressing     Problem: Discharge Planning  Goal: Discharge to home or other facility with appropriate resources  Outcome: Progressing     Problem: Chronic Conditions and Co-morbidities  Goal: Patient's chronic conditions and co-morbidity symptoms are monitored and maintained or improved  Outcome: Progressing     Problem: Fall/Injury  Goal: Not fall by end of shift  Outcome: Progressing  Goal: Be free from injury by end of the shift  Outcome: Progressing  Goal: Verbalize understanding of personal risk factors for fall in the hospital  Outcome: Progressing  Goal: Verbalize understanding of risk factor reduction measures to prevent injury from fall in the home  Outcome: Progressing  Goal: Use assistive devices by end of the shift  Outcome: Progressing  Goal: Pace activities to prevent fatigue by end of the shift  Outcome: Progressing   The patient's goals for the shift include Rest    The clinical goals for the shift include Monitor vitals, monitor labs, safety, promote rest, improved breathing      08/15/24 at 12:51 AM - Crystal Manriquez RN

## 2024-08-15 NOTE — PROGRESS NOTES
Occupational Therapy    Evaluation    Patient Name: Kamila Jones  MRN: [id number]  Today's Date: 8/15/2024  Time Calculation  Start Time: 0739  Stop Time: 0756  Time Calculation (min): 17 min    Assessment  IP OT Assessment  OT Assessment: Patient is a 68 year old female admitted with PNA. She is presenting below baseline level with a decline in strength, balance, activity tolerance, and function, resulting in an incresaed need for assist with ADL tasks. Skilled OT to address the above deficits and modify as needed to increase patient's safety and independence with daily tasks.  Prognosis: Good  Barriers to Discharge: Other (Comment) (decreased activity tolerance, Min A txfers, high fall risk)  Evaluation/Treatment Tolerance: Patient limited by fatigue  End of Session Communication: Bedside nurse  End of Session Patient Position: Bed, 3 rail up, Alarm on  Plan:  Treatment Interventions: ADL retraining, Functional transfer training, UE strengthening/ROM, Endurance training, Patient/family training, Compensatory technique education  OT Frequency: 3 times per week  OT Discharge Recommendations: Moderate intensity level of continued care  Equipment Recommended upon Discharge: Wheeled walker  OT Recommended Transfer Status: Assist of 1  OT - OK to Discharge: Yes    Subjective   Current Problem:  1. Pneumonia of left lung due to infectious organism, unspecified part of lung        2. Weakness          General:  General  Reason for Referral: decline in ADLs; PNA  Referred By: Dr. Conroy  Past Medical History Relevant to Rehab: anemia, anxiety, GERD, COPD, hypoxia, continuous chronic alcoholism  Family/Caregiver Present: No  Prior to Session Communication: Bedside nurse  Patient Position Received: Bed, 3 rail up, Alarm off, caregiver present  Preferred Learning Style: verbal, visual  General Comment: Patient is a 68 year old female who presented to the ED with c/o weakness and SOB. Patient is admitted with PNA. She is  cleared by nursing for therapy. Patient in bed upon arrival and agreeable to participate  Precautions:  Medical Precautions: Fall precautions, Oxygen therapy device and L/min (3L O2 via NC)  Pain:  Pain Assessment  Pain Assessment: 0-10  0-10 (Numeric) Pain Score: 3  Pain Type: Acute pain  Pain Location: Chest    Objective   Cognition:  Orientation Level: Oriented X4  Cognition Comments: patient able to follow commands throughout. slightly impulsive. appears anxious throughout  Insight: Mild  Impulsive: Mildly     Home Living:  Type of Home: House  Lives With: Adult children (daughter and ALAINA)  Home Adaptive Equipment: Walker rolling or standard, Cane  Home Layout: One level  Home Access: Stairs to enter without rails  Entrance Stairs-Rails: None  Entrance Stairs-Number of Steps: 3  Bathroom Shower/Tub: Tub/shower unit  Bathroom Toilet: Handicapped height  Bathroom Equipment: Grab bars in shower, Shower chair with back  Home Living Comments: patient wears 3L O2 ATC at baseline   Prior Function:  Level of [place]: Needs assistance with ADLs, Needs assistance with homemaking  Receives Help From: Family (home health aide 2x week to assist with showers)  ADL Assistance: Needs assistance (assist 2x week with showers, independent toileting and dressing, does report increased difficulty)  Homemaking Assistance: Needs assistance (family manage)  Ambulatory Assistance: Independent (with rollator)  Prior Function Comments: patient reports 3 falls in the last 3 months  IADL History:  Homemaking Responsibilities: No  IADL Comments: family manages  ADL:  Eating Assistance: Stand by  Eating Deficit: Setup (seated, all items within reach)  Grooming Assistance: Minimal  Grooming Deficit: Setup, Steadying, Supervision/safety, Increased time to complete, Standing with assistive device (per clinical judgement)  Bathing Assistance: Moderate  Bathing Deficit: Setup, Supervision/safety, Increased time to complete , Buttocks, Right  lower leg including foot, Left lower leg including foot (per clinical judgement)  UE Dressing Assistance: Minimal  UE Dressing Deficit: Pull over head, Pull around back, Pull down in back (per clinical judgement)  LE Dressing Assistance: Moderate  LE Dressing Deficit: Setup, Steadying, Requires assistive device for steadying, Supervision/safety, Increased time to complete, Pull up over hips, Don/doff L sock, Don/doff R sock (per clinical judgement)  Toileting Assistance with Device: Moderate  Toileting Deficit: Steadying, Increased time to complete, Bedside commode, Clothing management up, Clothing management down, Perineal hygiene (posterior hygiene)  Activity Tolerance:  Endurance: Decreased tolerance for upright activites  Activity Tolerance Comments: fair- tolerance, fatigues easily  Bed Mobility/Transfers: Bed Mobility  Bed Mobility: Yes  Bed Mobility 1  Bed Mobility 1: Supine to sitting  Level of Assistance 1: Moderate assistance  Bed Mobility Comments 1: head of bed elevated, assist to move B LE, very effortful  Bed Mobility 2  Bed Mobility  2: Sitting to supine  Level of Assistance 2: Moderate assistance  Bed Mobility Comments 2: assist to raise B LE    Transfers  Transfer: Yes  Transfer 1  Transfer From 1: Bed to  Transfer to 1: Stand  Technique 1: Sit to stand  Transfer Device 1: Walker  Transfer Level of Assistance 1: Minimum assistance, Minimal verbal cues  Trials/Comments 1: cues for proper hand placement, x2 trials. patient also turns from standing EOB to BSC with Min A using 2WW  Transfers 2  Transfer From 2: Commode-standard to  Transfer to 2: Stand  Technique 2: Sit to stand  Transfer Device 2: Walker  Transfer Level of Assistance 2: Minimum assistance, Minimal verbal cues  Trials/Comments 2: cues for proper hand placement. patient then turns to be seated EOB with 2WW with Min A    Sitting Balance:  Static Sitting Balance  Static Sitting-Balance Support: Feet supported, Bilateral upper extremity  supported  Static Sitting-Level of Assistance: Close supervision  Standing Balance:  Static Standing Balance  Static Standing-Balance Support: Bilateral upper extremity supported  Static Standing-Level of Assistance: Contact guard    IADL's:   Homemaking Responsibilities: No  IADL Comments: family manages  Vision: Vision - Basic Assessment  Current Vision: Wears glasses only for reading  Sensation:  Sensation Comment: patient denies numbness/tingling  Strength:  Strength Comments: impaired L UE. R UE 3+/5 grossly  Coordination:  Movements are Fluid and Coordinated: No  Upper Body Coordination: tremulous   Hand Function:  Hand Function  Gross Grasp: Functional  Coordination: Functional  Extremities: RUE   RUE : Within Functional Limits and LUE   LUE: Exceptions to WFL (chronic decreased shoulder flextion)    Outcome Measures: [facility] Daily Activity  Putting on and taking off regular lower body clothing: A lot  Bathing (including washing, rinsing, drying): A lot  Putting on and taking off regular upper body clothing: A lot  Toileting, which includes using toilet, bedpan or urinal: A lot  Taking care of personal grooming such as brushing teeth: A little  Eating Meals: None  Daily Activity - Total Score: 15    Education Documentation  Body Mechanics, taught by Irasema Madden OT at 8/15/2024  8:42 AM.  Learner: Patient  Readiness: Acceptance  Method: Explanation, Demonstration  Response: Verbalizes Understanding, Needs Reinforcement    Precautions, taught by Irasema Madden OT at 8/15/2024  8:42 AM.  Learner: Patient  Readiness: Acceptance  Method: Explanation, Demonstration  Response: Verbalizes Understanding, Needs Reinforcement    ADL Training, taught by Irasema Madden OT at 8/15/2024  8:42 AM.  Learner: Patient  Readiness: Acceptance  Method: Explanation, Demonstration  Response: Verbalizes Understanding, Needs Reinforcement    Education Comments  No comments found.      Goals:   Encounter Problems       Encounter  Problems (Active)       OT Goals       ADLs (Progressing)       Start:  08/15/24    Expected End:  09/05/24       Patient will complete ADL tasks, with modified independence, using AE need in order to increase patient's safety and independence with self-care tasks.          Functional Transfers (Progressing)       Start:  08/15/24    Expected End:  09/05/24       Patient will complete functional transfers with modified independence, using least restrictive device, in order to increase patient's safety and independence with daily tasks.          B UE Strengthening (Progressing)       Start:  08/15/24    Expected End:  09/05/24       Patient will increase B UE strength to 4-/5 for functional transfers.          Functional Mobility (Progressing)       Start:  08/15/24    Expected End:  09/05/24       Patient will demonstrate the ability to complete item retrieval and functional mobility with supervision in order to increase safety and independence with daily tasks.          Activity Tolerance (Progressing)       Start:  08/15/24    Expected End:  09/05/24       Patient will demonstrate the ability to participate in functional activity at least >/= 20 minutes in order to increase patient's safety and independence with daily tasks.

## 2024-08-16 LAB
ANION GAP SERPL CALC-SCNC: 10 MMOL/L
BUN SERPL-MCNC: 22 MG/DL (ref 8–25)
CALCIUM SERPL-MCNC: 8.2 MG/DL (ref 8.5–10.4)
CHLORIDE SERPL-SCNC: 111 MMOL/L (ref 97–107)
CO2 SERPL-SCNC: 20 MMOL/L (ref 24–31)
CREAT SERPL-MCNC: 1 MG/DL (ref 0.4–1.6)
EGFRCR SERPLBLD CKD-EPI 2021: 61 ML/MIN/1.73M*2
ERYTHROCYTE [DISTWIDTH] IN BLOOD BY AUTOMATED COUNT: 15 % (ref 11.5–14.5)
GLUCOSE SERPL-MCNC: 123 MG/DL (ref 65–99)
HCT VFR BLD AUTO: 28.3 % (ref 36–46)
HGB BLD-MCNC: 8.8 G/DL (ref 12–16)
LEGIONELLA AG UR QL: NEGATIVE
MAGNESIUM SERPL-MCNC: 2.1 MG/DL (ref 1.6–3.1)
MCH RBC QN AUTO: 32 PG (ref 26–34)
MCHC RBC AUTO-ENTMCNC: 31.1 G/DL (ref 32–36)
MCV RBC AUTO: 103 FL (ref 80–100)
NRBC BLD-RTO: 0 /100 WBCS (ref 0–0)
PLATELET # BLD AUTO: 364 X10*3/UL (ref 150–450)
POTASSIUM SERPL-SCNC: 3.9 MMOL/L (ref 3.4–5.1)
PROCALCITONIN SERPL-MCNC: 3.77 NG/ML
RBC # BLD AUTO: 2.75 X10*6/UL (ref 4–5.2)
S PNEUM AG UR QL: NEGATIVE
SODIUM SERPL-SCNC: 141 MMOL/L (ref 133–145)
WBC # BLD AUTO: 6.3 X10*3/UL (ref 4.4–11.3)

## 2024-08-16 PROCEDURE — 94762 N-INVAS EAR/PLS OXIMTRY CONT: CPT

## 2024-08-16 PROCEDURE — 2500000004 HC RX 250 GENERAL PHARMACY W/ HCPCS (ALT 636 FOR OP/ED): Performed by: INTERNAL MEDICINE

## 2024-08-16 PROCEDURE — 83735 ASSAY OF MAGNESIUM: CPT | Performed by: NURSE PRACTITIONER

## 2024-08-16 PROCEDURE — 87081 CULTURE SCREEN ONLY: CPT | Mod: TRILAB | Performed by: NURSE PRACTITIONER

## 2024-08-16 PROCEDURE — 80048 BASIC METABOLIC PNL TOTAL CA: CPT | Performed by: NURSE PRACTITIONER

## 2024-08-16 PROCEDURE — 2500000001 HC RX 250 WO HCPCS SELF ADMINISTERED DRUGS (ALT 637 FOR MEDICARE OP): Performed by: INTERNAL MEDICINE

## 2024-08-16 PROCEDURE — 97110 THERAPEUTIC EXERCISES: CPT | Mod: GP,CQ

## 2024-08-16 PROCEDURE — 97535 SELF CARE MNGMENT TRAINING: CPT | Mod: GO,CO

## 2024-08-16 PROCEDURE — 2500000005 HC RX 250 GENERAL PHARMACY W/O HCPCS: Performed by: INTERNAL MEDICINE

## 2024-08-16 PROCEDURE — 99232 SBSQ HOSP IP/OBS MODERATE 35: CPT | Performed by: NURSE PRACTITIONER

## 2024-08-16 PROCEDURE — 84145 PROCALCITONIN (PCT): CPT | Mod: TRILAB,WESLAB | Performed by: NURSE PRACTITIONER

## 2024-08-16 PROCEDURE — 99233 SBSQ HOSP IP/OBS HIGH 50: CPT | Performed by: INTERNAL MEDICINE

## 2024-08-16 PROCEDURE — 97116 GAIT TRAINING THERAPY: CPT | Mod: GP,CQ

## 2024-08-16 PROCEDURE — 2060000001 HC INTERMEDIATE ICU ROOM DAILY

## 2024-08-16 PROCEDURE — 85027 COMPLETE CBC AUTOMATED: CPT | Performed by: NURSE PRACTITIONER

## 2024-08-16 PROCEDURE — 97110 THERAPEUTIC EXERCISES: CPT | Mod: GO,CO

## 2024-08-16 PROCEDURE — 2500000002 HC RX 250 W HCPCS SELF ADMINISTERED DRUGS (ALT 637 FOR MEDICARE OP, ALT 636 FOR OP/ED): Performed by: INTERNAL MEDICINE

## 2024-08-16 PROCEDURE — 2500000001 HC RX 250 WO HCPCS SELF ADMINISTERED DRUGS (ALT 637 FOR MEDICARE OP): Performed by: NURSE PRACTITIONER

## 2024-08-16 PROCEDURE — 36415 COLL VENOUS BLD VENIPUNCTURE: CPT | Performed by: NURSE PRACTITIONER

## 2024-08-16 ASSESSMENT — PAIN SCALES - GENERAL
PAINLEVEL_OUTOF10: 7
PAINLEVEL_OUTOF10: 6
PAINLEVEL_OUTOF10: 8

## 2024-08-16 ASSESSMENT — PAIN - FUNCTIONAL ASSESSMENT
PAIN_FUNCTIONAL_ASSESSMENT: 0-10

## 2024-08-16 ASSESSMENT — COGNITIVE AND FUNCTIONAL STATUS - GENERAL
MOBILITY SCORE: 8
TURNING FROM BACK TO SIDE WHILE IN FLAT BAD: A LOT
MOVING TO AND FROM BED TO CHAIR: A LOT
DAILY ACTIVITIY SCORE: 15
MOVING FROM LYING ON BACK TO SITTING ON SIDE OF FLAT BED WITH BEDRAILS: A LOT
WALKING IN HOSPITAL ROOM: A LOT
TURNING FROM BACK TO SIDE WHILE IN FLAT BAD: A LOT
PERSONAL GROOMING: A LITTLE
MOVING FROM LYING ON BACK TO SITTING ON SIDE OF FLAT BED WITH BEDRAILS: A LOT
MOBILITY SCORE: 14
MOVING TO AND FROM BED TO CHAIR: TOTAL
TURNING FROM BACK TO SIDE WHILE IN FLAT BAD: A LOT
MOBILITY SCORE: 12
DRESSING REGULAR LOWER BODY CLOTHING: A LOT
HELP NEEDED FOR BATHING: A LOT
STANDING UP FROM CHAIR USING ARMS: A LITTLE
TOILETING: A LOT
STANDING UP FROM CHAIR USING ARMS: A LITTLE
WALKING IN HOSPITAL ROOM: TOTAL
DRESSING REGULAR UPPER BODY CLOTHING: A LITTLE
TOILETING: A LOT
DRESSING REGULAR UPPER BODY CLOTHING: A LOT
DRESSING REGULAR LOWER BODY CLOTHING: A LOT
STANDING UP FROM CHAIR USING ARMS: TOTAL
DAILY ACTIVITIY SCORE: 16
DRESSING REGULAR LOWER BODY CLOTHING: A LOT
PERSONAL GROOMING: A LITTLE
HELP NEEDED FOR BATHING: A LOT
MOVING TO AND FROM BED TO CHAIR: A LOT
CLIMB 3 TO 5 STEPS WITH RAILING: TOTAL
CLIMB 3 TO 5 STEPS WITH RAILING: TOTAL
HELP NEEDED FOR BATHING: A LOT
TOILETING: A LOT
WALKING IN HOSPITAL ROOM: A LOT
CLIMB 3 TO 5 STEPS WITH RAILING: TOTAL
DAILY ACTIVITIY SCORE: 17
DRESSING REGULAR UPPER BODY CLOTHING: A LITTLE

## 2024-08-16 ASSESSMENT — PAIN DESCRIPTION - DESCRIPTORS
DESCRIPTORS: ACHING;SHARP
DESCRIPTORS: ACHING

## 2024-08-16 ASSESSMENT — PAIN DESCRIPTION - LOCATION: LOCATION: HEAD

## 2024-08-16 ASSESSMENT — PAIN SCALES - WONG BAKER: WONGBAKER_NUMERICALRESPONSE: HURTS WHOLE LOT

## 2024-08-16 NOTE — CARE PLAN
The patient's goals for the shift include increase activity.    The clinical goals for the shift include Patient will maintain hemodynamic stability.

## 2024-08-16 NOTE — PROGRESS NOTES
08/16/24 1144   Discharge Planning   Expected Discharge Disposition SNF   Does the patient need discharge transport arranged? Yes   RoundTrip coordination needed? Yes   Has discharge transport been arranged? No     Met with patient and spoke with her regarding SNF. Patient having guilt regarding care needs, and level of assistance from family. Patient agreeable to SNF, and may consider long-term care depending on progress. Patient requesting [facility], [facility], [facility], and [facility]. Referrals made to all.     12:20 PM  [facility] and [facility] declined due to no bed availability. [facility] is reviewing. Awaiting response from [facility].     1:21 PM No response from [facility] at this time. Left a message for their admissions requesting they advise regarding pre-cert. Contact information provided.     1:29 PM Received a return call from [facility]; they have received the referral and are reviewing now. [facility]o expressed concerns regarding patient smoking.     Patient updated to the above and states she is not a smoker and is okay at a non-smoking facility. [facility] updated to the same.     2:01 PM  [facility] declined stating no bed availability. [facility] did accept pending bed availability. Per MIL[facility] Balbuena okay to start pre-cert. [facility] notified; awaiting bed confirmation.    2:52 PM  [facility] confirmed they can accept and are aware we will submit for pre-cert.  pre-cert team made aware and requested they submit for authorization. [facility] did inquire when to expect patient pending insurance approval. Reached out to MILES Balbuena regarding the same.     3:38 PM  Insurance approved the patient for admission to [facility]. Authorization is good starting today through 8/20. [facility] made aware of approval and plan is for discharge tomorrow. [facility] updated to the same.

## 2024-08-16 NOTE — PROGRESS NOTES
Kamila Jones is a 68 y.o. female on day 2 of admission presenting with Pneumonia of left lung due to infectious organism, unspecified part of lung.      Subjective   Converted to NSR during the night. Cardizem gtt dc'd. Breathing has improved. She is on her baseline oxygen 2 liters.      Objective     Last Recorded Vitals  /86 (BP Location: Left arm, Patient Position: Lying)   Pulse 70   Temp 36.5 °C (97.7 °F) (Oral)   Resp 17   Wt 60.7 kg (133 lb 13.1 oz)   SpO2 96%   Intake/Output last 3 Shifts:    Intake/Output Summary (Last 24 hours) at 8/16/2024 0932  Last data filed at 8/16/2024 0110  Gross per 24 hour   Intake 1202.42 ml   Output 150 ml   Net 1052.42 ml       Admission Weight  Weight: 59 kg (130 lb) (08/14/24 1712)    Daily Weight  08/16/24 : 60.7 kg (133 lb 13.1 oz)    Image Results  ECG 12 Lead  Normal sinus rhythm  Slow r-wave progression , age undetermined  Abnormal ECG  No previous ECGs available  Confirmed by Seamus Purcell ([id number]) on 8/15/2024 10:36:33 PM  Electrocardiogram, 12-lead PRN ACS symptoms  Atrial fibrillation with rapid ventricular response  Marked ST abnormality, possible inferolateral subendocardial injury  Abnormal ECG  When compared with ECG of 15-AUG-2024 09:51, (unconfirmed)  No significant change was found  Confirmed by Seamus Purcell ([id number]) on 8/15/2024 10:25:41 PM      Physical Exam  HENT:      Head: Normocephalic and atraumatic.      Nose: Nose normal.      Mouth/Throat:      Mouth: Mucous membranes are moist.      Pharynx: Oropharynx is clear.   Eyes:      Extraocular Movements: Extraocular movements intact.      Pupils: Pupils are equal, round, and reactive to light.   Cardiovascular:      Rate and Rhythm: Normal rate. Rhythm irregular.      Pulses: Normal pulses.   Pulmonary:      Effort: Pulmonary effort is normal.      Breath sounds: Rales present.      Comments: Faint bibasilar rales  Abdominal:      General: Abdomen is flat. Bowel sounds are normal.       Palpations: Abdomen is soft.   Musculoskeletal:         General: Normal range of motion.   Skin:     General: Skin is warm and dry.      Capillary Refill: Capillary refill takes less than 2 seconds.   Neurological:      General: No focal deficit present.      Mental Status: She is oriented to person, place, and time.   Psychiatric:         Mood and Affect: Mood normal.         Relevant Results  Results for orders placed or performed during the hospital encounter of 08/14/24 (from the past 24 hour(s))   Electrocardiogram, 12-lead PRN ACS symptoms   Result Value Ref Range    Ventricular Rate 177 BPM    Atrial Rate 153 BPM    QRS Duration 80 ms    QT Interval 268 ms    QTC Calculation(Bazett) 460 ms    R Axis 83 degrees    T Axis 263 degrees    QRS Count 29 beats    Q Onset 223 ms    T Offset 357 ms    QTC Fredericia 384 ms   CBC   Result Value Ref Range    WBC 6.3 4.4 - 11.3 x10*3/uL    nRBC 0.0 0.0 - 0.0 /100 WBCs    RBC 2.75 (L) 4.00 - 5.20 x10*6/uL    Hemoglobin 8.8 (L) 12.0 - 16.0 g/dL    Hematocrit 28.3 (L) 36.0 - 46.0 %     (H) 80 - 100 fL    MCH 32.0 26.0 - 34.0 pg    MCHC 31.1 (L) 32.0 - 36.0 g/dL    RDW 15.0 (H) 11.5 - 14.5 %    Platelets 364 150 - 450 x10*3/uL   Basic metabolic panel   Result Value Ref Range    Glucose 123 (H) 65 - 99 mg/dL    Sodium 141 133 - 145 mmol/L    Potassium 3.9 3.4 - 5.1 mmol/L    Chloride 111 (H) 97 - 107 mmol/L    Bicarbonate 20 (L) 24 - 31 mmol/L    Urea Nitrogen 22 8 - 25 mg/dL    Creatinine 1.00 0.40 - 1.60 mg/dL    eGFR 61 >60 mL/min/1.73m*2    Calcium 8.2 (L) 8.5 - 10.4 mg/dL    Anion Gap 10 <=19 mmol/L   Magnesium   Result Value Ref Range    Magnesium 2.10 1.60 - 3.10 mg/dL     Assessment & Plan    Pneumonia  -Continue IV Abx  -Follow all cultures  -Pulmonology follows    Atrial Fibrillation  -Cardiology follows  -Pt converted to NSR during the night. Cardizem gtt dc'd  -Continue BB  -Continue Eliquis    Right lung nodule  -Pulmonology follows  -Will need outpatient  PET/CT    DVT Prophylaxis  -Eliquis    Dispo  To SNF once arrangements made    Esha Carlisle, MOISES-CNP

## 2024-08-16 NOTE — CARE PLAN
The patient's goals for the shift include Rest    The clinical goals for the shift include consult pulmonology, PET/CT    Over the shift, the patient did not make progress toward the following goals. Barriers to progression include none. Recommendations to address these barriers include none.

## 2024-08-16 NOTE — PROGRESS NOTES
Physical Therapy    Physical Therapy Treatment    Patient Name: Kamila Jones  MRN: [id number]  Today's Date: 8/16/2024  Time Calculation  Start Time: 1400  Stop Time: 1428  Time Calculation (min): 28 min    Assessment/Plan   PT Assessment  Barriers to Discharge: Mod A with bed mobility Min A with transfers/ gait 24/7 for safety Patient fatiques easily  End of Session Communication: Bedside nurse  Assessment Comment: Patient fatiques easily Pain back/ R hip Mod A with bed mobility Min A with transfers/ gait  End of Session Patient Position: Up in chair, Alarm on (OT in room to work with patient)     PT Plan  Treatment/Interventions: Bed mobility, Transfer training, Gait training, Therapeutic exercise, Therapeutic activity, Positioning, Endurance training  PT Plan: Ongoing PT  PT Frequency: 4 times per week  PT Discharge Recommendations: Moderate intensity level of continued care  Equipment Recommended upon Discharge: Wheeled walker  PT Recommended Transfer Status: Assist x1, Assistive device (Fatiques easily)  PT - OK to Discharge: Yes      General Visit Information:   PT  Visit  PT Received On: 08/16/24  General  Reason for Referral: Impaired Mobility  Referred By: Dr. Conroy  Past Medical History Relevant to Rehab: anemia, anxiety, GERD, COPD, hypoxia, continuous chronic alcoholism  Co-Treatment: OT  Co-Treatment Reason: Partial co treat due poor activity tolerance  Prior to Session Communication: Bedside nurse  Patient Position Received: Bed, 3 rail up, Alarm on  Preferred Learning Style: verbal  General Comment: Cleared by nurse to see. Patient agreeable to therapy    Subjective   Precautions:  Precautions  Medical Precautions: Fall precautions  Vital Signs:       Objective   Pain:  Pain Assessment  Pain Assessment: 0-10  0-10 (Numeric) Pain Score: 7  Pain Type: Chronic pain  Pain Location: Hip  Pain Orientation: Right  Pain Descriptors: Aching, Sharp  Pain Frequency: Constant/continuous  Pain Onset:  Ongoing  Multiple Pain Sites: Two  Pain 2  Pain Score 2: 7  Pain Type 2: Chronic pain  Pain Location 2: Back  Cognition:  Cognition  Overall Cognitive Status: Within Functional Limits  Orientation Level: Oriented X4  Cognition Comments: Slightly depressed but willing to participate in therapy  Insight: Moderate  Impulsive: Mildly  Coordination:     Postural Control:     Extremity/Trunk Assessments:    Activity Tolerance:     Treatments:  Therapeutic Exercise  Therapeutic Exercise Performed: Yes  Therapeutic Exercise Activity 1: B LE supine ther ex: ankle pumps,quad/gluteal sets,heelslides, SAQs, hip abduction/adduction x 15  Rest periods between exercises. iveth noted    Bed Mobility  Bed Mobility: Yes  Bed Mobility 1  Bed Mobility 1: Supine to sitting  Level of Assistance 1: Moderate assistance  Bed Mobility Comments 1: Assist for trunk up and slight assist for B LEs over the EOB EOB x 3 minutes with close supervision    Ambulation/Gait Training  Ambulation/Gait Training Performed: Yes  Ambulation/Gait Training 1  Surface 1: Level tile  Device 1: Rolling walker  Assistance 1: Minimum assistance  Quality of Gait 1: Inconsistent stride length, Decreased step length  Comments/Distance (ft) 1: 10' x 2 to/from bathroom with RW with Min A with small slow shuffled step to gait pattern C/o back and R hip pain Seated rest between walks Contious 02 on 3 liters Iveth noted  Transfers  Transfer: Yes  Transfer 1  Transfer From 1: Bed to  Transfer to 1: Stand  Technique 1: Sit to stand  Transfer Device 1: Walker  Transfer Level of Assistance 1: Minimal verbal cues, Minimum assistance  Trials/Comments 1: x 1 trial  Transfers 2  Transfer From 2: Stand to  Transfer to 2: Sit, Toilet  Technique 2: Stand to sit, Sit to stand  Transfer Device 2: Walker  Transfer Level of Assistance 2: Minimum assistance  Trials/Comments 2: x 2 trials STS with Min A with cues fror safe hand placement Iveth noted  Transfers 3  Transfer From 3:  Stand to  Transfer to 3: Sit, Chair with arms  Technique 3: Stand to sit  Transfer Device 3: Walker  Transfer Level of Assistance 3: Minimum assistance  Trials/Comments 3: x 1 trial STS to bedside chair with Gustavo Cornelius noted    Outcome Measures:  [facility] Basic Mobility  Turning from your back to your side while in a flat bed without using bedrails: A lot  Moving from lying on your back to sitting on the side of a flat bed without using bedrails: A lot  Moving to and from bed to chair (including a wheelchair): A lot  Standing up from a chair using your arms (e.g. wheelchair or bedside chair): A little  To walk in hospital room: A lot  Climbing 3-5 steps with railing: Total  Basic Mobility - Total Score: 12    Education Documentation  Body Mechanics, taught by Mireya Chery PTA at 8/16/2024  2:58 PM.  Learner: Patient  Readiness: Acceptance  Method: Explanation  Response: Verbalizes Understanding, Needs Reinforcement    Home Exercise Program, taught by Mireya Chery PTA at 8/16/2024  2:58 PM.  Learner: Patient  Readiness: Acceptance  Method: Explanation  Response: Verbalizes Understanding, Needs Reinforcement    Mobility Training, taught by Mireya Chery PTA at 8/16/2024  2:58 PM.  Learner: Patient  Readiness: Acceptance  Method: Explanation  Response: Verbalizes Understanding, Needs Reinforcement    Education Comments  No comments found.        OP EDUCATION:       Encounter Problems       Encounter Problems (Active)       Balance       Sitting Balance (Progressing)       Start:  08/15/24    Expected End:  08/29/24       Pt will demonstrate good sitting balance to promote safe engagement with out of bed activities           Standing Balance (Progressing)       Start:  08/15/24    Expected End:  08/29/24       Pt will demonstrate good static standing balance to promote safe participation with out of bed activity, transfers, and mobility              Mobility       Ambulation (Progressing)       Start:   08/15/24    Expected End:  08/29/24       Pt will ambulate 50' modified independent assist with walker to promote safe home mobility              PT Transfers       Supine to sit (Progressing)       Start:  08/15/24    Expected End:  08/29/24       Pt will transfer supine to sitting at edge of bed with independent assist to promote acute care out of bed activity           Sit to stand (Progressing)       Start:  08/15/24    Expected End:  08/29/24       Pt will transfer sit to standing position with modified independent assist and walker to promote safe out of bed activity           Bed to chair (Progressing)       Start:  08/15/24    Expected End:  08/29/24       Pt will transfer from sitting edge of bed to the chair with modified independent assist and walker to promote out of bed activity and reduce the risks of prolonged acute care bedrest              Pain - Adult          Safety       Safe Mobility Techniques (Progressing)       Start:  08/15/24    Expected End:  08/29/24       Pt will correctly identify and demonstrate safe mobility techniques to reduce their risks for falls during their acute care stay

## 2024-08-16 NOTE — PROGRESS NOTES
"Kamila Jones is a 68 y.o. female on day 2 of admission presenting seen in follow-up for chronic hypoxic respiratory failure, pneumonia, COPD, spiculated lung nodule  Subjective   On 3 L nasal cannula oxygen; O2 sats 90%.  No respiratory complaints.  Endorses bilateral lower extremity foot pain.       Objective     Physical Exam  Vitals and nursing note reviewed.   Constitutional:       Appearance: Normal appearance.   HENT:      Head: Normocephalic and atraumatic.      Nose: Nose normal.      Mouth/Throat:      Mouth: Mucous membranes are moist.   Eyes:      Extraocular Movements: Extraocular movements intact.      Conjunctiva/sclera: Conjunctivae normal.      Pupils: Pupils are equal, round, and reactive to light.   Cardiovascular:      Rate and Rhythm: Normal rate and regular rhythm.      Pulses: Normal pulses.      Heart sounds: Normal heart sounds.   Pulmonary:      Effort: Pulmonary effort is normal.      Breath sounds: Normal breath sounds.   Abdominal:      General: Bowel sounds are normal.      Palpations: Abdomen is soft.   Musculoskeletal:         General: Normal range of motion.   Skin:     General: Skin is warm and dry.      Capillary Refill: Capillary refill takes less than 2 seconds.   Neurological:      General: No focal deficit present.      Mental Status: She is alert and oriented to person, place, and time.   Psychiatric:         Mood and Affect: Mood normal.         Behavior: Behavior normal.         Last Recorded Vitals  Blood pressure 137/67, pulse 68, temperature 36.4 °C (97.5 °F), temperature source Oral, resp. rate 16, height 1.676 m (5' 6\"), weight 60.7 kg (133 lb 13.1 oz), SpO2 98%.  Intake/Output last 3 Shifts:  I/O last 3 completed shifts:  In: 1819.9 (30 mL/kg) [P.O.:520; I.V.:1099.9 (18.1 mL/kg); IV Piggyback:200]  Out: 750 (12.4 mL/kg) [Urine:750 (0.3 mL/kg/hr)]  Weight: 60.7 kg       Intake/Output Summary (Last 24 hours) at 8/16/2024 1259  Last data filed at 8/16/2024 0900  Gross " per 24 hour   Intake 1282.42 ml   Output 150 ml   Net 1132.42 ml      apixaban, 5 mg, oral, q12h  bisoprolol, 5 mg, oral, BID  cefTRIAXone, 1 g, intravenous, q24h  doxycycline, 100 mg, intravenous, q12h  escitalopram, 10 mg, oral, Daily  famotidine, 20 mg, oral, Daily  mometasone, 1 puff, inhalation, Daily  oxygen, , inhalation, q8h  primidone, 200 mg, oral, BID  varenicline, 0.5 mg, oral, Daily       PRN medications: acetaminophen **OR** acetaminophen **OR** acetaminophen, benzocaine-menthol, dextromethorphan-guaifenesin, guaiFENesin, ipratropium-albuteroL, metoprolol, ondansetron ODT **OR** ondansetron, polyethylene glycol    [Held by provider] dilTIAZem, 5-15 mg/hr, Last Rate: Stopped (08/16/24 0110)       Results for orders placed or performed during the hospital encounter of 08/14/24 (from the past 24 hour(s))   CBC   Result Value Ref Range    WBC 6.3 4.4 - 11.3 x10*3/uL    nRBC 0.0 0.0 - 0.0 /100 WBCs    RBC 2.75 (L) 4.00 - 5.20 x10*6/uL    Hemoglobin 8.8 (L) 12.0 - 16.0 g/dL    Hematocrit 28.3 (L) 36.0 - 46.0 %     (H) 80 - 100 fL    MCH 32.0 26.0 - 34.0 pg    MCHC 31.1 (L) 32.0 - 36.0 g/dL    RDW 15.0 (H) 11.5 - 14.5 %    Platelets 364 150 - 450 x10*3/uL   Basic metabolic panel   Result Value Ref Range    Glucose 123 (H) 65 - 99 mg/dL    Sodium 141 133 - 145 mmol/L    Potassium 3.9 3.4 - 5.1 mmol/L    Chloride 111 (H) 97 - 107 mmol/L    Bicarbonate 20 (L) 24 - 31 mmol/L    Urea Nitrogen 22 8 - 25 mg/dL    Creatinine 1.00 0.40 - 1.60 mg/dL    eGFR 61 >60 mL/min/1.73m*2    Calcium 8.2 (L) 8.5 - 10.4 mg/dL    Anion Gap 10 <=19 mmol/L   Magnesium   Result Value Ref Range    Magnesium 2.10 1.60 - 3.10 mg/dL      CT chest wo IV contrast  Result Date: 8/14/2024  Interpreted By:  Dionna Mishra, STUDY: CT CHEST WO IV CONTRAST;  8/14/2024 8:11 pm   INDICATION: Signs/Symptoms:eval FINDINGS: LUNGS AND AIRWAYS: The trachea and central airways are patent. No endobronchial lesion is present, although small  amount of mucus/secretions are noted in the trachea.   New consolidative airspace opacity with small amount of pleural fluid is present in the left lower lobe.   No consolidation or pleural effusion is present in the right lung.   There is a spiculated nodule measuring 1.9 x 1.1 cm in size present in the right lower lobe (series 4, image 237), increased from 1.5 x 0.4 cm on previous imaging.   MEDIASTINUM AND HAIR, LOWER NECK AND AXILLA: The visualized thyroid gland is within normal limits.   No evidence of thoracic lymphadenopathy by CT criteria.   Esophagus appears within normal limits as seen.   HEART AND VESSELS: The thoracic aorta is of normal course and caliber without vascular calcifications.   Main pulmonary artery and its branches are normal in caliber.   Moderate coronary artery calcifications are seen. The study is not optimized for evaluation of coronary arteries.   The cardiac chambers are not enlarged.   No evidence of pericardial effusion.   UPPER ABDOMEN: The visualized subdiaphragmatic structures demonstrate no acute findings.   CHEST WALL AND OSSEOUS STRUCTURES: There are no suspicious osseous lesions. Multilevel degenerative changes are present   1.  Consolidative airspace opacity with small amount of pleural fluid is present in the left lower lobe, most consistent with pneumonia. Repeat imaging to document resolution after appropriate clinical intervention is recommended. 2. A 1.9 x 1.1 cm spiculated nodule is present in the right lower lobe, is slightly increased in size from 1.7 x 0.9 cm in May of 2023. Further assessment with PET-CT is recommended. 3. Moderate to severe coronary artery calcifications.   MACRO: Incidental Finding:  A solid non-calcified pulmonary nodule measuring greater than 8 mm.  (**-YCF-**)   Instructions:  Consider short term follow up non-contrast Chest CT at 3 months, PET/CT or tissue sampling. Note, PET/CT may be limited in low grade malignancy, nodules <1 cm size or  those located close to diaphragm. (Leobardo Nolan et al., Guidelines for management of incidental pulmonary nodules detected on CT images: From the Fleischner Society 2017, Radiology. 2017    CT head wo IV contrast  Result Date: 8/14/2024  FINDINGS: BRAIN PARENCHYMA: Gray-white differentiation is preserved. No mass-effect, midline shift or effacement of cerebral sulci. Patchy periventricular and subcortical white matter hypodensities, nonspecific but often seen in the setting of chronic microangiopathic change.   HEMORRHAGE: No acute intracranial hemorrhage.   VENTRICLES and EXTRA-AXIAL SPACES: The ventricles and sulci are within normal limits for brain volume. No abnormal extra-axial fluid collection.   ORBITS: The visualized orbits and globes are within normal limits.   EXTRACRANIAL SOFT TISSUES: Within normal limits.   PARANASAL SINUSES/MASTOIDS: The visualized paranasal sinuses and mastoid air cells are well aerated.   CALVARIUM: No depressed skull fracture.   1. No acute intracranial abnormality identified.           XR chest 1 view  Result Date: 8/14/2024   FINDINGS: Cardiomediastinal contours are within normal limits. Streaky left basilar opacity. No large pleural effusion or discernible pneumothorax. 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 Severe degenerative changes of the glenohumeral joints bilaterally. 1. Left basilar opacity, likely atelectasis versus airspace disease.         Assessment/Plan  Chronic hypoxic respiratory failure  Oxygen at baseline  Continuous pulse oximetry/pulmonary hygiene  Incentive spirometry/pulmonary hygiene    Pneumonia  Continue IV antibiotics   Follow-up cultures  Sputum if able  Procalcitonin pending     COPD  Continue as needed IBD/ICS  FEV 1 when optimized     Atrial fibrillation with RVE  Cardizem gtt discontinued, patient in NSR  Eliquis  Cardiology follow-up     Spiculated lung nodule right lower lobe measuring 1.9 x 1.1 cm in size increased from 1.5 x 0.4  cm on previous imaging  PET-CT scan as outpatient     Obstructive sleep apnea  CPAP nightly-patient agrees to wear hospital unit     Tobacco Dependence  Smoking cessation    Discharge planning-SNF recommended, facilities in review  Will see as needed this weekend    MOISES Nunez-CNP

## 2024-08-16 NOTE — PROGRESS NOTES
Occupational Therapy    OT Treatment    Patient Name: Kamila Jones  MRN: [id number]  Today's Date: 8/16/2024  Time Calculation  Start Time: 1408  Stop Time: 1446  Time Calculation (min): 38 min        Assessment:  OT Assessment: Pt limited with self care and mobility due to pain and fatigue, limited sitting tolerance of 5 minutes after self care tasks, needing to get back to bed.  Prognosis: Good  Barriers to Discharge:  (poor activity tolerance, fall risk)  Evaluation/Treatment Tolerance: Patient limited by fatigue, Patient limited by pain  End of Session Patient Position: Bed, 3 rail up, Alarm on (all needs in reach.)  OT Assessment Results: Decreased ADL status, Decreased upper extremity strength, Decreased safe judgment during ADL, Decreased endurance, Decreased functional mobility, Decreased gross motor control  Prognosis: Good  Barriers to Discharge:  (poor activity tolerance, fall risk)  Evaluation/Treatment Tolerance: Patient limited by fatigue, Patient limited by pain  Strengths: Support of extended family/friends  Barriers to Participation: Comorbidities  Plan:  Treatment Interventions: ADL retraining, Functional transfer training, Endurance training, UE strengthening/ROM  OT Frequency: 3 times per week  OT Discharge Recommendations: Moderate intensity level of continued care  Equipment Recommended upon Discharge: Wheeled walker  OT Recommended Transfer Status: Assist of 1  OT - OK to Discharge: Yes  Treatment Interventions: ADL retraining, Functional transfer training, Endurance training, UE strengthening/ROM    Subjective      General:  General  Referred By: Dr. Conroy  Past Medical History Relevant to Rehab: anemia, anxiety, GERD, COPD, hypoxia, continuous chronic alcoholism  Co-Treatment: PT  Co-Treatment Reason:  (d/t poor activity tolerance)  Prior to Session Communication: Bedside nurse  Patient Position Received:  (Pt sitting edge of bed with PT staff)  General Comment: Agreeable to  treatment.  Precautions:  Medical Precautions: Fall precautions, Oxygen therapy device and L/min (3L nc)       Pain:  Pain Assessment  Pain Assessment: 0-10  0-10 (Numeric) Pain Score: 7  Pain Location: Back (and Lt hip)  Pain Interventions:  (notified nurse of pain level)    Objective    Cognition:  Cognition  Overall Cognitive Status: Within Functional Limits  Orientation Level: Oriented X4  Insight: Moderate  Impulsive: Mildly     Activities of Daily Living: Grooming  Grooming Level of Assistance: Close supervision  Grooming Where Assessed: Standing sinkside  Grooming Comments: oral care, heavy use of forearms on countertop         Toileting  Toileting Level of Assistance: Moderate assistance  Where Assessed: Toilet  Toileting Comments: assist for thoroughness for hygiene after BM, assist for brief up  Functional Standing Tolerance:  Time: ~ 2minutes  Activity: self care  Functional Standing Tolerance Comments: poor standing tolerance, limited with pain and fatigue  Bed Mobility/Transfers: Bed Mobility 1  Bed Mobility 1: Sitting to supine  Level of Assistance 1: Minimum assistance  Bed Mobility Comments 1: for BLE    Transfer 1  Transfer From 1: Bed to  Transfer to 1: Toilet  Technique 1: To right  Transfer Device 1: Walker  Transfer Level of Assistance 1: Minimum assistance  Trials/Comments 1: cues for use of grab bars  Transfers 2  Transfer From 2: Toilet to  Transfer to 2: Chair with arms  Technique 2: To left  Transfer Device 2: Walker  Transfer Level of Assistance 2: Minimum assistance  Trials/Comments 2: cues for use of grab bars  Transfers 3  Transfer From 3: Chair with arms to  Transfer to 3: Bed  Technique 3: To right  Transfer Device 3: Walker  Transfer Level of Assistance 3: Minimum assistance  Trials/Comments 3: cues for safe hand placement sit to stand        Therapy/Activity: Therapeutic Exercise  Therapeutic Exercise Activity 1: scap protraction/retraction  Therapeutic Exercise Activity 2: shoulder  flexion  Therapeutic Exercise Activity 3: shoulder IR/ER                  RUE only, unable to tolerate LUE therex due to pain    Outcome Measures:[facility] Daily Activity  Putting on and taking off regular lower body clothing: A lot  Bathing (including washing, rinsing, drying): A lot  Putting on and taking off regular upper body clothing: A little  Toileting, which includes using toilet, bedpan or urinal: A lot  Taking care of personal grooming such as brushing teeth: None  Eating Meals: None  Daily Activity - Total Score: 17        Education Documentation  Home Exercise Program, taught by RYANNE Nelson at 8/16/2024  3:09 PM.  Learner: Patient  Readiness: Acceptance  Method: Explanation  Response: Verbalizes Understanding, Demonstrated Understanding, Needs Reinforcement    ADL Training, taught by RYANNE Nelson at 8/16/2024  3:09 PM.  Learner: Patient  Readiness: Acceptance  Method: Explanation  Response: Verbalizes Understanding, Demonstrated Understanding, Needs Reinforcement    Education Comments  No comments found.    IP EDUCATION:  Education  Individual(s) Educated: Patient  Education Provided: Diagnosis & Precautions, Fall precautons, Risk and benefits of OT discussed with patient or other, POC discussed and agreed upon  Patient Response to Education: Patient/Caregiver Verbalized Understanding of Information, Patient/Caregiver Performed Return Demonstration of Exercises/Activities    Goals:  Encounter Problems       Encounter Problems (Active)       OT Goals       ADLs (Progressing)       Start:  08/15/24    Expected End:  09/05/24       Patient will complete ADL tasks, with modified independence, using AE need in order to increase patient's safety and independence with self-care tasks.          Functional Transfers (Progressing)       Start:  08/15/24    Expected End:  09/05/24       Patient will complete functional transfers with modified independence, using least restrictive device, in order to increase  patient's safety and independence with daily tasks.          B UE Strengthening (Progressing)       Start:  08/15/24    Expected End:  09/05/24       Patient will increase B UE strength to 4-/5 for functional transfers.          Functional Mobility (Progressing)       Start:  08/15/24    Expected End:  09/05/24       Patient will demonstrate the ability to complete item retrieval and functional mobility with supervision in order to increase safety and independence with daily tasks.          Activity Tolerance (Progressing)       Start:  08/15/24    Expected End:  09/05/24       Patient will demonstrate the ability to participate in functional activity at least >/= 20 minutes in order to increase patient's safety and independence with daily tasks.

## 2024-08-17 VITALS
TEMPERATURE: 98.1 F | HEIGHT: 66 IN | SYSTOLIC BLOOD PRESSURE: 137 MMHG | BODY MASS INDEX: 21.51 KG/M2 | DIASTOLIC BLOOD PRESSURE: 75 MMHG | OXYGEN SATURATION: 98 % | HEART RATE: 67 BPM | RESPIRATION RATE: 17 BRPM | WEIGHT: 133.82 LBS

## 2024-08-17 LAB
ANION GAP SERPL CALC-SCNC: 8 MMOL/L
BUN SERPL-MCNC: 15 MG/DL (ref 8–25)
CALCIUM SERPL-MCNC: 8.5 MG/DL (ref 8.5–10.4)
CHLORIDE SERPL-SCNC: 109 MMOL/L (ref 97–107)
CO2 SERPL-SCNC: 23 MMOL/L (ref 24–31)
CREAT SERPL-MCNC: 0.6 MG/DL (ref 0.4–1.6)
EGFRCR SERPLBLD CKD-EPI 2021: >90 ML/MIN/1.73M*2
ERYTHROCYTE [DISTWIDTH] IN BLOOD BY AUTOMATED COUNT: 14.7 % (ref 11.5–14.5)
GLUCOSE SERPL-MCNC: 109 MG/DL (ref 65–99)
HCT VFR BLD AUTO: 27.5 % (ref 36–46)
HGB BLD-MCNC: 8.7 G/DL (ref 12–16)
MCH RBC QN AUTO: 32.3 PG (ref 26–34)
MCHC RBC AUTO-ENTMCNC: 31.6 G/DL (ref 32–36)
MCV RBC AUTO: 102 FL (ref 80–100)
NRBC BLD-RTO: 0 /100 WBCS (ref 0–0)
PLATELET # BLD AUTO: 399 X10*3/UL (ref 150–450)
POTASSIUM SERPL-SCNC: 4 MMOL/L (ref 3.4–5.1)
RBC # BLD AUTO: 2.69 X10*6/UL (ref 4–5.2)
SODIUM SERPL-SCNC: 140 MMOL/L (ref 133–145)
WBC # BLD AUTO: 7.1 X10*3/UL (ref 4.4–11.3)

## 2024-08-17 PROCEDURE — 2500000001 HC RX 250 WO HCPCS SELF ADMINISTERED DRUGS (ALT 637 FOR MEDICARE OP): Performed by: NURSE PRACTITIONER

## 2024-08-17 PROCEDURE — 2500000005 HC RX 250 GENERAL PHARMACY W/O HCPCS: Performed by: INTERNAL MEDICINE

## 2024-08-17 PROCEDURE — 85027 COMPLETE CBC AUTOMATED: CPT | Performed by: NURSE PRACTITIONER

## 2024-08-17 PROCEDURE — 2500000001 HC RX 250 WO HCPCS SELF ADMINISTERED DRUGS (ALT 637 FOR MEDICARE OP): Performed by: INTERNAL MEDICINE

## 2024-08-17 PROCEDURE — 2500000004 HC RX 250 GENERAL PHARMACY W/ HCPCS (ALT 636 FOR OP/ED): Performed by: INTERNAL MEDICINE

## 2024-08-17 PROCEDURE — 36415 COLL VENOUS BLD VENIPUNCTURE: CPT | Performed by: NURSE PRACTITIONER

## 2024-08-17 PROCEDURE — 94762 N-INVAS EAR/PLS OXIMTRY CONT: CPT

## 2024-08-17 PROCEDURE — 99239 HOSP IP/OBS DSCHRG MGMT >30: CPT | Performed by: NURSE PRACTITIONER

## 2024-08-17 PROCEDURE — 80048 BASIC METABOLIC PNL TOTAL CA: CPT | Performed by: NURSE PRACTITIONER

## 2024-08-17 PROCEDURE — 2500000002 HC RX 250 W HCPCS SELF ADMINISTERED DRUGS (ALT 637 FOR MEDICARE OP, ALT 636 FOR OP/ED): Performed by: INTERNAL MEDICINE

## 2024-08-17 PROCEDURE — 2500000005 HC RX 250 GENERAL PHARMACY W/O HCPCS: Performed by: NURSE PRACTITIONER

## 2024-08-17 RX ORDER — GABAPENTIN 100 MG/1
100 CAPSULE ORAL 3 TIMES DAILY
Start: 2024-08-17

## 2024-08-17 RX ORDER — DOXYCYCLINE 100 MG/1
100 CAPSULE ORAL 2 TIMES DAILY
Start: 2024-08-17 | End: 2024-08-22

## 2024-08-17 RX ORDER — CEFUROXIME AXETIL 500 MG/1
500 TABLET ORAL 2 TIMES DAILY
Start: 2024-08-17 | End: 2024-08-22

## 2024-08-17 RX ORDER — FAMOTIDINE 20 MG/1
20 TABLET, FILM COATED ORAL DAILY
Start: 2024-08-17

## 2024-08-17 RX ORDER — VARENICLINE TARTRATE 0.5 MG/1
0.5 TABLET, FILM COATED ORAL DAILY
Start: 2024-08-17

## 2024-08-17 RX ORDER — ACETAMINOPHEN 325 MG/1
650 TABLET ORAL EVERY 4 HOURS PRN
Start: 2024-08-17

## 2024-08-17 RX ORDER — PRIMIDONE 50 MG/1
200 TABLET ORAL 2 TIMES DAILY
Start: 2024-08-17

## 2024-08-17 RX ORDER — BISOPROLOL FUMARATE 5 MG/1
5 TABLET, FILM COATED ORAL 2 TIMES DAILY
Start: 2024-08-17

## 2024-08-17 ASSESSMENT — COGNITIVE AND FUNCTIONAL STATUS - GENERAL
WALKING IN HOSPITAL ROOM: A LOT
DAILY ACTIVITIY SCORE: 16
HELP NEEDED FOR BATHING: A LOT
MOBILITY SCORE: 14
TOILETING: A LOT
MOVING TO AND FROM BED TO CHAIR: A LOT
DRESSING REGULAR LOWER BODY CLOTHING: A LOT
DRESSING REGULAR UPPER BODY CLOTHING: A LITTLE
CLIMB 3 TO 5 STEPS WITH RAILING: TOTAL
STANDING UP FROM CHAIR USING ARMS: A LITTLE
PERSONAL GROOMING: A LITTLE
TURNING FROM BACK TO SIDE WHILE IN FLAT BAD: A LOT

## 2024-08-17 ASSESSMENT — PAIN DESCRIPTION - LOCATION: LOCATION: HEAD

## 2024-08-17 ASSESSMENT — PAIN DESCRIPTION - DESCRIPTORS
DESCRIPTORS: ACHING
DESCRIPTORS: ACHING

## 2024-08-17 ASSESSMENT — PAIN SCALES - GENERAL
PAINLEVEL_OUTOF10: 6
PAINLEVEL_OUTOF10: 3
PAINLEVEL_OUTOF10: 6

## 2024-08-17 ASSESSMENT — PAIN - FUNCTIONAL ASSESSMENT
PAIN_FUNCTIONAL_ASSESSMENT: 0-10
PAIN_FUNCTIONAL_ASSESSMENT: 0-10

## 2024-08-17 NOTE — CARE PLAN
Problem: Fall/Injury  Goal: Not fall by end of shift  Outcome: Progressing  Goal: Be free from injury by end of the shift  Outcome: Progressing     Problem: Skin  Goal: Be free of skin breakdown  Outcome: Progressing   The patient's goals for the shift include Rest    The clinical goals for the shift include VSS, improved respiratory effert

## 2024-08-17 NOTE — PROGRESS NOTES
Subjective Data:  Patient resting comfortable underlying pneumonia left lower lobe.  Currently stable at the moment.  Patient was on IV Cardizem now back in a sinus rhythm 60.  Overnight Events:    None  Objective   Last Recorded Vitals  /70 (BP Location: Left arm, Patient Position: Lying)   Pulse 64   Temp 36.1 °C (97 °F) (Temporal)   Resp 19   Wt 60.7 kg (133 lb 13.1 oz)   SpO2 97%     Intake/Output Summary (Last 24 hours) at 8/16/2024 2138  Last data filed at 8/16/2024 1151  Gross per 24 hour   Intake 487.92 ml   Output --   Net 487.92 ml     Physical Exam:  HEENT: Normocephalic/atraumatic pupils equal react light  Neck exam mild JVD, no bruit  Lung exam decreased breath sound bilaterally and few crackles at the bases  Cardiac exam is regular rhythm S1-S2, soft systolic murmur heard.   Abdomen soft nontender, nondistended  Extremities no clubbing, cyanosis, trace edema  Neuro exam grossly intact.  Image Results  ECG 12 Lead  Normal sinus rhythm  Slow r-wave progression , age undetermined  Abnormal ECG  No previous ECGs available  Confirmed by Seamus Purcell ([id number]) on 8/15/2024 10:36:33 PM  Electrocardiogram, 12-lead PRN ACS symptoms  Atrial fibrillation with rapid ventricular response  Marked ST abnormality, possible inferolateral subendocardial injury  Abnormal ECG  When compared with ECG of 15-AUG-2024 09:51, (unconfirmed)  No significant change was found  Confirmed by Seamus Purcell ([id number]) on 8/15/2024 10:25:41 PM    Last Labs:  CBC - 8/16/2024:  4:38 AM  6.3 8.8 364    28.3      CMP - 8/16/2024:  4:38 AM  8.2 6.4 25 --- <0.2   _ 3.1 10 102      PTT - No results in last year.  1.1   11.4 _     Inpatient Medications:  Scheduled medications   Medication Dose Route Frequency    apixaban  5 mg oral q12h    bisoprolol  5 mg oral BID    cefTRIAXone  1 g intravenous q24h    doxycycline  100 mg intravenous q12h    escitalopram  10 mg oral Daily    famotidine  20 mg oral Daily    mometasone  1 puff inhalation  Daily    oxygen   inhalation q8h    [START ON 8/17/2024] oxygen   inhalation Nightly at 0300    primidone  200 mg oral BID    varenicline  0.5 mg oral Daily     Principal Problem:    Pneumonia of left lung due to infectious organism, unspecified part of lung  Active Problems:    COPD (chronic obstructive pulmonary disease) (Multi)    ISABELA (generalized anxiety disorder)    History of hypertension    Iron deficiency anemia    Pulmonary nodule    Subarachnoid hemorrhage (Multi)    Subdural hematoma (Multi)    Pleural effusion    Assessment/Plan   68-year-old female patient with a history of COPD, anxiety disorders now with paroxysmal atrial fibrillation.  Patient with underlying left lower lobe pneumonia hypoxia contributing for underlying A-fib RVR.  1.  A-fib RVR: Patient back in a sinus rhythm.  Now continue bisoprolol 5 mg tablet p.o. twice a day for better rate control.  Also started on Eliquis 5 mg tablet p.o. twice daily for underlying atrial fibrillation.  2.  Pneumonia: Continue current IV antibiotic including Rocephin as well as doxycycline IV.  3.  Acute respiratory failure: Patient  Pneumonia hypoxia continue oxygen as well as a bronchodilators.    Pt. care time is spent includes review of diagnostic tests, labs, radiographs, EKGs, old echoes, cardiac work-up and coordination of care. Assessment, impression and plans are reflected in the note above as well as the orders.    Code Status:  Full Code  I spent 50 minutes in the professional and overall care of this patient.  Dat Conroy MD

## 2024-08-17 NOTE — DISCHARGE SUMMARY
Discharge Diagnosis  Pneumonia of left lung due to infectious organism, unspecified part of lung    Issues Requiring Follow-Up      Discharge Meds     Your medication list        START taking these medications        Instructions Last Dose Given Next Dose Due   apixaban 5 mg tablet  Commonly known as: Eliquis      Take 1 tablet (5 mg) by mouth every 12 hours.       bisoprolol 5 mg tablet  Commonly known as: Zebeta      Take 1 tablet (5 mg) by mouth 2 times a day.       cefuroxime 500 mg tablet  Commonly known as: Ceftin      Take 1 tablet (500 mg) by mouth 2 times a day for 5 days.       doxycycline 100 mg capsule  Commonly known as: Vibramycin      Take 1 capsule (100 mg) by mouth 2 times a day for 5 days. Take with at least 8 ounces (large glass) of water, do not lie down for 30 minutes after       famotidine 20 mg tablet  Commonly known as: Pepcid      Take 1 tablet (20 mg) by mouth once daily.       mometasone 220 mcg/ actuation (14) inhaler  Commonly known as: Asmanex      Inhale 1 puff once daily. Rinse mouth with water after use to reduce aftertaste and incidence of candidiasis. Do not swallow.       oxygen gas therapy  Commonly known as: O2      Inhale 1 each continuously.              CHANGE how you take these medications        Instructions Last Dose Given Next Dose Due   acetaminophen 325 mg tablet  Commonly known as: Tylenol  What changed:   medication strength  how much to take  when to take this  reasons to take this      Take 2 tablets (650 mg) by mouth every 4 hours if needed for mild pain (1 - 3), headaches or fever (temp greater than 38.0 C).       gabapentin 100 mg capsule  Commonly known as: Neurontin  What changed:   medication strength  See the new instructions.      Take 1 capsule (100 mg) by mouth 3 times a day.       primidone 50 mg tablet  Commonly known as: Mysoline  What changed: See the new instructions.      Take 4 tablets (200 mg) by mouth 2 times a day.       varenicline 0.5 mg  tablet  Commonly known as: Chantix  What changed:   how much to take  how to take this  when to take this  additional instructions      Take 1 tablet (0.5 mg) by mouth once daily. Take with full glass of water.              CONTINUE taking these medications        Instructions Last Dose Given Next Dose Due   escitalopram 10 mg tablet  Commonly known as: Lexapro      TAKE 1 TABLET BY MOUTH EVERY DAY              STOP taking these medications      amLODIPine 10 mg tablet  Commonly known as: Norvasc        amLODIPine 5 mg tablet  Commonly known as: Norvasc        lisinopril 10 mg tablet        meloxicam 15 mg tablet  Commonly known as: Mobic        methocarbamol 750 mg tablet  Commonly known as: Robaxin        mirtazapine 15 mg tablet  Commonly known as: Remeron                  Where to Get Your Medications        Information about where to get these medications is not yet available    Ask your nurse or doctor about these medications  acetaminophen 325 mg tablet  apixaban 5 mg tablet  bisoprolol 5 mg tablet  cefuroxime 500 mg tablet  doxycycline 100 mg capsule  famotidine 20 mg tablet  gabapentin 100 mg capsule  mometasone 220 mcg/ actuation (14) inhaler  oxygen gas therapy  primidone 50 mg tablet  varenicline 0.5 mg tablet         Test Results Pending At Discharge  Pending Labs       Order Current Status    Extra Urine Gray Tube Collected (08/15/24 0604)    Staphylococcus aureus/MRSA colonization, Culture In process    Urinalysis with Reflex Culture and Microscopic In process    Blood Culture Preliminary result    Blood Culture Preliminary result    Respiratory Culture/Smear Preliminary result            Hospital Course   Admitted for pneumonia. Treated with IV Abx and to complete oral course on discharge. Was found to have right lung nodule on CT and will need outpatient PET/CT. To follow-up with Pulmonology. Stay was complicated by Afib with RVR. Was treated with Cardizem gtt and quickly converted to NSR. Was  evaluated by Cardiology and meds were added. To follow-up outpatient. Medically stable for discharge to SNF.      Pertinent Physical Exam At Time of Discharge  Physical Exam  HENT:      Head: Normocephalic and atraumatic.      Nose: Nose normal.      Mouth/Throat:      Mouth: Mucous membranes are moist.      Pharynx: Oropharynx is clear.   Eyes:      Extraocular Movements: Extraocular movements intact.      Pupils: Pupils are equal, round, and reactive to light.   Cardiovascular:      Rate and Rhythm: Normal rate and regular rhythm.      Pulses: Normal pulses.   Pulmonary:      Effort: Pulmonary effort is normal.      Breath sounds: Normal breath sounds.      Comments: Diminished  Abdominal:      General: Abdomen is flat. Bowel sounds are normal.      Palpations: Abdomen is soft.   Musculoskeletal:         General: Normal range of motion.      Comments: Generalized weakness   Skin:     General: Skin is warm and dry.      Capillary Refill: Capillary refill takes less than 2 seconds.   Neurological:      General: No focal deficit present.      Mental Status: She is oriented to person, place, and time.   Psychiatric:         Mood and Affect: Mood normal.         Outpatient Follow-Up  No future appointments.      Esha Carlisle, APRN-CNP

## 2024-08-17 NOTE — PROGRESS NOTES
08/17/24 0857   Discharge Planning   Expected Discharge Disposition SNF  ([facility])   Has discharge transport been arranged? Yes   What day is the transport expected? 08/17/24   What time is the transport expected? 1400     7000 Completed   AVS and goldenrod sent to facility   RN made aware and given report number

## 2024-08-18 LAB
BACTERIA BLD CULT: NORMAL
BACTERIA BLD CULT: NORMAL
BACTERIA SPEC RESP CULT: ABNORMAL
GRAM STN SPEC: ABNORMAL
GRAM STN SPEC: ABNORMAL
STAPHYLOCOCCUS SPEC CULT: NORMAL

## 2024-08-19 LAB
BACTERIA BLD CULT: NORMAL
BACTERIA BLD CULT: NORMAL

## 2024-09-01 DIAGNOSIS — M16.11 PRIMARY LOCALIZED OSTEOARTHRITIS OF RIGHT HIP: ICD-10-CM

## 2024-09-01 DIAGNOSIS — G89.29 CHRONIC RIGHT HIP PAIN: ICD-10-CM

## 2024-09-01 DIAGNOSIS — M25.551 CHRONIC RIGHT HIP PAIN: ICD-10-CM

## 2024-09-03 RX ORDER — MELOXICAM 15 MG/1
15 TABLET ORAL DAILY
Qty: 30 TABLET | Refills: 0 | OUTPATIENT
Start: 2024-09-03

## 2024-09-03 NOTE — TELEPHONE ENCOUNTER
Unsure if this is helping and she has recent ED visit with possible decrease in kidney function. Would need a follow up if she wants to continue this

## 2024-09-03 NOTE — TELEPHONE ENCOUNTER
Phone call to the patient to discuss pharmacy request for Meloxicam. Pt. States she was just in the hospital and does not need it. She is on other medications and doesn't want to be taking them all.

## 2024-09-16 ENCOUNTER — OFFICE VISIT (OUTPATIENT)
Dept: PRIMARY CARE | Facility: CLINIC | Age: 69
End: 2024-09-16
Payer: COMMERCIAL

## 2024-09-16 VITALS
WEIGHT: 125 LBS | DIASTOLIC BLOOD PRESSURE: 82 MMHG | BODY MASS INDEX: 20.09 KG/M2 | HEIGHT: 66 IN | HEART RATE: 88 BPM | OXYGEN SATURATION: 97 % | SYSTOLIC BLOOD PRESSURE: 168 MMHG

## 2024-09-16 DIAGNOSIS — D38.6 NEOPLASM OF UNCERTAIN BEHAVIOR OF RESPIRATORY ORGAN, UNSPECIFIED: ICD-10-CM

## 2024-09-16 DIAGNOSIS — M54.50 BACK PAIN, LUMBOSACRAL: ICD-10-CM

## 2024-09-16 DIAGNOSIS — I10 PRIMARY HYPERTENSION: ICD-10-CM

## 2024-09-16 DIAGNOSIS — R91.8 LUNG MASS: Primary | ICD-10-CM

## 2024-09-16 DIAGNOSIS — I48.0 PAROXYSMAL ATRIAL FIBRILLATION (MULTI): ICD-10-CM

## 2024-09-16 PROCEDURE — 90662 IIV NO PRSV INCREASED AG IM: CPT | Performed by: FAMILY MEDICINE

## 2024-09-16 PROCEDURE — 1159F MED LIST DOCD IN RCRD: CPT | Performed by: FAMILY MEDICINE

## 2024-09-16 PROCEDURE — 3079F DIAST BP 80-89 MM HG: CPT | Performed by: FAMILY MEDICINE

## 2024-09-16 PROCEDURE — 3008F BODY MASS INDEX DOCD: CPT | Performed by: FAMILY MEDICINE

## 2024-09-16 PROCEDURE — 1125F AMNT PAIN NOTED PAIN PRSNT: CPT | Performed by: FAMILY MEDICINE

## 2024-09-16 PROCEDURE — 3077F SYST BP >= 140 MM HG: CPT | Performed by: FAMILY MEDICINE

## 2024-09-16 PROCEDURE — 99495 TRANSJ CARE MGMT MOD F2F 14D: CPT | Performed by: FAMILY MEDICINE

## 2024-09-16 PROCEDURE — 1111F DSCHRG MED/CURRENT MED MERGE: CPT | Performed by: FAMILY MEDICINE

## 2024-09-16 PROCEDURE — 1157F ADVNC CARE PLAN IN RCRD: CPT | Performed by: FAMILY MEDICINE

## 2024-09-16 RX ORDER — GABAPENTIN 100 MG/1
200 CAPSULE ORAL 3 TIMES DAILY
Qty: 180 CAPSULE | Refills: 0 | Status: SHIPPED | OUTPATIENT
Start: 2024-09-16 | End: 2024-09-16 | Stop reason: ALTCHOICE

## 2024-09-16 RX ORDER — FLUTICASONE FUROATE, UMECLIDINIUM BROMIDE AND VILANTEROL TRIFENATATE 200; 62.5; 25 UG/1; UG/1; UG/1
1 POWDER RESPIRATORY (INHALATION)
COMMUNITY
Start: 2024-06-28

## 2024-09-16 RX ORDER — HYDROCHLOROTHIAZIDE 12.5 MG/1
12.5 TABLET ORAL DAILY
Qty: 30 TABLET | Refills: 11 | Status: SHIPPED | OUTPATIENT
Start: 2024-09-16 | End: 2025-09-16

## 2024-09-16 RX ORDER — GABAPENTIN 100 MG/1
200 CAPSULE ORAL 3 TIMES DAILY
Qty: 180 CAPSULE | Refills: 3 | Status: SHIPPED | OUTPATIENT
Start: 2024-09-16 | End: 2025-09-16

## 2024-09-16 RX ORDER — HYDROCHLOROTHIAZIDE 12.5 MG/1
12.5 TABLET ORAL DAILY
Qty: 30 TABLET | Refills: 0 | Status: SHIPPED | OUTPATIENT
Start: 2024-09-16 | End: 2024-09-16 | Stop reason: ALTCHOICE

## 2024-09-16 ASSESSMENT — PAIN SCALES - GENERAL: PAINLEVEL: 10-WORST PAIN EVER

## 2024-09-16 ASSESSMENT — ENCOUNTER SYMPTOMS
SHORTNESS OF BREATH: 1
CHEST TIGHTNESS: 1
WHEEZING: 1
COUGH: 1
PALPITATIONS: 0

## 2024-09-16 NOTE — PROGRESS NOTES
[facility]: [facility]  E/M EVALUATION    Kamila Jones is a 69 y.o. female who presents for hospital followup and Immunizations (Patient would like to know if she is due for pneumonia vaccine/dd).    Subjective   8/14/24 hospital for pna. Transferred to SNF.  Home 2 weeks now.     COPD- 3 L oxygen.  Breathing is stable current    Lumg mass- agrees to PET scan. Though states wont accept treatment if abnormal.  Stopped smoking 24 hours ago.      Atrial fib-in hospital.      Review of Systems   Respiratory:  Positive for cough, chest tightness, shortness of breath and wheezing.    Cardiovascular:  Positive for leg swelling. Negative for chest pain and palpitations.       Objective   Vitals:    09/16/24 1406   BP: 168/82   Pulse: 88   SpO2: 97%     Physical Exam  Constitutional:       Appearance: Normal appearance. She is not ill-appearing or toxic-appearing.   Cardiovascular:      Rate and Rhythm: Normal rate and regular rhythm.      Heart sounds: No murmur heard.  Pulmonary:      Effort: Tachypnea, accessory muscle usage and prolonged expiration present. No respiratory distress.      Breath sounds: Normal breath sounds. Decreased air movement present.   Musculoskeletal:      Right lower leg: No edema.      Left lower leg: No edema.   Neurological:      Mental Status: She is alert.           Assessment/Plan      Patient Active Problem List   Diagnosis    Intentional drug overdose (Multi)    Palliative care patient    Medical home patient    Abnormal weight loss    Accidental fall    Acute deep vein thrombosis (DVT) of distal vein of right lower extremity (Multi)    Acute renal failure syndrome (CMS-HCC)    Continuous chronic alcoholism (Multi)    Anemia    Anxiety    Aortic valve disorder    Aortic valve stenosis    Abdominal pain    Back pain, lumbosacral    Carotid stenosis, bilateral    Chronic anxiety    Chronic GERD    Chronic hypercapnic respiratory failure (Multi)    Closed fracture of  humerus    Acute exacerbation of chronic obstructive airways disease (Multi)    Acute respiratory failure (Multi)    Acute bronchitis with chronic obstructive pulmonary disease (COPD) (Multi)    COPD (chronic obstructive pulmonary disease) (Multi)    Pulmonary emphysema (Multi)    Dysthymia    ISABELA (generalized anxiety disorder)    Dyspnea    History of hypertension    Primary hypertension    Hypokalemia    Hyponatremia    Hypoxia    Iron deficiency anemia    Lower extremity numbness    Nonsustained ventricular tachycardia (Multi)    Other chronic pain    Nausea & vomiting    Peptic ulcer disease    Pulmonary nodule    Shoulder injury    Suicide by drug overdose (Multi)    Tobacco dependency    Traumatic intracranial subarachnoid hemorrhage (Multi)    Urinary tract infection    Hematochezia    Fracture of humerus neck, right, closed, initial encounter    Osteoarthritis of right hip    Pain of right hip joint    Subarachnoid hemorrhage (Multi)    Unsteady gait    Subdural hematoma (Multi)    Pneumonia of left lung due to infectious organism, unspecified part of lung    Pleural effusion       Diagnoses and all orders for this visit:  Lung mass  -     NM PET CT head and neck initial staging; Future  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  Back pain, lumbosacral  -     gabapentin (Neurontin) 100 mg capsule; Take 2 capsules (200 mg) by mouth 3 times a day.  Other orders  -     Flu vaccine, trivalent, preservative free, HIGH-DOSE, age 65y+ (Fluzone)      The patient was encouraged to ensure that any/all documentation is accurate and up to date, and that our office be provided a copy in the event that anything changes.         Toan Pires MD

## 2024-09-17 ENCOUNTER — TELEPHONE (OUTPATIENT)
Dept: PRIMARY CARE | Facility: CLINIC | Age: 69
End: 2024-09-17
Payer: COMMERCIAL

## 2024-09-17 DIAGNOSIS — D38.6 NEOPLASM OF UNCERTAIN BEHAVIOR OF RESPIRATORY ORGAN, UNSPECIFIED: ICD-10-CM

## 2024-09-17 DIAGNOSIS — R91.8 LUNG MASS: Primary | ICD-10-CM

## 2024-09-17 NOTE — TELEPHONE ENCOUNTER
Patient went to schedule her CT and she said the   questioned her because the order says CT of head and neck and the diagnosis is lung muss, please clarify that this is the correct imaging.

## 2024-09-19 DIAGNOSIS — J96.12 CHRONIC HYPERCAPNIC RESPIRATORY FAILURE (MULTI): Primary | ICD-10-CM

## 2024-09-20 RX ORDER — ALBUTEROL SULFATE 90 UG/1
AEROSOL, METERED RESPIRATORY (INHALATION)
Qty: 18 G | Refills: 11 | Status: SHIPPED | OUTPATIENT
Start: 2024-09-20

## 2024-09-25 DIAGNOSIS — J18.9 PNEUMONIA OF LEFT LUNG DUE TO INFECTIOUS ORGANISM, UNSPECIFIED PART OF LUNG: ICD-10-CM

## 2024-09-25 DIAGNOSIS — D38.6 NEOPLASM OF UNCERTAIN BEHAVIOR OF RESPIRATORY ORGAN, UNSPECIFIED: Primary | ICD-10-CM

## 2024-09-25 RX ORDER — TRAMADOL HYDROCHLORIDE 50 MG/1
50 TABLET ORAL EVERY 8 HOURS PRN
Qty: 15 TABLET | Refills: 0 | Status: SHIPPED | OUTPATIENT
Start: 2024-09-25 | End: 2024-09-30

## 2024-10-02 ENCOUNTER — HOSPITAL ENCOUNTER (OUTPATIENT)
Dept: RADIOLOGY | Facility: CLINIC | Age: 69
Discharge: HOME | End: 2024-10-02
Payer: COMMERCIAL

## 2024-10-02 DIAGNOSIS — D38.6 NEOPLASM OF UNCERTAIN BEHAVIOR OF RESPIRATORY ORGAN, UNSPECIFIED: ICD-10-CM

## 2024-10-02 DIAGNOSIS — R91.8 LUNG MASS: ICD-10-CM

## 2024-10-02 PROCEDURE — 78815 PET IMAGE W/CT SKULL-THIGH: CPT

## 2024-10-02 PROCEDURE — A9552 F18 FDG: HCPCS | Performed by: FAMILY MEDICINE

## 2024-10-02 PROCEDURE — 3430000001 HC RX 343 DIAGNOSTIC RADIOPHARMACEUTICALS: Performed by: FAMILY MEDICINE

## 2024-10-02 RX ORDER — FLUDEOXYGLUCOSE F 18 200 MCI/ML
11.5 INJECTION, SOLUTION INTRAVENOUS
Status: COMPLETED | OUTPATIENT
Start: 2024-10-02 | End: 2024-10-02

## 2024-10-03 DIAGNOSIS — J44.9 CHRONIC OBSTRUCTIVE PULMONARY DISEASE, UNSPECIFIED: ICD-10-CM

## 2024-10-03 NOTE — TELEPHONE ENCOUNTER
----- Message from Toan Pires sent at 10/3/2024  7:50 AM EDT -----  Pet scan seems to be ok, but will need a repeat CT in 6 months to be certain.

## 2024-10-06 RX ORDER — FLUTICASONE FUROATE, UMECLIDINIUM BROMIDE AND VILANTEROL TRIFENATATE 200; 62.5; 25 UG/1; UG/1; UG/1
POWDER RESPIRATORY (INHALATION)
Qty: 180 EACH | Refills: 4 | Status: SHIPPED | OUTPATIENT
Start: 2024-10-06

## 2024-10-08 DIAGNOSIS — M25.551 CHRONIC RIGHT HIP PAIN: ICD-10-CM

## 2024-10-08 DIAGNOSIS — M16.11 PRIMARY LOCALIZED OSTEOARTHRITIS OF RIGHT HIP: ICD-10-CM

## 2024-10-08 DIAGNOSIS — F32.9 CURRENT EPISODE OF MAJOR DEPRESSIVE DISORDER WITHOUT PRIOR EPISODE, UNSPECIFIED DEPRESSION EPISODE SEVERITY: ICD-10-CM

## 2024-10-08 DIAGNOSIS — G89.29 CHRONIC RIGHT HIP PAIN: ICD-10-CM

## 2024-10-08 RX ORDER — ESCITALOPRAM OXALATE 10 MG/1
10 TABLET ORAL DAILY
Qty: 90 TABLET | Refills: 1 | Status: SHIPPED | OUTPATIENT
Start: 2024-10-08

## 2024-10-09 RX ORDER — MELOXICAM 15 MG/1
15 TABLET ORAL DAILY
Qty: 30 TABLET | Refills: 0 | OUTPATIENT
Start: 2024-10-09

## 2024-10-11 ENCOUNTER — TELEPHONE (OUTPATIENT)
Dept: PRIMARY CARE | Facility: CLINIC | Age: 69
End: 2024-10-11
Payer: COMMERCIAL

## 2024-10-11 DIAGNOSIS — M16.11 PRIMARY LOCALIZED OSTEOARTHRITIS OF RIGHT HIP: Primary | ICD-10-CM

## 2024-10-11 RX ORDER — TRAMADOL HYDROCHLORIDE 50 MG/1
50 TABLET ORAL EVERY 6 HOURS PRN
Qty: 28 TABLET | Refills: 0 | Status: ON HOLD | OUTPATIENT
Start: 2024-10-11 | End: 2024-10-18

## 2024-10-11 NOTE — TELEPHONE ENCOUNTER
Vania from [facility] called stating patient is in so much pain she can barely walk. The only thing patient has been taking to try and manage the pain is tylenol, they would like to know if you can prescribe medication or refer patient to pain management.

## 2024-10-14 ENCOUNTER — HOSPITAL ENCOUNTER (INPATIENT)
Facility: HOSPITAL | Age: 69
Discharge: NURSING, REHAB OR LONG-TERM CARE | DRG: 177 | End: 2024-10-14
Attending: INTERNAL MEDICINE | Admitting: INTERNAL MEDICINE
Payer: COMMERCIAL

## 2024-10-14 ENCOUNTER — APPOINTMENT (OUTPATIENT)
Dept: RADIOLOGY | Facility: HOSPITAL | Age: 69
DRG: 177 | End: 2024-10-14
Payer: COMMERCIAL

## 2024-10-14 DIAGNOSIS — J90 PLEURAL EFFUSION: ICD-10-CM

## 2024-10-14 DIAGNOSIS — I48.0 PAROXYSMAL ATRIAL FIBRILLATION (MULTI): ICD-10-CM

## 2024-10-14 DIAGNOSIS — M54.50 BACK PAIN, LUMBOSACRAL: ICD-10-CM

## 2024-10-14 DIAGNOSIS — R26.2 INABILITY TO AMBULATE DUE TO HIP: ICD-10-CM

## 2024-10-14 DIAGNOSIS — M54.50 CHRONIC LOW BACK PAIN WITHOUT SCIATICA, UNSPECIFIED BACK PAIN LATERALITY: ICD-10-CM

## 2024-10-14 DIAGNOSIS — R06.02 SOB (SHORTNESS OF BREATH): ICD-10-CM

## 2024-10-14 DIAGNOSIS — G89.29 CHRONIC LOW BACK PAIN WITHOUT SCIATICA, UNSPECIFIED BACK PAIN LATERALITY: ICD-10-CM

## 2024-10-14 DIAGNOSIS — J18.9 PNEUMONIA OF LEFT LUNG DUE TO INFECTIOUS ORGANISM, UNSPECIFIED PART OF LUNG: ICD-10-CM

## 2024-10-14 DIAGNOSIS — J96.12 CHRONIC HYPERCAPNIC RESPIRATORY FAILURE (MULTI): Primary | ICD-10-CM

## 2024-10-14 LAB
ALBUMIN SERPL BCP-MCNC: 4.2 G/DL (ref 3.4–5)
ALP SERPL-CCNC: 85 U/L (ref 33–136)
ALT SERPL W P-5'-P-CCNC: 8 U/L (ref 7–45)
ANION GAP SERPL CALCULATED.3IONS-SCNC: 15 MMOL/L (ref 10–20)
APPEARANCE UR: CLEAR
AST SERPL W P-5'-P-CCNC: 15 U/L (ref 9–39)
BASOPHILS # BLD AUTO: 0.02 X10*3/UL (ref 0–0.1)
BASOPHILS NFR BLD AUTO: 0.3 %
BILIRUB SERPL-MCNC: 0.4 MG/DL (ref 0–1.2)
BILIRUB UR STRIP.AUTO-MCNC: NEGATIVE MG/DL
BUN SERPL-MCNC: 18 MG/DL (ref 6–23)
CALCIUM SERPL-MCNC: 9.4 MG/DL (ref 8.6–10.3)
CHLORIDE SERPL-SCNC: 103 MMOL/L (ref 98–107)
CO2 SERPL-SCNC: 23 MMOL/L (ref 21–32)
COLOR UR: ABNORMAL
CREAT SERPL-MCNC: 0.78 MG/DL (ref 0.5–1.05)
EGFRCR SERPLBLD CKD-EPI 2021: 81 ML/MIN/1.73M*2
EOSINOPHIL # BLD AUTO: 0.04 X10*3/UL (ref 0–0.4)
EOSINOPHIL NFR BLD AUTO: 0.6 %
ERYTHROCYTE [DISTWIDTH] IN BLOOD BY AUTOMATED COUNT: 16.8 % (ref 11.5–14.5)
GLUCOSE SERPL-MCNC: 88 MG/DL (ref 74–99)
GLUCOSE UR STRIP.AUTO-MCNC: NORMAL MG/DL
HCT VFR BLD AUTO: 34.4 % (ref 36–46)
HGB BLD-MCNC: 10.8 G/DL (ref 12–16)
IMM GRANULOCYTES # BLD AUTO: 0.03 X10*3/UL (ref 0–0.5)
IMM GRANULOCYTES NFR BLD AUTO: 0.5 % (ref 0–0.9)
KETONES UR STRIP.AUTO-MCNC: ABNORMAL MG/DL
LEUKOCYTE ESTERASE UR QL STRIP.AUTO: NEGATIVE
LYMPHOCYTES # BLD AUTO: 0.86 X10*3/UL (ref 0.8–3)
LYMPHOCYTES NFR BLD AUTO: 13.8 %
MCH RBC QN AUTO: 31.8 PG (ref 26–34)
MCHC RBC AUTO-ENTMCNC: 31.4 G/DL (ref 32–36)
MCV RBC AUTO: 101 FL (ref 80–100)
MONOCYTES # BLD AUTO: 0.57 X10*3/UL (ref 0.05–0.8)
MONOCYTES NFR BLD AUTO: 9.2 %
NEUTROPHILS # BLD AUTO: 4.7 X10*3/UL (ref 1.6–5.5)
NEUTROPHILS NFR BLD AUTO: 75.6 %
NITRITE UR QL STRIP.AUTO: NEGATIVE
NRBC BLD-RTO: 0 /100 WBCS (ref 0–0)
PH UR STRIP.AUTO: 6 [PH]
PLATELET # BLD AUTO: 311 X10*3/UL (ref 150–450)
POTASSIUM SERPL-SCNC: 4.3 MMOL/L (ref 3.5–5.3)
PROT SERPL-MCNC: 7.6 G/DL (ref 6.4–8.2)
PROT UR STRIP.AUTO-MCNC: ABNORMAL MG/DL
RBC # BLD AUTO: 3.4 X10*6/UL (ref 4–5.2)
RBC # UR STRIP.AUTO: ABNORMAL /UL
RBC #/AREA URNS AUTO: NORMAL /HPF
SODIUM SERPL-SCNC: 137 MMOL/L (ref 136–145)
SP GR UR STRIP.AUTO: 1.02
SQUAMOUS #/AREA URNS AUTO: NORMAL /HPF
UROBILINOGEN UR STRIP.AUTO-MCNC: NORMAL MG/DL
WBC # BLD AUTO: 6.2 X10*3/UL (ref 4.4–11.3)
WBC #/AREA URNS AUTO: NORMAL /HPF

## 2024-10-14 PROCEDURE — 72100 X-RAY EXAM L-S SPINE 2/3 VWS: CPT

## 2024-10-14 PROCEDURE — G0378 HOSPITAL OBSERVATION PER HR: HCPCS

## 2024-10-14 PROCEDURE — 84075 ASSAY ALKALINE PHOSPHATASE: CPT | Performed by: STUDENT IN AN ORGANIZED HEALTH CARE EDUCATION/TRAINING PROGRAM

## 2024-10-14 PROCEDURE — 36415 COLL VENOUS BLD VENIPUNCTURE: CPT | Performed by: STUDENT IN AN ORGANIZED HEALTH CARE EDUCATION/TRAINING PROGRAM

## 2024-10-14 PROCEDURE — 2500000001 HC RX 250 WO HCPCS SELF ADMINISTERED DRUGS (ALT 637 FOR MEDICARE OP)

## 2024-10-14 PROCEDURE — 96374 THER/PROPH/DIAG INJ IV PUSH: CPT

## 2024-10-14 PROCEDURE — 2500000001 HC RX 250 WO HCPCS SELF ADMINISTERED DRUGS (ALT 637 FOR MEDICARE OP): Performed by: NURSE PRACTITIONER

## 2024-10-14 PROCEDURE — 73523 X-RAY EXAM HIPS BI 5/> VIEWS: CPT

## 2024-10-14 PROCEDURE — 2500000002 HC RX 250 W HCPCS SELF ADMINISTERED DRUGS (ALT 637 FOR MEDICARE OP, ALT 636 FOR OP/ED): Performed by: NURSE PRACTITIONER

## 2024-10-14 PROCEDURE — 72100 X-RAY EXAM L-S SPINE 2/3 VWS: CPT | Performed by: RADIOLOGY

## 2024-10-14 PROCEDURE — 85025 COMPLETE CBC W/AUTO DIFF WBC: CPT | Performed by: STUDENT IN AN ORGANIZED HEALTH CARE EDUCATION/TRAINING PROGRAM

## 2024-10-14 PROCEDURE — 2500000004 HC RX 250 GENERAL PHARMACY W/ HCPCS (ALT 636 FOR OP/ED): Performed by: NURSE PRACTITIONER

## 2024-10-14 PROCEDURE — 2500000004 HC RX 250 GENERAL PHARMACY W/ HCPCS (ALT 636 FOR OP/ED)

## 2024-10-14 PROCEDURE — 80053 COMPREHEN METABOLIC PANEL: CPT | Performed by: INTERNAL MEDICINE

## 2024-10-14 PROCEDURE — 2500000005 HC RX 250 GENERAL PHARMACY W/O HCPCS: Performed by: NURSE PRACTITIONER

## 2024-10-14 PROCEDURE — 73523 X-RAY EXAM HIPS BI 5/> VIEWS: CPT | Performed by: RADIOLOGY

## 2024-10-14 PROCEDURE — 81001 URINALYSIS AUTO W/SCOPE: CPT | Performed by: STUDENT IN AN ORGANIZED HEALTH CARE EDUCATION/TRAINING PROGRAM

## 2024-10-14 PROCEDURE — 94660 CPAP INITIATION&MGMT: CPT

## 2024-10-14 PROCEDURE — 71045 X-RAY EXAM CHEST 1 VIEW: CPT

## 2024-10-14 PROCEDURE — 85025 COMPLETE CBC W/AUTO DIFF WBC: CPT | Performed by: INTERNAL MEDICINE

## 2024-10-14 PROCEDURE — 73564 X-RAY EXAM KNEE 4 OR MORE: CPT | Mod: LT

## 2024-10-14 PROCEDURE — 81001 URINALYSIS AUTO W/SCOPE: CPT | Performed by: INTERNAL MEDICINE

## 2024-10-14 PROCEDURE — 99285 EMERGENCY DEPT VISIT HI MDM: CPT | Mod: 25

## 2024-10-14 PROCEDURE — 99223 1ST HOSP IP/OBS HIGH 75: CPT | Performed by: NURSE PRACTITIONER

## 2024-10-14 PROCEDURE — 73564 X-RAY EXAM KNEE 4 OR MORE: CPT | Mod: LEFT SIDE | Performed by: RADIOLOGY

## 2024-10-14 PROCEDURE — 71045 X-RAY EXAM CHEST 1 VIEW: CPT | Performed by: RADIOLOGY

## 2024-10-14 RX ORDER — PRIMIDONE 50 MG/1
200 TABLET ORAL 2 TIMES DAILY
Status: DISCONTINUED | OUTPATIENT
Start: 2024-10-14 | End: 2024-10-23 | Stop reason: HOSPADM

## 2024-10-14 RX ORDER — AMLODIPINE BESYLATE 5 MG/1
5 TABLET ORAL 2 TIMES DAILY
Status: DISCONTINUED | OUTPATIENT
Start: 2024-10-14 | End: 2024-10-15

## 2024-10-14 RX ORDER — ACETAMINOPHEN 500 MG
1000 TABLET ORAL EVERY 8 HOURS SCHEDULED
Status: COMPLETED | OUTPATIENT
Start: 2024-10-14 | End: 2024-10-16

## 2024-10-14 RX ORDER — HYDRALAZINE HYDROCHLORIDE 20 MG/ML
5 INJECTION INTRAMUSCULAR; INTRAVENOUS EVERY 6 HOURS PRN
Status: DISCONTINUED | OUTPATIENT
Start: 2024-10-14 | End: 2024-10-23 | Stop reason: HOSPADM

## 2024-10-14 RX ORDER — POLYETHYLENE GLYCOL 3350 17 G/17G
17 POWDER, FOR SOLUTION ORAL DAILY PRN
Status: DISCONTINUED | OUTPATIENT
Start: 2024-10-14 | End: 2024-10-23 | Stop reason: HOSPADM

## 2024-10-14 RX ORDER — KETOROLAC TROMETHAMINE 30 MG/ML
15 INJECTION, SOLUTION INTRAMUSCULAR; INTRAVENOUS ONCE
Status: COMPLETED | OUTPATIENT
Start: 2024-10-14 | End: 2024-10-14

## 2024-10-14 RX ORDER — HYDROCHLOROTHIAZIDE 12.5 MG/1
12.5 CAPSULE ORAL DAILY
Status: DISCONTINUED | OUTPATIENT
Start: 2024-10-14 | End: 2024-10-15

## 2024-10-14 RX ORDER — ESCITALOPRAM OXALATE 10 MG/1
10 TABLET ORAL DAILY
Status: DISCONTINUED | OUTPATIENT
Start: 2024-10-14 | End: 2024-10-20

## 2024-10-14 RX ORDER — ONDANSETRON 4 MG/1
1 TABLET, ORALLY DISINTEGRATING ORAL
COMMUNITY
Start: 2024-08-30 | End: 2024-10-23 | Stop reason: HOSPADM

## 2024-10-14 RX ORDER — GABAPENTIN 300 MG/1
300 CAPSULE ORAL 3 TIMES DAILY
Status: ON HOLD | COMMUNITY
End: 2024-10-18

## 2024-10-14 RX ORDER — BISOPROLOL FUMARATE 5 MG/1
5 TABLET, FILM COATED ORAL 2 TIMES DAILY
Status: DISCONTINUED | OUTPATIENT
Start: 2024-10-14 | End: 2024-10-17

## 2024-10-14 RX ORDER — FAMOTIDINE 20 MG/1
20 TABLET, FILM COATED ORAL DAILY
Status: DISCONTINUED | OUTPATIENT
Start: 2024-10-14 | End: 2024-10-23 | Stop reason: HOSPADM

## 2024-10-14 RX ORDER — AMLODIPINE BESYLATE 5 MG/1
5 TABLET ORAL 2 TIMES DAILY
COMMUNITY
End: 2024-10-23 | Stop reason: HOSPADM

## 2024-10-14 RX ORDER — ONDANSETRON 4 MG/1
4 TABLET, ORALLY DISINTEGRATING ORAL EVERY 8 HOURS PRN
Status: DISCONTINUED | OUTPATIENT
Start: 2024-10-14 | End: 2024-10-23 | Stop reason: HOSPADM

## 2024-10-14 RX ORDER — ALBUTEROL SULFATE 90 UG/1
2 INHALANT RESPIRATORY (INHALATION) EVERY 4 HOURS PRN
Status: DISCONTINUED | OUTPATIENT
Start: 2024-10-14 | End: 2024-10-23 | Stop reason: HOSPADM

## 2024-10-14 RX ORDER — GABAPENTIN 300 MG/1
300 CAPSULE ORAL 3 TIMES DAILY
Status: DISCONTINUED | OUTPATIENT
Start: 2024-10-14 | End: 2024-10-23 | Stop reason: HOSPADM

## 2024-10-14 RX ORDER — FLUTICASONE FUROATE AND VILANTEROL 200; 25 UG/1; UG/1
1 POWDER RESPIRATORY (INHALATION)
Status: DISCONTINUED | OUTPATIENT
Start: 2024-10-14 | End: 2024-10-23 | Stop reason: HOSPADM

## 2024-10-14 RX ORDER — ENOXAPARIN SODIUM 100 MG/ML
40 INJECTION SUBCUTANEOUS EVERY 24 HOURS
Status: DISCONTINUED | OUTPATIENT
Start: 2024-10-14 | End: 2024-10-14

## 2024-10-14 RX ORDER — ONDANSETRON HYDROCHLORIDE 2 MG/ML
4 INJECTION, SOLUTION INTRAVENOUS EVERY 8 HOURS PRN
Status: DISCONTINUED | OUTPATIENT
Start: 2024-10-14 | End: 2024-10-23 | Stop reason: HOSPADM

## 2024-10-14 RX ORDER — OXYCODONE HYDROCHLORIDE 5 MG/1
5 TABLET ORAL EVERY 8 HOURS PRN
Status: DISCONTINUED | OUTPATIENT
Start: 2024-10-14 | End: 2024-10-20

## 2024-10-14 RX ORDER — VARENICLINE TARTRATE 0.5 MG/1
1 TABLET, FILM COATED ORAL
COMMUNITY
Start: 2024-09-27 | End: 2024-10-14 | Stop reason: ENTERED-IN-ERROR

## 2024-10-14 RX ORDER — MIRTAZAPINE 15 MG/1
15 TABLET, FILM COATED ORAL NIGHTLY
Status: DISCONTINUED | OUTPATIENT
Start: 2024-10-14 | End: 2024-10-23 | Stop reason: HOSPADM

## 2024-10-14 RX ORDER — LIDOCAINE 560 MG/1
1 PATCH PERCUTANEOUS; TOPICAL; TRANSDERMAL DAILY
Status: DISCONTINUED | OUTPATIENT
Start: 2024-10-14 | End: 2024-10-15

## 2024-10-14 RX ORDER — ACETAMINOPHEN 325 MG/1
975 TABLET ORAL ONCE
Status: COMPLETED | OUTPATIENT
Start: 2024-10-14 | End: 2024-10-14

## 2024-10-14 RX ORDER — MIRTAZAPINE 15 MG/1
15 TABLET, FILM COATED ORAL NIGHTLY
COMMUNITY
Start: 2024-10-08 | End: 2024-10-23 | Stop reason: HOSPADM

## 2024-10-14 SDOH — ECONOMIC STABILITY: FOOD INSECURITY: WITHIN THE PAST 12 MONTHS, THE FOOD YOU BOUGHT JUST DIDN'T LAST AND YOU DIDN'T HAVE MONEY TO GET MORE.: NEVER TRUE

## 2024-10-14 SDOH — SOCIAL STABILITY: SOCIAL INSECURITY: WITHIN THE LAST YEAR, HAVE YOU BEEN AFRAID OF YOUR PARTNER OR EX-PARTNER?: NO

## 2024-10-14 SDOH — ECONOMIC STABILITY: HOUSING INSECURITY: IN THE PAST 12 MONTHS, HOW MANY TIMES HAVE YOU MOVED WHERE YOU WERE LIVING?: 1

## 2024-10-14 SDOH — SOCIAL STABILITY: SOCIAL INSECURITY
WITHIN THE LAST YEAR, HAVE YOU BEEN RAPED OR FORCED TO HAVE ANY KIND OF SEXUAL ACTIVITY BY YOUR PARTNER OR EX-PARTNER?: NO

## 2024-10-14 SDOH — SOCIAL STABILITY: SOCIAL INSECURITY: DO YOU FEEL ANYONE HAS EXPLOITED OR TAKEN ADVANTAGE OF YOU FINANCIALLY OR OF YOUR PERSONAL PROPERTY?: NO

## 2024-10-14 SDOH — SOCIAL STABILITY: SOCIAL NETWORK
DO YOU BELONG TO ANY CLUBS OR ORGANIZATIONS SUCH AS CHURCH GROUPS, UNIONS, FRATERNAL OR ATHLETIC GROUPS, OR SCHOOL GROUPS?: NO

## 2024-10-14 SDOH — ECONOMIC STABILITY: HOUSING INSECURITY: IN THE LAST 12 MONTHS, WAS THERE A TIME WHEN YOU WERE NOT ABLE TO PAY THE MORTGAGE OR RENT ON TIME?: NO

## 2024-10-14 SDOH — SOCIAL STABILITY: SOCIAL INSECURITY: ABUSE: ADULT

## 2024-10-14 SDOH — ECONOMIC STABILITY: HOUSING INSECURITY: AT ANY TIME IN THE PAST 12 MONTHS, WERE YOU HOMELESS OR LIVING IN A SHELTER (INCLUDING NOW)?: NO

## 2024-10-14 SDOH — SOCIAL STABILITY: SOCIAL INSECURITY: WERE YOU ABLE TO COMPLETE ALL THE BEHAVIORAL HEALTH SCREENINGS?: YES

## 2024-10-14 SDOH — SOCIAL STABILITY: SOCIAL INSECURITY
WITHIN THE LAST YEAR, HAVE YOU BEEN KICKED, HIT, SLAPPED, OR OTHERWISE PHYSICALLY HURT BY YOUR PARTNER OR EX-PARTNER?: NO

## 2024-10-14 SDOH — ECONOMIC STABILITY: FOOD INSECURITY: HOW HARD IS IT FOR YOU TO PAY FOR THE VERY BASICS LIKE FOOD, HOUSING, MEDICAL CARE, AND HEATING?: NOT HARD AT ALL

## 2024-10-14 SDOH — HEALTH STABILITY: PHYSICAL HEALTH: ON AVERAGE, HOW MANY MINUTES DO YOU ENGAGE IN EXERCISE AT THIS LEVEL?: 0 MIN

## 2024-10-14 SDOH — ECONOMIC STABILITY: FOOD INSECURITY: WITHIN THE PAST 12 MONTHS, YOU WORRIED THAT YOUR FOOD WOULD RUN OUT BEFORE YOU GOT THE MONEY TO BUY MORE.: NEVER TRUE

## 2024-10-14 SDOH — HEALTH STABILITY: PHYSICAL HEALTH
HOW OFTEN DO YOU NEED TO HAVE SOMEONE HELP YOU WHEN YOU READ INSTRUCTIONS, PAMPHLETS, OR OTHER WRITTEN MATERIAL FROM YOUR DOCTOR OR PHARMACY?: NEVER

## 2024-10-14 SDOH — SOCIAL STABILITY: SOCIAL INSECURITY: WITHIN THE LAST YEAR, HAVE YOU BEEN HUMILIATED OR EMOTIONALLY ABUSED IN OTHER WAYS BY YOUR PARTNER OR EX-PARTNER?: NO

## 2024-10-14 SDOH — ECONOMIC STABILITY: INCOME INSECURITY: IN THE PAST 12 MONTHS HAS THE ELECTRIC, GAS, OIL, OR WATER COMPANY THREATENED TO SHUT OFF SERVICES IN YOUR HOME?: NO

## 2024-10-14 SDOH — SOCIAL STABILITY: SOCIAL INSECURITY: ARE YOU MARRIED, WIDOWED, DIVORCED, SEPARATED, NEVER MARRIED, OR LIVING WITH A PARTNER?: WIDOWED

## 2024-10-14 SDOH — SOCIAL STABILITY: SOCIAL INSECURITY: ARE YOU OR HAVE YOU BEEN THREATENED OR ABUSED PHYSICALLY, EMOTIONALLY, OR SEXUALLY BY ANYONE?: NO

## 2024-10-14 SDOH — HEALTH STABILITY: MENTAL HEALTH
DO YOU FEEL STRESS - TENSE, RESTLESS, NERVOUS, OR ANXIOUS, OR UNABLE TO SLEEP AT NIGHT BECAUSE YOUR MIND IS TROUBLED ALL THE TIME - THESE DAYS?: TO SOME EXTENT

## 2024-10-14 SDOH — HEALTH STABILITY: PHYSICAL HEALTH: ON AVERAGE, HOW MANY DAYS PER WEEK DO YOU ENGAGE IN MODERATE TO STRENUOUS EXERCISE (LIKE A BRISK WALK)?: 0 DAYS

## 2024-10-14 SDOH — SOCIAL STABILITY: SOCIAL NETWORK: HOW OFTEN DO YOU ATTEND MEETINGS OF THE CLUBS OR ORGANIZATIONS YOU BELONG TO?: NEVER

## 2024-10-14 SDOH — ECONOMIC STABILITY: TRANSPORTATION INSECURITY: IN THE PAST 12 MONTHS, HAS LACK OF TRANSPORTATION KEPT YOU FROM MEDICAL APPOINTMENTS OR FROM GETTING MEDICATIONS?: NO

## 2024-10-14 SDOH — HEALTH STABILITY: MENTAL HEALTH: HOW MANY DRINKS CONTAINING ALCOHOL DO YOU HAVE ON A TYPICAL DAY WHEN YOU ARE DRINKING?: PATIENT DOES NOT DRINK

## 2024-10-14 SDOH — SOCIAL STABILITY: SOCIAL INSECURITY: DOES ANYONE TRY TO KEEP YOU FROM HAVING/CONTACTING OTHER FRIENDS OR DOING THINGS OUTSIDE YOUR HOME?: NO

## 2024-10-14 SDOH — SOCIAL STABILITY: SOCIAL INSECURITY: HAVE YOU HAD ANY THOUGHTS OF HARMING ANYONE ELSE?: NO

## 2024-10-14 SDOH — SOCIAL STABILITY: SOCIAL NETWORK: HOW OFTEN DO YOU ATTEND CHURCH OR RELIGIOUS SERVICES?: MORE THAN 4 TIMES PER YEAR

## 2024-10-14 SDOH — SOCIAL STABILITY: SOCIAL NETWORK: HOW OFTEN DO YOU GET TOGETHER WITH FRIENDS OR RELATIVES?: MORE THAN THREE TIMES A WEEK

## 2024-10-14 SDOH — SOCIAL STABILITY: SOCIAL INSECURITY: ARE THERE ANY APPARENT SIGNS OF INJURIES/BEHAVIORS THAT COULD BE RELATED TO ABUSE/NEGLECT?: NO

## 2024-10-14 SDOH — HEALTH STABILITY: MENTAL HEALTH: HOW OFTEN DO YOU HAVE SIX OR MORE DRINKS ON ONE OCCASION?: NEVER

## 2024-10-14 SDOH — SOCIAL STABILITY: SOCIAL INSECURITY: DO YOU FEEL UNSAFE GOING BACK TO THE PLACE WHERE YOU ARE LIVING?: NO

## 2024-10-14 SDOH — SOCIAL STABILITY: SOCIAL NETWORK
IN A TYPICAL WEEK, HOW MANY TIMES DO YOU TALK ON THE PHONE WITH FAMILY, FRIENDS, OR NEIGHBORS?: MORE THAN THREE TIMES A WEEK

## 2024-10-14 SDOH — SOCIAL STABILITY: SOCIAL INSECURITY: HAS ANYONE EVER THREATENED TO HURT YOUR FAMILY OR YOUR PETS?: NO

## 2024-10-14 SDOH — SOCIAL STABILITY: SOCIAL INSECURITY: HAVE YOU HAD THOUGHTS OF HARMING ANYONE ELSE?: NO

## 2024-10-14 ASSESSMENT — PAIN DESCRIPTION - DESCRIPTORS: DESCRIPTORS: ACHING

## 2024-10-14 ASSESSMENT — COGNITIVE AND FUNCTIONAL STATUS - GENERAL
STANDING UP FROM CHAIR USING ARMS: A LOT
DRESSING REGULAR UPPER BODY CLOTHING: A LOT
MOBILITY SCORE: 13
CLIMB 3 TO 5 STEPS WITH RAILING: TOTAL
HELP NEEDED FOR BATHING: A LOT
DAILY ACTIVITIY SCORE: 17
MOBILITY SCORE: 13
MOVING TO AND FROM BED TO CHAIR: A LOT
MOVING TO AND FROM BED TO CHAIR: A LOT
TOILETING: A LITTLE
WALKING IN HOSPITAL ROOM: A LOT
MOVING FROM LYING ON BACK TO SITTING ON SIDE OF FLAT BED WITH BEDRAILS: A LITTLE
HELP NEEDED FOR BATHING: A LOT
TOILETING: A LITTLE
CLIMB 3 TO 5 STEPS WITH RAILING: TOTAL
TURNING FROM BACK TO SIDE WHILE IN FLAT BAD: A LITTLE
TURNING FROM BACK TO SIDE WHILE IN FLAT BAD: A LITTLE
DRESSING REGULAR LOWER BODY CLOTHING: A LOT
WALKING IN HOSPITAL ROOM: A LOT
DRESSING REGULAR LOWER BODY CLOTHING: A LOT
DAILY ACTIVITIY SCORE: 17
PATIENT BASELINE BEDBOUND: NO
DRESSING REGULAR UPPER BODY CLOTHING: A LOT
MOVING FROM LYING ON BACK TO SITTING ON SIDE OF FLAT BED WITH BEDRAILS: A LITTLE
STANDING UP FROM CHAIR USING ARMS: A LOT

## 2024-10-14 ASSESSMENT — COLUMBIA-SUICIDE SEVERITY RATING SCALE - C-SSRS
6. HAVE YOU EVER DONE ANYTHING, STARTED TO DO ANYTHING, OR PREPARED TO DO ANYTHING TO END YOUR LIFE?: NO
2. HAVE YOU ACTUALLY HAD ANY THOUGHTS OF KILLING YOURSELF?: NO
1. IN THE PAST MONTH, HAVE YOU WISHED YOU WERE DEAD OR WISHED YOU COULD GO TO SLEEP AND NOT WAKE UP?: NO

## 2024-10-14 ASSESSMENT — PAIN - FUNCTIONAL ASSESSMENT
PAIN_FUNCTIONAL_ASSESSMENT: 0-10

## 2024-10-14 ASSESSMENT — PAIN SCALES - GENERAL
PAINLEVEL_OUTOF10: 3
PAINLEVEL_OUTOF10: 3
PAINLEVEL_OUTOF10: 4
PAINLEVEL_OUTOF10: 2
PAINLEVEL_OUTOF10: 5 - MODERATE PAIN
PAINLEVEL_OUTOF10: 3
PAINLEVEL_OUTOF10: 7

## 2024-10-14 ASSESSMENT — ACTIVITIES OF DAILY LIVING (ADL)
HEARING - LEFT EAR: FUNCTIONAL
LACK_OF_TRANSPORTATION: NO
GROOMING: INDEPENDENT
TOILETING: NEEDS ASSISTANCE
BATHING: NEEDS ASSISTANCE
FEEDING YOURSELF: INDEPENDENT
PATIENT'S MEMORY ADEQUATE TO SAFELY COMPLETE DAILY ACTIVITIES?: YES
JUDGMENT_ADEQUATE_SAFELY_COMPLETE_DAILY_ACTIVITIES: YES
LACK_OF_TRANSPORTATION: NO
ADEQUATE_TO_COMPLETE_ADL: YES
LACK_OF_TRANSPORTATION: NO
DRESSING YOURSELF: NEEDS ASSISTANCE
WALKS IN HOME: NEEDS ASSISTANCE
HEARING - RIGHT EAR: FUNCTIONAL
LACK_OF_TRANSPORTATION: NO

## 2024-10-14 ASSESSMENT — PAIN DESCRIPTION - LOCATION
LOCATION: CHEST
LOCATION: BACK
LOCATION: BACK

## 2024-10-14 ASSESSMENT — PAIN DESCRIPTION - ORIENTATION: ORIENTATION: LOWER

## 2024-10-14 ASSESSMENT — PATIENT HEALTH QUESTIONNAIRE - PHQ9
1. LITTLE INTEREST OR PLEASURE IN DOING THINGS: NOT AT ALL
SUM OF ALL RESPONSES TO PHQ9 QUESTIONS 1 & 2: 0
2. FEELING DOWN, DEPRESSED OR HOPELESS: NOT AT ALL

## 2024-10-14 ASSESSMENT — PAIN DESCRIPTION - PAIN TYPE: TYPE: ACUTE PAIN

## 2024-10-14 ASSESSMENT — LIFESTYLE VARIABLES
HOW MANY STANDARD DRINKS CONTAINING ALCOHOL DO YOU HAVE ON A TYPICAL DAY: PATIENT DOES NOT DRINK
AUDIT-C TOTAL SCORE: 0
SKIP TO QUESTIONS 9-10: 1
HOW OFTEN DO YOU HAVE 6 OR MORE DRINKS ON ONE OCCASION: NEVER
HOW OFTEN DO YOU HAVE A DRINK CONTAINING ALCOHOL: NEVER
AUDIT-C TOTAL SCORE: 0

## 2024-10-14 NOTE — PROGRESS NOTES
"Pharmacy Medication History Review    Kamila Jones \"Elizabeth" is a 69 y.o. female admitted for Back Pain. Pharmacy reviewed the patient's [id number] medications and allergies for accuracy.    The list below reflectives the updated PTA list. Please review each medication in order reconciliation for additional clarification and justification.  Prior to Admission Medications   Prescriptions Last Dose Informant Patient Reported? Taking?   Trelegy Ellipta 200-62.5-25 mcg blister with device 10/13/2024 at am  No Yes   Sig: INHALE 1 PUFF INTO THE LUNGS EVERY DAY FOR 90 DAYS   acetaminophen (Tylenol) 325 mg tablet Past Month  No Yes   Sig: Take 2 tablets (650 mg) by mouth every 4 hours if needed for mild pain (1 - 3), headaches or fever (temp greater than 38.0 C).   albuterol (Ventolin HFA) 90 mcg/actuation inhaler Past Week  No Yes   Sig: INHALE 1 PUFF BY MOUTH EVERY 4 HOURS AS NEEDED 25   amLODIPine (Norvasc) 5 mg tablet 10/13/2024  Yes Yes   Sig: Take 1 tablet (5 mg) by mouth 2 times a day.   apixaban (Eliquis) 5 mg tablet 10/13/2024 at am  No Yes   Sig: Take 1 tablet (5 mg) by mouth every 12 hours.   bisoprolol (Zebeta) 5 mg tablet 10/13/2024 at am  No Yes   Sig: Take 1 tablet (5 mg) by mouth 2 times a day.   escitalopram (Lexapro) 10 mg tablet 10/13/2024 at pm  No Yes   Sig: TAKE 1 TABLET BY MOUTH EVERY DAY   famotidine (Pepcid) 20 mg tablet   No No   Sig: Take 1 tablet (20 mg) by mouth once daily.   gabapentin (Neurontin) 100 mg capsule   No No   Sig: Take 2 capsules (200 mg) by mouth 3 times a day.   gabapentin (Neurontin) 300 mg capsule 10/13/2024 at pm  Yes Yes   Sig: Take 1 capsule (300 mg) by mouth 3 times a day.   hydroCHLOROthiazide (Microzide) 12.5 mg tablet   No No   Sig: Take 1 tablet (12.5 mg) by mouth once daily.   mirtazapine (Remeron) 15 mg tablet 10/13/2024 at pm  Yes Yes   Sig: Take 1 tablet (15 mg) by mouth once daily at bedtime.   ondansetron ODT (Zofran-ODT) 4 mg disintegrating tablet Past " Week  Yes Yes   Sig: Take 1 tablet (4 mg) by mouth 3 times a day.   oxygen (O2) gas therapy 10/14/2024 at am  No Yes   Sig: Inhale 1 each continuously.   primidone (Mysoline) 50 mg tablet 10/13/2024 at am  No Yes   Sig: Take 4 tablets (200 mg) by mouth 2 times a day.   traMADol (Ultram) 50 mg tablet   No No   Sig: Take 1 tablet (50 mg) by mouth every 6 hours if needed for severe pain (7 - 10) for up to 7 days.      Facility-Administered Medications: None          The list below reflectives the updated allergy list. Please review each documented allergy for additional clarification and justification.  Allergies  Reviewed by Namrata Phan CPhT on 10/14/2024        Severity Reactions Comments    Propranolol Hcl High Shortness of breath             Below are additional concerns with the patient's PTA list.  - flagged for review to be deleted:  Pepcid  Gabapentin 100 mg  Hydrochlorothiazide  Tramadol    - pt is prescribed hydrochlorothiazide but states she is afraid to take it because she doesn't want to get up in the middle of the night and fall while going to the bathroom  - pt's gabapentin dose recently increased to 300 mg TID      NAMRATA PHAN CPhT

## 2024-10-14 NOTE — CARE PLAN
The patient's goals for the shift include  pain control    The clinical goals for the shift include pain control    Problem: Pain - Adult  Goal: Verbalizes/displays adequate comfort level or baseline comfort level  Outcome: Progressing     Problem: Safety - Adult  Goal: Free from fall injury  Outcome: Progressing     Problem: Discharge Planning  Goal: Discharge to home or other facility with appropriate resources  Outcome: Progressing     Problem: Chronic Conditions and Co-morbidities  Goal: Patient's chronic conditions and co-morbidity symptoms are monitored and maintained or improved  Outcome: Progressing     Problem: Fall/Injury  Goal: Not fall by end of shift  Outcome: Progressing  Goal: Be free from injury by end of the shift  Outcome: Progressing  Goal: Verbalize understanding of personal risk factors for fall in the hospital  Outcome: Progressing  Goal: Verbalize understanding of risk factor reduction measures to prevent injury from fall in the home  Outcome: Progressing  Goal: Use assistive devices by end of the shift  Outcome: Progressing  Goal: Pace activities to prevent fatigue by end of the shift  Outcome: Progressing

## 2024-10-14 NOTE — H&P
"History Of Present Illness  Kamila Jones \"Elizabeth" is a 69 y.o. female presenting with worsening low back and bilateral hip pain with falls. Patient has had issues with chronic pain for quite some time with severe OA of her R hip. She was supposed to undergo R AMINA and surgery was cancelled due to her chronic respiratory disease and other co-morbidities. She was seeing pain management and did attempt hip injections with no improvement, feels the last one she had back in June actually made her feel worse. She was hospitalized back in August with pneumonia and went to SNF where she states she was there a few weeks and was doing better, she was up ambulating with a walker and returned home with her daughter. She has progressively declined and has had 3 falls in the past week or so, denies ever losing consciousness or hitting her head. She denies any increased SOB from her baseline, chest pain, abdominal pain, urinary symptoms. She has occasional constipation. She denies any loss of bowel or bladder or numbness. She was unable to ambulate today due to her pain and weakness which prompted her to come to the ER.     ED: tylenol, toradol.   She will be admitted for further work up and management of pain, inability to ambulate.      Past Medical History  Past Medical History:   Diagnosis Date    Alcohol dependence, in remission     History of alcohol dependence    Anemia     iron deficiency    Arthritis     Atrial fibrillation (Multi)     COPD (chronic obstructive pulmonary disease) (Multi)     Dyspnea     Edentulous     History of blood transfusion     1/2024 lower GI bleed    no reaction    HTN (hypertension)     Lung nodule     RAMON on CPAP     with O2 3L n/c    Oxygen deficiency     uses home O2 3L n/c continuously    Personal history of other venous thrombosis and embolism     History of deep venous thrombosis right leg    Personal history of suicidal behavior     History of suicide attempt    Uses roller walker  "       Surgical History  Past Surgical History:   Procedure Laterality Date    ANKLE FRACTURE SURGERY      MR HEAD ANGIO WO IV CONTRAST  10/24/2017    MR HEAD ANGIO WO IV CONTRAST [facility]    MR NECK ANGIO WO IV CONTRAST  10/24/2017    MR NECK ANGIO WO IV CONTRAST [facility]    OTHER SURGICAL HISTORY Right 06/15/2017    Treatment Of Ankle Fracture    TONSILLECTOMY  06/15/2017    Tonsillectomy        Social History  Home with her daughter and son in law. Previous ETOH abuse, she has not drank in many months per the patient. Smokes occasional 1-2 cigarettes per day. Denies drug use.     Family History  Family History   Problem Relation Name Age of Onset    Accidental death Mother      Stroke Father      Other (cerbral hemmorrhage) Father      Cancer Sister      Lung cancer Sister      Colon cancer Brother      Cancer Brother      Lung disease Brother      Heart attack Brother          Allergies  Propranolol hcl and Lisinopril    Review of Systems     Please see pertinent positives in the HPI and past medical and surgical histories.     Physical Exam    General: Pleasant, awake, alert.   HEENT: PERRLA, no facial asymmetry noted. Normocephalic, mucous membranes moist.   Neck: No JVD, supple.  Cardiovascular: Regular rate and rhythm. Normal S1 and S2. No murmurs, rubs or gallops.   Respiratory: Diminished with some scattered rhonchi on 3L NC.   Abdomen: Soft, round, non-tender to palpation. Bowel sounds present and normoactive.  Extremities: No peripheral cyanosis. No edema. Equal strength in all extremities.   Neurologic: Alert and oriented to person, place and time. Normal sensation.   Dermatologic: No rash, ecchymosis, or jaundice.  Psychological: Appropriate affect and behavior.      Last Recorded Vitals  Blood pressure (!) 197/84, pulse 86, temperature 37.2 °C (99 °F), temperature source Oral, resp. rate 18, weight 56.2 kg (124 lb), SpO2 100%.    Relevant Results    Results for orders placed or  performed during the hospital encounter of 10/14/24 (from the past 24 hour(s))   CBC and Auto Differential   Result Value Ref Range    WBC 6.2 4.4 - 11.3 x10*3/uL    nRBC 0.0 0.0 - 0.0 /100 WBCs    RBC 3.40 (L) 4.00 - 5.20 x10*6/uL    Hemoglobin 10.8 (L) 12.0 - 16.0 g/dL    Hematocrit 34.4 (L) 36.0 - 46.0 %     (H) 80 - 100 fL    MCH 31.8 26.0 - 34.0 pg    MCHC 31.4 (L) 32.0 - 36.0 g/dL    RDW 16.8 (H) 11.5 - 14.5 %    Platelets 311 150 - 450 x10*3/uL    Neutrophils % 75.6 40.0 - 80.0 %    Immature Granulocytes %, Automated 0.5 0.0 - 0.9 %    Lymphocytes % 13.8 13.0 - 44.0 %    Monocytes % 9.2 2.0 - 10.0 %    Eosinophils % 0.6 0.0 - 6.0 %    Basophils % 0.3 0.0 - 2.0 %    Neutrophils Absolute 4.70 1.60 - 5.50 x10*3/uL    Immature Granulocytes Absolute, Automated 0.03 0.00 - 0.50 x10*3/uL    Lymphocytes Absolute 0.86 0.80 - 3.00 x10*3/uL    Monocytes Absolute 0.57 0.05 - 0.80 x10*3/uL    Eosinophils Absolute 0.04 0.00 - 0.40 x10*3/uL    Basophils Absolute 0.02 0.00 - 0.10 x10*3/uL   Comprehensive metabolic panel   Result Value Ref Range    Glucose 88 74 - 99 mg/dL    Sodium 137 136 - 145 mmol/L    Potassium 4.3 3.5 - 5.3 mmol/L    Chloride 103 98 - 107 mmol/L    Bicarbonate 23 21 - 32 mmol/L    Anion Gap 15 10 - 20 mmol/L    Urea Nitrogen 18 6 - 23 mg/dL    Creatinine 0.78 0.50 - 1.05 mg/dL    eGFR 81 >60 mL/min/1.73m*2    Calcium 9.4 8.6 - 10.3 mg/dL    Albumin 4.2 3.4 - 5.0 g/dL    Alkaline Phosphatase 85 33 - 136 U/L    Total Protein 7.6 6.4 - 8.2 g/dL    AST 15 9 - 39 U/L    Bilirubin, Total 0.4 0.0 - 1.2 mg/dL    ALT 8 7 - 45 U/L   Urinalysis with Reflex Microscopic   Result Value Ref Range    Color, Urine Light-Yellow Light-Yellow, Yellow, Dark-Yellow    Appearance, Urine Clear Clear    Specific Gravity, Urine 1.020 1.005 - 1.035    pH, Urine 6.0 5.0, 5.5, 6.0, 6.5, 7.0, 7.5, 8.0    Protein, Urine 600 (3+) (A) NEGATIVE, 10 (TRACE), 20 (TRACE) mg/dL    Glucose, Urine Normal Normal mg/dL    Blood, Urine  0.2 (2+) (A) NEGATIVE    Ketones, Urine 20 (1+) (A) NEGATIVE mg/dL    Bilirubin, Urine NEGATIVE NEGATIVE    Urobilinogen, Urine Normal Normal mg/dL    Nitrite, Urine NEGATIVE NEGATIVE    Leukocyte Esterase, Urine NEGATIVE NEGATIVE   Microscopic Only, Urine   Result Value Ref Range    WBC, Urine NONE 1-5, NONE /HPF    RBC, Urine NONE NONE, 1-2, 3-5 /HPF    Squamous Epithelial Cells, Urine 1-9 (SPARSE) Reference range not established. /HPF        XR knee left 4+ views    Result Date: 10/14/2024  Interpreted By:  Bharath Negrete, STUDY: XR KNEE LEFT 4+ VIEWS; 10/14/2024 11:13 am   INDICATION: Signs/Symptoms:Left knee pain   COMPARISON: None.   ACCESSION NUMBER(S): [id number]   ORDERING CLINICIAN: СВЕТЛАНА VASQUEZ   TECHNIQUE: Number of films: Four view radiographs of the left knee.   FINDINGS: No fractures or destructive lesions are identified. Bone island is noted in the medial tibial condyle. The joint spaces and articular surfaces are maintained considering patient's age. The alignment is anatomic. Vascular calcifications are noted. There is no significant effusion in the suprapatellar bursa.       Unremarkable left knee radiographs.   Signed by: Bharath Negrete 10/14/2024 11:58 AM Dictation workstation:   [id number]    XR lumbar spine 2-3 views    Result Date: 10/14/2024  Interpreted By:  Bharath Negrete, STUDY: XR LUMBAR SPINE 2-3 VIEWS; XR HIPS BILATERAL 5+ VW WITH PELVIS WHEN PERFORMED; 10/14/2024 11:13 am   INDICATION: Signs/Symptoms:Low back pain, fall; Signs/Symptoms:Bilateral hip pain, fall   COMPARISON: Radiographs of the pelvis from March 2024.   ACCESSION NUMBER(S): [id number]; [id number]   ORDERING CLINICIAN: СВЕТЛАНА VASQUEZ   TECHNIQUE: Three-view radiographs of the lumbar spine and additional five view radiographs of the pelvis and both hips were obtained.   FINDINGS: There is mild loss of height involving the L3 and L5 vertebra, however no acute fracture is identified. There is narrowing of the L1-L2,  L2-L3, L3-L4 and mostly the L4-L5 disc spaces, with marginal osteophytes. The alignment is anatomic, without spondylolysis or spondylolisthesis. Hypertrophic changes involve the facet joints of the lower lumbar spine. Calcifications of the aorta and its branches are present.   No acute pelvic fracture is seen. Extensive osteoarthritis involves the right hip joint, with complete obliteration of the joint space, subchondral bone sclerosis, subchondral cysts and marginal osteophytes. Osteoarthritis also involves the left hip joint and to a lesser degree the sacroiliac joints bilaterally. The alignment is anatomic. Vascular calcifications involve iliac and femoral arteries bilaterally.       1. Marked lumbar spondylosis and remote compression deformities of the L3 and L5 vertebra, without acute vertebral fracture or subluxation. 2. Extensive right hip osteoarthritis again noted. Additional degenerative changes in the pelvis as above. No pelvic fracture or subluxation.   Signed by: Bharath Negreet 10/14/2024 11:57 AM Dictation workstation:   [id number]    XR hips bilateral 5+ VW w pelvis when performed    Result Date: 10/14/2024  Interpreted By:  Bharath Negrete, STUDY: XR LUMBAR SPINE 2-3 VIEWS; XR HIPS BILATERAL 5+ VW WITH PELVIS WHEN PERFORMED; 10/14/2024 11:13 am   INDICATION: Signs/Symptoms:Low back pain, fall; Signs/Symptoms:Bilateral hip pain, fall   COMPARISON: Radiographs of the pelvis from March 2024.   ACCESSION NUMBER(S): [id number]; [id number]   ORDERING CLINICIAN: СВЕТЛАНА VASQUEZ   TECHNIQUE: Three-view radiographs of the lumbar spine and additional five view radiographs of the pelvis and both hips were obtained.   FINDINGS: There is mild loss of height involving the L3 and L5 vertebra, however no acute fracture is identified. There is narrowing of the L1-L2, L2-L3, L3-L4 and mostly the L4-L5 disc spaces, with marginal osteophytes. The alignment is anatomic, without spondylolysis or spondylolisthesis.  Hypertrophic changes involve the facet joints of the lower lumbar spine. Calcifications of the aorta and its branches are present.   No acute pelvic fracture is seen. Extensive osteoarthritis involves the right hip joint, with complete obliteration of the joint space, subchondral bone sclerosis, subchondral cysts and marginal osteophytes. Osteoarthritis also involves the left hip joint and to a lesser degree the sacroiliac joints bilaterally. The alignment is anatomic. Vascular calcifications involve iliac and femoral arteries bilaterally.       1. Marked lumbar spondylosis and remote compression deformities of the L3 and L5 vertebra, without acute vertebral fracture or subluxation. 2. Extensive right hip osteoarthritis again noted. Additional degenerative changes in the pelvis as above. No pelvic fracture or subluxation.   Signed by: Bharath Negrete 10/14/2024 11:57 AM Dictation workstation:   [id number]    XR chest 1 view    Result Date: 10/14/2024  Interpreted By:  Bharath Negrete, STUDY: XR CHEST 1 VIEW; 10/14/2024 11:13 am   INDICATION: Signs/Symptoms:Fall rib pain   COMPARISON: August 2024.   ACCESSION NUMBER(S): [id number]   ORDERING CLINICIAN: СВЕТЛАНА VASQUEZ   FINDINGS: The study is limited due to  lordotic projection and overlying artifacts obscuring some detail. The cardiomediastinal silhouette is within normal limits for the technique. Patchy/nodular infiltrates are seen in the right suprahilar region, with mild volume loss of the right upper lobe and elevation of the minor fissure. There is no pneumothorax or significant effusion. Marked degenerative changes involve the shoulders. There is also partially included deformity in the right humeral neck consistent with a healing fracture.       Limited study. Patchy/nodular consolidation in the right upper lobe, possibly pneumonia or atelectasis, however in the setting of injury, lung contusion can not be excluded. Correlate clinically follow-up in 1-2 weeks  to document complete resolution.   Signed by: Bharath Guerranani 10/14/2024 11:44 AM Dictation workstation:   [id number]       Assessment/Plan     Acute on Chronic Low Back Pain and Bilateral Hip Pain / Inability to Ambulate  -Has known history of significant OA with back and hip pain.   -Followed with pain management and attempted hip injections with no relief.   -Unable to have surgery due to her pulmonary status and co-morbidities.   -Will place on scheduled tylenol and lidocaine patch. PRN Oxy for pain for now, monitor respiratory status closely.   -PT/OT evals, anticipate she will need SNF.  -OOB with assist only.   -All imaging done shows OA and degenerative changes, no acute fractures.     COPD   -No wheeze on exam.   -Continue home inhalers.     Chronic Respiratory Failure / RAMON  -Baseline O2 3L NC continuous at home.   -She also uses BiPAP at HS, this was ordered.   -Currently on baseline oxygen. No wheeze on exam.   -CXR with patchy nodular consolidation RUL with known lung mass, she just had PET CT as outpatient and is currently following with her PCP for this.   -Continue home inhalers.     Atrial Fibrillation  -On Eliquis and BB.     HTN  -BP elevated on arrival, likely with relation to pain.   -Continue her home BP medications and monitor BP.   -PRN Hydralazine added.     DVT Risk  -On Eliquis.   -SCDs.     Plan  Case and plan was discussed with the patient, she is agreeable.   Case and plan was also discussed with my collaborating physician, Dr. Laughlin.     CODE STATUS was discussed, she wishes to be DNR CCA, DNI.        I spent 75 minutes in the professional and overall care of this patient.      Rupal Sosa, MOISES-CNP

## 2024-10-14 NOTE — PROGRESS NOTES
10/14/24 1622   Physical Activity   On average, how many days per week do you engage in moderate to strenuous exercise (like a brisk walk)? 0 days   On average, how many minutes do you engage in exercise at this level? 0 min   Financial Resource Strain   How hard is it for you to pay for the very basics like food, housing, medical care, and heating? Not hard   Housing Stability   In the last 12 months, was there a time when you were not able to pay the mortgage or rent on time? N   In the past 12 months, how many times have you moved where you were living? 1   At any time in the past 12 months, were you homeless or living in a shelter (including now)? N   Transportation Needs   In the past 12 months, has lack of transportation kept you from medical appointments or from getting medications? no   In the past 12 months, has lack of transportation kept you from meetings, work, or from getting things needed for daily living? No   Food Insecurity   Within the past 12 months, you worried that your food would run out before you got the money to buy more. Never true   Within the past 12 months, the food you bought just didn't last and you didn't have money to get more. Never true   Stress   Do you feel stress - tense, restless, nervous, or anxious, or unable to sleep at night because your mind is troubled all the time - these days? To some exte  (is on medication)   Social Connections   In a typical week, how many times do you talk on the phone with family, friends, or neighbors? More than 3   How often do you get together with friends or relatives? More than 3   How often do you attend Voodoo or Hoahaoism services? More than 4   Do you belong to any clubs or organizations such as Voodoo groups, unions, fraternal or athletic groups, or school groups? No   How often do you attend meetings of the clubs or organizations you belong to? Never   Are you , , , , never , or living with a  partner?    Intimate Partner Violence   Within the last year, have you been afraid of your partner or ex-partner? No   Within the last year, have you been humiliated or emotionally abused in other ways by your partner or ex-partner? No   Within the last year, have you been kicked, hit, slapped, or otherwise physically hurt by your partner or ex-partner? No   Within the last year, have you been raped or forced to have any kind of sexual activity by your partner or ex-partner? No   Alcohol Use   Q2: How many drinks containing alcohol do you have on a typical day when you are drinking? None   Q3: How often do you have six or more drinks on one occasion? Never   Utilities   In the past 12 months has the electric, gas, oil, or water company threatened to shut off services in your home? No   Health Literacy   How often do you need to have someone help you when you read instructions, pamphlets, or other written material from your doctor or pharmacy? Never

## 2024-10-14 NOTE — ED PROVIDER NOTES
HPI   Chief Complaint   Patient presents with    Back Pain     Increased worsening back pain and more frequent falls. Fell yesterday with -LOC. No deformities or outward rotation noted     Hip Pain       HPI  Patient is a 69-year-old female with history of COPD on 3 L nasal cannula, atrial fibrillation presenting for evaluation of back pain and bilateral hip pain worsening for the past few days.  Patient states that she has bone-on-bone hips and the surgeon will not operate on her and her gait has gotten steadily worse due to the pain in her low back and hips.  She states that since Saturday now the pain has gotten to bed where she cannot even ambulate with her walker.  She states she has fallen 3 times in the past couple weeks.  She endorses bilateral hip pain, low back pain, left knee pain and also left-sided rib pain.  Denies chest pain or shortness of breath.  She denies abdominal pain.  Denies any saddle anesthesia, fevers or chills or urinary incontinence.       Patient History   Past Medical History:   Diagnosis Date    Alcohol dependence, in remission     History of alcohol dependence    Anemia     iron deficiency    Arthritis     Atrial fibrillation (Multi)     COPD (chronic obstructive pulmonary disease) (Multi)     Dyspnea     Edentulous     History of blood transfusion     1/2024 lower GI bleed    no reaction    HTN (hypertension)     Lung nodule     RAMON on CPAP     with O2 3L n/c    Oxygen deficiency     uses home O2 3L n/c continuously    Personal history of other venous thrombosis and embolism     History of deep venous thrombosis right leg    Personal history of suicidal behavior     History of suicide attempt    Uses roller walker      Past Surgical History:   Procedure Laterality Date    ANKLE FRACTURE SURGERY      MR HEAD ANGIO WO IV CONTRAST  10/24/2017    MR HEAD ANGIO WO IV CONTRAST [facility]    MR NECK ANGIO WO IV CONTRAST  10/24/2017    MR NECK ANGIO WO IV CONTRAST [facility]    OTHER SURGICAL HISTORY Right 06/15/2017    Treatment Of Ankle Fracture    TONSILLECTOMY  06/15/2017    Tonsillectomy     Family History   Problem Relation Name Age of Onset    Accidental death Mother      Stroke Father      Other (cerbral hemmorrhage) Father      Cancer Sister      Lung cancer Sister      Colon cancer Brother      Cancer Brother      Lung disease Brother      Heart attack Brother       Social History     Tobacco Use    Smoking status: Some Days     Current packs/day: 0.00     Average packs/day: 0.3 packs/day for 53.8 years (13.5 ttl pk-yrs)     Types: Cigarettes     Start date:      Last attempt to quit: 10/28/2023     Years since quittin.9     Passive exposure: Never    Smokeless tobacco: Never   Vaping Use    Vaping status: Never Used   Substance Use Topics    Alcohol use: Not Currently     Comment: 3-5 beers 3-4x a week    Drug use: Never       Physical Exam   ED Triage Vitals [10/14/24 0942]   Temperature Heart Rate Respirations BP   36.6 °C (97.8 °F) 94 18 158/67      Pulse Ox Temp Source Heart Rate Source Patient Position   97 % Oral Monitor --      BP Location FiO2 (%)     -- --       Physical Exam  Vitals and nursing note reviewed.   Constitutional:       General: She is not in acute distress.     Appearance: She is well-developed.      Comments: Somewhat disheveled but otherwise in no acute distress   HENT:      Head: Normocephalic and atraumatic.   Eyes:      Conjunctiva/sclera: Conjunctivae normal.   Cardiovascular:      Rate and Rhythm: Normal rate and regular rhythm.      Heart sounds: No murmur heard.  Pulmonary:      Effort: Pulmonary effort is normal. No respiratory distress.      Breath sounds: Normal breath sounds.   Abdominal:      Palpations: Abdomen is soft.      Tenderness: There is no abdominal tenderness.   Musculoskeletal:         General: No swelling.      Cervical back: Neck supple.      Comments: Tenderness to palpation at bilateral hips and the left  knee   Skin:     General: Skin is warm and dry.      Capillary Refill: Capillary refill takes less than 2 seconds.   Neurological:      Mental Status: She is alert.   Psychiatric:         Mood and Affect: Mood normal.           ED Course & MDM   Diagnoses as of 10/16/24 0812   Chronic low back pain without sciatica, unspecified back pain laterality   Inability to ambulate due to hip                 No data recorded     [place] Coma Scale Score: 15 (10/14/24 1516 : Sherine Mcgarry RN)                           Medical Decision Making  Parts of this chart have been completed using voice recognition software. Please excuse any errors of transcription.  My thought process and reason for plan has been formulated from the time that I saw the patient until the time of disposition and is not specific to one specific moment during their visit and furthermore my MDM encompasses this entire chart and not only this text box.      HPI: Detailed above.    Exam: A medically appropriate exam performed, outlined above, given the known history and presentation.    History obtained from: Patient    Social Determinants of Health considered during this visit: Lives independently with daughter    Medications given during visit:  Medications   oxyCODONE (Roxicodone) immediate release tablet 5 mg (5 mg oral Given 10/15/24 1037)   famotidine (Pepcid) tablet 20 mg (20 mg oral Given 10/15/24 0822)   albuterol 90 mcg/actuation inhaler 2 puff (has no administration in time range)   apixaban (Eliquis) tablet 5 mg (5 mg oral Given 10/16/24 0317)   bisoprolol (Zebeta) tablet 5 mg (5 mg oral Given 10/15/24 2033)   escitalopram (Lexapro) tablet 10 mg (10 mg oral Given 10/15/24 0822)   gabapentin (Neurontin) capsule 300 mg (300 mg oral Given 10/15/24 2034)   mirtazapine (Remeron) tablet 15 mg (15 mg oral Given 10/15/24 2034)   oxygen (O2) therapy (3 L/min inhalation Start 10/15/24 2300)   primidone (Mysoline) tablet 200 mg (200 mg oral Given  10/15/24 2034)   tiotropium (Spiriva Respimat) 2.5 mcg/actuation inhaler 2 puff (2 puffs inhalation Given 10/15/24 0805)     And   fluticasone furoate-vilanteroL (Breo Ellipta) 200-25 mcg/dose inhaler 1 puff (1 puff inhalation Given 10/15/24 0806)   ondansetron ODT (Zofran-ODT) disintegrating tablet 4 mg ( oral See Alternative 10/15/24 0924)     Or   ondansetron (Zofran) injection 4 mg (4 mg intravenous Given 10/15/24 0924)   polyethylene glycol (Glycolax, Miralax) packet 17 g (17 g oral Given 10/14/24 1812)   oxygen (O2) therapy (3 L/min inhalation Start 10/15/24 1929)   hydrALAZINE (Apresoline) injection 5 mg (5 mg intravenous Given 10/14/24 1548)   lidocaine 4 % patch 2 patch (0 patches transdermal Medication Removed 10/16/24 0035)   acetaminophen (Tylenol) tablet 1,000 mg (has no administration in time range)   amiodarone (Pacerone) tablet 400 mg (has no administration in time range)   ketorolac (Toradol) injection 15 mg (15 mg intravenous Given 10/14/24 1034)   acetaminophen (Tylenol) tablet 975 mg (975 mg oral Given 10/14/24 1033)   acetaminophen (Tylenol) tablet 1,000 mg (1,000 mg oral Given 10/16/24 0610)   digoxin (Lanoxin) injection 500 mcg (500 mcg intravenous Given 10/15/24 1513)        Diagnostic/tests  Labs Reviewed   CBC WITH AUTO DIFFERENTIAL - Abnormal       Result Value    WBC 6.2      nRBC 0.0      RBC 3.40 (*)     Hemoglobin 10.8 (*)     Hematocrit 34.4 (*)      (*)     MCH 31.8      MCHC 31.4 (*)     RDW 16.8 (*)     Platelets 311      Neutrophils % 75.6      Immature Granulocytes %, Automated 0.5      Lymphocytes % 13.8      Monocytes % 9.2      Eosinophils % 0.6      Basophils % 0.3      Neutrophils Absolute 4.70      Immature Granulocytes Absolute, Automated 0.03      Lymphocytes Absolute 0.86      Monocytes Absolute 0.57      Eosinophils Absolute 0.04      Basophils Absolute 0.02     URINALYSIS WITH REFLEX MICROSCOPIC - Abnormal    Color, Urine Light-Yellow      Appearance, Urine  Clear      Specific Gravity, Urine 1.020      pH, Urine 6.0      Protein, Urine 600 (3+) (*)     Glucose, Urine Normal      Blood, Urine 0.2 (2+) (*)     Ketones, Urine 20 (1+) (*)     Bilirubin, Urine NEGATIVE      Urobilinogen, Urine Normal      Nitrite, Urine NEGATIVE      Leukocyte Esterase, Urine NEGATIVE     CBC - Abnormal    WBC 8.5      nRBC 0.0      RBC 3.21 (*)     Hemoglobin 10.4 (*)     Hematocrit 33.8 (*)      (*)     MCH 32.4      MCHC 30.8 (*)     RDW 16.7 (*)     Platelets 303     BASIC METABOLIC PANEL - Abnormal    Glucose 76      Sodium 135 (*)     Potassium 4.1      Chloride 101      Bicarbonate 22      Anion Gap 16      Urea Nitrogen 23      Creatinine 0.75      eGFR 85      Calcium 8.9     CBC WITH AUTO DIFFERENTIAL - Abnormal    WBC 5.1      nRBC 0.0      RBC 2.99 (*)     Hemoglobin 9.5 (*)     Hematocrit 28.8 (*)     MCV 96      MCH 31.8      MCHC 33.0      RDW 16.1 (*)     Platelets 279      Neutrophils % 60.0      Immature Granulocytes %, Automated 0.2      Lymphocytes % 20.2      Monocytes % 12.9      Eosinophils % 6.1      Basophils % 0.6      Neutrophils Absolute 3.06      Immature Granulocytes Absolute, Automated 0.01      Lymphocytes Absolute 1.03      Monocytes Absolute 0.66      Eosinophils Absolute 0.31      Basophils Absolute 0.03     BASIC METABOLIC PANEL - Abnormal    Glucose 129 (*)     Sodium 134 (*)     Potassium 3.8      Chloride 103      Bicarbonate 23      Anion Gap 12      Urea Nitrogen 37 (*)     Creatinine 1.31 (*)     eGFR 44 (*)     Calcium 8.4 (*)    BLOOD CULTURE - Normal    Blood Culture Loaded on Instrument - Culture in progress     BLOOD CULTURE - Normal    Blood Culture Loaded on Instrument - Culture in progress     COMPREHENSIVE METABOLIC PANEL - Normal    Glucose 88      Sodium 137      Potassium 4.3      Chloride 103      Bicarbonate 23      Anion Gap 15      Urea Nitrogen 18      Creatinine 0.78      eGFR 81      Calcium 9.4      Albumin 4.2       Alkaline Phosphatase 85      Total Protein 7.6      AST 15      Bilirubin, Total 0.4      ALT 8     SARS-COV-2 PCR - Normal    Coronavirus 2019, PCR Not Detected      Narrative:     This assay has received FDA Emergency Use Authorization (EUA) and is only authorized for the duration of time that circumstances exist to justify the authorization of the emergency use of in vitro diagnostic tests for the detection of SARS-CoV-2 virus and/or diagnosis of COVID-19 infection under section 564(b)(1) of the Act, 21 U.S.C. 360bbb-3(b)(1). This assay is an in vitro diagnostic nucleic acid amplification test for the qualitative detection of SARS-CoV-2 from nasopharyngeal specimens and has been validated for use at [facility]. Negative results do not preclude COVID-19 infections and should not be used as the sole basis for diagnosis, treatment, or other management decisions.     INFLUENZA A AND B PCR - Normal    Flu A Result Not Detected      Flu B Result Not Detected      Narrative:     This assay is an in vitro diagnostic multiplex nucleic acid amplification test for the detection and discrimination of Influenza A & B from nasopharyngeal specimens, and has been validated for use at [facility]. Negative results do not preclude Influenza A/B infections, and should not be used as the sole basis for diagnosis, treatment, or other management decisions. If Influenza A/B and RSV PCR results are negative, testing for Parainfluenza virus, Adenovirus and Metapneumovirus is routinely performed for [facility] [place]ology and intensive care inpatients, and is available on other patients by placing an add-on request.   MICROSCOPIC ONLY, URINE    WBC, Urine NONE      RBC, Urine NONE      Squamous Epithelial Cells, Urine 1-9 (SPARSE)        XR chest 1 view   Final Result   Limited study. Patchy/nodular consolidation in the right upper lobe,   possibly pneumonia or atelectasis, however in the  setting of injury,   lung contusion can not be excluded. Correlate clinically follow-up in   1-2 weeks to document complete resolution.        Signed by: Bharath Negrete 10/14/2024 11:44 AM   Dictation workstation:   [id number]      XR lumbar spine 2-3 views   Final Result   1. Marked lumbar spondylosis and remote compression deformities of   the L3 and L5 vertebra, without acute vertebral fracture or   subluxation.   2. Extensive right hip osteoarthritis again noted. Additional   degenerative changes in the pelvis as above. No pelvic fracture or   subluxation.        Signed by: Bharath Negrete 10/14/2024 11:57 AM   Dictation workstation:   [id number]      XR knee left 4+ views   Final Result   Unremarkable left knee radiographs.        Signed by: Bharath Negrete 10/14/2024 11:58 AM   Dictation workstation:   [id number]      XR hips bilateral 5+ VW w pelvis when performed   Final Result   1. Marked lumbar spondylosis and remote compression deformities of   the L3 and L5 vertebra, without acute vertebral fracture or   subluxation.   2. Extensive right hip osteoarthritis again noted. Additional   degenerative changes in the pelvis as above. No pelvic fracture or   subluxation.        Signed by: Bharath Negrete 10/14/2024 11:57 AM   Dictation workstation:   [id number]      CT chest wo IV contrast    (Results Pending)   Transthoracic Echo (TTE) Complete    (Results Pending)        Considerations/further MDM:  Patient is a 69-year-old female presenting for evaluation of back pain, hip pain, failure to thrive    Patient is hemodynamically stable during the visit.  She appears slightly disheveled and uncomfortable on exam but otherwise no acute distress.  The patient is moving bilateral lower extremities and has no red flag symptoms.  She is neurovascularly intact bilaterally.  She has intact patellar deep tendon reflexes bilaterally.  Laboratory workup is with chronic anemia but otherwise unremarkable.  Urinalysis is  without evidence of urinary tract infection.  X-rays of the hips, pelvis, knee and lumbar spine were performed without acute process such as fracture or dislocation.  Chest x-ray without pneumonia, pneumothorax or findings consistent with CHF.  Patient states she does not feel that her daughter can take care of her at home and would like to be at a nursing facility given her ongoing pain and failure to thrive.  I have low suspicion for cauda equina syndrome or spinal epidural abscess.  She is admitted to the hospital with plan for PT OT evaluation and skilled rehab admission.  She is agreeable with this plan of care.    Procedure  Procedures     Juana Gaytan PA-C  10/16/24 0812

## 2024-10-14 NOTE — PROGRESS NOTES
10/14/24 1621   [facility] Disability Status   Are you deaf or do you have serious difficulty hearing? N   Are you blind or do you have serious difficulty seeing, even when wearing glasses? N   Because of a physical, mental, or emotional condition, do you have serious difficulty concentrating, remembering, or making decisions? (5 years old or older) Y   Do you have serious difficulty walking or climbing stairs? Y   Do you have serious difficulty dressing or bathing? Y   Because of a physical, mental, or emotional condition, do you have serious difficulty doing errands alone such as visiting the doctor? Y

## 2024-10-14 NOTE — PROGRESS NOTES
"   10/14/24 1612   Discharge Planning   Living Arrangements Children   Support Systems Children;Family members   Assistance Needed Needing more assistance with ADLs and functional transfers, Needs assistance with homemaking. Receives help from her family.Patient states she is having difficulty ambulating, weakness   Type of Residence Private residence  (multi level home uses the main level)   Number of Stairs to Enter Residence 0   Number of Stairs Within Residence 0   Do you have animals or pets at home? Yes   Type of Animals or Pets 5 cats, 3 dogs   Who is requesting discharge planning? Provider   Type of Home Care Services DME or oxygen  (She also uses BiPAP at ,)   Expected Discharge Disposition SNF   Does the patient need discharge transport arranged? Yes   RoundTrip coordination needed? Yes   Has discharge transport been arranged? No   Financial Resource Strain   How hard is it for you to pay for the very basics like food, housing, medical care, and heating? Not hard   Housing Stability   In the last 12 months, was there a time when you were not able to pay the mortgage or rent on time? N   In the past 12 months, how many times have you moved where you were living? 1   At any time in the past 12 months, were you homeless or living in a shelter (including now)? N   Transportation Needs   In the past 12 months, has lack of transportation kept you from medical appointments or from getting medications? no   In the past 12 months, has lack of transportation kept you from meetings, work, or from getting things needed for daily living? No   Patient Choice   Provider Choice list and CMS website (https://medicare.gov/care-compare#search) for post-acute Quality and Resource Measure Data were provided and reviewed with: Patient;Family   Patient / Family choosing to utilize agency / facility established prior to hospitalization Yes     Kamila Jones \"Crystal\" is a 69 y.o. female presenting with worsening low back and " bilateral hip pain with falls. Patient has had issues with chronic pain for quite some time with severe OA of her R hip. She was supposed to undergo R AMINA and surgery was cancelled due to her chronic respiratory disease and other co-morbidities.   She was seeing pain management and did attempt hip injections with no improvement, feels the last one she had back in June actually made her feel worse. She was hospitalized back in August with pneumonia and went to SNF where she states she was there a few weeks and was doing better, she was up ambulating with a walker and returned home with her daughter. She has progressively declined and has had 3 falls in the past week or so.  Patient seen at bedside, introduced myself and reason for visit. Patient currently lives with daughter/family in a Multi-level home (Able to live on main level with bedroom/bathroom). Uses 2 steps to enter without rails. Uses a Walker rolling or standard (rollator). Has a standard shower with a shower chair with back. Needing increased assistance with ADLs and functional transfers, Needs assistance with homemaking. Receives help from her family. +Falls.   Patient has home oxygen and patient wears 3L O2 via [place]. Patient also wears a Bi-pap.  Patient expressed need to go to skilled rehab and is in agreement. Patient has been at [facility] before and does not want to go back there, food was not good. List of choices to be given to patient.     PLAN: Skilled rehab, needs choices and accepting facility, will need precert, Has [facility] Dual.

## 2024-10-15 ENCOUNTER — APPOINTMENT (OUTPATIENT)
Dept: RADIOLOGY | Facility: HOSPITAL | Age: 69
DRG: 177 | End: 2024-10-15
Payer: COMMERCIAL

## 2024-10-15 LAB
ANION GAP SERPL CALCULATED.3IONS-SCNC: 16 MMOL/L (ref 10–20)
BUN SERPL-MCNC: 23 MG/DL (ref 6–23)
CALCIUM SERPL-MCNC: 8.9 MG/DL (ref 8.6–10.3)
CHLORIDE SERPL-SCNC: 101 MMOL/L (ref 98–107)
CO2 SERPL-SCNC: 22 MMOL/L (ref 21–32)
CREAT SERPL-MCNC: 0.75 MG/DL (ref 0.5–1.05)
EGFRCR SERPLBLD CKD-EPI 2021: 85 ML/MIN/1.73M*2
ERYTHROCYTE [DISTWIDTH] IN BLOOD BY AUTOMATED COUNT: 16.7 % (ref 11.5–14.5)
FLUAV RNA RESP QL NAA+PROBE: NOT DETECTED
FLUBV RNA RESP QL NAA+PROBE: NOT DETECTED
GLUCOSE SERPL-MCNC: 76 MG/DL (ref 74–99)
HCT VFR BLD AUTO: 33.8 % (ref 36–46)
HGB BLD-MCNC: 10.4 G/DL (ref 12–16)
MCH RBC QN AUTO: 32.4 PG (ref 26–34)
MCHC RBC AUTO-ENTMCNC: 30.8 G/DL (ref 32–36)
MCV RBC AUTO: 105 FL (ref 80–100)
NRBC BLD-RTO: 0 /100 WBCS (ref 0–0)
PLATELET # BLD AUTO: 303 X10*3/UL (ref 150–450)
POTASSIUM SERPL-SCNC: 4.1 MMOL/L (ref 3.5–5.3)
RBC # BLD AUTO: 3.21 X10*6/UL (ref 4–5.2)
SARS-COV-2 RNA RESP QL NAA+PROBE: NOT DETECTED
SODIUM SERPL-SCNC: 135 MMOL/L (ref 136–145)
WBC # BLD AUTO: 8.5 X10*3/UL (ref 4.4–11.3)

## 2024-10-15 PROCEDURE — 99232 SBSQ HOSP IP/OBS MODERATE 35: CPT | Performed by: NURSE PRACTITIONER

## 2024-10-15 PROCEDURE — 36415 COLL VENOUS BLD VENIPUNCTURE: CPT | Performed by: NURSE PRACTITIONER

## 2024-10-15 PROCEDURE — 99222 1ST HOSP IP/OBS MODERATE 55: CPT | Performed by: NURSE PRACTITIONER

## 2024-10-15 PROCEDURE — 9420000001 HC RT PATIENT EDUCATION 5 MIN

## 2024-10-15 PROCEDURE — 80048 BASIC METABOLIC PNL TOTAL CA: CPT | Performed by: NURSE PRACTITIONER

## 2024-10-15 PROCEDURE — 94660 CPAP INITIATION&MGMT: CPT

## 2024-10-15 PROCEDURE — 94664 DEMO&/EVAL PT USE INHALER: CPT

## 2024-10-15 PROCEDURE — 99222 1ST HOSP IP/OBS MODERATE 55: CPT | Performed by: INTERNAL MEDICINE

## 2024-10-15 PROCEDURE — G0378 HOSPITAL OBSERVATION PER HR: HCPCS

## 2024-10-15 PROCEDURE — 2500000001 HC RX 250 WO HCPCS SELF ADMINISTERED DRUGS (ALT 637 FOR MEDICARE OP): Performed by: NURSE PRACTITIONER

## 2024-10-15 PROCEDURE — 94760 N-INVAS EAR/PLS OXIMETRY 1: CPT

## 2024-10-15 PROCEDURE — 71250 CT THORAX DX C-: CPT

## 2024-10-15 PROCEDURE — 97161 PT EVAL LOW COMPLEX 20 MIN: CPT | Mod: GP

## 2024-10-15 PROCEDURE — 97165 OT EVAL LOW COMPLEX 30 MIN: CPT | Mod: GO

## 2024-10-15 PROCEDURE — 87636 SARSCOV2 & INF A&B AMP PRB: CPT | Performed by: NURSE PRACTITIONER

## 2024-10-15 PROCEDURE — 87040 BLOOD CULTURE FOR BACTERIA: CPT | Mod: TRILAB | Performed by: NURSE PRACTITIONER

## 2024-10-15 PROCEDURE — 97535 SELF CARE MNGMENT TRAINING: CPT | Mod: GO

## 2024-10-15 PROCEDURE — 2500000004 HC RX 250 GENERAL PHARMACY W/ HCPCS (ALT 636 FOR OP/ED): Performed by: NURSE PRACTITIONER

## 2024-10-15 PROCEDURE — 85027 COMPLETE CBC AUTOMATED: CPT | Performed by: NURSE PRACTITIONER

## 2024-10-15 PROCEDURE — 2500000005 HC RX 250 GENERAL PHARMACY W/O HCPCS: Performed by: NURSE PRACTITIONER

## 2024-10-15 PROCEDURE — 94640 AIRWAY INHALATION TREATMENT: CPT

## 2024-10-15 PROCEDURE — 2500000002 HC RX 250 W HCPCS SELF ADMINISTERED DRUGS (ALT 637 FOR MEDICARE OP, ALT 636 FOR OP/ED): Performed by: NURSE PRACTITIONER

## 2024-10-15 RX ORDER — LIDOCAINE 560 MG/1
2 PATCH PERCUTANEOUS; TOPICAL; TRANSDERMAL DAILY
Status: DISCONTINUED | OUTPATIENT
Start: 2024-10-15 | End: 2024-10-23 | Stop reason: HOSPADM

## 2024-10-15 RX ORDER — DIGOXIN 0.25 MG/ML
500 INJECTION INTRAMUSCULAR; INTRAVENOUS ONCE
Status: COMPLETED | OUTPATIENT
Start: 2024-10-15 | End: 2024-10-15

## 2024-10-15 ASSESSMENT — ACTIVITIES OF DAILY LIVING (ADL)
HOME_MANAGEMENT_TIME_ENTRY: 15
BATHING_ASSISTANCE: MAXIMAL

## 2024-10-15 ASSESSMENT — COGNITIVE AND FUNCTIONAL STATUS - GENERAL
TURNING FROM BACK TO SIDE WHILE IN FLAT BAD: A LOT
DRESSING REGULAR UPPER BODY CLOTHING: A LOT
DRESSING REGULAR LOWER BODY CLOTHING: A LOT
STANDING UP FROM CHAIR USING ARMS: A LITTLE
HELP NEEDED FOR BATHING: A LOT
PERSONAL GROOMING: A LOT
WALKING IN HOSPITAL ROOM: A LOT
TOILETING: A LOT
MOVING TO AND FROM BED TO CHAIR: A LOT
CLIMB 3 TO 5 STEPS WITH RAILING: A LOT
EATING MEALS: A LITTLE
DAILY ACTIVITIY SCORE: 13
MOVING FROM LYING ON BACK TO SITTING ON SIDE OF FLAT BED WITH BEDRAILS: A LITTLE
MOBILITY SCORE: 14

## 2024-10-15 ASSESSMENT — ENCOUNTER SYMPTOMS
NEUROLOGICAL NEGATIVE: 1
GASTROINTESTINAL NEGATIVE: 1
MUSCULOSKELETAL NEGATIVE: 1
ALLERGIC/IMMUNOLOGIC NEGATIVE: 1
PSYCHIATRIC NEGATIVE: 1
CONSTITUTIONAL NEGATIVE: 1
EYES NEGATIVE: 1
CARDIOVASCULAR NEGATIVE: 1
RESPIRATORY NEGATIVE: 1
HEMATOLOGIC/LYMPHATIC NEGATIVE: 1
ENDOCRINE NEGATIVE: 1

## 2024-10-15 ASSESSMENT — PAIN SCALES - GENERAL
PAINLEVEL_OUTOF10: 7
PAINLEVEL_OUTOF10: 0 - NO PAIN
PAINLEVEL_OUTOF10: 0 - NO PAIN
PAINLEVEL_OUTOF10: 8
PAINLEVEL_OUTOF10: 7
PAINLEVEL_OUTOF10: 0 - NO PAIN

## 2024-10-15 ASSESSMENT — PAIN - FUNCTIONAL ASSESSMENT
PAIN_FUNCTIONAL_ASSESSMENT: 0-10

## 2024-10-15 ASSESSMENT — PAIN DESCRIPTION - ORIENTATION: ORIENTATION: LEFT

## 2024-10-15 ASSESSMENT — PAIN DESCRIPTION - LOCATION: LOCATION: SHOULDER

## 2024-10-15 NOTE — PROGRESS NOTES
"Occupational Therapy    Evaluation & Treatment    Patient Name: Kamila Jones \"Crystal\"  MRN: [id number]  Department: [facility] 3 S  Room: 308[facility]  Today's Date: 10/15/2024  Time Calculation  Start Time: 0904  Stop Time: 0934  Time Calculation (min): 30 min    Assessment  IP OT Assessment  OT Assessment: 70 y/o female here with LBP, inability to ambulate, fever. On eval, she requires increased assist for all xfers, mobility and self care d/t decreased strength, balance, activity tolerance, and feelings of generalized malise. Skilled OT services are required to maximize safety/IND prior to DC  Prognosis: Good  Barriers to Discharge: Other (Comment) (high fall risk. decreased activity tolerance)  Evaluation/Treatment Tolerance: Treatment limited secondary to medical complications (Comment) (+ nausea/emesis)  Medical Staff Made Aware: Yes  End of Session Communication: Bedside nurse  End of Session Patient Position: Bed, 3 rail up, Alarm on  Plan:  Treatment Interventions: ADL retraining, Functional transfer training, UE strengthening/ROM, Endurance training, Patient/family training, Equipment evaluation/education, Neuromuscular reeducation, Compensatory technique education  OT Frequency: 3 times per week  OT Discharge Recommendations: Moderate intensity level of continued care  Equipment Recommended upon Discharge: Wheeled walker  OT Recommended Transfer Status: Assist of 2  OT - OK to Discharge: Yes    Subjective   Current Problem:  1. Chronic low back pain without sciatica, unspecified back pain laterality        2. Inability to ambulate due to hip          General:  General  Reason for Referral: Decreased ADLS, LBP, fever  Referred By: GIOVANNI Sosa APRN-CNP  Past Medical History Relevant to Rehab: chronic LBP, hip OA, chronic resp failure, ETOH hx, anemia, a-fib, COPD, HTN, RAMON, DVT, ankle fx repair, suicide attempt  Family/Caregiver Present: No  Co-Treatment: PT  Co-Treatment Reason: partial co treat with PT for safe " advancement of mobility  Prior to Session Communication: Bedside nurse  Patient Position Received: Bed, 3 rail up, Alarm on  Preferred Learning Style: visual, verbal  General Comment: Cleared by nsg, pt met in supine, agreeable to OT session  Precautions:  Medical Precautions: Fall precautions, Oxygen therapy device and L/min  Precautions Comment: on 3L O2 via NC    Pain:  Pain Assessment  Pain Assessment: 0-10  0-10 (Numeric) Pain Score: 7  Pain Type: Chronic pain  Pain Location: Back (& hips and Lt upper chest region)  Pain Interventions: Repositioned (nsg aware. pt with large episode of emesis so nsg waiting until anti nausea meds alievate symptoms)    Objective   Cognition:  Overall Cognitive Status: Within Functional Limits  Cognition Comments: pleasant, cooperative.  slightly anxious at times    Home Living:  Type of Home: House  Lives With: Adult children (dtr and son in law)  Home Adaptive Equipment: Walker rolling or standard (2ww and rollator)  Home Layout: Multi-level, Able to live on main level with bedroom/bathroom  Home Access: Stairs to enter without rails  Entrance Stairs-Rails: None  Entrance Stairs-Number of Steps: 3  Bathroom Shower/Tub: Tub/shower unit  Bathroom Toilet: Standard  Bathroom Equipment: Grab bars in shower, Shower chair with back  Home Living Comments: dtr and [employer]-I-[employer] work; pt home alone at those times. Does have an aide to assist w/ showering     Prior Function:  Level of [place]: Needs assistance with ADLs, Needs assistance with homemaking, Needs assistance with functional transfers  Receives Help From: Family, Personal care attendant  ADL Assistance:  (assist for bathing 2x/week)  Homemaking Assistance:  (family manages)  Ambulatory Assistance:  (was initially doing well with FWW, had a significant decline in the last week, unable to ambulate or stand, fell 3x in 1 week)  Prior Function Comments: Pt w/ decline since home from rehab in 6/24. Pt sleeps in a regular bed, has had 3  falls in the past week, uses 3L O2 24/7, BiPAP at night. Pt has seen pain mamagement in the past w/o relief.    ADL:  Eating Assistance: Stand by  Eating Deficit: Setup  Grooming Assistance: Minimal  Grooming Deficit:  (anticipated for thorough hair brushing)  Bathing Assistance: Maximal  Bathing Deficit:  (max A for distal LEs and buttocks  in stance)  UE Dressing Assistance: Moderate  UE Dressing Deficit:  (to doff pull over shirt d/t c/o Lt sided shoulder/chest pain)  LE Dressing Assistance: Maximal  LE Dressing Deficit: Don/doff R sock, Don/doff L sock  Toileting Assistance with Device: Maximal  Toileting Deficit:  (max A for thorough posterior hygiene acts in stance after BM at [facility])  ADL Comments: UB/LB ADLS completed c/ pt seated supported at [facility] today. Patient with large episode of emesis, requiring assist for hygiene, gown mgmt. nsg informed and aware; in room to provide IV anti nausea meds    Activity Tolerance:  Endurance: Decreased tolerance for upright activites  Activity Tolerance Comments: fatigues quickly  Rate of Perceived Dyspnea (RPD): decreased due to weakness, nausea w/ + emesis during session, had been feverish earlier    Bed Mobility/Transfers:   Bed Mobility  Bed Mobility: Yes  Bed Mobility 1  Bed Mobility 1: Supine to sitting  Level of Assistance 1: Moderate assistance  Bed Mobility Comments 1: for trunk up, use of draw sheet to scoot towards EOB  Bed Mobility 2  Bed Mobility  2: Sitting to supine  Level of Assistance 2: Minimum assistance  Bed Mobility Comments 2: for safe and controlled descent    Transfers  Transfer: Yes  Transfer 1  Transfer From 1: Bed to  Transfer to 1: Stand  Technique 1: Sit to stand, Stand to sit  Transfer Device 1: Walker  Transfer Level of Assistance 1: Minimum assistance, +2  Trials/Comments 1: for STS from EOB with cues on walker mgmt and weight shifting  Transfers 2  Transfer From 2: Bed to  Transfer to 2: Commode-standard  Technique 2: Stand pivot  Transfer  Device 2: Walker  Transfer Level of Assistance 2: Minimum assistance, +2  Trials/Comments 2: effortful. slowed dipika. requires cues on body positioning and hand placement prior to descent  Transfers 3  Transfer From 3: Commode-standard to  Transfer to 3: Sit, Stand  Technique 3: Stand to sit, Sit to stand  Transfer Device 3: Walker  Transfer Level of Assistance 3: Minimum assistance, +2  Trials/Comments 3: with cues on hand placement    Functional Mobility:  Functional Mobility  Functional Mobility Performed: No    Sitting Balance:  Static Sitting Balance  Static Sitting-Balance Support: Feet supported, Bilateral upper extremity supported  Static Sitting-Level of Assistance: Close supervision  Static Sitting-Comment/Number of Minutes: EOB sitting    Vision: Vision - Basic Assessment  Current Vision: Wears glasses only for reading  Sensation:  Light Touch: Partial deficits in the RLE, Partial deficits in the LLE (chronic)  Strength:  Strength Comments: BUEs at least >/= 3/5, observed functionally  Coordination:  Movements are Fluid and Coordinated: No  Upper Body Coordination: impaired  Lower Body Coordination: impaired   Hand Function:  Hand Function  Gross Grasp: Functional  Coordination: Impaired  Extremities: RUE   RUE : Exceptions to WFL (generalized weakness) and LUE   LUE: Exceptions to WFL (generalized weakness)    Outcome Measures: [facility] Daily Activity  Putting on and taking off regular lower body clothing: A lot  Bathing (including washing, rinsing, drying): A lot  Putting on and taking off regular upper body clothing: A lot  Toileting, which includes using toilet, bedpan or urinal: A lot  Taking care of personal grooming such as brushing teeth: A lot  Eating Meals: A little  Daily Activity - Total Score: 13      Education Documentation  Body Mechanics, taught by Omar Gonzales OT at 10/15/2024 11:11 AM.  Learner: Patient  Readiness: Acceptance  Method: Explanation  Response: Needs  Reinforcement    Precautions, taught by Omar Gonzales OT at 10/15/2024 11:11 AM.  Learner: Patient  Readiness: Acceptance  Method: Explanation  Response: Needs Reinforcement    ADL Training, taught by Omar Gonzales OT at 10/15/2024 11:11 AM.  Learner: Patient  Readiness: Acceptance  Method: Explanation  Response: Needs Reinforcement    Education Comments  No comments found.      Goals:   Encounter Problems       Encounter Problems (Active)       OT Goals       ADLS (Progressing)       Start:  10/15/24    Expected End:  10/29/24       Patient will complete ADL tasks, with supervision using AE need in order to increase patient's safety and independence with self-care tasks.           Functional Transfers (Progressing)       Start:  10/15/24    Expected End:  10/29/24       Patient will complete functional transfers with supervision using least restrictive device, in order to increase patient's safety and independence with daily tasks.          Activity Tolerance (Progressing)       Start:  10/15/24    Expected End:  10/29/24       Patient will demonstrate the ability to participate in functional activity at least >/= 20 minutes in order to increase patient's safety and independence with daily tasks.

## 2024-10-15 NOTE — CONSULTS
Inpatient consult to Pulmonology  Consult performed by: MILES Nunez  Consult ordered by: MILES Santiago  Reason for consult: Abnormal chest imaging, fevers          Reason For Consult  Abnormal chest imaging, fevers     History Of Present Illness  Crystal Jones is a 69 y.o. female with a past medical history that includes but is not limited to COPD; on 3 L nasal cannula continuously, spiculated right lower lobe lung nodule, obstructive sleep apnea and chronic right hip pain secondary to severe osteoarthritis who presented to [facility] for evaluation of worsening bilateral hip pain, and low back pain.  She was scheduled for right total hip arthroscopy however surgery was canceled secondary to chronic hypoxic respiratory failure and other comorbidities.  She was discharged from [facility] to rehab on 8/17 after a 3-day hospitalization for pneumonia.    Past Medical History  Past Medical History:   Diagnosis Date    Alcohol dependence, in remission     History of alcohol dependence    Anemia     iron deficiency    Arthritis     Atrial fibrillation (Multi)     COPD (chronic obstructive pulmonary disease) (Multi)     Dyspnea     Edentulous     History of blood transfusion     1/2024 lower GI bleed    no reaction    HTN (hypertension)     Lung nodule     RAMON on CPAP     with O2 3L n/c    Oxygen deficiency     uses home O2 3L n/c continuously    Personal history of other venous thrombosis and embolism     History of deep venous thrombosis right leg    Personal history of suicidal behavior     History of suicide attempt    Uses roller walker        Surgical History  Past Surgical History:   Procedure Laterality Date    ANKLE FRACTURE SURGERY      MR HEAD ANGIO WO IV CONTRAST  10/24/2017    MR HEAD ANGIO WO IV CONTRAST [facility]    MR NECK ANGIO WO IV CONTRAST  10/24/2017    MR NECK ANGIO WO IV CONTRAST [facility]    OTHER SURGICAL HISTORY Right 06/15/2017    Treatment  Of Ankle Fracture    TONSILLECTOMY  06/15/2017    Tonsillectomy        Social History  Social History     Tobacco Use    Smoking status: Some Days     Current packs/day: 0.00     Average packs/day: 0.3 packs/day for 53.8 years (13.5 ttl pk-yrs)     Types: Cigarettes     Start date:      Last attempt to quit: 10/28/2023     Years since quittin.9     Passive exposure: Never    Smokeless tobacco: Never   Vaping Use    Vaping status: Never Used   Substance Use Topics    Alcohol use: Not Currently     Comment: 3-5 beers 3-4x a week    Drug use: Never       Family History  Family History   Problem Relation Name Age of Onset    Accidental death Mother      Stroke Father      Other (cerbral hemmorrhage) Father      Cancer Sister      Lung cancer Sister      Colon cancer Brother      Cancer Brother      Lung disease Brother      Heart attack Brother          Allergies  Propranolol hcl and Lisinopril    Review of Systems   Constitutional: Negative.    HENT: Negative.     Eyes: Negative.    Respiratory: Negative.     Cardiovascular: Negative.    Gastrointestinal: Negative.    Endocrine: Negative.    Genitourinary: Negative.    Musculoskeletal: Negative.    Allergic/Immunologic: Negative.    Neurological: Negative.    Hematological: Negative.    Psychiatric/Behavioral: Negative.          Physical Exam  Vitals and nursing note reviewed.   Constitutional:       Appearance: Normal appearance.   HENT:      Head: Normocephalic and atraumatic.      Nose: Nose normal.      Mouth/Throat:      Mouth: Mucous membranes are moist.   Eyes:      Extraocular Movements: Extraocular movements intact.      Conjunctiva/sclera: Conjunctivae normal.      Pupils: Pupils are equal, round, and reactive to light.   Cardiovascular:      Rate and Rhythm: Normal rate and regular rhythm.      Pulses: Normal pulses.      Heart sounds: Normal heart sounds.   Pulmonary:      Effort: Pulmonary effort is normal.      Breath sounds: Normal breath sounds.  "  Abdominal:      General: Bowel sounds are normal.      Palpations: Abdomen is soft.   Musculoskeletal:         General: Normal range of motion.   Skin:     General: Skin is warm and dry.      Capillary Refill: Capillary refill takes less than 2 seconds.   Neurological:      General: No focal deficit present.      Mental Status: She is alert and oriented to person, place, and time.   Psychiatric:         Mood and Affect: Mood normal.         Behavior: Behavior normal.          Vital Signs  Blood pressure 151/53, pulse 97, temperature 37.1 °C (98.8 °F), temperature source Oral, resp. rate 18, height 1.676 m (5' 5.98\"), weight 56.2 kg (123 lb 14.4 oz), SpO2 97%.  Oxygen Therapy  SpO2: 97 %  Medical Gas Therapy: Respiratory support without O2  Medical Gas Delivery Method: Nasal cannula (3L)     No intake or output data in the 24 hours ending 10/15/24 0949     Scheduled medications:  acetaminophen, 1,000 mg, oral, q8h JENNYFER  amLODIPine, 5 mg, oral, BID  apixaban, 5 mg, oral, q12h  bisoprolol, 5 mg, oral, BID  escitalopram, 10 mg, oral, Daily  famotidine, 20 mg, oral, Daily  tiotropium, 2 puff, inhalation, Daily   And  fluticasone furoate-vilanteroL, 1 puff, inhalation, Daily  gabapentin, 300 mg, oral, TID  hydroCHLOROthiazide, 12.5 mg, oral, Daily  lidocaine, 1 patch, transdermal, Daily  mirtazapine, 15 mg, oral, Nightly  oxygen, , inhalation, Continuous - Inhalation  primidone, 200 mg, oral, BID       PRN medications: albuterol, hydrALAZINE, ondansetron ODT **OR** ondansetron, oxyCODONE, polyethylene glycol     oxygen, , Last Rate: 3 L/min (10/14/24 5818)       Relevant Results  Results for orders placed or performed during the hospital encounter of 10/14/24 (from the past 24 hour(s))   CBC and Auto Differential   Result Value Ref Range    WBC 6.2 4.4 - 11.3 x10*3/uL    nRBC 0.0 0.0 - 0.0 /100 WBCs    RBC 3.40 (L) 4.00 - 5.20 x10*6/uL    Hemoglobin 10.8 (L) 12.0 - 16.0 g/dL    Hematocrit 34.4 (L) 36.0 - 46.0 %    MCV " 101 (H) 80 - 100 fL    MCH 31.8 26.0 - 34.0 pg    MCHC 31.4 (L) 32.0 - 36.0 g/dL    RDW 16.8 (H) 11.5 - 14.5 %    Platelets 311 150 - 450 x10*3/uL    Neutrophils % 75.6 40.0 - 80.0 %    Immature Granulocytes %, Automated 0.5 0.0 - 0.9 %    Lymphocytes % 13.8 13.0 - 44.0 %    Monocytes % 9.2 2.0 - 10.0 %    Eosinophils % 0.6 0.0 - 6.0 %    Basophils % 0.3 0.0 - 2.0 %    Neutrophils Absolute 4.70 1.60 - 5.50 x10*3/uL    Immature Granulocytes Absolute, Automated 0.03 0.00 - 0.50 x10*3/uL    Lymphocytes Absolute 0.86 0.80 - 3.00 x10*3/uL    Monocytes Absolute 0.57 0.05 - 0.80 x10*3/uL    Eosinophils Absolute 0.04 0.00 - 0.40 x10*3/uL    Basophils Absolute 0.02 0.00 - 0.10 x10*3/uL   Comprehensive metabolic panel   Result Value Ref Range    Glucose 88 74 - 99 mg/dL    Sodium 137 136 - 145 mmol/L    Potassium 4.3 3.5 - 5.3 mmol/L    Chloride 103 98 - 107 mmol/L    Bicarbonate 23 21 - 32 mmol/L    Anion Gap 15 10 - 20 mmol/L    Urea Nitrogen 18 6 - 23 mg/dL    Creatinine 0.78 0.50 - 1.05 mg/dL    eGFR 81 >60 mL/min/1.73m*2    Calcium 9.4 8.6 - 10.3 mg/dL    Albumin 4.2 3.4 - 5.0 g/dL    Alkaline Phosphatase 85 33 - 136 U/L    Total Protein 7.6 6.4 - 8.2 g/dL    AST 15 9 - 39 U/L    Bilirubin, Total 0.4 0.0 - 1.2 mg/dL    ALT 8 7 - 45 U/L   Urinalysis with Reflex Microscopic   Result Value Ref Range    Color, Urine Light-Yellow Light-Yellow, Yellow, Dark-Yellow    Appearance, Urine Clear Clear    Specific Gravity, Urine 1.020 1.005 - 1.035    pH, Urine 6.0 5.0, 5.5, 6.0, 6.5, 7.0, 7.5, 8.0    Protein, Urine 600 (3+) (A) NEGATIVE, 10 (TRACE), 20 (TRACE) mg/dL    Glucose, Urine Normal Normal mg/dL    Blood, Urine 0.2 (2+) (A) NEGATIVE    Ketones, Urine 20 (1+) (A) NEGATIVE mg/dL    Bilirubin, Urine NEGATIVE NEGATIVE    Urobilinogen, Urine Normal Normal mg/dL    Nitrite, Urine NEGATIVE NEGATIVE    Leukocyte Esterase, Urine NEGATIVE NEGATIVE   Microscopic Only, Urine   Result Value Ref Range    WBC, Urine NONE 1-5, NONE /HPF     RBC, Urine NONE NONE, 1-2, 3-5 /HPF    Squamous Epithelial Cells, Urine 1-9 (SPARSE) Reference range not established. /HPF   CBC   Result Value Ref Range    WBC 8.5 4.4 - 11.3 x10*3/uL    nRBC 0.0 0.0 - 0.0 /100 WBCs    RBC 3.21 (L) 4.00 - 5.20 x10*6/uL    Hemoglobin 10.4 (L) 12.0 - 16.0 g/dL    Hematocrit 33.8 (L) 36.0 - 46.0 %     (H) 80 - 100 fL    MCH 32.4 26.0 - 34.0 pg    MCHC 30.8 (L) 32.0 - 36.0 g/dL    RDW 16.7 (H) 11.5 - 14.5 %    Platelets 303 150 - 450 x10*3/uL   Basic metabolic panel   Result Value Ref Range    Glucose 76 74 - 99 mg/dL    Sodium 135 (L) 136 - 145 mmol/L    Potassium 4.1 3.5 - 5.3 mmol/L    Chloride 101 98 - 107 mmol/L    Bicarbonate 22 21 - 32 mmol/L    Anion Gap 16 10 - 20 mmol/L    Urea Nitrogen 23 6 - 23 mg/dL    Creatinine 0.75 0.50 - 1.05 mg/dL    eGFR 85 >60 mL/min/1.73m*2    Calcium 8.9 8.6 - 10.3 mg/dL   Sars-CoV-2 PCR   Result Value Ref Range    Coronavirus 2019, PCR Not Detected Not Detected   Influenza A, and B PCR   Result Value Ref Range    Flu A Result Not Detected Not Detected    Flu B Result Not Detected Not Detected      XR knee left 4+ views  Result Date: 10/14/2024  FINDINGS: No fractures or destructive lesions are identified. Bone island is noted in the medial tibial condyle. The joint spaces and articular surfaces are maintained considering patient's age. The alignment is anatomic. Vascular calcifications are noted. There is no significant effusion in the suprapatellar bursa.  Unremarkable left knee radiographs.       XR lumbar spine 2-3 views  Result Date: 10/14/2024  FINDINGS: There is mild loss of height involving the L3 and L5 vertebra, however no acute fracture is identified. There is narrowing of the L1-L2, L2-L3, L3-L4 and mostly the L4-L5 disc spaces, with marginal osteophytes. The alignment is anatomic, without spondylolysis or spondylolisthesis. Hypertrophic changes involve the facet joints of the lower lumbar spine. Calcifications of the aorta and  its branches are present.   No acute pelvic fracture is seen. Extensive osteoarthritis involves the right hip joint, with complete obliteration of the joint space, subchondral bone sclerosis, subchondral cysts and marginal osteophytes. Osteoarthritis also involves the left hip joint and to a lesser degree the sacroiliac joints bilaterally. The alignment is anatomic. Vascular calcifications involve iliac and femoral arteries bilaterally.  1. Marked lumbar spondylosis and remote compression deformities of the L3 and L5 vertebra, without acute vertebral fracture or subluxation. 2. Extensive right hip osteoarthritis again noted. Additional degenerative changes in the pelvis as above. No pelvic fracture or subluxation.      XR hips bilateral 5+ VW w pelvis when performed  Result Date: 10/14/2024  FINDINGS: There is mild loss of height involving the L3 and L5 vertebra, however no acute fracture is identified. There is narrowing of the L1-L2, L2-L3, L3-L4 and mostly the L4-L5 disc spaces, with marginal osteophytes. The alignment is anatomic, without spondylolysis or spondylolisthesis. Hypertrophic changes involve the facet joints of the lower lumbar spine. Calcifications of the aorta and its branches are present.   No acute pelvic fracture is seen. Extensive osteoarthritis involves the right hip joint, with complete obliteration of the joint space, subchondral bone sclerosis, subchondral cysts and marginal osteophytes. Osteoarthritis also involves the left hip joint and to a lesser degree the sacroiliac joints bilaterally. The alignment is anatomic. Vascular calcifications involve iliac and femoral arteries bilaterally.       1. Marked lumbar spondylosis and remote compression deformities of the L3 and L5 vertebra, without acute vertebral fracture or subluxation. 2. Extensive right hip osteoarthritis again noted. Additional degenerative changes in the pelvis as above. No pelvic fracture or subluxation.   Signed by: Bharath  Penelope 10/14/2024 11:57 AM Dictation workstation:   [id number]    XR chest 1 view  Result Date: 10/14/2024  Interpreted By:  Bharath Negrete, STUDY: XR CHEST 1 VIEW; 10/14/2024 11:13 am   INDICATION: Signs/Symptoms:Fall rib pain   COMPARISON: August 2024.   ACCESSION NUMBER(S): [id number]   ORDERING CLINICIAN: СВЕТЛАНА VASQUEZ   FINDINGS: The study is limited due to  lordotic projection and overlying artifacts obscuring some detail. The cardiomediastinal silhouette is within normal limits for the technique. Patchy/nodular infiltrates are seen in the right suprahilar region, with mild volume loss of the right upper lobe and elevation of the minor fissure. There is no pneumothorax or significant effusion. Marked degenerative changes involve the shoulders. There is also partially included deformity in the right humeral neck consistent with a healing fracture.  Limited study. Patchy/nodular consolidation in the right upper lobe, possibly pneumonia or atelectasis, however in the setting of injury, lung contusion can not be excluded. Correlate clinically follow-up in 1-2 weeks to document complete resolution.      NM PET CT lung SPN  Result Date: 10/3/2024  FINDINGS: NECK: *No FDG avid cervical lymphadenopathy is present. *Unremarkable thyroid gland   CHEST: *There is re-demonstration of a focal somewhat spiculated right lower lobe pulmonary nodule better evaluated on cross-sectional imaging with minimal FDG avidity (max SUV of 1.4). An additional, right upper lobe anterior segment nodularity is also noted with minimal FDG avidity (1.2) compatible with atelectasis. *There is interval improvement of the consolidative opacity in the left lower lobe when compared to cross-sectional imaging with mild residual FDG avidity (max SUV of 3) consistent with a resolving infectious inflammatory process. *No FDG mediastinal, hilar or axillary lymphadenopathy. *Unremarkable both breasts   ABDOMEN AND PELVIS: *No evidence of  hypermetabolic lymphadenopathy. *Physiologic radiotracer uptake is present in the liver and spleen with excretion into the bowel and the urinary tract. *Unremarkable bilateral adrenal glands *Incidentally noted aneurysmal dilatation of the infrarenal abdominal aorta measuring up to 3 cm in size within limitations of non dedicated imaging.   MUSCULOSKELETAL: *There is mild nonspecific activity in the right femoral head and acetabulum with underlying cystic changes which may represent subchondral degenerative changes. Otherwise, no other concerning FDG avid bone lesion throughout the remaining osseous structures *Diffusely increased FDG uptake is seen throughout the musculature.   1. Minimal to non FDG avid pulmonary nodule in the right lower lobe, corresponding to abnormal findings seen on dedicated CT chest, favored to represent infectious/inflammatory etiology with neoplasm can not be completely excluded, follow-up with dedicated CT is recommended. 2. Interval improvement of mild FDG avid consolidative opacity in the left lower lobe, consistent with improving infection/inflammation. 3. Mild FDG avidity in the right femoral head and acetabulum with underlying osseous cystic changes which can be seen with degenerative hip changes. 4. Incidentally noted aneurysmal dilatation of the infrarenal abdominal aorta measuring up to 3 cm in size within limitations of non dedicated imaging. Recommend dedicated CTA of the abdomen and pelvis for further evaluation.   I personally reviewed the image(s) / study and agree with the findings and interpretation as stated. This study was interpreted at [facility].        Assessment/Plan   Chronic hypoxic respiratory failure  Oxygen at baseline  BiPAP nightly-patient has NIV at home and is monitored, she is using device consistently and benefiting from it  Continuous pulse oximetry  Incentive spirometry/pulmonary hygiene    Acute on chronic bilateral  hip pain/low back pain  Analgesia   follows with Pain Management    Right lower lobe spiculated lung nodule   CT scan chest without contrast    COPD  Continue IBD/ICS    Atrial fibrillation  On Eliquis    Prophylaxis  Obstructive sleep apnea  BiPAP nightly    PT/OT/out of bed    Emmie Interiano, APRN-CNP     no

## 2024-10-15 NOTE — PROGRESS NOTES
10/15/24 1148   Discharge Planning   Living Arrangements Children   Support Systems Children;Family members   Type of Residence Private residence   Who is requesting discharge planning? Provider   Home or Post Acute Services None   Type of Home Care Services DME or oxygen  (bipap)   Expected Discharge Disposition SNF   Does the patient need discharge transport arranged? Yes   RoundTrip coordination needed? Yes     Pt is weak, high pain level, not feeling well overall. Feeling nauseated. Patient is agreeable to SNF and was given choices. 3 choices made 1. [facility] 2. [facility] 3. [facility] . Referral was placed.   PT recommends moderate intensity.     PLAN: Skilled rehab, needs accepting facility, and will need a precert,

## 2024-10-15 NOTE — CONSULTS
"Inpatient consult to Cardiology  Consult performed by: Karon Romano MD  Consult ordered by: MOISES Santiago-CNP  Reason for consult: Atrial fibrillation      History Of Present Illness:    Crystal Jones is a 69 y.o. female presenting with worsening lower back pain and lower extremity pain with fall.  Patient had issue with chronic pain secondary to osteoarthritis, history of right total hip replacement., Atrial fibrillation on oral anticoagulation, COPD, history of alcohol abuse, anemia requiring blood transfusion.  Patient admitted to the hospital for management of pain and recent fall.  I was asked to see her because she developed A-fib with RVR.  Patient blood pressure running on the low side.  Patient cannot lying comfortable in bed.  Not great historian.  Denies having chest pain or shortness of breath no palpitations dizziness lightheadedness or syncope.    Review of systems.  10 point review systems otherwise negative     Last Recorded Vitals:  Vitals:    10/15/24 0805 10/15/24 0903 10/15/24 1435 10/15/24 1513   BP:   92/63    BP Location:   Right arm    Patient Position:   Lying    Pulse:   (!) 134 (!) 153   Resp:   18    Temp:   37 °C (98.6 °F)    TempSrc:   Oral    SpO2: 97% 97% 98%    Weight:       Height:           Last Labs:  CBC - 10/15/2024:  4:34 AM  8.5 10.4 303    33.8      CMP - 10/15/2024:  4:34 AM  8.9 7.6 15 --- 0.4   _ 4.2 8 85      PTT - No results in last year.  1.1   11.4 _     LDL Calculated   Date/Time Value Ref Range Status   06/24/2022 01:28 PM 93 65 - 130 MG/DL Final   07/30/2018 10:58 AM 82 65 - 130 MG/DL Final      Last I/O:  No intake/output data recorded.    Past Cardiology Tests (Last 3 Years):  EKG:  Electrocardiogram, 12-lead PRN ACS symptoms 08/15/2024      ECG 12 Lead 08/14/2024      ECG 12 lead (Clinic Performed) 03/06/2024    Echo:  No results found for this or any previous visit from the past 1095 days.    Ejection Fractions:  No results found for: \"EF\"  Cath:  No " results found for this or any previous visit from the past 1095 days.    Stress Test:  Nuclear Stress Test 03/25/2024    Cardiac Imaging:  No results found for this or any previous visit from the past 1095 days.      Past Medical History:  She has a past medical history of Alcohol dependence, in remission, Anemia, Arthritis, Atrial fibrillation (Multi), COPD (chronic obstructive pulmonary disease) (Multi), Dyspnea, Edentulous, History of blood transfusion, HTN (hypertension), Lung nodule, RAMON on CPAP, Oxygen deficiency, Personal history of other venous thrombosis and embolism, Personal history of suicidal behavior, and Uses roller walker.    Past Surgical History:  She has a past surgical history that includes Tonsillectomy (06/15/2017); Other surgical history (Right, 06/15/2017); MR angio head wo IV contrast (10/24/2017); MR angio neck wo IV contrast (10/24/2017); and Ankle fracture surgery.      Social History:  She reports that she has been smoking cigarettes. She started smoking about 54 years ago. She has a 13.5 pack-year smoking history. She has never been exposed to tobacco smoke. She has never used smokeless tobacco. She reports that she does not currently use alcohol. She reports that she does not use drugs.    Family History:  Family History   Problem Relation Name Age of Onset    Accidental death Mother      Stroke Father      Other (cerbral hemmorrhage) Father      Cancer Sister      Lung cancer Sister      Colon cancer Brother      Cancer Brother      Lung disease Brother      Heart attack Brother          Allergies:  Propranolol hcl and Lisinopril    Inpatient Medications:  Scheduled medications   Medication Dose Route Frequency    acetaminophen  1,000 mg oral q8h JENNYFER    apixaban  5 mg oral q12h    bisoprolol  5 mg oral BID    escitalopram  10 mg oral Daily    famotidine  20 mg oral Daily    tiotropium  2 puff inhalation Daily    And    fluticasone furoate-vilanteroL  1 puff inhalation Daily     gabapentin  300 mg oral TID    lidocaine  2 patch transdermal Daily    mirtazapine  15 mg oral Nightly    oxygen   inhalation Continuous - Inhalation    primidone  200 mg oral BID     PRN medications   Medication    albuterol    hydrALAZINE    ondansetron ODT    Or    ondansetron    oxyCODONE    polyethylene glycol     Continuous Medications   Medication Dose Last Rate    oxygen   3 L/min (10/14/24 9071)     Outpatient Medications:  Current Outpatient Medications   Medication Instructions    acetaminophen (TYLENOL) 650 mg, oral, Every 4 hours PRN    albuterol (Ventolin HFA) 90 mcg/actuation inhaler INHALE 1 PUFF BY MOUTH EVERY 4 HOURS AS NEEDED 25    amLODIPine (NORVASC) 5 mg, oral, 2 times daily    apixaban (ELIQUIS) 5 mg, oral, Every 12 hours    bisoprolol (ZEBETA) 5 mg, oral, 2 times daily    escitalopram (LEXAPRO) 10 mg, oral, Daily    famotidine (PEPCID) 20 mg, oral, Daily    gabapentin (NEURONTIN) 200 mg, oral, 3 times daily    gabapentin (NEURONTIN) 300 mg, oral, 3 times daily    hydroCHLOROthiazide (MICROZIDE) 12.5 mg, oral, Daily    mirtazapine (REMERON) 15 mg, oral, Nightly    ondansetron ODT (Zofran-ODT) 4 mg disintegrating tablet 1 tablet, oral, 3 times daily (0900,1400,1900)    oxygen (O2) gas therapy 1 each, inhalation, Continuous    primidone (MYSOLINE) 200 mg, oral, 2 times daily    traMADol (ULTRAM) 50 mg, oral, Every 6 hours PRN    Trelegy Ellipta 200-62.5-25 mcg blister with device INHALE 1 PUFF INTO THE LUNGS EVERY DAY FOR 90 DAYS       Physical Exam:  General: Patient is in no acute distress.  HEENT: atraumatic normocephalic.  Neck: is supple jugular venous pressure within normal limits no thyromegaly.  Cardiovascular regular rate and rhythm normal heart sounds no murmurs rubs or gallops.  Lungs: clear to auscultation bilaterally.  Abdomen: is soft nontender.  Extremities warm to touch no edema.  Neurologic examination: patient is awake alert oriented to person, place, date/time.  Psychiatric  examination: patient has good insight denies feeling suicidal and depressed.  Pulses 2+ intact bilaterally     Assessment/Plan   Atrial fibrillation.  Patient admitted to the hospital for management of lower back pain weakness of lower extremities and was found to have atrial fibrillation with rapid ventricular rate.  Her blood pressure was running low.  I received 500 mg of IV digoxin and addition of th bisoprolol she is on.  Now back into normal sinus rhythm.e my recommendation is to continue bisoprolol and oral anticoagulation.  We may consider putting her on amiodarone if she continues to have paroxysmal atrial fibrillation.  I had recent stress test this year that showed no ischemia.  Last year had in 2023 echocardiogram showing normal ejection fraction.  Will repeat another 2D echo.  However patient is here for management of her pain and atrial fibrillation was incidental finding will monitor for now.    Thank you for the consultation    Peripheral IV 10/14/24 22 G Right;Anterior Forearm (Active)   Site Assessment Clean;Dry;Intact 10/15/24 0827   Dressing Status Dry;Clean 10/15/24 0827   Number of days: 1       Code Status:  DNR and No Intubation      Karon Romano MD

## 2024-10-15 NOTE — CARE PLAN
The patient's goals for the shift include  PT/OT, pain control    The clinical goals for the shift include pain control, PT/OT, monitor for infection      Problem: Skin  Goal: Prevent/minimize sheer/friction injuries  Outcome: Not Progressing  Flowsheets (Taken 10/15/2024 0748)  Prevent/minimize sheer/friction injuries:   Use pull sheet   Increase activity/out of bed for meals   Complete micro-shifts as needed if patient unable. Adjust patient position to relieve pressure points, not a full turn   HOB 30 degrees or less   Turn/reposition every 2 hours/use positioning/transfer devices   Utilize specialty bed per algorithm     Problem: Pain - Adult  Goal: Verbalizes/displays adequate comfort level or baseline comfort level  10/15/2024 0748 by Sherine Mcgarry RN  Outcome: Progressing  10/15/2024 0748 by Sherine Mcgarry RN  Outcome: Progressing     Problem: Safety - Adult  Goal: Free from fall injury  10/15/2024 0748 by Sherine Mcagrry RN  Outcome: Progressing  10/15/2024 0748 by Sherine Mcgarry RN  Outcome: Progressing     Problem: Discharge Planning  Goal: Discharge to home or other facility with appropriate resources  10/15/2024 0748 by Sherine Mcgarry RN  Outcome: Progressing  10/15/2024 0748 by Sherine Mcgarry RN  Outcome: Progressing     Problem: Fall/Injury  Goal: Not fall by end of shift  10/15/2024 0748 by Sherine Mcgarry RN  Outcome: Progressing  10/15/2024 0748 by Sherine Mcgarry RN  Outcome: Progressing  Goal: Be free from injury by end of the shift  10/15/2024 0748 by Sherine Mcgarry RN  Outcome: Progressing  10/15/2024 0748 by Sherine Mcgarry RN  Outcome: Progressing  Goal: Verbalize understanding of personal risk factors for fall in the hospital  10/15/2024 0748 by Sherine Mcgarry RN  Outcome: Progressing  10/15/2024 0748 by Sherine Mcgarry RN  Outcome: Progressing  Goal: Verbalize understanding of risk factor reduction measures to prevent injury from fall in the home  10/15/2024 0748 by  Sherine Mcgarry RN  Outcome: Progressing  10/15/2024 0748 by Sherine Mcgarry RN  Outcome: Progressing  Goal: Use assistive devices by end of the shift  10/15/2024 0748 by Sherine Mcgarry RN  Outcome: Progressing  10/15/2024 0748 by Sherine Mcgarry RN  Outcome: Progressing  Goal: Pace activities to prevent fatigue by end of the shift  10/15/2024 0748 by Sherine Mcgarry RN  Outcome: Progressing  10/15/2024 0748 by Sherine Mcgarry RN  Outcome: Progressing

## 2024-10-15 NOTE — CARE PLAN
The patient's goals for the shift include      The clinical goals for the shift include pain control      Problem: Safety - Adult  Goal: Free from fall injury  Outcome: Progressing     Problem: Discharge Planning  Goal: Discharge to home or other facility with appropriate resources  Outcome: Progressing     Problem: Chronic Conditions and Co-morbidities  Goal: Patient's chronic conditions and co-morbidity symptoms are monitored and maintained or improved  Outcome: Progressing

## 2024-10-15 NOTE — PROGRESS NOTES
"Physical Therapy    Physical Therapy Evaluation    Patient Name: Kamila Jones \"Elizabeth"  MRN: [id number]  Department: [facility]  Room: [address] [place], [place] [address]  Today's Date: 10/15/2024   Time Calculation  Start Time: 0903  Stop Time: 0925  Time Calculation (min): 22 min    Assessment/Plan   PT Assessment  PT Assessment Results: Decreased strength, Decreased endurance, Impaired balance, Decreased mobility, Impaired sensation, Pain  Rehab Prognosis: Good  Barriers to Discharge: decreased activity tolerance, + fall risk, assist required, pt home alone while family works  Evaluation/Treatment Tolerance: Patient limited by fatigue, Patient limited by pain  Medical Staff Made Aware: Yes  Strengths: Ability to acquire knowledge, Support of Caregivers  Barriers to Participation: Comorbidities  End of Session Communication: Bedside nurse  Assessment Comment: Pt gives effort as able, weak, high pain level, not feeling well overall. Pt would benefit from continued PT services to progress safe functional mobility.  End of Session Patient Position: Bed, 3 rail up, Alarm on  IP OR SWING BED PT PLAN  Inpatient or Swing Bed: Inpatient  PT Plan  Treatment/Interventions: Bed mobility, Transfer training, Gait training, Balance training, Strengthening, Endurance training, Therapeutic exercise, Therapeutic activity  PT Plan: Ongoing PT  PT Frequency: 4 times per week  PT Discharge Recommendations: Moderate intensity level of continued care  Equipment Recommended upon Discharge: Wheeled walker  PT Recommended Transfer Status: Assist x1, Assistive device  PT - OK to Discharge: Yes    Subjective   General Visit Information:  General  Reason for Referral: impaired mobility, chr LBP  Referred By: GIOVANNI DE LEON-CNP  Past Medical History Relevant to Rehab: chronic LBP, hip OA, chronic resp failure, ETOH hx, anemia, a-fib, COPD, HTN, RAMON, DVT, ankle fx repair, suicide attempt  Family/Caregiver Present: No  Co-Treatment: OT  Co-Treatment Reason: partial for pt " safety and tolerance  Prior to Session Communication: Bedside nurse  Patient Position Received: Bed, 3 rail up, Alarm on  Preferred Learning Style: verbal, visual  General Comment: Pt is a 69 y.o. female adm for increased LBP and bilat hip pain, resulting in decreased mobility and 3 falls in the past week. Pt agreeable to PT eval, reports not feeling well in general.  Home Living:  Home Living  Type of Home: House  Lives With: Adult children (dtr S-I-L)  Home Adaptive Equipment: Walker rolling or standard (2WW and rollator)  Home Layout: Multi-level, Able to live on main level with bedroom/bathroom  Home Access: Stairs to enter without rails  Entrance Stairs-Rails: None  Entrance Stairs-Number of Steps: 2-3  Bathroom Shower/Tub: Tub/shower unit  Bathroom Equipment: Grab bars in shower, Shower chair with back  Home Living Comments: dtr and S-I-L work; pt home alone at those times. Does have an aide to assist w/ showering  Prior Level of Function:  Prior Function Per Pt/Caregiver Report  Level of [place]: Needs assistance with ADLs, Needs assistance with homemaking  Receives Help From: Family, Personal care attendant  ADL Assistance:  (assist for showering)  Homemaking Assistance:  (dtr manages)  Ambulatory Assistance:  (MOD I w/ 2WW, reports does not get out of the house much.)  Prior Function Comments: Pt w/ decline since home from rehab in 6/24. Pt sleeps in a regular bed, has had 3 falls in the past week, uses 3L O2 24/7, BiPAP at night. Pt has seen pain mamagement in the past w/o relief.  Precautions:  Precautions  Medical Precautions: Fall precautions, Oxygen therapy device and L/min (3L O2)     Vital Signs (Past 2hrs)        Date/Time Vitals Session Patient Position Pulse Resp SpO2 BP MAP (mmHg)    10/15/24 0903 Pre PT  Lying  --  --  97 %  --  --                   Objective   Pain:  Pain Assessment  Pain Assessment: 0-10  0-10 (Numeric) Pain Score: 8  Pain Type: Chronic pain  Pain Location: Back (also at  R>L hips, neck)  Pain Interventions:  (nurse informed)  Cognition:  Cognition  Overall Cognitive Status: Within Functional Limits  Cognition Comments: pleasant, cooperative, a little emotional over current decline in mobility, reports not feeling well overall.    General Assessments:  Activity Tolerance  Endurance: Decreased tolerance for upright activites  Activity Tolerance Comments: decreased due to weakness, nausea w/ + emesis during session, had been feverish earlier    Sensation  Sensation Comment: pt reports chronic  numbness at feet    Strength  Strength Comments: BLEs grossly 3/5  Dynamic Standing Balance  Dynamic Standing-Balance Support: Bilateral upper extremity supported  Dynamic Standing-Level of Assistance: Minimum assistance  Dynamic Standing-Balance: Turning (steps at bedside)  Dynamic Standing-Comments: Fair-  Functional Assessments:  Bed Mobility  Bed Mobility: Yes  Bed Mobility 1  Bed Mobility 1: Supine to sitting  Level of Assistance 1: Moderate assistance  Bed Mobility Comments 1: to Lt EOB, HOB elevated part way, pt reports unable to roll due to hip pain. Pt struggled to get to EOB, mobility painful, requires increased time to complete.  Bed Mobility 2  Bed Mobility  2: Sitting to supine  Level of Assistance 2: Minimum assistance  Bed Mobility Comments 2: pt returned to supine quickly, reporting  some dizziness, assist for positioning    Transfers  Transfer: Yes  Transfer 1  Technique 1: Sit to stand, Stand to sit  Transfer Device 1: Walker  Transfer Level of Assistance 1: Minimum assistance, +2  Trials/Comments 1: performed from/to EOB, to/from BSC, cues for safe hand placement (pt w/ episode of emesis while sitting on BSC)    Ambulation/Gait Training  Ambulation/Gait Training Performed: Yes  Ambulation/Gait Training 1  Surface 1: Level tile  Device 1: Rolling walker  Assistance 1: Minimum assistance (+1-2)  Comments/Distance (ft) 1: Pt amb ~ 3-4' x 2, to/from BSC, small steps, cues/assist to  guide mobility.  Extremity/Trunk Assessments:  RLE   RLE : Within Functional Limits  LLE   LLE : Within Functional Limits  Outcome Measures:  [facility] Basic Mobility  Turning from your back to your side while in a flat bed without using bedrails: A little  Moving from lying on your back to sitting on the side of a flat bed without using bedrails: A lot  Moving to and from bed to chair (including a wheelchair): A lot  Standing up from a chair using your arms (e.g. wheelchair or bedside chair): A little  To walk in hospital room: A lot  Climbing 3-5 steps with railing: A lot  Basic Mobility - Total Score: 14    Encounter Problems       Encounter Problems (Active)       Balance       LTG - Patient will maintain balance to allow for safe mobility (Progressing)       Start:  10/15/24    Expected End:  10/25/24               Mobility       STG - Patient will ambulate 50' w/ RW and min assist (Progressing)       Start:  10/15/24    Expected End:  10/25/24            Pt will tolerate BLE exercises to promote functional strength, endurance and balance (Progressing)       Start:  10/15/24    Expected End:  10/25/24               PT Transfers       STG - Patient to transfer to and from sit to supine IND w/ safe technique (Progressing)       Start:  10/15/24    Expected End:  10/25/24            STG - Patient will transfer sit to and from stand w/ distant supervision  (Progressing)       Start:  10/15/24    Expected End:  10/25/24                   Education Documentation  Mobility Training, taught by Judy Miles, PT at 10/15/2024 10:47 AM.  Learner: Patient  Readiness: Acceptance  Method: Explanation, Demonstration  Response: Verbalizes Understanding, Needs Reinforcement    Education Comments  No comments found.

## 2024-10-15 NOTE — PROGRESS NOTES
"Kamila Jones \"Elizabeth" is a 69 y.o. female on day 0 of admission presenting with Chronic low back pain without sciatica, unspecified back pain laterality.      Subjective   Patient seen and examined. Resting in bed. States pain with better control, she was able to stand and work with therapy today. She had 2 isolated fevers overnight, she denied having any chills or sweats, no new symptoms. Afebrile at this time.       Objective     Last Recorded Vitals  /53 (BP Location: Right arm, Patient Position: Lying)   Pulse 97   Temp 37.1 °C (98.8 °F) (Oral)   Resp 18   Wt 56.2 kg (123 lb 14.4 oz)   SpO2 97% Comment: 3L O2  Intake/Output last 3 Shifts:    Intake/Output Summary (Last 24 hours) at 10/15/2024 1216  Last data filed at 10/15/2024 1000  Gross per 24 hour   Intake --   Output 201 ml   Net -201 ml       Admission Weight  Weight: 56.2 kg (124 lb) (10/14/24 0942)    Daily Weight  10/14/24 : 56.2 kg (123 lb 14.4 oz)    Image Results    No new imaging to review.     Physical Exam    General: Alert and oriented x3, pleasant.   Cardiac: Regular rate and rhythm, S1/S2 , no murmur.   Pulmonary: Diminished on 3L NC.   Abdomen: Soft, round, nontender. BS +x4.   Extremities: No edema. Equal strength in all extremities.   Skin: No rashes or lesions.      Relevant Results    Scheduled medications  acetaminophen, 1,000 mg, oral, q8h JENNYFER  amLODIPine, 5 mg, oral, BID  apixaban, 5 mg, oral, q12h  bisoprolol, 5 mg, oral, BID  escitalopram, 10 mg, oral, Daily  famotidine, 20 mg, oral, Daily  tiotropium, 2 puff, inhalation, Daily   And  fluticasone furoate-vilanteroL, 1 puff, inhalation, Daily  gabapentin, 300 mg, oral, TID  hydroCHLOROthiazide, 12.5 mg, oral, Daily  lidocaine, 1 patch, transdermal, Daily  mirtazapine, 15 mg, oral, Nightly  oxygen, , inhalation, Continuous - Inhalation  primidone, 200 mg, oral, BID      Continuous medications  oxygen, , Last Rate: 3 L/min (10/14/24 2627)      PRN medications  PRN " medications: albuterol, hydrALAZINE, ondansetron ODT **OR** ondansetron, oxyCODONE, polyethylene glycol     Results for orders placed or performed during the hospital encounter of 10/14/24 (from the past 24 hour(s))   CBC   Result Value Ref Range    WBC 8.5 4.4 - 11.3 x10*3/uL    nRBC 0.0 0.0 - 0.0 /100 WBCs    RBC 3.21 (L) 4.00 - 5.20 x10*6/uL    Hemoglobin 10.4 (L) 12.0 - 16.0 g/dL    Hematocrit 33.8 (L) 36.0 - 46.0 %     (H) 80 - 100 fL    MCH 32.4 26.0 - 34.0 pg    MCHC 30.8 (L) 32.0 - 36.0 g/dL    RDW 16.7 (H) 11.5 - 14.5 %    Platelets 303 150 - 450 x10*3/uL   Basic metabolic panel   Result Value Ref Range    Glucose 76 74 - 99 mg/dL    Sodium 135 (L) 136 - 145 mmol/L    Potassium 4.1 3.5 - 5.3 mmol/L    Chloride 101 98 - 107 mmol/L    Bicarbonate 22 21 - 32 mmol/L    Anion Gap 16 10 - 20 mmol/L    Urea Nitrogen 23 6 - 23 mg/dL    Creatinine 0.75 0.50 - 1.05 mg/dL    eGFR 85 >60 mL/min/1.73m*2    Calcium 8.9 8.6 - 10.3 mg/dL   Sars-CoV-2 PCR   Result Value Ref Range    Coronavirus 2019, PCR Not Detected Not Detected   Influenza A, and B PCR   Result Value Ref Range    Flu A Result Not Detected Not Detected    Flu B Result Not Detected Not Detected             Assessment/Plan      Acute on Chronic Low Back Pain and Bilateral Hip Pain / Inability to Ambulate  -Has known history of significant OA with back and hip pain.   -Followed with pain management and attempted hip injections with no relief.   -Unable to have surgery due to her pulmonary status and co-morbidities.   -On scheduled tylenol and lidocaine patch. PRN Oxy for now, monitor respiratory status closely- stable with better pain control.   -PT/OT recommending moderate intensity therapy, needs SNF.   -OOB with assist only.   -All imaging done shows OA and degenerative changes, no acute fractures.      Fever  -Had 2 fevers overnight, she denies any chills or sweats. WBC normal.   -UA negative. COVID and flu checked and negative.   -Only abnormality  seen is her chest imaging, consulted pulm who is ordered CT chest. Will see if further abx needed for this.   -Blood cultures sent this AM, pending.   -Will hold off on abx for now until more info back on chest imaging.     COPD   -No wheeze on exam.   -Continue home inhalers.      Chronic Respiratory Failure / RAMON  -Baseline O2 3L NC continuous at home.   -She also uses BiPAP at HS, this was ordered.   -Currently on baseline oxygen. No wheeze on exam.   -CXR with patchy nodular consolidation RUL with known lung mass, she just had PET CT as outpatient and is currently following with her PCP for this. CT to be done per pulmonary.  -Continue home inhalers.      Atrial Fibrillation  -On Eliquis and BB.      HTN  -BP elevated on arrival, likely with relation to pain. This has improved.   -Continue her home BP medications and monitor BP.   -PRN Hydralazine added.      DVT Risk  -On Eliquis.   -SCDs.      Plan  Pulmonary following, planning for CT chest imaging.   Continue current pain medications, she is improving and participating in therapy.   Monitor for further fevers. Blood cultures pending. Will hold off on abx unless CT shows infectious looking process.   PT/OT, mobilize.   Plan for SNF when medically stable.     **ADDENDUM 1500- called to see the patient as her HR was elevated with vitals and EKG was done showing rapid aflutter 2:1 conduction. She is asymptomatic on my exam, she has no chest pain or palpitations, she denies any increased SOB. Placed on telemetry and HR in the 140-150 range, atrial flutter. Transferred to SDU status and cardio consulted. Discussed with Dr. Romano, will stop her amlodipine and hydrochlorothiazide, ordered a 500 mcg IV digoxin bolus and will continue her bisoprolol. Will see how she responds to this. Nurse updated.        Rupal Sosa, MOISES-CNP

## 2024-10-15 NOTE — NURSING NOTE
Tal solis on floor saw pt aware of EKG a flutter bp 96/48 hr 146  no c.p. no increased sob lungs demin o2 3l nc

## 2024-10-16 ENCOUNTER — APPOINTMENT (OUTPATIENT)
Dept: CARDIOLOGY | Facility: HOSPITAL | Age: 69
DRG: 177 | End: 2024-10-16
Payer: COMMERCIAL

## 2024-10-16 LAB
ANION GAP SERPL CALCULATED.3IONS-SCNC: 12 MMOL/L (ref 10–20)
AORTIC VALVE MEAN GRADIENT: 6 MMHG
AORTIC VALVE PEAK VELOCITY: 1.83 M/S
AV PEAK GRADIENT: 13.4 MMHG
AVA (PEAK VEL): 3.1 CM2
AVA (VTI): 2.97 CM2
BASOPHILS # BLD AUTO: 0.03 X10*3/UL (ref 0–0.1)
BASOPHILS NFR BLD AUTO: 0.6 %
BUN SERPL-MCNC: 37 MG/DL (ref 6–23)
CALCIUM SERPL-MCNC: 8.4 MG/DL (ref 8.6–10.3)
CHLORIDE SERPL-SCNC: 103 MMOL/L (ref 98–107)
CO2 SERPL-SCNC: 23 MMOL/L (ref 21–32)
CREAT SERPL-MCNC: 1.31 MG/DL (ref 0.5–1.05)
EGFRCR SERPLBLD CKD-EPI 2021: 44 ML/MIN/1.73M*2
EJECTION FRACTION APICAL 4 CHAMBER: 49.9
EJECTION FRACTION: 63 %
EOSINOPHIL # BLD AUTO: 0.31 X10*3/UL (ref 0–0.4)
EOSINOPHIL NFR BLD AUTO: 6.1 %
ERYTHROCYTE [DISTWIDTH] IN BLOOD BY AUTOMATED COUNT: 16.1 % (ref 11.5–14.5)
GLUCOSE SERPL-MCNC: 129 MG/DL (ref 74–99)
HCT VFR BLD AUTO: 28.8 % (ref 36–46)
HGB BLD-MCNC: 9.5 G/DL (ref 12–16)
IMM GRANULOCYTES # BLD AUTO: 0.01 X10*3/UL (ref 0–0.5)
IMM GRANULOCYTES NFR BLD AUTO: 0.2 % (ref 0–0.9)
LEFT ATRIUM VOLUME AREA LENGTH INDEX BSA: 41.3 ML/M2
LEFT VENTRICLE INTERNAL DIMENSION DIASTOLE: 4.21 CM (ref 3.5–6)
LEFT VENTRICULAR OUTFLOW TRACT DIAMETER: 2.1 CM
LV EJECTION FRACTION BIPLANE: 50 %
LYMPHOCYTES # BLD AUTO: 1.03 X10*3/UL (ref 0.8–3)
LYMPHOCYTES NFR BLD AUTO: 20.2 %
MCH RBC QN AUTO: 31.8 PG (ref 26–34)
MCHC RBC AUTO-ENTMCNC: 33 G/DL (ref 32–36)
MCV RBC AUTO: 96 FL (ref 80–100)
MITRAL VALVE E/A RATIO: 0.71
MONOCYTES # BLD AUTO: 0.66 X10*3/UL (ref 0.05–0.8)
MONOCYTES NFR BLD AUTO: 12.9 %
MRSA DNA SPEC QL NAA+PROBE: NOT DETECTED
NEUTROPHILS # BLD AUTO: 3.06 X10*3/UL (ref 1.6–5.5)
NEUTROPHILS NFR BLD AUTO: 60 %
NRBC BLD-RTO: 0 /100 WBCS (ref 0–0)
PLATELET # BLD AUTO: 279 X10*3/UL (ref 150–450)
POTASSIUM SERPL-SCNC: 3.8 MMOL/L (ref 3.5–5.3)
RBC # BLD AUTO: 2.99 X10*6/UL (ref 4–5.2)
RIGHT VENTRICLE FREE WALL PEAK S': 13.4 CM/S
RIGHT VENTRICLE PEAK SYSTOLIC PRESSURE: 32.4 MMHG
SODIUM SERPL-SCNC: 134 MMOL/L (ref 136–145)
TRICUSPID ANNULAR PLANE SYSTOLIC EXCURSION: 2.1 CM
WBC # BLD AUTO: 5.1 X10*3/UL (ref 4.4–11.3)

## 2024-10-16 PROCEDURE — 2060000001 HC INTERMEDIATE ICU ROOM DAILY

## 2024-10-16 PROCEDURE — 94760 N-INVAS EAR/PLS OXIMETRY 1: CPT

## 2024-10-16 PROCEDURE — 87641 MR-STAPH DNA AMP PROBE: CPT | Performed by: NURSE PRACTITIONER

## 2024-10-16 PROCEDURE — 9420000001 HC RT PATIENT EDUCATION 5 MIN

## 2024-10-16 PROCEDURE — 2500000001 HC RX 250 WO HCPCS SELF ADMINISTERED DRUGS (ALT 637 FOR MEDICARE OP): Performed by: NURSE PRACTITIONER

## 2024-10-16 PROCEDURE — 93306 TTE W/DOPPLER COMPLETE: CPT

## 2024-10-16 PROCEDURE — 87449 NOS EACH ORGANISM AG IA: CPT | Mod: TRILAB,WESLAB | Performed by: NURSE PRACTITIONER

## 2024-10-16 PROCEDURE — 97116 GAIT TRAINING THERAPY: CPT | Mod: GP,CQ

## 2024-10-16 PROCEDURE — 2500000005 HC RX 250 GENERAL PHARMACY W/O HCPCS: Performed by: NURSE PRACTITIONER

## 2024-10-16 PROCEDURE — 2500000004 HC RX 250 GENERAL PHARMACY W/ HCPCS (ALT 636 FOR OP/ED): Performed by: NURSE PRACTITIONER

## 2024-10-16 PROCEDURE — 2500000002 HC RX 250 W HCPCS SELF ADMINISTERED DRUGS (ALT 637 FOR MEDICARE OP, ALT 636 FOR OP/ED): Performed by: NURSE PRACTITIONER

## 2024-10-16 PROCEDURE — 99232 SBSQ HOSP IP/OBS MODERATE 35: CPT

## 2024-10-16 PROCEDURE — 99232 SBSQ HOSP IP/OBS MODERATE 35: CPT | Performed by: NURSE PRACTITIONER

## 2024-10-16 PROCEDURE — 85025 COMPLETE CBC W/AUTO DIFF WBC: CPT | Performed by: NURSE PRACTITIONER

## 2024-10-16 PROCEDURE — 2500000004 HC RX 250 GENERAL PHARMACY W/ HCPCS (ALT 636 FOR OP/ED): Mod: JZ

## 2024-10-16 PROCEDURE — 93306 TTE W/DOPPLER COMPLETE: CPT | Performed by: INTERNAL MEDICINE

## 2024-10-16 PROCEDURE — 80048 BASIC METABOLIC PNL TOTAL CA: CPT | Performed by: NURSE PRACTITIONER

## 2024-10-16 PROCEDURE — 2500000002 HC RX 250 W HCPCS SELF ADMINISTERED DRUGS (ALT 637 FOR MEDICARE OP, ALT 636 FOR OP/ED)

## 2024-10-16 PROCEDURE — 5A09357 ASSISTANCE WITH RESPIRATORY VENTILATION, LESS THAN 24 CONSECUTIVE HOURS, CONTINUOUS POSITIVE AIRWAY PRESSURE: ICD-10-PCS | Performed by: NURSE PRACTITIONER

## 2024-10-16 PROCEDURE — 87899 AGENT NOS ASSAY W/OPTIC: CPT | Mod: TRILAB,WESLAB | Performed by: NURSE PRACTITIONER

## 2024-10-16 PROCEDURE — 97110 THERAPEUTIC EXERCISES: CPT | Mod: GP,CQ

## 2024-10-16 PROCEDURE — 94640 AIRWAY INHALATION TREATMENT: CPT

## 2024-10-16 PROCEDURE — 5A0945A ASSISTANCE WITH RESPIRATORY VENTILATION, 24-96 CONSECUTIVE HOURS, HIGH NASAL FLOW/VELOCITY: ICD-10-PCS | Performed by: NURSE PRACTITIONER

## 2024-10-16 PROCEDURE — 94660 CPAP INITIATION&MGMT: CPT

## 2024-10-16 RX ORDER — VANCOMYCIN 1 G/200ML
1 INJECTION, SOLUTION INTRAVENOUS ONCE
Status: COMPLETED | OUTPATIENT
Start: 2024-10-16 | End: 2024-10-16

## 2024-10-16 RX ORDER — AMIODARONE HYDROCHLORIDE 200 MG/1
400 TABLET ORAL 2 TIMES DAILY
Status: DISCONTINUED | OUTPATIENT
Start: 2024-10-16 | End: 2024-10-18

## 2024-10-16 RX ORDER — VANCOMYCIN HYDROCHLORIDE 1 G/20ML
INJECTION, POWDER, LYOPHILIZED, FOR SOLUTION INTRAVENOUS DAILY PRN
Status: DISCONTINUED | OUTPATIENT
Start: 2024-10-16 | End: 2024-10-17

## 2024-10-16 RX ORDER — ACETAMINOPHEN 500 MG
1000 TABLET ORAL EVERY 8 HOURS SCHEDULED
Status: COMPLETED | OUTPATIENT
Start: 2024-10-16 | End: 2024-10-18

## 2024-10-16 RX ORDER — SODIUM CHLORIDE 9 MG/ML
75 INJECTION, SOLUTION INTRAVENOUS CONTINUOUS
Status: DISCONTINUED | OUTPATIENT
Start: 2024-10-16 | End: 2024-10-17

## 2024-10-16 ASSESSMENT — PAIN SCALES - GENERAL
PAINLEVEL_OUTOF10: 6
PAINLEVEL_OUTOF10: 6
PAINLEVEL_OUTOF10: 10 - WORST POSSIBLE PAIN
PAINLEVEL_OUTOF10: 7
PAINLEVEL_OUTOF10: 0 - NO PAIN

## 2024-10-16 ASSESSMENT — COGNITIVE AND FUNCTIONAL STATUS - GENERAL
MOBILITY SCORE: 15
DAILY ACTIVITIY SCORE: 17
WALKING IN HOSPITAL ROOM: A LOT
DRESSING REGULAR UPPER BODY CLOTHING: A LOT
HELP NEEDED FOR BATHING: A LOT
TURNING FROM BACK TO SIDE WHILE IN FLAT BAD: A LOT
MOVING TO AND FROM BED TO CHAIR: A LITTLE
TOILETING: A LITTLE
HELP NEEDED FOR BATHING: A LOT
MOBILITY SCORE: 15
DAILY ACTIVITIY SCORE: 17
STANDING UP FROM CHAIR USING ARMS: A LITTLE
STANDING UP FROM CHAIR USING ARMS: A LITTLE
MOVING FROM LYING ON BACK TO SITTING ON SIDE OF FLAT BED WITH BEDRAILS: A LITTLE
MOVING FROM LYING ON BACK TO SITTING ON SIDE OF FLAT BED WITH BEDRAILS: A LITTLE
DRESSING REGULAR LOWER BODY CLOTHING: A LOT
WALKING IN HOSPITAL ROOM: A LOT
MOVING TO AND FROM BED TO CHAIR: A LITTLE
DRESSING REGULAR LOWER BODY CLOTHING: A LOT
MOBILITY SCORE: 15
TURNING FROM BACK TO SIDE WHILE IN FLAT BAD: A LITTLE
WALKING IN HOSPITAL ROOM: A LOT
TOILETING: A LITTLE
TURNING FROM BACK TO SIDE WHILE IN FLAT BAD: A LITTLE
STANDING UP FROM CHAIR USING ARMS: A LITTLE
MOVING FROM LYING ON BACK TO SITTING ON SIDE OF FLAT BED WITH BEDRAILS: A LITTLE
DRESSING REGULAR UPPER BODY CLOTHING: A LOT
CLIMB 3 TO 5 STEPS WITH RAILING: TOTAL
CLIMB 3 TO 5 STEPS WITH RAILING: TOTAL
CLIMB 3 TO 5 STEPS WITH RAILING: A LOT
MOVING TO AND FROM BED TO CHAIR: A LITTLE

## 2024-10-16 ASSESSMENT — PAIN DESCRIPTION - LOCATION
LOCATION: BACK
LOCATION: HIP
LOCATION: BACK

## 2024-10-16 ASSESSMENT — PAIN - FUNCTIONAL ASSESSMENT
PAIN_FUNCTIONAL_ASSESSMENT: 0-10

## 2024-10-16 ASSESSMENT — PAIN DESCRIPTION - ORIENTATION
ORIENTATION: LOWER
ORIENTATION: RIGHT

## 2024-10-16 ASSESSMENT — PAIN SCALES - WONG BAKER: WONGBAKER_NUMERICALRESPONSE: HURTS EVEN MORE

## 2024-10-16 ASSESSMENT — PAIN DESCRIPTION - DESCRIPTORS: DESCRIPTORS: ACHING

## 2024-10-16 NOTE — PROGRESS NOTES
10/16/24 1217   Discharge Planning   Living Arrangements Children   Support Systems Children;Family members   Type of Residence Private residence   Who is requesting discharge planning? Provider   Home or Post Acute Services None   Type of Home Care Services DME or oxygen   Expected Discharge Disposition SNF   Does the patient need discharge transport arranged? Yes   RoundTrip coordination needed? Yes   Has discharge transport been arranged? No     Patient was accepted to [facility] and precert was started. Patient was informed.   Precert Status: Pending  [employer] Pending Auth ID # [id number]  Dates: 10/16/2024    Per Cardio Note:  Patient loaded with IV digoxin 500 mcg yesterday after entering atrial fibrillation with rapid ventricular response.  She is converted back into normal sinus rhythm this morning.     Plan for SNF when medically stable.     PLAN: [facility], precert started

## 2024-10-16 NOTE — CONSULTS
Vancomycin Dosing by Pharmacy- [facility] INITIAL    Kamila Jones is a 69 y.o. year old female who Pharmacy has been consulted for vancomycin dosing for Vancomycin Indications: Pneumonia. Based on the patient's indication and renal status this patient will be dosed based on a target level of Other Indication: 15-20 mcg/mL    Patient is currently in [facility].    Initial Dosin mg x 1    Visit Vitals  /77 (BP Location: Left arm, Patient Position: Sitting)   Pulse 66   Temp 37 °C (98.6 °F) (Oral)   Resp 16           Lab Results   Component Value Date    CREATININE 1.31 (H) 10/16/2024    CREATININE 0.75 10/15/2024    CREATININE 0.78 10/14/2024    CREATININE 0.60 2024       I/O last 3 completed shifts:  In: 680 (12.1 mL/kg) [P.O.:680]  Out: 201 (3.6 mL/kg) [Urine:200 (0.1 mL/kg/hr); Emesis/NG output:1]  Weight: 56.2 kg     Lab Results   Component Value Date    PATIENTTEMP 37.0 2023    PATIENTTEMP 37.0 2023    PATIENTTEMP 37.0 2023          Assessment/Plan     Random level within 24 hours of first dose will be obtained on 10-17 with am labs. May be obtained sooner if clinically indicated.   Will continue to monitor renal function daily while on vancomycin and order serum creatinine at least every 48 hours if not already ordered.  Follow for continued vancomycin needs, clinical response, and signs/symptoms of toxicity.     JASMIN BEDOYA, PharmD

## 2024-10-16 NOTE — PROGRESS NOTES
"Physical Therapy    Physical Therapy Treatment    Patient Name: Kamila Jones \"Elizabeth"  MRN: [id number]  Department: [facility]  Room: [address] [place], [place] [address]  Today's Date: 10/16/2024  Time Calculation  Start Time: 1320  Stop Time: 1350  Time Calculation (min): 30 min         Assessment/Plan   PT Assessment  Strengths: Rehab experience  End of Session Communication: Bedside nurse  Assessment Comment: Gives effort with weakness/ pain  End of Session Patient Position: Bed, 3 rail up, Alarm on (Needs at reach)     PT Plan  Treatment/Interventions: Bed mobility, Transfer training, Gait training, Balance training, Strengthening, Endurance training, Therapeutic exercise, Therapeutic activity  PT Plan: Ongoing PT  PT Frequency: 4 times per week  PT Discharge Recommendations: Moderate intensity level of continued care  Equipment Recommended upon Discharge: Wheeled walker  PT Recommended Transfer Status: Assist x1, Assistive device  PT - OK to Discharge: Yes      General Visit Information:   PT  Visit  PT Received On: 10/16/24  General  Reason for Referral: impaired mobility  Referred By: GIOVANNI DE LEON-CNP  Past Medical History Relevant to Rehab: chronic LBP, hip OA, chronic resp failure, ETOH hx, anemia, a-fib, COPD, HTN, RAMON, DVT, ankle fx repair, suicide attempt  Prior to Session Communication: Bedside nurse  Patient Position Received: Bed, 3 rail up, Alarm on  Preferred Learning Style: visual, verbal  General Comment: Clearede by nurse to see. Patient agreeable to therapy    Subjective   Precautions:  Precautions  Medical Precautions: Fall precautions, Oxygen therapy device and L/min (3 liters 02)  Precautions Comment: on 3L O2 via NC    Vital Signs (Past 2hrs)        Date/Time Vitals Session Patient Position Pulse Resp SpO2 BP MAP (mmHg)    10/16/24 1320 --  --  --  --  98 %  --  --                         Objective   Pain:  Pain Assessment  Pain Assessment: 0-10  0-10 (Numeric) Pain Score: 6  Pain Type: Acute pain  Pain Location: " Back  Pain Orientation: Lower  Pain Radiating Towards: left hip  Pain Descriptors: Aching  Pain Frequency: Constant/continuous  Pain Onset: Ongoing  Clinical Progression: Gradually worsening  Multiple Pain Sites: Two  Pain 2  Pain Score 2: 0 - No pain  Llamas-Sellers FACES Pain Rating 2: Hurts even more  Pain Type 2: Chronic pain  Pain Location 2: Hip  Pain Orientation 2: Left  Cognition:  Cognition  Overall Cognitive Status: Within Functional Limits  Cognition Comments: pleasant and cooperative  Coordination:     Postural Control:  Dynamic Standing Balance  Dynamic Standing-Balance Support: Bilateral upper extremity supported  Dynamic Standing-Level of Assistance: Minimum assistance  Dynamic Standing-Balance: Turning  Dynamic Standing-Comments: Fair -  Extremity/Trunk Assessments:    Activity Tolerance:  Activity Tolerance  Endurance: Decreased tolerance for upright activites  Activity Tolerance Comments: Fatiques easily Pain with mobility  Treatments:       Bed Mobility  Bed Mobility: Yes  Bed Mobility 1  Bed Mobility 1: Supine to sitting  Level of Assistance 1: Moderate assistance  Bed Mobility Comments 1: Mod A for trunk up with use of draw sheet for trunk support  Bed Mobility 2  Bed Mobility  2: Sitting to supine  Level of Assistance 2: Minimum assistance  Bed Mobility Comments 2: Min A for safe controlled trunk descent    Ambulation/Gait Training  Ambulation/Gait Training Performed: Yes  Ambulation/Gait Training 1  Surface 1: Level tile  Device 1: Rolling walker  Assistance 1: Minimum assistance, Minimal verbal cues (Second person to manage IV and 02 line s)  Quality of Gait 1: Narrow base of support, Inconsistent stride length, Shuffling gait, Forward flexed posture  Comments/Distance (ft) 1: 30' x 2 with RW with Min A with small slow steps with assist to manage RW Seated rest between walks  Transfers  Transfer: Yes  Transfer 1  Transfer From 1: Bed to  Transfer to 1: Stand  Technique 1: Sit to stand  Transfer  Device 1: Walker  Transfer Level of Assistance 1: Minimum assistance, Minimal verbal cues  Trials/Comments 1: STS from bed x 2 trials due to rest between walks at side of bed with Min A with cues for safe hand placement  Transfers 2  Transfer From 2: Stand to  Transfer to 2: Bed  Technique 2: Stand to sit  Transfer Device 2: Walker  Transfer Level of Assistance 2: Minimum assistance, Minimal verbal cues  Trials/Comments 2: STS to bed with x 2 trials due rest between walks at side of bed with Min A with cues for safe hand placement    Outcome Measures:  [facility] Basic Mobility  Turning from your back to your side while in a flat bed without using bedrails: A little  Moving from lying on your back to sitting on the side of a flat bed without using bedrails: A lot  Moving to and from bed to chair (including a wheelchair): A little  Standing up from a chair using your arms (e.g. wheelchair or bedside chair): A little  To walk in hospital room: A lot  Climbing 3-5 steps with railing: A lot  Basic Mobility - Total Score: 15    Education Documentation  Body Mechanics, taught by Mireya Chery PTA at 10/16/2024  2:18 PM.  Learner: Patient  Readiness: Acceptance  Method: Explanation, Teach-back  Response: Verbalizes Understanding, Needs Reinforcement    Home Exercise Program, taught by Mireya Chery PTA at 10/16/2024  2:18 PM.  Learner: Patient  Readiness: Acceptance  Method: Explanation, Teach-back  Response: Verbalizes Understanding, Needs Reinforcement    Mobility Training, taught by Mireya Chery PTA at 10/16/2024  2:18 PM.  Learner: Patient  Readiness: Acceptance  Method: Explanation, Teach-back  Response: Verbalizes Understanding, Needs Reinforcement    Education Comments  No comments found.        OP EDUCATION:       Encounter Problems       Encounter Problems (Active)       Balance       LTG - Patient will maintain balance to allow for safe mobility (Progressing)       Start:  10/15/24    Expected End:   10/25/24               Mobility       STG - Patient will ambulate 50' w/ RW and min assist (Progressing)       Start:  10/15/24    Expected End:  10/25/24            Pt will tolerate BLE exercises to promote functional strength, endurance and balance (Progressing)       Start:  10/15/24    Expected End:  10/25/24               PT Transfers       STG - Patient to transfer to and from sit to supine IND w/ safe technique (Progressing)       Start:  10/15/24    Expected End:  10/25/24            STG - Patient will transfer sit to and from stand w/ distant supervision  (Progressing)       Start:  10/15/24    Expected End:  10/25/24

## 2024-10-16 NOTE — PROGRESS NOTES
"Kamila Jones \"Elizabeth" is a 69 y.o. female on day 0 of admission presenting with Chronic low back pain without sciatica, unspecified back pain laterality.      Subjective   Patient seen and examined. Sitting up in the chair. States she feels ok today, still with pain. She denies any increased SOB. Admits to episode of emesis yesterday and poor fluid intake. Afebrile.        Objective     Last Recorded Vitals  /77 (BP Location: Left arm, Patient Position: Sitting)   Pulse 66   Temp 37 °C (98.6 °F) (Oral)   Resp 16   Wt 56.2 kg (123 lb 14.4 oz)   SpO2 100%   Intake/Output last 3 Shifts:    Intake/Output Summary (Last 24 hours) at 10/16/2024 1235  Last data filed at 10/16/2024 1030  Gross per 24 hour   Intake 492.5 ml   Output --   Net 492.5 ml       Admission Weight  Weight: 56.2 kg (124 lb) (10/14/24 0942)    Daily Weight  10/14/24 : 56.2 kg (123 lb 14.4 oz)    Image Results  CT chest wo IV contrast  Narrative: Interpreted By:  Kiara Garnica,   STUDY:  CT CHEST WO IV CONTRAST;  10/15/2024 5:19 pm      INDICATION:  68 y/o   F with  Signs/Symptoms:Follow-up lung nodule.  Dyspnea          COMPARISON:  None.      ACCESSION NUMBER(S):  [id number]      ORDERING CLINICIAN:  BETO RICHMOND      TECHNIQUE:  CT was performed  following the administration of  ml of IV contrast  (Optiray 350).  Reformats were obtained in the coronal and sagittal  plane.      FINDINGS:  LIMITATIONS/LINES:   None.      HEART:   Heart is within normal limits of size.  No significant  pericardial effusion. There is rather extensive coronary artery  calcification observed.      MEDIASTINUM/HAIR:   There is no aneurysm of the thoracic aorta.  Atherosclerosis of the thoracic aorta is seen. There is stable  precarinal adenopathy measuring 11 mm in short axis diameter with no  additional enlarged mediastinal lymph nodes observed. No obvious  hilar mass or adenopathy is seen on this study limited by lack of  intravenous contrast administration.   "    PLEURA:   Unremarkable.      LUNG PARENCHYMA:   Pulmonary emphysema is observed. There is a 3 mm  left upper lobe pulmonary nodule seen on series 7 axial image 121  unchanged. A 4 mm right lower lobe pulmonary nodule is present on  series 7 axial image 160 with adjacent 3 mm right lower lobe  pulmonary nodule on axial image 156. These can be seen on prior study  as well. Re-identified is an irregularly marginated and spiculated  nodule at the base of the right lower lobe measuring 1.3 x 1.9 cm in  size without interval change.      When compared with prior examination, the infiltrate and airspace  consolidation seen within the left lower lobe has nearly completely  resolved with a small amount of residual airspace consolidation  within left lower lobe posteromedially. However, there is now new  infiltrate and airspace consolidation within the posterior segment of  the right upper lobe.      BONES:   No acute osseous abnormality is observed. There are  degenerative changes of the thoracic spine.      CHEST WALL/OTHER:   On images taken through upper abdomen, renal  artery calcification is observed and there is ectasia of the  abdominal aorta.      Impression: There is new infiltrate and airspace consolidation in the posterior  segment of right upper lobe with 5 mm cavitary component within the  consolidated pulmonary parenchyma. Appearance is consistent with a  pneumonia of the right upper lobe.      Near complete resolution of the left lower lobe pneumonia.      Stable pulmonary nodules including a 1.3 x 1.9 cm spiculated nodule  at the base of the right lower lobe.      Stable mild precarinal lymphadenopathy.      Rather extensive coronary artery calcifications.      MACRO:  None      Signed by: Kiara Garnica 10/16/2024 8:23 AM  Dictation workstation:   [id number]      Physical Exam    General: Alert and oriented x3, pleasant.   Cardiac: Regular rate and rhythm, S1/S2 , no murmur.   Pulmonary: Diminished on 3L  NC.   Abdomen: Soft, round, nontender. BS +x4.   Extremities: No edema. Equal strength in all extremities.   Skin: No rashes or lesions.      Relevant Results    Scheduled medications  acetaminophen, 1,000 mg, oral, q8h JENNYFER  amiodarone, 400 mg, oral, BID  apixaban, 5 mg, oral, q12h  bisoprolol, 5 mg, oral, BID  escitalopram, 10 mg, oral, Daily  famotidine, 20 mg, oral, Daily  tiotropium, 2 puff, inhalation, Daily   And  fluticasone furoate-vilanteroL, 1 puff, inhalation, Daily  gabapentin, 300 mg, oral, TID  lidocaine, 2 patch, transdermal, Daily  mirtazapine, 15 mg, oral, Nightly  oxygen, , inhalation, Continuous - Inhalation  primidone, 200 mg, oral, BID      Continuous medications  oxygen, , Last Rate: 3 L/min (10/15/24 2300)  sodium chloride 0.9%, 75 mL/hr, Last Rate: 75 mL/hr (10/16/24 1020)      PRN medications  PRN medications: albuterol, hydrALAZINE, ondansetron ODT **OR** ondansetron, oxyCODONE, polyethylene glycol     Results for orders placed or performed during the hospital encounter of 10/14/24 (from the past 24 hours)   CBC and Auto Differential   Result Value Ref Range    WBC 5.1 4.4 - 11.3 x10*3/uL    nRBC 0.0 0.0 - 0.0 /100 WBCs    RBC 2.99 (L) 4.00 - 5.20 x10*6/uL    Hemoglobin 9.5 (L) 12.0 - 16.0 g/dL    Hematocrit 28.8 (L) 36.0 - 46.0 %    MCV 96 80 - 100 fL    MCH 31.8 26.0 - 34.0 pg    MCHC 33.0 32.0 - 36.0 g/dL    RDW 16.1 (H) 11.5 - 14.5 %    Platelets 279 150 - 450 x10*3/uL    Neutrophils % 60.0 40.0 - 80.0 %    Immature Granulocytes %, Automated 0.2 0.0 - 0.9 %    Lymphocytes % 20.2 13.0 - 44.0 %    Monocytes % 12.9 2.0 - 10.0 %    Eosinophils % 6.1 0.0 - 6.0 %    Basophils % 0.6 0.0 - 2.0 %    Neutrophils Absolute 3.06 1.60 - 5.50 x10*3/uL    Immature Granulocytes Absolute, Automated 0.01 0.00 - 0.50 x10*3/uL    Lymphocytes Absolute 1.03 0.80 - 3.00 x10*3/uL    Monocytes Absolute 0.66 0.05 - 0.80 x10*3/uL    Eosinophils Absolute 0.31 0.00 - 0.40 x10*3/uL    Basophils Absolute 0.03 0.00 -  0.10 x10*3/uL   Basic Metabolic Panel   Result Value Ref Range    Glucose 129 (H) 74 - 99 mg/dL    Sodium 134 (L) 136 - 145 mmol/L    Potassium 3.8 3.5 - 5.3 mmol/L    Chloride 103 98 - 107 mmol/L    Bicarbonate 23 21 - 32 mmol/L    Anion Gap 12 10 - 20 mmol/L    Urea Nitrogen 37 (H) 6 - 23 mg/dL    Creatinine 1.31 (H) 0.50 - 1.05 mg/dL    eGFR 44 (L) >60 mL/min/1.73m*2    Calcium 8.4 (L) 8.6 - 10.3 mg/dL   Transthoracic Echo (TTE) Complete   Result Value Ref Range    BSA 1.62 m2             Assessment/Plan      Acute on Chronic Low Back Pain and Bilateral Hip Pain / Inability to Ambulate  -Has known history of significant OA with back and hip pain.   -Followed with pain management and attempted hip injections with no relief.   -Unable to have surgery due to her pulmonary status and co-morbidities.   -On scheduled tylenol and lidocaine patch. PRN Oxy for now, monitor respiratory status closely- stable with better pain control.   -PT/OT recommending moderate intensity therapy, needs SNF.   -OOB with assist only.   -All imaging done shows OA and degenerative changes, no acute fractures.      Fever / Healthcare Aquired Pneumonia  -Had 2 isolated fevers. WBC remain normal.   -UA negative. COVID and flu checked and negative.   -CT chest shows new infiltrate RUL- discussed with pulmonary who reviewed her images, they feel this is a HCAP and we will treat with vanco/zosyn- pulmonary ordering.   -Blood cultures pending.     RADHA   -Creat doubled today, 0.7 --> 1.3.   -Suspect from dehydration as she has had poor fluid intake and had emesis yesterday.   -Will give 24 hours IVF hydration and monitor renal function.   -Post void bladder scan showed no residual.      COPD   -No wheeze on exam.   -Continue home inhalers.      Chronic Respiratory Failure / RAMON  -Baseline O2 3L NC continuous at home.   -She also uses BiPAP at HS, this is ordered.   -Currently on baseline oxygen. No wheeze on exam.   -CXR with patchy nodular  consolidation RUL with known lung mass, she just had PET CT as outpatient and is currently following with her PCP for this.  -Continue home inhalers.      Atrial Fibrillation with RVR  -On Eliquis and BB.   -Episode of rapid afib/flutter yesterday, cardio was consulted, given loading dose of digoxin and converted back to NSR.   -Cardio follows, added amiodarone today.   -Monitor on telemetry.     HTN  -BP elevated on arrival, likely with relation to pain. This has improved and BP actually soft yesterday.   -Meds adjusted and BP is stable.   -Monitor.     DVT Risk  -On Eliquis.   -SCDs.      Plan  Pulmonary following, reviewed CT chest imaging with them. Feels she has HCAP and we will treat with vanco and zosyn.   IVF hydration for her RADHA- repeat labs in AM.   Cardio follows, amiodarone added today. Monitor HR and rhythm.   Continue current pain medications, she is improving and participating in therapy.   Blood cultures pending.   PT/OT, mobilize.   Plan for SNF when medically stable and cleared by consultants.   Called and updated patient's daughter Edgar. All questions answered.         Rupal Sosa, APRN-CNP

## 2024-10-16 NOTE — NURSING NOTE
Notified Dr Romano patients heart rate sustaining 130-140's /99, asymptomatic, MD gave no new orders she is fine to take amiodarone until 9pm tonight.

## 2024-10-16 NOTE — PROGRESS NOTES
"Kamila Jones \"Elizabeth" is a 69 y.o. female on day 0 of admission seen in follow-up for abnormal chest imaging, fevers     Subjective   On 3 L nasal cannula oxygen; oxygen sats 98%. Endorses improved breathing and bilateral hip/back pain. Afebrile.        Objective     Physical Exam  Vitals and nursing note reviewed.   Constitutional:       General: She is awake.      Appearance: Normal appearance.   HENT:      Head: Normocephalic and atraumatic.      Nose: Nose normal.      Mouth/Throat:      Mouth: Mucous membranes are moist.   Eyes:      Extraocular Movements: Extraocular movements intact.      Conjunctiva/sclera: Conjunctivae normal.      Pupils: Pupils are equal, round, and reactive to light.   Neck:      Thyroid: No thyroid mass.      Trachea: Phonation normal.   Cardiovascular:      Rate and Rhythm: Normal rate and regular rhythm.      Pulses: Normal pulses.      Heart sounds: Normal heart sounds. No murmur heard.     No gallop.   Pulmonary:      Effort: Pulmonary effort is normal. No respiratory distress.      Breath sounds: Normal breath sounds and air entry. No decreased breath sounds, wheezing, rhonchi or rales.   Abdominal:      General: Bowel sounds are normal. There is no distension.      Palpations: Abdomen is soft.      Tenderness: There is no abdominal tenderness.   Musculoskeletal:         General: Normal range of motion.      Cervical back: Neck supple.      Right lower leg: No edema.      Left lower leg: No edema.   Skin:     General: Skin is warm and dry.      Capillary Refill: Capillary refill takes less than 2 seconds.   Neurological:      General: No focal deficit present.      Mental Status: She is alert and oriented to person, place, and time. Mental status is at baseline.      Cranial Nerves: Cranial nerves 2-12 are intact.      Motor: Motor function is intact.   Psychiatric:         Attention and Perception: Attention and perception normal.         Mood and Affect: Mood normal.         " "Speech: Speech normal.         Behavior: Behavior normal.         Last Recorded Vitals  Blood pressure 114/59, pulse 69, temperature 36.9 °C (98.4 °F), temperature source Oral, resp. rate 16, height 1.676 m (5' 5.98\"), weight 56.2 kg (123 lb 14.4 oz), SpO2 98%.  Intake/Output last 3 Shifts:  I/O last 3 completed shifts:  In: 680 (12.1 mL/kg) [P.O.:680]  Out: 201 (3.6 mL/kg) [Urine:200 (0.1 mL/kg/hr); Emesis/NG output:1]  Weight: 56.2 kg       Intake/Output Summary (Last 24 hours) at 10/16/2024 0859  Last data filed at 10/15/2024 1700  Gross per 24 hour   Intake 680 ml   Output 100 ml   Net 580 ml      Scheduled medications:  acetaminophen, 1,000 mg, oral, q8h JENNYFER  amiodarone, 400 mg, oral, BID  apixaban, 5 mg, oral, q12h  bisoprolol, 5 mg, oral, BID  escitalopram, 10 mg, oral, Daily  famotidine, 20 mg, oral, Daily  tiotropium, 2 puff, inhalation, Daily   And  fluticasone furoate-vilanteroL, 1 puff, inhalation, Daily  gabapentin, 300 mg, oral, TID  lidocaine, 2 patch, transdermal, Daily  mirtazapine, 15 mg, oral, Nightly  oxygen, , inhalation, Continuous - Inhalation  primidone, 200 mg, oral, BID     PRN medications: albuterol, hydrALAZINE, ondansetron ODT **OR** ondansetron, oxyCODONE, polyethylene glycol   oxygen, , Last Rate: 3 L/min (10/15/24 2300)       Relevant Results  Results for orders placed or performed during the hospital encounter of 10/14/24 (from the past 24 hours)   CBC and Auto Differential   Result Value Ref Range    WBC 5.1 4.4 - 11.3 x10*3/uL    nRBC 0.0 0.0 - 0.0 /100 WBCs    RBC 2.99 (L) 4.00 - 5.20 x10*6/uL    Hemoglobin 9.5 (L) 12.0 - 16.0 g/dL    Hematocrit 28.8 (L) 36.0 - 46.0 %    MCV 96 80 - 100 fL    MCH 31.8 26.0 - 34.0 pg    MCHC 33.0 32.0 - 36.0 g/dL    RDW 16.1 (H) 11.5 - 14.5 %    Platelets 279 150 - 450 x10*3/uL    Neutrophils % 60.0 40.0 - 80.0 %    Immature Granulocytes %, Automated 0.2 0.0 - 0.9 %    Lymphocytes % 20.2 13.0 - 44.0 %    Monocytes % 12.9 2.0 - 10.0 %    Eosinophils " % 6.1 0.0 - 6.0 %    Basophils % 0.6 0.0 - 2.0 %    Neutrophils Absolute 3.06 1.60 - 5.50 x10*3/uL    Immature Granulocytes Absolute, Automated 0.01 0.00 - 0.50 x10*3/uL    Lymphocytes Absolute 1.03 0.80 - 3.00 x10*3/uL    Monocytes Absolute 0.66 0.05 - 0.80 x10*3/uL    Eosinophils Absolute 0.31 0.00 - 0.40 x10*3/uL    Basophils Absolute 0.03 0.00 - 0.10 x10*3/uL   Basic Metabolic Panel   Result Value Ref Range    Glucose 129 (H) 74 - 99 mg/dL    Sodium 134 (L) 136 - 145 mmol/L    Potassium 3.8 3.5 - 5.3 mmol/L    Chloride 103 98 - 107 mmol/L    Bicarbonate 23 21 - 32 mmol/L    Anion Gap 12 10 - 20 mmol/L    Urea Nitrogen 37 (H) 6 - 23 mg/dL    Creatinine 1.31 (H) 0.50 - 1.05 mg/dL    eGFR 44 (L) >60 mL/min/1.73m*2    Calcium 8.4 (L) 8.6 - 10.3 mg/dL   Transthoracic Echo (TTE) Complete   Result Value Ref Range    BSA 1.62 m2      CT chest wo IV contrast  Result Date: 10/16/2024  FINDINGS: LIMITATIONS/LINES:   None.   HEART:   Heart is within normal limits of size.  No significant pericardial effusion. There is rather extensive coronary artery calcification observed.   MEDIASTINUM/HAIR:   There is no aneurysm of the thoracic aorta. Atherosclerosis of the thoracic aorta is seen. There is stable precarinal adenopathy measuring 11 mm in short axis diameter with no additional enlarged mediastinal lymph nodes observed. No obvious hilar mass or adenopathy is seen on this study limited by lack of intravenous contrast administration.   PLEURA:   Unremarkable.   LUNG PARENCHYMA:   Pulmonary emphysema is observed. There is a 3 mm left upper lobe pulmonary nodule seen on series 7 axial image 121 unchanged. A 4 mm right lower lobe pulmonary nodule is present on series 7 axial image 160 with adjacent 3 mm right lower lobe pulmonary nodule on axial image 156. These can be seen on prior study as well. Re-identified is an irregularly marginated and spiculated nodule at the base of the right lower lobe measuring 1.3 x 1.9 cm in size  without interval change.   When compared with prior examination, the infiltrate and airspace consolidation seen within the left lower lobe has nearly completely resolved with a small amount of residual airspace consolidation within left lower lobe posteromedially. However, there is now new infiltrate and airspace consolidation within the posterior segment of the right upper lobe.   BONES:   No acute osseous abnormality is observed. There are degenerative changes of the thoracic spine.   CHEST WALL/OTHER:   On images taken through upper abdomen, renal artery calcification is observed and there is ectasia of the abdominal aorta.  There is new infiltrate and airspace consolidation in the posterior segment of right upper lobe with 5 mm cavitary component within the consolidated pulmonary parenchyma. Appearance is consistent with a pneumonia of the right upper lobe.   Near complete resolution of the left lower lobe pneumonia.   Stable pulmonary nodules including a 1.3 x 1.9 cm spiculated nodule at the base of the right lower lobe.   Stable mild precarinal lymphadenopathy.   Rather extensive coronary artery calcifications.       XR knee left 4+ views  Result Date: 10/14/2024  FINDINGS: No fractures or destructive lesions are identified. Bone island is noted in the medial tibial condyle. The joint spaces and articular surfaces are maintained considering patient's age. The alignment is anatomic. Vascular calcifications are noted. There is no significant effusion in the suprapatellar bursa.  Unremarkable left knee radiographs.       XR lumbar spine 2-3 views  Result Date: 10/14/2024  FINDINGS: There is mild loss of height involving the L3 and L5 vertebra, however no acute fracture is identified. There is narrowing of the L1-L2, L2-L3, L3-L4 and mostly the L4-L5 disc spaces, with marginal osteophytes. The alignment is anatomic, without spondylolysis or spondylolisthesis. Hypertrophic changes involve the facet joints of the  lower lumbar spine. Calcifications of the aorta and its branches are present.   No acute pelvic fracture is seen. Extensive osteoarthritis involves the right hip joint, with complete obliteration of the joint space, subchondral bone sclerosis, subchondral cysts and marginal osteophytes. Osteoarthritis also involves the left hip joint and to a lesser degree the sacroiliac joints bilaterally. The alignment is anatomic. Vascular calcifications involve iliac and femoral arteries bilaterally. 1. Marked lumbar spondylosis and remote compression deformities of the L3 and L5 vertebra, without acute vertebral fracture or subluxation. 2. Extensive right hip osteoarthritis again noted. Additional degenerative changes in the pelvis as above. No pelvic fracture or subluxation.     XR hips bilateral 5+ VW w pelvis when performed  Result Date: 10/14/2024  FINDINGS: There is mild loss of height involving the L3 and L5 vertebra, however no acute fracture is identified. There is narrowing of the L1-L2, L2-L3, L3-L4 and mostly the L4-L5 disc spaces, with marginal osteophytes. The alignment is anatomic, without spondylolysis or spondylolisthesis. Hypertrophic changes involve the facet joints of the lower lumbar spine. Calcifications of the aorta and its branches are present.   No acute pelvic fracture is seen. Extensive osteoarthritis involves the right hip joint, with complete obliteration of the joint space, subchondral bone sclerosis, subchondral cysts and marginal osteophytes. Osteoarthritis also involves the left hip joint and to a lesser degree the sacroiliac joints bilaterally. The alignment is anatomic. Vascular calcifications involve iliac and femoral arteries bilaterally.  1. Marked lumbar spondylosis and remote compression deformities of the L3 and L5 vertebra, without acute vertebral fracture or subluxation. 2. Extensive right hip osteoarthritis again noted. Additional degenerative changes in the pelvis as above. No pelvic  fracture or subluxation.     XR chest 1 view  Result Date: 10/14/2024  FINDINGS: The study is limited due to  lordotic projection and overlying artifacts obscuring some detail. The cardiomediastinal silhouette is within normal limits for the technique. Patchy/nodular infiltrates are seen in the right suprahilar region, with mild volume loss of the right upper lobe and elevation of the minor fissure. There is no pneumothorax or significant effusion. Marked degenerative changes involve the shoulders. There is also partially included deformity in the right humeral neck consistent with a healing fracture.  Limited study. Patchy/nodular consolidation in the right upper lobe, possibly pneumonia or atelectasis, however in the setting of injury, lung contusion can not be excluded. Correlate clinically follow-up in 1-2 weeks to document complete resolution.       Assessment/Plan   Chronic hypoxic respiratory failure  Oxygen at baseline  BiPAP nightly while in hospital-continue NIV at home, she is monitored is  using device consistently and benefiting from it  Continuous pulse oximetry  Incentive spirometry/pulmonary hygiene     Healthcare acquired pneumonia  Will add IV vancomycin, Zosyn  Sputum if able  Follow-up cultures    Acute on chronic bilateral hip pain/low back pain  Analgesia   Follows with Pain Management     Right lower lobe spiculated lung nodule   CT scan chest without contrast reviewed with collaborating physician Dr. Davis  Follow-up CT scan chest in 3-6 weeks     COPD  Continue IBD/ICS     Atrial fibrillation  On Eliquis     Prophylaxis  Obstructive sleep apnea  BiPAP nightly     PT/OT/out of bed      Emmie Interiano, APRN-CNP

## 2024-10-16 NOTE — PROGRESS NOTES
"Kamila Jones \"Elizabeth" is a 69 y.o. female on day 0 of admission presenting with Chronic low back pain without sciatica, unspecified back pain laterality.    Subjective   Patient upright in chair eating lunch.  She denies chest pain, shortness of breath or palpitations.       Objective     Physical Exam  Constitutional:       Appearance: Normal appearance.   Cardiovascular:      Rate and Rhythm: Normal rate and regular rhythm.      Pulses: Normal pulses.      Heart sounds: Normal heart sounds.   Pulmonary:      Effort: Pulmonary effort is normal.      Breath sounds: Normal breath sounds.   Musculoskeletal:      Right lower leg: No edema.      Left lower leg: No edema.   Neurological:      Mental Status: She is alert and oriented to person, place, and time.         Last Recorded Vitals  Blood pressure 150/77, pulse 66, temperature 37 °C (98.6 °F), temperature source Oral, resp. rate 16, height 1.676 m (5' 5.98\"), weight 56.2 kg (123 lb 14.4 oz), SpO2 100%.  Intake/Output last 3 Shifts:  I/O last 3 completed shifts:  In: 680 (12.1 mL/kg) [P.O.:680]  Out: 201 (3.6 mL/kg) [Urine:200 (0.1 mL/kg/hr); Emesis/NG output:1]  Weight: 56.2 kg     Relevant Results  Results for orders placed or performed during the hospital encounter of 10/14/24 (from the past 24 hours)   CBC and Auto Differential   Result Value Ref Range    WBC 5.1 4.4 - 11.3 x10*3/uL    nRBC 0.0 0.0 - 0.0 /100 WBCs    RBC 2.99 (L) 4.00 - 5.20 x10*6/uL    Hemoglobin 9.5 (L) 12.0 - 16.0 g/dL    Hematocrit 28.8 (L) 36.0 - 46.0 %    MCV 96 80 - 100 fL    MCH 31.8 26.0 - 34.0 pg    MCHC 33.0 32.0 - 36.0 g/dL    RDW 16.1 (H) 11.5 - 14.5 %    Platelets 279 150 - 450 x10*3/uL    Neutrophils % 60.0 40.0 - 80.0 %    Immature Granulocytes %, Automated 0.2 0.0 - 0.9 %    Lymphocytes % 20.2 13.0 - 44.0 %    Monocytes % 12.9 2.0 - 10.0 %    Eosinophils % 6.1 0.0 - 6.0 %    Basophils % 0.6 0.0 - 2.0 %    Neutrophils Absolute 3.06 1.60 - 5.50 x10*3/uL    Immature Granulocytes " Absolute, Automated 0.01 0.00 - 0.50 x10*3/uL    Lymphocytes Absolute 1.03 0.80 - 3.00 x10*3/uL    Monocytes Absolute 0.66 0.05 - 0.80 x10*3/uL    Eosinophils Absolute 0.31 0.00 - 0.40 x10*3/uL    Basophils Absolute 0.03 0.00 - 0.10 x10*3/uL   Basic Metabolic Panel   Result Value Ref Range    Glucose 129 (H) 74 - 99 mg/dL    Sodium 134 (L) 136 - 145 mmol/L    Potassium 3.8 3.5 - 5.3 mmol/L    Chloride 103 98 - 107 mmol/L    Bicarbonate 23 21 - 32 mmol/L    Anion Gap 12 10 - 20 mmol/L    Urea Nitrogen 37 (H) 6 - 23 mg/dL    Creatinine 1.31 (H) 0.50 - 1.05 mg/dL    eGFR 44 (L) >60 mL/min/1.73m*2    Calcium 8.4 (L) 8.6 - 10.3 mg/dL   Transthoracic Echo (TTE) Complete   Result Value Ref Range    BSA 1.62 m2                Assessment/Plan   Assessment & Plan  Chronic low back pain without sciatica, unspecified back pain laterality  10/15:  Atrial fibrillation.  Patient admitted to the hospital for management of lower back pain weakness of lower extremities and was found to have atrial fibrillation with rapid ventricular rate.  Her blood pressure was running low.  I received 500 mg of IV digoxin and addition of th bisoprolol she is on.  Now back into normal sinus rhythm.e my recommendation is to continue bisoprolol and oral anticoagulation.  We may consider putting her on amiodarone if she continues to have paroxysmal atrial fibrillation.  I had recent stress test this year that showed no ischemia.  Last year had in 2023 echocardiogram showing normal ejection fraction.  Will repeat another 2D echo.  However patient is here for management of her pain and atrial fibrillation was incidental finding will monitor for now.    10/16: Patient loaded with IV digoxin 500 mcg yesterday after entering atrial fibrillation with rapid ventricular response.  She is converted back into normal sinus rhythm this morning.  Telemetry reading SVT however I believe this is actually a paroxysmal atrial fibrillation.  Patient is asymptomatic of  this.  Will start her on 400 mg of oral amiodarone to maintain rate control.  Echocardiogram completed today has not been read yet.  Blood pressures are labile with readings from systolic in the 90s into the 150s.  I suspect there is a component of pain related to her elevated blood pressures.  Will keep her antihypertensive regimen as is with 5 mg of bisoprolol for now.  Continue 400 mg twice daily amiodarone, continue with Eliquis 5 mg twice daily.  Will try and control her pain.  Will continue to follow with you.       I spent 20 minutes in the professional and overall care of this patient.      Deisy Sheridan PA-C

## 2024-10-16 NOTE — CARE PLAN
The patient's goals for the shift include  monitor heart rate    The clinical goals for the shift include monitor heart rate    Problem: Safety - Adult  Goal: Free from fall injury  Outcome: Progressing     Problem: Discharge Planning  Goal: Discharge to home or other facility with appropriate resources  Outcome: Progressing     Problem: Chronic Conditions and Co-morbidities  Goal: Patient's chronic conditions and co-morbidity symptoms are monitored and maintained or improved  Outcome: Progressing     Problem: Fall/Injury  Goal: Not fall by end of shift  Outcome: Progressing  Goal: Be free from injury by end of the shift  Outcome: Progressing  Goal: Verbalize understanding of personal risk factors for fall in the hospital  Outcome: Progressing  Goal: Verbalize understanding of risk factor reduction measures to prevent injury from fall in the home  Outcome: Progressing  Goal: Use assistive devices by end of the shift  Outcome: Progressing  Goal: Pace activities to prevent fatigue by end of the shift  Outcome: Progressing     Problem: Skin  Goal: Decreased wound size/increased tissue granulation at next dressing change  Outcome: Progressing  Goal: Participates in plan/prevention/treatment measures  Outcome: Progressing  Goal: Prevent/manage excess moisture  Outcome: Progressing  Goal: Prevent/minimize sheer/friction injuries  Outcome: Progressing  Goal: Promote/optimize nutrition  Outcome: Progressing  Goal: Promote skin healing  Outcome: Progressing     Problem: Respiratory  Goal: Clear secretions with interventions this shift  Outcome: Progressing  Goal: Minimize anxiety/maximize coping throughout shift  Outcome: Progressing  Goal: Minimal/no exertional discomfort or dyspnea this shift  Outcome: Progressing  Goal: No signs of respiratory distress (eg. Use of accessory muscles. Peds grunting)  Outcome: Progressing  Goal: Patent airway maintained this shift  Outcome: Progressing  Goal: Tolerate mechanical ventilation  evidenced by VS/agitation level this shift  Outcome: Progressing  Goal: Tolerate pulmonary toileting this shift  Outcome: Progressing  Goal: Verbalize decreased shortness of breath this shift  Outcome: Progressing  Goal: Wean oxygen to maintain O2 saturation per order/standard this shift  Outcome: Progressing  Goal: Increase self care and/or family involvement in next 24 hours  Outcome: Progressing

## 2024-10-16 NOTE — CARE PLAN
The patient's goals for the shift include      The clinical goals for the shift include monitor vitals    Problem: Skin  Goal: Decreased wound size/increased tissue granulation at next dressing change  Outcome: Progressing     Problem: Skin  Goal: Participates in plan/prevention/treatment measures  Outcome: Progressing     Problem: Skin  Goal: Prevent/manage excess moisture  Outcome: Progressing

## 2024-10-17 LAB
ALBUMIN SERPL BCP-MCNC: 3.6 G/DL (ref 3.4–5)
ALP SERPL-CCNC: 70 U/L (ref 33–136)
ALT SERPL W P-5'-P-CCNC: 8 U/L (ref 7–45)
ANION GAP SERPL CALCULATED.3IONS-SCNC: 16 MMOL/L (ref 10–20)
AST SERPL W P-5'-P-CCNC: 15 U/L (ref 9–39)
BASOPHILS # BLD AUTO: 0.03 X10*3/UL (ref 0–0.1)
BASOPHILS NFR BLD AUTO: 0.7 %
BILIRUB SERPL-MCNC: 0.2 MG/DL (ref 0–1.2)
BUN SERPL-MCNC: 28 MG/DL (ref 6–23)
CALCIUM SERPL-MCNC: 8.2 MG/DL (ref 8.6–10.3)
CHLORIDE SERPL-SCNC: 105 MMOL/L (ref 98–107)
CO2 SERPL-SCNC: 22 MMOL/L (ref 21–32)
CREAT SERPL-MCNC: 1.15 MG/DL (ref 0.5–1.05)
EGFRCR SERPLBLD CKD-EPI 2021: 51 ML/MIN/1.73M*2
EOSINOPHIL # BLD AUTO: 0.26 X10*3/UL (ref 0–0.4)
EOSINOPHIL NFR BLD AUTO: 6.2 %
ERYTHROCYTE [DISTWIDTH] IN BLOOD BY AUTOMATED COUNT: 16.4 % (ref 11.5–14.5)
GLUCOSE SERPL-MCNC: 101 MG/DL (ref 74–99)
HCT VFR BLD AUTO: 32.1 % (ref 36–46)
HGB BLD-MCNC: 9.8 G/DL (ref 12–16)
IMM GRANULOCYTES # BLD AUTO: 0.01 X10*3/UL (ref 0–0.5)
IMM GRANULOCYTES NFR BLD AUTO: 0.2 % (ref 0–0.9)
LEGIONELLA AG UR QL: NEGATIVE
LYMPHOCYTES # BLD AUTO: 1 X10*3/UL (ref 0.8–3)
LYMPHOCYTES NFR BLD AUTO: 23.9 %
MCH RBC QN AUTO: 31.8 PG (ref 26–34)
MCHC RBC AUTO-ENTMCNC: 30.5 G/DL (ref 32–36)
MCV RBC AUTO: 104 FL (ref 80–100)
MONOCYTES # BLD AUTO: 0.64 X10*3/UL (ref 0.05–0.8)
MONOCYTES NFR BLD AUTO: 15.3 %
NEUTROPHILS # BLD AUTO: 2.24 X10*3/UL (ref 1.6–5.5)
NEUTROPHILS NFR BLD AUTO: 53.7 %
NRBC BLD-RTO: 0 /100 WBCS (ref 0–0)
PLATELET # BLD AUTO: 241 X10*3/UL (ref 150–450)
POTASSIUM SERPL-SCNC: 4.3 MMOL/L (ref 3.5–5.3)
PROT SERPL-MCNC: 6.7 G/DL (ref 6.4–8.2)
RBC # BLD AUTO: 3.08 X10*6/UL (ref 4–5.2)
S PNEUM AG UR QL: NEGATIVE
SODIUM SERPL-SCNC: 139 MMOL/L (ref 136–145)
VANCOMYCIN SERPL-MCNC: 8.7 UG/ML (ref 5–20)
WBC # BLD AUTO: 4.2 X10*3/UL (ref 4.4–11.3)

## 2024-10-17 PROCEDURE — 80053 COMPREHEN METABOLIC PANEL: CPT | Performed by: NURSE PRACTITIONER

## 2024-10-17 PROCEDURE — 2500000002 HC RX 250 W HCPCS SELF ADMINISTERED DRUGS (ALT 637 FOR MEDICARE OP, ALT 636 FOR OP/ED): Performed by: NURSE PRACTITIONER

## 2024-10-17 PROCEDURE — 2500000004 HC RX 250 GENERAL PHARMACY W/ HCPCS (ALT 636 FOR OP/ED): Performed by: NURSE PRACTITIONER

## 2024-10-17 PROCEDURE — 9420000001 HC RT PATIENT EDUCATION 5 MIN

## 2024-10-17 PROCEDURE — 99232 SBSQ HOSP IP/OBS MODERATE 35: CPT | Performed by: NURSE PRACTITIONER

## 2024-10-17 PROCEDURE — 2060000001 HC INTERMEDIATE ICU ROOM DAILY

## 2024-10-17 PROCEDURE — 36415 COLL VENOUS BLD VENIPUNCTURE: CPT | Performed by: NURSE PRACTITIONER

## 2024-10-17 PROCEDURE — 85025 COMPLETE CBC W/AUTO DIFF WBC: CPT | Performed by: NURSE PRACTITIONER

## 2024-10-17 PROCEDURE — 94660 CPAP INITIATION&MGMT: CPT

## 2024-10-17 PROCEDURE — 97110 THERAPEUTIC EXERCISES: CPT | Mod: GP

## 2024-10-17 PROCEDURE — 97535 SELF CARE MNGMENT TRAINING: CPT | Mod: GO,CO

## 2024-10-17 PROCEDURE — 94640 AIRWAY INHALATION TREATMENT: CPT

## 2024-10-17 PROCEDURE — 2500000001 HC RX 250 WO HCPCS SELF ADMINISTERED DRUGS (ALT 637 FOR MEDICARE OP)

## 2024-10-17 PROCEDURE — 2500000001 HC RX 250 WO HCPCS SELF ADMINISTERED DRUGS (ALT 637 FOR MEDICARE OP): Performed by: NURSE PRACTITIONER

## 2024-10-17 PROCEDURE — 97530 THERAPEUTIC ACTIVITIES: CPT | Mod: GO,CO

## 2024-10-17 PROCEDURE — 97116 GAIT TRAINING THERAPY: CPT | Mod: GP

## 2024-10-17 PROCEDURE — 2500000002 HC RX 250 W HCPCS SELF ADMINISTERED DRUGS (ALT 637 FOR MEDICARE OP, ALT 636 FOR OP/ED)

## 2024-10-17 PROCEDURE — 2500000005 HC RX 250 GENERAL PHARMACY W/O HCPCS: Performed by: NURSE PRACTITIONER

## 2024-10-17 PROCEDURE — 99232 SBSQ HOSP IP/OBS MODERATE 35: CPT

## 2024-10-17 PROCEDURE — 80202 ASSAY OF VANCOMYCIN: CPT

## 2024-10-17 PROCEDURE — 94760 N-INVAS EAR/PLS OXIMETRY 1: CPT

## 2024-10-17 RX ORDER — VANCOMYCIN 1 G/200ML
1000 INJECTION, SOLUTION INTRAVENOUS ONCE
Status: DISCONTINUED | OUTPATIENT
Start: 2024-10-17 | End: 2024-10-17

## 2024-10-17 RX ORDER — AMIODARONE HYDROCHLORIDE 200 MG/1
TABLET ORAL
Qty: 116 TABLET | Refills: 0 | Status: SHIPPED | OUTPATIENT
Start: 2024-10-17 | End: 2024-10-17

## 2024-10-17 RX ORDER — METOPROLOL SUCCINATE 25 MG/1
25 TABLET, EXTENDED RELEASE ORAL 2 TIMES DAILY
Status: DISCONTINUED | OUTPATIENT
Start: 2024-10-17 | End: 2024-10-23 | Stop reason: HOSPADM

## 2024-10-17 RX ORDER — AMIODARONE HYDROCHLORIDE 200 MG/1
TABLET ORAL
Qty: 176 TABLET | Refills: 0 | Status: CANCELLED | OUTPATIENT
Start: 2024-10-17 | End: 2024-12-30

## 2024-10-17 RX ORDER — AMIODARONE HYDROCHLORIDE 200 MG/1
200 TABLET ORAL 2 TIMES DAILY
Qty: 60 TABLET | Refills: 0 | Status: SHIPPED | OUTPATIENT
Start: 2024-10-17 | End: 2024-10-23 | Stop reason: HOSPADM

## 2024-10-17 ASSESSMENT — COGNITIVE AND FUNCTIONAL STATUS - GENERAL
TURNING FROM BACK TO SIDE WHILE IN FLAT BAD: A LOT
CLIMB 3 TO 5 STEPS WITH RAILING: A LOT
MOVING FROM LYING ON BACK TO SITTING ON SIDE OF FLAT BED WITH BEDRAILS: A LITTLE
WALKING IN HOSPITAL ROOM: A LOT
HELP NEEDED FOR BATHING: A LOT
DRESSING REGULAR LOWER BODY CLOTHING: A LITTLE
PERSONAL GROOMING: A LITTLE
MOBILITY SCORE: 16
TOILETING: A LITTLE
TOILETING: A LITTLE
DAILY ACTIVITIY SCORE: 17
WALKING IN HOSPITAL ROOM: A LITTLE
STANDING UP FROM CHAIR USING ARMS: A LITTLE
MOBILITY SCORE: 15
DRESSING REGULAR UPPER BODY CLOTHING: A LOT
MOVING FROM LYING ON BACK TO SITTING ON SIDE OF FLAT BED WITH BEDRAILS: A LITTLE
CLIMB 3 TO 5 STEPS WITH RAILING: TOTAL
DRESSING REGULAR LOWER BODY CLOTHING: A LOT
MOVING TO AND FROM BED TO CHAIR: A LITTLE
DAILY ACTIVITIY SCORE: 19
MOVING TO AND FROM BED TO CHAIR: A LITTLE
DRESSING REGULAR UPPER BODY CLOTHING: A LITTLE
HELP NEEDED FOR BATHING: A LITTLE
TURNING FROM BACK TO SIDE WHILE IN FLAT BAD: A LITTLE
STANDING UP FROM CHAIR USING ARMS: A LITTLE

## 2024-10-17 ASSESSMENT — PAIN SCALES - GENERAL
PAINLEVEL_OUTOF10: 7
PAINLEVEL_OUTOF10: 8
PAINLEVEL_OUTOF10: 8
PAINLEVEL_OUTOF10: 2
PAINLEVEL_OUTOF10: 8
PAINLEVEL_OUTOF10: 3

## 2024-10-17 ASSESSMENT — PAIN - FUNCTIONAL ASSESSMENT
PAIN_FUNCTIONAL_ASSESSMENT: 0-10

## 2024-10-17 ASSESSMENT — ACTIVITIES OF DAILY LIVING (ADL): HOME_MANAGEMENT_TIME_ENTRY: 28

## 2024-10-17 ASSESSMENT — PAIN SCALES - WONG BAKER
WONGBAKER_NUMERICALRESPONSE: HURTS LITTLE MORE
WONGBAKER_NUMERICALRESPONSE: HURTS LITTLE BIT

## 2024-10-17 ASSESSMENT — PAIN SCALES - PAIN ASSESSMENT IN ADVANCED DEMENTIA (PAINAD): TOTALSCORE: MEDICATION (SEE MAR)

## 2024-10-17 ASSESSMENT — PAIN DESCRIPTION - LOCATION
LOCATION: HIP
LOCATION: BACK
LOCATION: HIP

## 2024-10-17 NOTE — PROGRESS NOTES
"Kamila Jones \"Elizabeth" is a 69 y.o. female on day 1 of admission presenting with Chronic low back pain without sciatica, unspecified back pain laterality.      Subjective   Patient seen and examined. Resting in bed, eating breakfast. States she feels ok, has some pain this morning. Moist productive cough with white sputum. She denies any worsening SOB, remains on her baseline O2. Afebrile.        Objective     Last Recorded Vitals  /67 (BP Location: Right arm, Patient Position: Lying)   Pulse 69   Temp 37 °C (98.6 °F) (Temporal)   Resp 20   Wt 56.2 kg (123 lb 14.4 oz)   SpO2 96%   Intake/Output last 3 Shifts:    Intake/Output Summary (Last 24 hours) at 10/17/2024 0840  Last data filed at 10/17/2024 0325  Gross per 24 hour   Intake 1262.5 ml   Output 875 ml   Net 387.5 ml       Admission Weight  Weight: 56.2 kg (124 lb) (10/14/24 0942)    Daily Weight  10/14/24 : 56.2 kg (123 lb 14.4 oz)    Image Results  Transthoracic Echo (TTE) Complete              [facility]  [address] [id number]              Phone [phone]    TRANSTHORACIC ECHOCARDIOGRAM REPORT    Patient Name:     KAMILA JONES      Reading Physician:   [id number] Karon Romano MD  Study Date:       10/16/2024           Ordering Provider:   [id number] ROMANO  MRN/PID:          [id number]             Fellow:  Accession#:       [id number]         Nurse:  Date of           1955 / 69 years Sonographer:         Sharmila Russell RDCS  Birth/Age:  Gender:           F                    Additional Staff:  Height:           165.10 cm            Admit Date:  Weight:           55.79 kg             Admission Status:    Inpatient - Routine  BSA / BMI:        1.61 m2 / 20.47      Department Location: [facility]                    kg/m2  Blood Pressure: 149 /67 mmHg    Study Type:    TRANSTHORACIC ECHO (TTE) COMPLETE  Diagnosis/ICD: Shortness of " breath-R06.02  Indication:    SOB, Pleural effusion  CPT Codes:     Echo Complete w Full [id number]    Patient History:  Pertinent History: DVT, carotid stenosis, HTN, V-tach, COPD.    Study Detail: The following Echo studies were performed: 2D, M-Mode, Doppler and                color flow. Technically challenging study due to prominent lung                artifact and body habitus.       PHYSICIAN INTERPRETATION:  Left Ventricle: The left ventricular systolic function is normal, with a visually estimated ejection fraction of 60-65%. There are no regional wall motion abnormalities. The left ventricular cavity size is normal. Spectral Doppler shows a normal pattern of left ventricular diastolic filling.  Left Atrium: The left atrium is mildly dilated.  Right Ventricle: The right ventricle is normal in size. There is normal right ventricular global systolic function.  Right Atrium: The right atrium is mildly dilated.  Aortic Valve: The aortic valve is trileaflet. The aortic valve dimensionless index is 0.86. There is no evidence of aortic valve regurgitation. The peak instantaneous gradient of the aortic valve is 13.4 mmHg. The mean gradient of the aortic valve is 6.0 mmHg.  Mitral Valve: The mitral valve is normal in structure. There is mild mitral valve regurgitation.  Tricuspid Valve: The tricuspid valve is structurally normal. There is trace tricuspid regurgitation. The Doppler estimated RVSP is slightly elevated right ventricular systolic pressure at 32.4 mmHg.  Pulmonic Valve: The pulmonic valve is structurally normal. There is physiologic pulmonic valve regurgitation.  Pericardium: No pericardial effusion noted.  Aorta: The aortic root is normal.  Systemic Veins: The inferior vena cava appears normal in size, with IVC inspiratory collapse greater than 50%.       CONCLUSIONS:   1. The left ventricular systolic function is normal, with a visually estimated ejection fraction of 60-65%.   2. There is normal  right ventricular global systolic function.   3. Mild mitral valve regurgitation.   4. Slightly elevated right ventricular systolic pressure.   5. Trace tricuspid regurgitation is visualized.    QUANTITATIVE DATA SUMMARY:     2D MEASUREMENTS:           Normal Ranges:  LAs:             3.90 cm   (2.7-4.0cm)  IVSd:            0.90 cm   (0.6-1.1cm)  LVPWd:           1.07 cm   (0.6-1.1cm)  LVIDd:           4.21 cm   (3.9-5.9cm)  LVIDs:           2.74 cm  LV Mass Index:   84.0 g/m2  LV % FS          34.9 %       LA VOLUME:                    Normal Ranges:  LA Vol A4C:        49.3 ml    (22+/-6mL/m2)  LA Vol A2C:        77.0 ml  LA Vol BP:         66.4 ml  LA Vol Index A4C:  30.6ml/m2  LA Vol Index A2C:  47.9 ml/m2  LA Vol Index BP:   41.3 ml/m2  LA Area A4C:       17.6 cm2  LA Area A2C:       23.7 cm2  LA Major Axis A4C: 5.3 cm  LA Major Axis A2C: 6.2 cm  LA Volume Index:   38.6 ml/m2  LA Vol A4C:        45.7 ml  LA Vol A2C:        72.9 ml  LA Vol Index BSA:  36.9 ml/m2       RA VOLUME BY A/L METHOD:            Normal Ranges:  RA Vol A4C:              62.0 ml    (8.3-19.5ml)  RA Vol Index A4C:        38.5 ml/m2  RA Area A4C:             20.4 cm2  RA Major Axis A4C:       5.7 cm       AORTA MEASUREMENTS:         Normal Ranges:  Ao Sinus, d:        2.84 cm (2.1-3.5cm)  Ao STJ, d:          2.69 cm (1.7-3.4cm)  Asc Ao, d:          3.20 cm (2.1-3.4cm)       LV SYSTOLIC FUNCTION BY 2D PLANIMETRY (MOD):                       Normal Ranges:  EF-A4C View:    50 % (>=55%)  EF-A2C View:    51 %  EF-Biplane:     50 %  EF-Visual:      63 %  LV EF Reported: 63 %       LV DIASTOLIC FUNCTION:           Normal Ranges:  MV Peak E:             0.82 m/s  (0.7-1.2 m/s)  MV Peak A:             1.16 m/s  (0.42-0.7 m/s)  E/A Ratio:             0.71      (1.0-2.2)  MV e'                  0.061 m/s (>8.0)  MV lateral e'          0.08 m/s  MV medial e'           0.05 m/s  E/e' Ratio:            13.45     (<8.0)       MITRAL VALVE:          Normal  Ranges:  MV DT:        291 msec (150-240msec)       AORTIC VALVE:                      Normal Ranges:  AoV Vmax:                1.83 m/s  (<=1.7m/s)  AoV Peak P.4 mmHg (<20mmHg)  AoV Mean P.0 mmHg  (1.7-11.5mmHg)  LVOT Max Mihir:            1.64 m/s  (<=1.1m/s)  AoV VTI:                 35.20 cm  (18-25cm)  LVOT VTI:                30.20 cm  LVOT Diameter:           2.10 cm   (1.8-2.4cm)  AoV Area, VTI:           2.97 cm2  (2.5-5.5cm2)  AoV Area,Vmax:           3.10 cm2  (2.5-4.5cm2)  AoV Dimensionless Index: 0.86       RIGHT VENTRICLE:  RV Basal 3.48 cm  RV Mid   2.62 cm  RV Major 6.8 cm  TAPSE:   20.8 mm  RV s'    0.13 m/s       TRICUSPID VALVE/RVSP:          Normal Ranges:  Peak TR Velocity:     2.71 m/s  RV Syst Pressure:     32 mmHg  (< 30mmHg)  IVC Diam:             1.78 cm       [id number] Karon Romano MD  Electronically signed on 10/16/2024 at 12:42:13 PM       ** Final **  CT chest wo IV contrast  Narrative: Interpreted By:  Kiara Garnica,   STUDY:  CT CHEST WO IV CONTRAST;  10/15/2024 5:19 pm      INDICATION:  68 y/o   F with  Signs/Symptoms:Follow-up lung nodule.  Dyspnea          COMPARISON:  None.      ACCESSION NUMBER(S):  [id number]      ORDERING CLINICIAN:  BETO RICHMOND      TECHNIQUE:  CT was performed  following the administration of  ml of IV contrast  (Optiray 350).  Reformats were obtained in the coronal and sagittal  plane.      FINDINGS:  LIMITATIONS/LINES:   None.      HEART:   Heart is within normal limits of size.  No significant  pericardial effusion. There is rather extensive coronary artery  calcification observed.      MEDIASTINUM/HAIR:   There is no aneurysm of the thoracic aorta.  Atherosclerosis of the thoracic aorta is seen. There is stable  precarinal adenopathy measuring 11 mm in short axis diameter with no  additional enlarged mediastinal lymph nodes observed. No obvious  hilar mass or adenopathy is seen on this study limited by lack of  intravenous  contrast administration.      PLEURA:   Unremarkable.      LUNG PARENCHYMA:   Pulmonary emphysema is observed. There is a 3 mm  left upper lobe pulmonary nodule seen on series 7 axial image 121  unchanged. A 4 mm right lower lobe pulmonary nodule is present on  series 7 axial image 160 with adjacent 3 mm right lower lobe  pulmonary nodule on axial image 156. These can be seen on prior study  as well. Re-identified is an irregularly marginated and spiculated  nodule at the base of the right lower lobe measuring 1.3 x 1.9 cm in  size without interval change.      When compared with prior examination, the infiltrate and airspace  consolidation seen within the left lower lobe has nearly completely  resolved with a small amount of residual airspace consolidation  within left lower lobe posteromedially. However, there is now new  infiltrate and airspace consolidation within the posterior segment of  the right upper lobe.      BONES:   No acute osseous abnormality is observed. There are  degenerative changes of the thoracic spine.      CHEST WALL/OTHER:   On images taken through upper abdomen, renal  artery calcification is observed and there is ectasia of the  abdominal aorta.      Impression: There is new infiltrate and airspace consolidation in the posterior  segment of right upper lobe with 5 mm cavitary component within the  consolidated pulmonary parenchyma. Appearance is consistent with a  pneumonia of the right upper lobe.      Near complete resolution of the left lower lobe pneumonia.      Stable pulmonary nodules including a 1.3 x 1.9 cm spiculated nodule  at the base of the right lower lobe.      Stable mild precarinal lymphadenopathy.      Rather extensive coronary artery calcifications.      MACRO:  None      Signed by: Kiara Garnica 10/16/2024 8:23 AM  Dictation workstation:   [id number]      Physical Exam    General: Alert and oriented x3, pleasant.   Cardiac: Regular rate and rhythm, S1/S2 , no murmur.    Pulmonary: Diminished on 3L NC.   Abdomen: Soft, round, nontender. BS +x4.   Extremities: No edema. Equal strength in all extremities.   Skin: No rashes or lesions.    Relevant Results    Scheduled medications  acetaminophen, 1,000 mg, oral, q8h JENNYFER  amiodarone, 400 mg, oral, BID  apixaban, 5 mg, oral, q12h  bisoprolol, 5 mg, oral, BID  escitalopram, 10 mg, oral, Daily  famotidine, 20 mg, oral, Daily  tiotropium, 2 puff, inhalation, Daily   And  fluticasone furoate-vilanteroL, 1 puff, inhalation, Daily  gabapentin, 300 mg, oral, TID  lidocaine, 2 patch, transdermal, Daily  mirtazapine, 15 mg, oral, Nightly  oxygen, , inhalation, Continuous - Inhalation  piperacillin-tazobactam, 3.375 g, intravenous, q6h  primidone, 200 mg, oral, BID      Continuous medications  oxygen, , Last Rate: 3 L/min (10/16/24 2340)  sodium chloride 0.9%, 75 mL/hr, Last Rate: 75 mL/hr (10/17/24 2775)      PRN medications  PRN medications: albuterol, hydrALAZINE, ondansetron ODT **OR** ondansetron, oxyCODONE, polyethylene glycol, vancomycin     Results for orders placed or performed during the hospital encounter of 10/14/24 (from the past 24 hours)   Transthoracic Echo (TTE) Complete   Result Value Ref Range    AV pk lew 1.83 m/s    LVOT diam 2.10 cm    AV mn grad 6.0 mmHg    MV E/A ratio 0.71     Tricuspid annular plane systolic excursion 2.1 cm    LV Biplane EF 50 %    LA vol index A/L 41.3 ml/m2    LV EF 63 %    RV free wall pk S' 13.40 cm/s    RVSP 32.4 mmHg    LVIDd 4.21 cm    AV pk grad 13.4 mmHg    Aortic Valve Area by Continuity of VTI 2.97 cm2    Aortic Valve Area by Continuity of Peak Velocity 3.10 cm2    LV A4C EF 49.9    MRSA Surveillance for Vancomycin De-escalation, PCR    Specimen: Anterior Nares; Swab   Result Value Ref Range    MRSA PCR Not Detected Not Detected   CBC and Auto Differential   Result Value Ref Range    WBC 4.2 (L) 4.4 - 11.3 x10*3/uL    nRBC 0.0 0.0 - 0.0 /100 WBCs    RBC 3.08 (L) 4.00 - 5.20 x10*6/uL     Hemoglobin 9.8 (L) 12.0 - 16.0 g/dL    Hematocrit 32.1 (L) 36.0 - 46.0 %     (H) 80 - 100 fL    MCH 31.8 26.0 - 34.0 pg    MCHC 30.5 (L) 32.0 - 36.0 g/dL    RDW 16.4 (H) 11.5 - 14.5 %    Platelets 241 150 - 450 x10*3/uL    Neutrophils % 53.7 40.0 - 80.0 %    Immature Granulocytes %, Automated 0.2 0.0 - 0.9 %    Lymphocytes % 23.9 13.0 - 44.0 %    Monocytes % 15.3 2.0 - 10.0 %    Eosinophils % 6.2 0.0 - 6.0 %    Basophils % 0.7 0.0 - 2.0 %    Neutrophils Absolute 2.24 1.60 - 5.50 x10*3/uL    Immature Granulocytes Absolute, Automated 0.01 0.00 - 0.50 x10*3/uL    Lymphocytes Absolute 1.00 0.80 - 3.00 x10*3/uL    Monocytes Absolute 0.64 0.05 - 0.80 x10*3/uL    Eosinophils Absolute 0.26 0.00 - 0.40 x10*3/uL    Basophils Absolute 0.03 0.00 - 0.10 x10*3/uL   Comprehensive metabolic panel   Result Value Ref Range    Glucose 101 (H) 74 - 99 mg/dL    Sodium 139 136 - 145 mmol/L    Potassium 4.3 3.5 - 5.3 mmol/L    Chloride 105 98 - 107 mmol/L    Bicarbonate 22 21 - 32 mmol/L    Anion Gap 16 10 - 20 mmol/L    Urea Nitrogen 28 (H) 6 - 23 mg/dL    Creatinine 1.15 (H) 0.50 - 1.05 mg/dL    eGFR 51 (L) >60 mL/min/1.73m*2    Calcium 8.2 (L) 8.6 - 10.3 mg/dL    Albumin 3.6 3.4 - 5.0 g/dL    Alkaline Phosphatase 70 33 - 136 U/L    Total Protein 6.7 6.4 - 8.2 g/dL    AST 15 9 - 39 U/L    Bilirubin, Total 0.2 0.0 - 1.2 mg/dL    ALT 8 7 - 45 U/L   Vancomycin   Result Value Ref Range    Vancomycin 8.7 5.0 - 20.0 ug/mL           Assessment/Plan      Acute on Chronic Low Back Pain and Bilateral Hip Pain / Inability to Ambulate  -Has known history of significant OA with back and hip pain.   -Followed with pain management and attempted hip injections with no relief.   -Unable to have surgery due to her pulmonary status and co-morbidities.   -On scheduled tylenol and lidocaine patch. PRN Oxy for now, monitor respiratory status closely- stable with better pain control.   -PT/OT recommending moderate intensity therapy, needs SNF.   -OOB  with assist only.   -All imaging done shows OA and degenerative changes, no acute fractures.      Fever / Healthcare Aquired Pneumonia  -Had 2 isolated fevers.  -UA negative. COVID and flu checked and negative.   -CT chest shows new infiltrate RUL- discussed with pulmonary who reviewed her images, they feel this is a HCAP and we will treat with vanco/zosyn, continue for now with plans to transition to PO for DC.   -Blood cultures pending so far without growth.    -Strep/legionella pending. MRSA negative. Will defer when to stop vanco to pulm.      RADHA   -Creat improving, down 1.1 today.   -Suspect from dehydration as she has had poor fluid intake and had emesis.   -IVF x 24 hours then stop.   -Post void bladder scan showed no residual.       COPD   -No wheeze on exam.   -Continue home inhalers.      Chronic Respiratory Failure / RAMON  -Baseline O2 3L NC continuous at home.   -She also uses BiPAP at HS, this is ordered.   -Currently on baseline oxygen. No wheeze on exam.   -CXR with patchy nodular consolidation RUL with known lung mass, she just had PET CT as outpatient and is currently following with her PCP for this.  -Continue home inhalers.      Atrial Fibrillation with RVR  -On Eliquis and BB.   -Episode of rapid afib/flutter yesterday, cardio was consulted, given loading dose of digoxin and converted back to NSR.   -Cardio follows, added amiodarone today.   -Monitor on telemetry.      HTN  -BP stable at this time.   -Meds adjusted and BP is stable.   -Monitor.     DVT Risk  -On Eliquis.   -SCDs.      Plan  Pulmonary and Cardio following.    IVF hydration for her RDAHA, complete today, creat improved.    Monitor on amiodarone, had a few episodes of rapid afib since yesterday evening, resolved on their own.   Continue current pain medications, she is improving and participating in therapy.   Blood cultures pending with no growth to date.   PT/OT, mobilize.   Plan for SNF when medically stable and cleared by  consultants. Awaiting precert.        Rupal Sosa, APRN-CNP

## 2024-10-17 NOTE — PROGRESS NOTES
"Physical Therapy    Physical Therapy Treatment    Patient Name: Kamila Jones \"Crystal\"  MRN: [id number]  Department: [facility]  Room: [address] [place], [place] [address]  Today's Date: 10/17/2024  Time Calculation  Start Time: 1105  Stop Time: 1136  Time Calculation (min): 31 min         Assessment/Plan   PT Assessment  PT Assessment Results: Decreased strength, Decreased endurance, Impaired balance, Decreased mobility, Impaired sensation, Pain  Rehab Prognosis: Good  Barriers to Discharge: decreased activity tolerance, + fall risk, assist required, pt home alone while family works  Evaluation/Treatment Tolerance: Patient tolerated treatment well (moderate pain, some fatigue)  Medical Staff Made Aware: Yes  Strengths: Attitude of self  Barriers to Participation: Comorbidities  End of Session Communication: Bedside nurse  Assessment Comment: Pt gives good effort despite pain, requires reinforcement cues for safety and technique  End of Session Patient Position: Up in chair, Alarm on  PT Plan  Inpatient/Swing Bed or Outpatient: Inpatient  PT Plan  Treatment/Interventions: Bed mobility, Transfer training, Gait training, Balance training, Strengthening, Endurance training, Therapeutic exercise, Therapeutic activity  PT Plan: Ongoing PT  PT Frequency: 4 times per week  PT Discharge Recommendations: Moderate intensity level of continued care  Equipment Recommended upon Discharge: Wheeled walker  PT Recommended Transfer Status: Assist x1, Assistive device  PT - OK to Discharge: Yes      General Visit Information:   PT  Visit  PT Received On: 10/17/24  General  Reason for Referral: impaired mobility  Referred By: GIOVANNI AQUINO  Past Medical History Relevant to Rehab: chronic LBP, hip OA, chronic resp failure, ETOH hx, anemia, a-fib, COPD, HTN, RAMON, DVT, ankle fx repair, suicide attempt  Family/Caregiver Present: No  Co-Treatment: OT  Co-Treatment Reason:  (partial mobility overlap w/ OT due to pt safety and tolerance w/ high pain level)  Prior to " Session Communication: Bedside nurse  Patient Position Received: Bed, 2 rail up, Alarm on  Preferred Learning Style: verbal, written, visual  General Comment: Pt agreeable to PT session.    Subjective   Precautions:  Precautions  Medical Precautions: Fall precautions, Oxygen therapy device and L/min (3L O2 (baseline))    Vital Signs (Past 2hrs)        Date/Time Vitals Session Patient Position Pulse Resp SpO2 BP MAP (mmHg)    10/17/24 1028 --  --  84  --  --  122/54  77     10/17/24 1047 --  --  --  --  97 %  --  --     10/17/24 1105 During PT  Sitting  60  --  97 %  --  --     10/17/24 1147 --  --  59  20  98 %  129/56  71                   Objective   Pain:  Pain Assessment  Pain Assessment: 0-10  0-10 (Numeric) Pain Score: 8  Pain Type: Chronic pain  Pain Location: Back (and bilat hips)  Response to Interventions: recent meds brought pain down to a 5/10  Cognition:  Cognition  Overall Cognitive Status: Within Functional Limits  Cognition Comments: pleasant, cooperative, motivated toward activity as tolerated  Coordination:  Coordination Comment: mobility slow and effortful at times w/ pain  Postural Control:  Postural Control  Posture Comment: forward flexed in standing  Dynamic Standing Balance  Dynamic Standing-Balance Support: Bilateral upper extremity supported  Dynamic Standing-Level of Assistance: Minimum assistance  Dynamic Standing-Balance: Turning (amb)  Dynamic Standing-Comments: Fair    Activity Tolerance:  Activity Tolerance  Endurance: Tolerates less than 10 min exercise, no significant change in vital signs  Activity Tolerance Comments: Fair, occasional productive cough during session  Treatments:  Therapeutic Exercise  Therapeutic Exercise Performed: Yes  Therapeutic Exercise Activity 1: Pt performed seated bilat ankle pumps, LAQs, hip flexion, helen hip add and abduction, glute sets x 20 reps.    Bed Mobility 1  Bed Mobility 1: Supine to sitting  Level of Assistance 1: Minimum assistance  Bed  Mobility Comments 1: to Lt EOB w/ HOB elevated part way, assist for RLE to EOB; pt reports unable to roll due to hip pain    Ambulation/Gait Training  Ambulation/Gait Training Performed: Yes  Ambulation/Gait Training 1  Surface 1: Level tile  Device 1: Rolling walker  Assistance 1: Minimum assistance, Minimal verbal cues (second person for IV and O2 management)  Comments/Distance (ft) 1: Pt amb ~ 50' x 2, including turns, occasional pause to rest. Pt amb at slow pace, frequent cues for erect posture, staying closer to and inside RW and for hand placement on RW. Pt able to intermittently self correct. Pt easily fatigued by end of session, audible crepitis noted at hips/low back toward end of amb. SpO2 97% on 3L O2.  Transfer 1  Technique 1: Sit to stand, Stand to sit  Transfer Device 1: Walker  Transfer Level of Assistance 1: Minimum assistance  Trials/Comments 1: performed from EOB, to chair w/ arms. Cues for hand placement, keeping  RW close until ready to sit down.    Outcome Measures:  [facility] Basic Mobility  Turning from your back to your side while in a flat bed without using bedrails: A little  Moving from lying on your back to sitting on the side of a flat bed without using bedrails: A lot  Moving to and from bed to chair (including a wheelchair): A little  Standing up from a chair using your arms (e.g. wheelchair or bedside chair): A little  To walk in hospital room: A little  Climbing 3-5 steps with railing: A lot  Basic Mobility - Total Score: 16    Education Documentation  Precautions, taught by Judy Miles PT at 10/17/2024 12:14 PM.  Learner: Patient  Readiness: Acceptance  Method: Explanation  Response: Verbalizes Understanding, Needs Reinforcement    Body Mechanics, taught by Judy Miles PT at 10/17/2024 12:14 PM.  Learner: Patient  Readiness: Acceptance  Method: Explanation  Response: Verbalizes Understanding, Needs Reinforcement    Home Exercise Program, taught by Judy Miles PT at  10/17/2024 12:14 PM.  Learner: Patient  Readiness: Acceptance  Method: Explanation  Response: Verbalizes Understanding, Needs Reinforcement    Mobility Training, taught by Judy Miles PT at 10/17/2024 12:14 PM.  Learner: Patient  Readiness: Acceptance  Method: Explanation  Response: Verbalizes Understanding, Needs Reinforcement    Education Comments  No comments found.        OP EDUCATION:       Encounter Problems       Encounter Problems (Active)       Balance       LTG - Patient will maintain balance to allow for safe mobility (Progressing)       Start:  10/15/24    Expected End:  10/25/24               Mobility       STG - Patient will ambulate 50' w/ RW and min assist (Progressing)       Start:  10/15/24    Expected End:  10/25/24            Pt will tolerate BLE exercises to promote functional strength, endurance and balance (Progressing)       Start:  10/15/24    Expected End:  10/25/24               PT Transfers       STG - Patient to transfer to and from sit to supine IND w/ safe technique (Progressing)       Start:  10/15/24    Expected End:  10/25/24            STG - Patient will transfer sit to and from stand w/ distant supervision  (Progressing)       Start:  10/15/24    Expected End:  10/25/24

## 2024-10-17 NOTE — PROGRESS NOTES
Vancomycin Dosing by Pharmacy- Cessation of Therapy    Consult to pharmacy for vancomycin dosing has been discontinued by the prescriber, pharmacy will sign off at this time.    Please call pharmacy if there are further questions or re-enter a consult if vancomycin is resumed.     HAROON Gonzalez, LyubovD

## 2024-10-17 NOTE — PROGRESS NOTES
"Kamila Jones \"Elizabeth" is a 69 y.o. female on day 1 of admission presenting with Chronic low back pain without sciatica, unspecified back pain laterality.    Subjective   Patient asleep, easily awoken. Alert and oriented x3.  No complaints this morning.       Objective     Physical Exam  Vitals reviewed.   Constitutional:       Appearance: Normal appearance. She is ill-appearing.   Cardiovascular:      Rate and Rhythm: Normal rate and regular rhythm.      Pulses: Normal pulses.      Heart sounds: Normal heart sounds.   Pulmonary:      Effort: Pulmonary effort is normal.      Breath sounds: Normal breath sounds.   Abdominal:      General: Abdomen is flat.      Palpations: Abdomen is soft.   Musculoskeletal:      Right lower leg: No edema.      Left lower leg: No edema.   Skin:     General: Skin is warm and dry.      Capillary Refill: Capillary refill takes less than 2 seconds.   Neurological:      Mental Status: She is alert and oriented to person, place, and time.         Last Recorded Vitals  Blood pressure 145/67, pulse 69, temperature 37 °C (98.6 °F), temperature source Temporal, resp. rate 20, height 1.676 m (5' 5.98\"), weight 56.2 kg (123 lb 14.4 oz), SpO2 96%.  Intake/Output last 3 Shifts:  I/O last 3 completed shifts:  In: 1262.5 (22.5 mL/kg) [I.V.:1012.5 (18 mL/kg); IV Piggyback:250]  Out: 875 (15.6 mL/kg) [Urine:875 (0.4 mL/kg/hr)]  Weight: 56.2 kg     Relevant Results  Results for orders placed or performed during the hospital encounter of 10/14/24 (from the past 24 hours)   MRSA Surveillance for Vancomycin De-escalation, PCR    Specimen: Anterior Nares; Swab   Result Value Ref Range    MRSA PCR Not Detected Not Detected   CBC and Auto Differential   Result Value Ref Range    WBC 4.2 (L) 4.4 - 11.3 x10*3/uL    nRBC 0.0 0.0 - 0.0 /100 WBCs    RBC 3.08 (L) 4.00 - 5.20 x10*6/uL    Hemoglobin 9.8 (L) 12.0 - 16.0 g/dL    Hematocrit 32.1 (L) 36.0 - 46.0 %     (H) 80 - 100 fL    MCH 31.8 26.0 - 34.0 pg    " MCHC 30.5 (L) 32.0 - 36.0 g/dL    RDW 16.4 (H) 11.5 - 14.5 %    Platelets 241 150 - 450 x10*3/uL    Neutrophils % 53.7 40.0 - 80.0 %    Immature Granulocytes %, Automated 0.2 0.0 - 0.9 %    Lymphocytes % 23.9 13.0 - 44.0 %    Monocytes % 15.3 2.0 - 10.0 %    Eosinophils % 6.2 0.0 - 6.0 %    Basophils % 0.7 0.0 - 2.0 %    Neutrophils Absolute 2.24 1.60 - 5.50 x10*3/uL    Immature Granulocytes Absolute, Automated 0.01 0.00 - 0.50 x10*3/uL    Lymphocytes Absolute 1.00 0.80 - 3.00 x10*3/uL    Monocytes Absolute 0.64 0.05 - 0.80 x10*3/uL    Eosinophils Absolute 0.26 0.00 - 0.40 x10*3/uL    Basophils Absolute 0.03 0.00 - 0.10 x10*3/uL   Comprehensive metabolic panel   Result Value Ref Range    Glucose 101 (H) 74 - 99 mg/dL    Sodium 139 136 - 145 mmol/L    Potassium 4.3 3.5 - 5.3 mmol/L    Chloride 105 98 - 107 mmol/L    Bicarbonate 22 21 - 32 mmol/L    Anion Gap 16 10 - 20 mmol/L    Urea Nitrogen 28 (H) 6 - 23 mg/dL    Creatinine 1.15 (H) 0.50 - 1.05 mg/dL    eGFR 51 (L) >60 mL/min/1.73m*2    Calcium 8.2 (L) 8.6 - 10.3 mg/dL    Albumin 3.6 3.4 - 5.0 g/dL    Alkaline Phosphatase 70 33 - 136 U/L    Total Protein 6.7 6.4 - 8.2 g/dL    AST 15 9 - 39 U/L    Bilirubin, Total 0.2 0.0 - 1.2 mg/dL    ALT 8 7 - 45 U/L   Vancomycin   Result Value Ref Range    Vancomycin 8.7 5.0 - 20.0 ug/mL                         Assessment/Plan   Assessment & Plan  Chronic low back pain without sciatica, unspecified back pain laterality    10/15:  Atrial fibrillation.  Patient admitted to the hospital for management of lower back pain weakness of lower extremities and was found to have atrial fibrillation with rapid ventricular rate.  Her blood pressure was running low.  I received 500 mg of IV digoxin and addition of th bisoprolol she is on.  Now back into normal sinus rhythm.e my recommendation is to continue bisoprolol and oral anticoagulation.  We may consider putting her on amiodarone if she continues to have paroxysmal atrial fibrillation.   I had recent stress test this year that showed no ischemia.  Last year had in 2023 echocardiogram showing normal ejection fraction.  Will repeat another 2D echo.  However patient is here for management of her pain and atrial fibrillation was incidental finding will monitor for now.     10/16: Patient loaded with IV digoxin 500 mcg yesterday after entering atrial fibrillation with rapid ventricular response.  She is converted back into normal sinus rhythm this morning.  Telemetry reading SVT however I believe this is actually a paroxysmal atrial fibrillation.  Patient is asymptomatic of this.  Will start her on 400 mg of oral amiodarone to maintain rate control.  Echocardiogram completed today has not been read yet.  Blood pressures are labile with readings from systolic in the 90s into the 150s.  I suspect there is a component of pain related to her elevated blood pressures.  Will keep her antihypertensive regimen as is with 5 mg of bisoprolol for now.  Continue 400 mg twice daily amiodarone, continue with Eliquis 5 mg twice daily.  Will try and control her pain.  Will continue to follow with you.    10/17: As above.  Patient went into atrial fibrillation with RVR again early this morning.  I would like to change her beta-blocker to a long-acting dosed twice daily for increased beta-blockade coverage.  Will start her on metoprolol succinate 25 mg twice daily.  This morning upon my examination she is in normal sinus rhythm.  Will continue with amiodarone 400 mg twice daily.  Will taper this upon discharge.  Patient is likely to go home today or tomorrow.  She is advised to follow-up with Dr. Romano in 2 to 3 weeks.  Blood pressure stable.  Morning lab work significant for sodium of 139, potassium 4.3, creatinine 1.15.  She remains on 3 L of supplemental O2 with saturations at 98%.  She had an echocardiogram completed yesterday which revealed a normal EF of 60 to 65%, normal right ventricular systolic function, mild  mitral valve regurgitation, slightly elevated RVSP and trace tricuspid regurgitation.  Will make the above medication adjustment and continue to follow with you while in house.  No contraindication to discharge from cardiac standpoint.        I spent 20 minutes in the professional and overall care of this patient.      Deisy Sheridan PA-C

## 2024-10-17 NOTE — PROGRESS NOTES
"Kamila Jones \"Elizabeth" is a 69 y.o. female on day 1 of admission seen in follow-up for abnormal chest imaging, fevers     Subjective   Sitting up in chair.  On 3 L nasal cannula oxygen; oxygen sats 96%. Endorses improved breathing and improved pain.  Afebrile.        Objective     Physical Exam  Vitals and nursing note reviewed.   Constitutional:       General: She is awake.      Appearance: Normal appearance.   HENT:      Head: Normocephalic and atraumatic.      Nose: Nose normal.      Mouth/Throat:      Mouth: Mucous membranes are moist.   Eyes:      Extraocular Movements: Extraocular movements intact.      Conjunctiva/sclera: Conjunctivae normal.      Pupils: Pupils are equal, round, and reactive to light.   Neck:      Thyroid: No thyroid mass.      Trachea: Phonation normal.   Cardiovascular:      Rate and Rhythm: Regular rhythm. Bradycardia present.      Pulses: Normal pulses.      Heart sounds: Normal heart sounds. No murmur heard.     No gallop.   Pulmonary:      Effort: Pulmonary effort is normal. No respiratory distress.      Breath sounds: Normal air entry. No decreased breath sounds, wheezing, rhonchi or rales.      Comments: Lungs diminished but clear.  Abdominal:      General: Bowel sounds are normal. There is no distension.      Palpations: Abdomen is soft.      Tenderness: There is no abdominal tenderness.   Musculoskeletal:         General: Normal range of motion.      Cervical back: Neck supple.      Right lower leg: No edema.      Left lower leg: No edema.   Skin:     General: Skin is warm and dry.      Capillary Refill: Capillary refill takes less than 2 seconds.   Neurological:      General: No focal deficit present.      Mental Status: She is alert and oriented to person, place, and time. Mental status is at baseline.      Cranial Nerves: Cranial nerves 2-12 are intact.      Motor: Motor function is intact.   Psychiatric:         Attention and Perception: Attention and perception normal.         " "Mood and Affect: Mood normal.         Speech: Speech normal.         Behavior: Behavior normal.         Last Recorded Vitals  Blood pressure 145/67, pulse 69, temperature 37 °C (98.6 °F), temperature source Temporal, resp. rate 20, height 1.676 m (5' 5.98\"), weight 56.2 kg (123 lb 14.4 oz), SpO2 96%.  Intake/Output last 3 Shifts:  I/O last 3 completed shifts:  In: 1262.5 (22.5 mL/kg) [I.V.:1012.5 (18 mL/kg); IV Piggyback:250]  Out: 875 (15.6 mL/kg) [Urine:875 (0.4 mL/kg/hr)]  Weight: 56.2 kg       Intake/Output Summary (Last 24 hours) at 10/17/2024 0849  Last data filed at 10/17/2024 0325  Gross per 24 hour   Intake 1262.5 ml   Output 875 ml   Net 387.5 ml      Scheduled medications:  acetaminophen, 1,000 mg, oral, q8h JENNYFER  amiodarone, 400 mg, oral, BID  apixaban, 5 mg, oral, q12h  bisoprolol, 5 mg, oral, BID  escitalopram, 10 mg, oral, Daily  famotidine, 20 mg, oral, Daily  tiotropium, 2 puff, inhalation, Daily   And  fluticasone furoate-vilanteroL, 1 puff, inhalation, Daily  gabapentin, 300 mg, oral, TID  lidocaine, 2 patch, transdermal, Daily  mirtazapine, 15 mg, oral, Nightly  oxygen, , inhalation, Continuous - Inhalation  piperacillin-tazobactam, 3.375 g, intravenous, q6h  primidone, 200 mg, oral, BID     PRN medications: albuterol, hydrALAZINE, ondansetron ODT **OR** ondansetron, oxyCODONE, polyethylene glycol, vancomycin     oxygen, , Last Rate: 3 L/min (10/16/24 2340)  sodium chloride 0.9%, 75 mL/hr, Last Rate: 75 mL/hr (10/17/24 0325)       Relevant Results  Results for orders placed or performed during the hospital encounter of 10/14/24 (from the past 24 hours)   MRSA Surveillance for Vancomycin De-escalation, PCR    Specimen: Anterior Nares; Swab   Result Value Ref Range    MRSA PCR Not Detected Not Detected   CBC and Auto Differential   Result Value Ref Range    WBC 4.2 (L) 4.4 - 11.3 x10*3/uL    nRBC 0.0 0.0 - 0.0 /100 WBCs    RBC 3.08 (L) 4.00 - 5.20 x10*6/uL    Hemoglobin 9.8 (L) 12.0 - 16.0 g/dL    " Hematocrit 32.1 (L) 36.0 - 46.0 %     (H) 80 - 100 fL    MCH 31.8 26.0 - 34.0 pg    MCHC 30.5 (L) 32.0 - 36.0 g/dL    RDW 16.4 (H) 11.5 - 14.5 %    Platelets 241 150 - 450 x10*3/uL    Neutrophils % 53.7 40.0 - 80.0 %    Immature Granulocytes %, Automated 0.2 0.0 - 0.9 %    Lymphocytes % 23.9 13.0 - 44.0 %    Monocytes % 15.3 2.0 - 10.0 %    Eosinophils % 6.2 0.0 - 6.0 %    Basophils % 0.7 0.0 - 2.0 %    Neutrophils Absolute 2.24 1.60 - 5.50 x10*3/uL    Immature Granulocytes Absolute, Automated 0.01 0.00 - 0.50 x10*3/uL    Lymphocytes Absolute 1.00 0.80 - 3.00 x10*3/uL    Monocytes Absolute 0.64 0.05 - 0.80 x10*3/uL    Eosinophils Absolute 0.26 0.00 - 0.40 x10*3/uL    Basophils Absolute 0.03 0.00 - 0.10 x10*3/uL   Comprehensive metabolic panel   Result Value Ref Range    Glucose 101 (H) 74 - 99 mg/dL    Sodium 139 136 - 145 mmol/L    Potassium 4.3 3.5 - 5.3 mmol/L    Chloride 105 98 - 107 mmol/L    Bicarbonate 22 21 - 32 mmol/L    Anion Gap 16 10 - 20 mmol/L    Urea Nitrogen 28 (H) 6 - 23 mg/dL    Creatinine 1.15 (H) 0.50 - 1.05 mg/dL    eGFR 51 (L) >60 mL/min/1.73m*2    Calcium 8.2 (L) 8.6 - 10.3 mg/dL    Albumin 3.6 3.4 - 5.0 g/dL    Alkaline Phosphatase 70 33 - 136 U/L    Total Protein 6.7 6.4 - 8.2 g/dL    AST 15 9 - 39 U/L    Bilirubin, Total 0.2 0.0 - 1.2 mg/dL    ALT 8 7 - 45 U/L   Vancomycin   Result Value Ref Range    Vancomycin 8.7 5.0 - 20.0 ug/mL      Transthoracic Echo (TTE) Complete  Result Date: 10/16/2024  CONCLUSIONS: 1. The left ventricular systolic function is normal, with a visually estimated ejection fraction of 60-65%. 2. There is normal right ventricular global systolic function. 3. Mild mitral valve regurgitation. 4. Slightly elevated right ventricular systolic pressure. 5. Trace tricuspid regurgitation is visualized.    CT chest wo IV contrast  Result Date: 10/16/2024  FINDINGS: LIMITATIONS/LINES:   None.   HEART:   Heart is within normal limits of size.  No significant pericardial  effusion. There is rather extensive coronary artery calcification observed.   MEDIASTINUM/HAIR:   There is no aneurysm of the thoracic aorta. Atherosclerosis of the thoracic aorta is seen. There is stable precarinal adenopathy measuring 11 mm in short axis diameter with no additional enlarged mediastinal lymph nodes observed. No obvious hilar mass or adenopathy is seen on this study limited by lack of intravenous contrast administration.   PLEURA:   Unremarkable.   LUNG PARENCHYMA:   Pulmonary emphysema is observed. There is a 3 mm left upper lobe pulmonary nodule seen on series 7 axial image 121 unchanged. A 4 mm right lower lobe pulmonary nodule is present on series 7 axial image 160 with adjacent 3 mm right lower lobe pulmonary nodule on axial image 156. These can be seen on prior study as well. Re-identified is an irregularly marginated and spiculated nodule at the base of the right lower lobe measuring 1.3 x 1.9 cm in size without interval change.   When compared with prior examination, the infiltrate and airspace consolidation seen within the left lower lobe has nearly completely resolved with a small amount of residual airspace consolidation within left lower lobe posteromedially. However, there is now new infiltrate and airspace consolidation within the posterior segment of the right upper lobe.   BONES:   No acute osseous abnormality is observed. There are degenerative changes of the thoracic spine.   CHEST WALL/OTHER:   On images taken through upper abdomen, renal artery calcification is observed and there is ectasia of the abdominal aorta.  There is new infiltrate and airspace consolidation in the posterior segment of right upper lobe with 5 mm cavitary component within the consolidated pulmonary parenchyma. Appearance is consistent with a pneumonia of the right upper lobe.   Near complete resolution of the left lower lobe pneumonia.   Stable pulmonary nodules including a 1.3 x 1.9 cm spiculated nodule at  the base of the right lower lobe.   Stable mild precarinal lymphadenopathy.   Rather extensive coronary artery calcifications.       XR knee left 4+ views  Result Date: 10/14/2024  FINDINGS: No fractures or destructive lesions are identified. Bone island is noted in the medial tibial condyle. The joint spaces and articular surfaces are maintained considering patient's age. The alignment is anatomic. Vascular calcifications are noted. There is no significant effusion in the suprapatellar bursa.  Unremarkable left knee radiographs.       XR lumbar spine 2-3 views  Result Date: 10/14/2024  FINDINGS: There is mild loss of height involving the L3 and L5 vertebra, however no acute fracture is identified. There is narrowing of the L1-L2, L2-L3, L3-L4 and mostly the L4-L5 disc spaces, with marginal osteophytes. The alignment is anatomic, without spondylolysis or spondylolisthesis. Hypertrophic changes involve the facet joints of the lower lumbar spine. Calcifications of the aorta and its branches are present.   No acute pelvic fracture is seen. Extensive osteoarthritis involves the right hip joint, with complete obliteration of the joint space, subchondral bone sclerosis, subchondral cysts and marginal osteophytes. Osteoarthritis also involves the left hip joint and to a lesser degree the sacroiliac joints bilaterally. The alignment is anatomic. Vascular calcifications involve iliac and femoral arteries bilaterally. 1. Marked lumbar spondylosis and remote compression deformities of the L3 and L5 vertebra, without acute vertebral fracture or subluxation. 2. Extensive right hip osteoarthritis again noted. Additional degenerative changes in the pelvis as above. No pelvic fracture or subluxation.     XR hips bilateral 5+ VW w pelvis when performed  Result Date: 10/14/2024  FINDINGS: There is mild loss of height involving the L3 and L5 vertebra, however no acute fracture is identified. There is narrowing of the L1-L2, L2-L3,  L3-L4 and mostly the L4-L5 disc spaces, with marginal osteophytes. The alignment is anatomic, without spondylolysis or spondylolisthesis. Hypertrophic changes involve the facet joints of the lower lumbar spine. Calcifications of the aorta and its branches are present.   No acute pelvic fracture is seen. Extensive osteoarthritis involves the right hip joint, with complete obliteration of the joint space, subchondral bone sclerosis, subchondral cysts and marginal osteophytes. Osteoarthritis also involves the left hip joint and to a lesser degree the sacroiliac joints bilaterally. The alignment is anatomic. Vascular calcifications involve iliac and femoral arteries bilaterally.  1. Marked lumbar spondylosis and remote compression deformities of the L3 and L5 vertebra, without acute vertebral fracture or subluxation. 2. Extensive right hip osteoarthritis again noted. Additional degenerative changes in the pelvis as above. No pelvic fracture or subluxation.     XR chest 1 view  Result Date: 10/14/2024  FINDINGS: The study is limited due to  lordotic projection and overlying artifacts obscuring some detail. The cardiomediastinal silhouette is within normal limits for the technique. Patchy/nodular infiltrates are seen in the right suprahilar region, with mild volume loss of the right upper lobe and elevation of the minor fissure. There is no pneumothorax or significant effusion. Marked degenerative changes involve the shoulders. There is also partially included deformity in the right humeral neck consistent with a healing fracture.  Limited study. Patchy/nodular consolidation in the right upper lobe, possibly pneumonia or atelectasis, however in the setting of injury, lung contusion can not be excluded. Correlate clinically follow-up in 1-2 weeks to document complete resolution.       Assessment/Plan   Chronic hypoxic respiratory failure  Oxygen at baseline  BiPAP nightly while in hospital-continue NIV at home, she is  monitored is  using device consistently and benefiting from it  Continuous pulse oximetry  Incentive spirometry/pulmonary hygiene     Healthcare acquired pneumonia  MRSA negative will discontinue IV vancomycin  Continue IV Zosyn and discharge on Augmentin  Sputum if able  Follow-up cultures    Acute on chronic bilateral hip pain/low back pain  Analgesia   Follows with Pain Management     Right lower lobe spiculated lung nodule   CT scan chest without contrast reviewed with collaborating physician Dr. Davis  Follow-up CT scan chest in 3-6 weeks     COPD  Continue IBD/ICS     Atrial fibrillation  On Eliquis     Prophylaxis  Obstructive sleep apnea  BiPAP nightly     PT/OT/out of bed  Discharge planning-[facility] likely tomorrow    Emmie Interiano, APRN-CNP

## 2024-10-17 NOTE — PROGRESS NOTES
"Occupational Therapy    Occupational Therapy Treatment    Name: Kamila Jones \"Elizabeth"  MRN: [id number]  [facility]  Room: [address] [place], [place] [address]  Date: 10/17/24  Time Calculation  Start Time: 1044  Stop Time: 1132  Time Calculation (min): 48 min    Assessment:  Barriers to Discharge: Other (Comment) (multiple pain sites)  Evaluation/Treatment Tolerance: Patient limited by pain  Medical Staff Made Aware: Yes  End of Session Communication: Bedside nurse  End of Session Patient Position: Up in chair, Alarm on  Plan:  Treatment Interventions: ADL retraining, Functional transfer training, UE strengthening/ROM, Endurance training, Patient/family training, Equipment evaluation/education, Neuromuscular reeducation, Compensatory technique education  OT Frequency: 3 times per week  OT Discharge Recommendations: Moderate intensity level of continued care  Equipment Recommended upon Discharge: Wheeled walker  OT Recommended Transfer Status: Assist of 2  OT - OK to Discharge: Yes    Subjective   Previous Visit Info:  OT Last Visit  OT Received On: 10/17/24  General:  General  Prior to Session Communication: Bedside nurse  Patient Position Received: Bed, 2 rail up, Alarm on  Preferred Learning Style: verbal, written, visual  General Comment: very pleasant, agreeable to therapy ,. motivated  Precautions:       Vital Signs (Past 2hrs)        Date/Time Vitals Session Patient Position Pulse Resp SpO2 BP MAP (mmHg)    10/17/24 1028 --  --  84  --  --  122/54  77     10/17/24 1047 --  --  --  --  97 %  --  --                        Pain Assessment:  Pain Assessment  Pain Assessment: 0-10  0-10 (Numeric) Pain Score: 8  Pain Type: Chronic pain  Multiple Pain Sites: Four (legs, hips , back and knees)     Objective   Cognition:  Overall Cognitive Status: Within Functional Limits  Activities of Daily Living: Grooming  Grooming Comments: already completed    LE Dressing  LE Dressing: Yes  LE Dressing Adaptive Equipment: Reacher, Sock aide  Pants " Level of Assistance: Contact guard (use of reacher)  Sock Level of Assistance: Setup (states she knows how to use a sock aide, home health aide helps also)  LE Dressing Where Assessed: Edge of bed  LE Dressing Comments: provided with community resources for acquisition of sock aide, reacher    Functional Standing Tolerance:     Bed Mobility/Transfers: Bed Mobility 1  Bed Mobility 1: Supine to sitting  Level of Assistance 1: Minimum assistance    Transfer 1  Transfer From 1: Sit to  Transfer to 1: Stand  Technique 1: Sit to stand  Transfer Device 1: Walker (cues to keep close at allt imes)  Transfer Level of Assistance 1: Minimum assistance (plus one  person to manage equipment. ambulated up and down pereira with PT leading and occasional standing rest breaks, extended time to complete)    Tub Transfers  Tub Transfers Comments: writer educated pt by jeannie of [facility] tub chair and car transfers. pt verbalized understanding.       Outcome Measures:  [facility] Daily Activity  Putting on and taking off regular lower body clothing: A little  Bathing (including washing, rinsing, drying): A little  Putting on and taking off regular upper body clothing: A little  Toileting, which includes using toilet, bedpan or urinal: A little  Taking care of personal grooming such as brushing teeth: A little  Eating Meals: None  Daily Activity - Total Score: 19        Education Documentation  Handouts, taught by RYANNE Linares at 10/17/2024 11:31 AM.  Learner: Patient  Readiness: Eager  Method: Explanation, Demonstration, Handout  Response: Verbalizes Understanding, Demonstrated Understanding    Body Mechanics, taught by RYANNE Linares at 10/17/2024 11:31 AM.  Learner: Patient  Readiness: Eager  Method: Explanation, Demonstration, Handout  Response: Verbalizes Understanding, Demonstrated Understanding    Precautions, taught by RYANNE Linares at 10/17/2024 11:31 AM.  Learner: Patient  Readiness: Eager  Method: Explanation,  Demonstration, Handout  Response: Verbalizes Understanding, Demonstrated Understanding    Home Exercise Program, taught by RYANNE Linares at 10/17/2024 11:31 AM.  Learner: Patient  Readiness: Eager  Method: Explanation, Demonstration, Handout  Response: Verbalizes Understanding, Demonstrated Understanding    ADL Training, taught by RYANNE Linares at 10/17/2024 11:31 AM.  Learner: Patient  Readiness: Eager  Method: Explanation, Demonstration, Handout  Response: Verbalizes Understanding, Demonstrated Understanding    Education Comments  No comments found.      Goals:  Encounter Problems       Encounter Problems (Active)       OT Goals       ADLS (Progressing)       Start:  10/15/24    Expected End:  10/29/24       Patient will complete ADL tasks, with supervision using AE need in order to increase patient's safety and independence with self-care tasks.           Functional Transfers (Progressing)       Start:  10/15/24    Expected End:  10/29/24       Patient will complete functional transfers with supervision using least restrictive device, in order to increase patient's safety and independence with daily tasks.          Activity Tolerance (Progressing)       Start:  10/15/24    Expected End:  10/29/24       Patient will demonstrate the ability to participate in functional activity at least >/= 20 minutes in order to increase patient's safety and independence with daily tasks.

## 2024-10-17 NOTE — PROGRESS NOTES
[facility]    Kamila Jones is a 69 y.o. year old female who Pharmacy has been consulted for vancomycin dosing for Vancomycin Indications: Pneumonia. Based on the patient's indication and renal status this patient will be dosed based on a target level of Other Indication: 15-20 mcg/mL    Patient is currently in RADHA    Last vancomycin dose (incl. date and time): 1000 mg on 10/16 at 1400    Vancomycin level (incl. date and time): 9 mcg/mL on 10/17 at 0400    Lab Results   Component Value Date    VANCORANDOM 8.7 10/17/2024       Visit Vitals  /67 (BP Location: Right arm, Patient Position: Lying)   Pulse 69   Temp 37 °C (98.6 °F) (Temporal)   Resp 20           Lab Results   Component Value Date    CREATININE 1.15 (H) 10/17/2024    CREATININE 1.31 (H) 10/16/2024    CREATININE 0.75 10/15/2024    CREATININE 0.78 10/14/2024       I/O last 3 completed shifts:  In: 1262.5 (22.5 mL/kg) [I.V.:1012.5 (18 mL/kg); IV Piggyback:250]  Out: 875 (15.6 mL/kg) [Urine:875 (0.4 mL/kg/hr)]  Weight: 56.2 kg     Assessment/Plan     Level is below target trough goal.   re-dose vancomycin with one time order of 1000 mg.  Random level within 24 hours will be obtained on 10/18 at 0500. May be obtained sooner if clinically indicated.   Will continue to monitor renal function daily while on vancomycin and order serum creatinine at least every 48 hours if not already ordered.  Follow for continued vancomycin needs, clinical response, and signs/symptoms of toxicity.     Evi Doss, PharmD

## 2024-10-17 NOTE — PROGRESS NOTES
10/17/24 0929   Discharge Planning   Living Arrangements Children   Support Systems Children;Family members   Type of Residence Private residence   Who is requesting discharge planning? Provider   Home or Post Acute Services None   Type of Home Care Services DME or oxygen   Expected Discharge Disposition SNF  ([facility], awaiting for precrt, started 10/16)   Does the patient need discharge transport arranged? Yes   RoundTrip coordination needed? Yes   Has discharge transport been arranged? No     Per Notes: IVF hydration for her RADHA, complete today, creat improved.    Monitor on amiodarone, had a few episodes of rapid afib since yesterday evening, resolved on their own.   Continue current pain medications, she is improving and participating in therapy.     Precert Status: Approved  [facility] Auth ID # [id number]  Dates: 10/17/2024 - 10/21/2024    Plan for SNF ([facility]) when medically stable and cleared by consultants. Precert approved.

## 2024-10-17 NOTE — DOCUMENTATION CLARIFICATION NOTE
"    PATIENT:               ARTI BRANDT  ACCT #:                  [id number]  MRN:                       [id number]  :                       1955  ADMIT DATE:       10/14/2024 9:39 AM  DISCH DATE:  RESPONDING PROVIDER #:        [id number]          PROVIDER RESPONSE TEXT:    Gram Negative PNA, present on admit    CDI QUERY TEXT:    Clarification    Instruction:    Based on your assessment of the patient and the clinical information, please provide the requested documentation by clicking on the appropriate radio button and enter any additional information if prompted.    Question: Please further specify the type of pneumonia being treated as well as the present on admit status    When answering this query, please exercise your independent professional judgment. The fact that a question is being asked, does not imply that any particular answer is desired or expected.    The patient's clinical indicators include:  Clinical Information: 69 y.o. female presenting with worsening low back and bilateral hip pain with falls. Patient has had issues with chronic pain for quite some time with severe OA of her R hip. PMH: Acute on Chronic Low Back Pain and Bilateral Hip Pain / Inability to Ambulate, COPD, Chronic resp failure, on 3 L nasal cannula continuously, Afib    Clinical Indicators:  10/14 H&P:  \"She was hospitalized back in August with pneumonia and went to SNF where she states she was there a few weeks and was doing better...\"    10/14 CXR: \"IMPRESSION:  Limited study. Patchy/nodular consolidation in the right upper lobe,  possibly pneumonia or atelectasis, however in the setting of injury,  lung contusion can not be excluded.\"    10/16 IM progress note: \"Fever / Healthcare Acquired Pneumonia  -Had 2 isolated fevers. WBC remain normal.  -UA negative. COVID and flu checked and negative.  -CT chest shows new infiltrate RUL- discussed with pulmonary who reviewed her images, they feel this is a HCAP and we will treat with " "vanco/zosyn- pulmonary ordering. \"    Treatment:  -IV Zosyn  -IV Vancomycin    Risk Factors: 69 y.o female with recent hospitalization and SNF placement and documented \"healthcare acquired pneumonia\"  Options provided:  -- Gram Negative PNA, present on admit  -- Gram negative PNA, not present on admit  -- PNA with other associated organism, present on admit, Please specify other organism  -- PNA with other associated organism, not present on admit, Please specify other organism  -- Other - I will add my own diagnosis  -- Refer to Clinical Documentation Reviewer    Query created by: Carlee Arita on 10/17/2024 8:27 AM      Electronically signed by:  JOHN AQUINO 10/17/2024 8:39 AM          "

## 2024-10-18 ENCOUNTER — APPOINTMENT (OUTPATIENT)
Dept: RADIOLOGY | Facility: HOSPITAL | Age: 69
DRG: 177 | End: 2024-10-18
Payer: COMMERCIAL

## 2024-10-18 LAB
ANION GAP SERPL CALCULATED.3IONS-SCNC: 14 MMOL/L (ref 10–20)
BASOPHILS # BLD AUTO: 0.05 X10*3/UL (ref 0–0.1)
BASOPHILS NFR BLD AUTO: 0.5 %
BUN SERPL-MCNC: 19 MG/DL (ref 6–23)
CALCIUM SERPL-MCNC: 8.4 MG/DL (ref 8.6–10.3)
CHLORIDE SERPL-SCNC: 106 MMOL/L (ref 98–107)
CO2 SERPL-SCNC: 26 MMOL/L (ref 21–32)
CREAT SERPL-MCNC: 0.9 MG/DL (ref 0.5–1.05)
EGFRCR SERPLBLD CKD-EPI 2021: 68 ML/MIN/1.73M*2
EOSINOPHIL # BLD AUTO: 0.22 X10*3/UL (ref 0–0.4)
EOSINOPHIL NFR BLD AUTO: 2.4 %
ERYTHROCYTE [DISTWIDTH] IN BLOOD BY AUTOMATED COUNT: 16.3 % (ref 11.5–14.5)
GLUCOSE SERPL-MCNC: 147 MG/DL (ref 74–99)
HCT VFR BLD AUTO: 29.6 % (ref 36–46)
HGB BLD-MCNC: 9.4 G/DL (ref 12–16)
IMM GRANULOCYTES # BLD AUTO: 0.04 X10*3/UL (ref 0–0.5)
IMM GRANULOCYTES NFR BLD AUTO: 0.4 % (ref 0–0.9)
LACTATE SERPL-SCNC: 1.3 MMOL/L (ref 0.4–2)
LYMPHOCYTES # BLD AUTO: 0.67 X10*3/UL (ref 0.8–3)
LYMPHOCYTES NFR BLD AUTO: 7.3 %
MCH RBC QN AUTO: 31.6 PG (ref 26–34)
MCHC RBC AUTO-ENTMCNC: 31.8 G/DL (ref 32–36)
MCV RBC AUTO: 100 FL (ref 80–100)
MONOCYTES # BLD AUTO: 0.54 X10*3/UL (ref 0.05–0.8)
MONOCYTES NFR BLD AUTO: 5.9 %
NEUTROPHILS # BLD AUTO: 7.64 X10*3/UL (ref 1.6–5.5)
NEUTROPHILS NFR BLD AUTO: 83.5 %
NRBC BLD-RTO: 0 /100 WBCS (ref 0–0)
PLATELET # BLD AUTO: 354 X10*3/UL (ref 150–450)
POTASSIUM SERPL-SCNC: 4.6 MMOL/L (ref 3.5–5.3)
RBC # BLD AUTO: 2.97 X10*6/UL (ref 4–5.2)
SODIUM SERPL-SCNC: 141 MMOL/L (ref 136–145)
WBC # BLD AUTO: 9.2 X10*3/UL (ref 4.4–11.3)

## 2024-10-18 PROCEDURE — 36415 COLL VENOUS BLD VENIPUNCTURE: CPT | Performed by: NURSE PRACTITIONER

## 2024-10-18 PROCEDURE — 2060000001 HC INTERMEDIATE ICU ROOM DAILY

## 2024-10-18 PROCEDURE — 2500000002 HC RX 250 W HCPCS SELF ADMINISTERED DRUGS (ALT 637 FOR MEDICARE OP, ALT 636 FOR OP/ED): Performed by: INTERNAL MEDICINE

## 2024-10-18 PROCEDURE — 94760 N-INVAS EAR/PLS OXIMETRY 1: CPT

## 2024-10-18 PROCEDURE — 2500000004 HC RX 250 GENERAL PHARMACY W/ HCPCS (ALT 636 FOR OP/ED): Performed by: NURSE PRACTITIONER

## 2024-10-18 PROCEDURE — 94640 AIRWAY INHALATION TREATMENT: CPT

## 2024-10-18 PROCEDURE — 9420000001 HC RT PATIENT EDUCATION 5 MIN

## 2024-10-18 PROCEDURE — 71045 X-RAY EXAM CHEST 1 VIEW: CPT

## 2024-10-18 PROCEDURE — 71045 X-RAY EXAM CHEST 1 VIEW: CPT | Performed by: RADIOLOGY

## 2024-10-18 PROCEDURE — 99232 SBSQ HOSP IP/OBS MODERATE 35: CPT

## 2024-10-18 PROCEDURE — 2500000005 HC RX 250 GENERAL PHARMACY W/O HCPCS: Performed by: NURSE PRACTITIONER

## 2024-10-18 PROCEDURE — 94660 CPAP INITIATION&MGMT: CPT

## 2024-10-18 PROCEDURE — 2500000001 HC RX 250 WO HCPCS SELF ADMINISTERED DRUGS (ALT 637 FOR MEDICARE OP): Performed by: NURSE PRACTITIONER

## 2024-10-18 PROCEDURE — 80048 BASIC METABOLIC PNL TOTAL CA: CPT | Performed by: NURSE PRACTITIONER

## 2024-10-18 PROCEDURE — 99232 SBSQ HOSP IP/OBS MODERATE 35: CPT | Performed by: NURSE PRACTITIONER

## 2024-10-18 PROCEDURE — 36415 COLL VENOUS BLD VENIPUNCTURE: CPT | Performed by: INTERNAL MEDICINE

## 2024-10-18 PROCEDURE — 2500000002 HC RX 250 W HCPCS SELF ADMINISTERED DRUGS (ALT 637 FOR MEDICARE OP, ALT 636 FOR OP/ED): Performed by: NURSE PRACTITIONER

## 2024-10-18 PROCEDURE — 2500000002 HC RX 250 W HCPCS SELF ADMINISTERED DRUGS (ALT 637 FOR MEDICARE OP, ALT 636 FOR OP/ED)

## 2024-10-18 PROCEDURE — 94664 DEMO&/EVAL PT USE INHALER: CPT

## 2024-10-18 PROCEDURE — 85025 COMPLETE CBC W/AUTO DIFF WBC: CPT | Performed by: NURSE PRACTITIONER

## 2024-10-18 PROCEDURE — 83605 ASSAY OF LACTIC ACID: CPT | Performed by: INTERNAL MEDICINE

## 2024-10-18 PROCEDURE — 99239 HOSP IP/OBS DSCHRG MGMT >30: CPT | Performed by: NURSE PRACTITIONER

## 2024-10-18 PROCEDURE — 2500000005 HC RX 250 GENERAL PHARMACY W/O HCPCS: Performed by: INTERNAL MEDICINE

## 2024-10-18 PROCEDURE — 2500000001 HC RX 250 WO HCPCS SELF ADMINISTERED DRUGS (ALT 637 FOR MEDICARE OP)

## 2024-10-18 RX ORDER — AMOXICILLIN AND CLAVULANATE POTASSIUM 875; 125 MG/1; MG/1
875 TABLET, FILM COATED ORAL 2 TIMES DAILY
Qty: 14 TABLET | Refills: 0 | Status: SHIPPED | OUTPATIENT
Start: 2024-10-18 | End: 2024-10-23 | Stop reason: HOSPADM

## 2024-10-18 RX ORDER — AMIODARONE HYDROCHLORIDE 200 MG/1
200 TABLET ORAL 2 TIMES DAILY
Status: DISCONTINUED | OUTPATIENT
Start: 2024-10-18 | End: 2024-10-18

## 2024-10-18 RX ORDER — OXYCODONE HYDROCHLORIDE 5 MG/1
5 TABLET ORAL EVERY 8 HOURS PRN
Qty: 6 TABLET | Refills: 0 | Status: SHIPPED | OUTPATIENT
Start: 2024-10-18 | End: 2024-10-23 | Stop reason: HOSPADM

## 2024-10-18 RX ORDER — IPRATROPIUM BROMIDE AND ALBUTEROL SULFATE 2.5; .5 MG/3ML; MG/3ML
3 SOLUTION RESPIRATORY (INHALATION)
Status: DISCONTINUED | OUTPATIENT
Start: 2024-10-18 | End: 2024-10-18

## 2024-10-18 RX ORDER — IPRATROPIUM BROMIDE AND ALBUTEROL SULFATE 2.5; .5 MG/3ML; MG/3ML
3 SOLUTION RESPIRATORY (INHALATION) EVERY 4 HOURS PRN
Status: DISCONTINUED | OUTPATIENT
Start: 2024-10-18 | End: 2024-10-18

## 2024-10-18 RX ORDER — GABAPENTIN 300 MG/1
300 CAPSULE ORAL 3 TIMES DAILY
Qty: 6 CAPSULE | Refills: 0 | Status: SHIPPED | OUTPATIENT
Start: 2024-10-18 | End: 2024-10-23 | Stop reason: HOSPADM

## 2024-10-18 RX ORDER — IPRATROPIUM BROMIDE AND ALBUTEROL SULFATE 2.5; .5 MG/3ML; MG/3ML
3 SOLUTION RESPIRATORY (INHALATION)
Status: DISCONTINUED | OUTPATIENT
Start: 2024-10-18 | End: 2024-10-19

## 2024-10-18 RX ORDER — ACETAMINOPHEN 500 MG
1000 TABLET ORAL EVERY 8 HOURS SCHEDULED
Status: DISPENSED | OUTPATIENT
Start: 2024-10-18 | End: 2024-10-21

## 2024-10-18 RX ORDER — LIDOCAINE 560 MG/1
2 PATCH PERCUTANEOUS; TOPICAL; TRANSDERMAL DAILY
Start: 2024-10-19 | End: 2024-10-23 | Stop reason: HOSPADM

## 2024-10-18 RX ORDER — METOPROLOL SUCCINATE 25 MG/1
25 TABLET, EXTENDED RELEASE ORAL 2 TIMES DAILY
Start: 2024-10-18 | End: 2024-10-23 | Stop reason: HOSPADM

## 2024-10-18 RX ORDER — AMIODARONE HYDROCHLORIDE 200 MG/1
200 TABLET ORAL DAILY
Status: DISCONTINUED | OUTPATIENT
Start: 2024-10-18 | End: 2024-10-23 | Stop reason: HOSPADM

## 2024-10-18 ASSESSMENT — COGNITIVE AND FUNCTIONAL STATUS - GENERAL
DAILY ACTIVITIY SCORE: 14
EATING MEALS: A LITTLE
HELP NEEDED FOR BATHING: A LOT
MOBILITY SCORE: 10
DRESSING REGULAR LOWER BODY CLOTHING: A LOT
STANDING UP FROM CHAIR USING ARMS: A LOT
DRESSING REGULAR UPPER BODY CLOTHING: A LOT
WALKING IN HOSPITAL ROOM: A LOT
PERSONAL GROOMING: A LITTLE
MOVING FROM LYING ON BACK TO SITTING ON SIDE OF FLAT BED WITH BEDRAILS: A LOT
MOVING TO AND FROM BED TO CHAIR: TOTAL
TOILETING: A LOT
CLIMB 3 TO 5 STEPS WITH RAILING: TOTAL
TURNING FROM BACK TO SIDE WHILE IN FLAT BAD: A LOT

## 2024-10-18 ASSESSMENT — PAIN - FUNCTIONAL ASSESSMENT: PAIN_FUNCTIONAL_ASSESSMENT: FLACC (FACE, LEGS, ACTIVITY, CRY, CONSOLABILITY)

## 2024-10-18 ASSESSMENT — PAIN SCALES - GENERAL
PAINLEVEL_OUTOF10: 0 - NO PAIN
PAINLEVEL_OUTOF10: 0 - NO PAIN
PAINLEVEL_OUTOF10: 10 - WORST POSSIBLE PAIN
PAINLEVEL_OUTOF10: 7

## 2024-10-18 ASSESSMENT — PAIN DESCRIPTION - LOCATION
LOCATION: HIP
LOCATION: HIP

## 2024-10-18 ASSESSMENT — PAIN SCALES - PAIN ASSESSMENT IN ADVANCED DEMENTIA (PAINAD): TOTALSCORE: MEDICATION (SEE MAR)

## 2024-10-18 NOTE — DISCHARGE SUMMARY
Discharge Diagnosis  Chronic low back pain without sciatica, unspecified back pain laterality, pneumonia, rapid atrial fibrillation, respiratory failure    Issues Requiring Follow-Up      Discharge Meds     Medication List      START taking these medications     amiodarone 200 mg tablet; Commonly known as: Pacerone; Take 1 tablet   (200 mg) by mouth 2 times a day.   amoxicillin-pot clavulanate 875-125 mg tablet; Commonly known as:   Augmentin; Take 1 tablet (875 mg) by mouth 2 times a day for 7 days.   lidocaine 4 % patch; Place 2 patches over 12 hours on the skin once   daily. Remove & discard patch within 12 hours or as directed by MD.; Start   taking on: October 19, 2024   metoprolol succinate XL 25 mg 24 hr tablet; Commonly known as:   Toprol-XL; Take 1 tablet (25 mg) by mouth 2 times a day. Do not crush or   chew.   oxyCODONE 5 mg immediate release tablet; Commonly known as: Roxicodone;   Take 1 tablet (5 mg) by mouth every 8 hours if needed for severe pain (7 -   10).     CHANGE how you take these medications     gabapentin 300 mg capsule; Commonly known as: Neurontin; Take 1 capsule   (300 mg) by mouth 3 times a day.; What changed: Another medication with   the same name was removed. Continue taking this medication, and follow the   directions you see here.     CONTINUE taking these medications     acetaminophen 325 mg tablet; Commonly known as: Tylenol; Take 2 tablets   (650 mg) by mouth every 4 hours if needed for mild pain (1 - 3), headaches   or fever (temp greater than 38.0 C).   apixaban 5 mg tablet; Commonly known as: Eliquis; Take 1 tablet (5 mg)   by mouth every 12 hours.   escitalopram 10 mg tablet; Commonly known as: Lexapro; TAKE 1 TABLET BY   MOUTH EVERY DAY   famotidine 20 mg tablet; Commonly known as: Pepcid; Take 1 tablet (20   mg) by mouth once daily.   mirtazapine 15 mg tablet; Commonly known as: Remeron   ondansetron ODT 4 mg disintegrating tablet; Commonly known as:   Zofran-ODT   oxygen  gas therapy; Commonly known as: O2; Inhale 1 each continuously.   primidone 50 mg tablet; Commonly known as: Mysoline; Take 4 tablets (200   mg) by mouth 2 times a day.   Trelegy Ellipta 200-62.5-25 mcg blister with device; Generic drug:   fluticasone-umeclidin-vilanter; INHALE 1 PUFF INTO THE LUNGS EVERY DAY FOR   90 DAYS   Ventolin HFA 90 mcg/actuation inhaler; Generic drug: albuterol; INHALE 1   PUFF BY MOUTH EVERY 4 HOURS AS NEEDED 25     STOP taking these medications     amLODIPine 5 mg tablet; Commonly known as: Norvasc   bisoprolol 5 mg tablet; Commonly known as: Zebeta   hydroCHLOROthiazide 12.5 mg tablet; Commonly known as: Microzide   traMADol 50 mg tablet; Commonly known as: Ultram       Test Results Pending At Discharge  Pending Labs       Order Current Status    Blood Culture Preliminary result    Blood Culture Preliminary result            Hospital Course  Admitted for pain and overall functional decline. Pt with chronic respiratory failure and end stage COPD. Due to increasing O2 requirements pt has signed with Hospice. Discharge to [facility]osp[facility].       Pertinent Physical Exam At Time of Discharge  Physical Exam  Constitutional:       Appearance: She is ill-appearing.   HENT:      Head: Normocephalic and atraumatic.      Nose: Nose normal.      Mouth/Throat:      Mouth: Mucous membranes are moist.      Pharynx: Oropharynx is clear.   Eyes:      Extraocular Movements: Extraocular movements intact.      Pupils: Pupils are equal, round, and reactive to light.   Cardiovascular:      Rate and Rhythm: Normal rate and regular rhythm.      Pulses: Normal pulses.   Pulmonary:      Effort: Respiratory distress present.      Breath sounds: Rhonchi present.   Abdominal:      General: Abdomen is flat. Bowel sounds are normal.      Palpations: Abdomen is soft.   Musculoskeletal:         General: Normal range of motion.      Comments: Generalized weakness and pain   Skin:     General: Skin is warm and dry.       Capillary Refill: Capillary refill takes less than 2 seconds.   Neurological:      General: No focal deficit present.      Mental Status: She is oriented to person, place, and time.   Psychiatric:         Mood and Affect: Mood normal.         Outpatient Follow-Up  No future appointments.    Esha Carlisle, CNP

## 2024-10-18 NOTE — SIGNIFICANT EVENT
Called by RN that patient starting to feel nausea and was dry heaving and coughing up thick secretions. Pulse ox dropped and O2 was increased to 6L NC. She had wheezing. STAT portable CXR was ordered for concern for aspiration. PRN duonebs added. Went to eval the patient and she is visibly SOB, just had episode of emesis with thick mucous. She is on 6L NC with sats in the low 90's. RN checked and she had fever 102.8F. Tylenol and breathing treatment to be given. States she is feeling a little better after emesis. Still SOB, will see how she responds to breathing treatment. Cancelled DC to SNF today. Continue IV Zosyn, will consider dose of steroids if she continues to wheeze. Awaiting result of STAT CXR. Called and updated patient's daughter Edgar via telephone.     UPDATE 16:30- CXR shows worsening RUL consolidation. Gave dose of IV steroids as she continued to wheeze, now on 8L mini bubbler and much more comfortable. Called and discussed with pulmonary NP, they will re-eval tomorrow and are aware of worsening respiratory status. Will maintain SDU status.

## 2024-10-18 NOTE — CARE PLAN
PRN aerosol tx was given with duoneb and O2 was increased  to 8 lpm high flow bubbler with improvement in breathing and SpO2

## 2024-10-18 NOTE — NURSING NOTE
Patient having nausea, medicated with zofran, patient SOB and having moist cough, pulse ox checked, 75% on 3L, increased to 6L and instructed deep breathing, pulse ox increased to 92%, RT called and hospitalist notified. Patient with temp of 102.8. Stat CXR ordered, on IV Zosyn, breathing treatment ordered, labs and steroids. Tylenol for fever.

## 2024-10-18 NOTE — PROGRESS NOTES
10/18/24 1122   Discharge Planning   Living Arrangements Children;Family members   Support Systems Children;Family members   Assistance Needed needing more assistance   Type of Residence Private residence   Who is requesting discharge planning? Provider   Home or Post Acute Services None   Type of Home Care Services DME or oxygen   Expected Discharge Disposition SNF   Does the patient need discharge transport arranged? Yes   RoundTrip coordination needed? Yes   Has discharge transport been arranged? No   What day is the transport expected? 10/18/24     Precert Status: Approved  [facility] Auth ID # [id number]  Dates: 10/17/2024 - 10/21/2024   Discharge to [facility]      Patient now aspirated, getting a chest xray, pulse ox went down and increased her oxygen to 6 liters. Not being discharged tonight, Hopingfully this weekend.     PLAN: Discharge to [facility], precert approved through 10/21. Updated [facility].

## 2024-10-18 NOTE — NURSING NOTE
On 8L high flow bubbler NC at 95%, patient appears to be in less distress after breathing treatment.

## 2024-10-18 NOTE — PROGRESS NOTES
"Kamila Jones \"Elizabeth" is a 69 y.o. female on day 2 of admission presenting with Chronic low back pain without sciatica, unspecified back pain laterality.    Subjective   Patient upright in bed.  Has no complaints this morning.       Objective     Physical Exam  Constitutional:       Appearance: Normal appearance.   Cardiovascular:      Rate and Rhythm: Normal rate and regular rhythm.      Pulses: Normal pulses.      Heart sounds: Normal heart sounds.   Pulmonary:      Effort: Pulmonary effort is normal.      Breath sounds: Wheezing present.   Musculoskeletal:      Right lower leg: No edema.      Left lower leg: No edema.   Skin:     General: Skin is warm and dry.   Neurological:      Mental Status: She is alert and oriented to person, place, and time.         Last Recorded Vitals  Blood pressure 174/82, pulse 66, temperature 36.6 °C (97.9 °F), temperature source Oral, resp. rate 18, height 1.676 m (5' 5.98\"), weight 56.2 kg (123 lb 14.4 oz), SpO2 99%.  Intake/Output last 3 Shifts:  I/O last 3 completed shifts:  In: 1680 (29.9 mL/kg) [P.O.:680; I.V.:1000 (17.8 mL/kg)]  Out: 1276 (22.7 mL/kg) [Urine:1275 (0.6 mL/kg/hr); Stool:1]  Weight: 56.2 kg     Relevant Results    No results found for this or any previous visit (from the past 24 hours).               Assessment/Plan   Assessment & Plan  Chronic low back pain without sciatica, unspecified back pain laterality  10/15:  0/15:  Atrial fibrillation.  Patient admitted to the hospital for management of lower back pain weakness of lower extremities and was found to have atrial fibrillation with rapid ventricular rate.  Her blood pressure was running low.  I received 500 mg of IV digoxin and addition of th bisoprolol she is on.  Now back into normal sinus rhythm.e my recommendation is to continue bisoprolol and oral anticoagulation.  We may consider putting her on amiodarone if she continues to have paroxysmal atrial fibrillation.  I had recent stress test this year that " showed no ischemia.  Last year had in 2023 echocardiogram showing normal ejection fraction.  Will repeat another 2D echo.  However patient is here for management of her pain and atrial fibrillation was incidental finding will monitor for now.     10/16: Patient loaded with IV digoxin 500 mcg yesterday after entering atrial fibrillation with rapid ventricular response.  She is converted back into normal sinus rhythm this morning.  Telemetry reading SVT however I believe this is actually a paroxysmal atrial fibrillation.  Patient is asymptomatic of this.  Will start her on 400 mg of oral amiodarone to maintain rate control.  Echocardiogram completed today has not been read yet.  Blood pressures are labile with readings from systolic in the 90s into the 150s.  I suspect there is a component of pain related to her elevated blood pressures.  Will keep her antihypertensive regimen as is with 5 mg of bisoprolol for now.  Continue 400 mg twice daily amiodarone, continue with Eliquis 5 mg twice daily.  Will try and control her pain.  Will continue to follow with you.     10/17: As above.  Patient went into atrial fibrillation with RVR again early this morning.  I would like to change her beta-blocker to a long-acting dosed twice daily for increased beta-blockade coverage.  Will start her on metoprolol succinate 25 mg twice daily.  This morning upon my examination she is in normal sinus rhythm.  Will continue with amiodarone 400 mg twice daily.  Will taper this upon discharge.  Patient is likely to go home today or tomorrow.  She is advised to follow-up with Dr. Romano in 2 to 3 weeks.  Blood pressure stable.  Morning lab work significant for sodium of 139, potassium 4.3, creatinine 1.15.  She remains on 3 L of supplemental O2 with saturations at 98%.  She had an echocardiogram completed yesterday which revealed a normal EF of 60 to 65%, normal right ventricular systolic function, mild mitral valve regurgitation, slightly  elevated RVSP and trace tricuspid regurgitation.  Will make the above medication adjustment and continue to follow with you while in house.  No contraindication to discharge from cardiac standpoint.      10/18: As above.  Patient scheduled to go to rehab facility later today.  Current regimen of amiodarone and metoprolol XL has been effective in keeping her in sinus rhythm.  He only had 1 episode of A-fib RVR this morning, for which she self converted.  She should be discharged on amiodarone 200 mg twice daily and follow-up with Dr. Romano in 2 to 3 weeks.  Blood pressure is elevated this morning at 174/82 however she has not gotten her morning meds prior to this reading.  She remains on her baseline 3 L of O2 with saturations at 99%.  Overall patient stable from cardiac perspective for discharge.       I spent 20 minutes in the professional and overall care of this patient.      Deisy Sheridan PA-C

## 2024-10-18 NOTE — CARE PLAN
Problem: Respiratory  Goal: Minimal/no exertional discomfort or dyspnea this shift  Outcome: Progressing  Goal: Verbalize decreased shortness of breath this shift  Outcome: Progressing

## 2024-10-19 PROBLEM — T17.908A ASPIRATION INTO AIRWAY: Status: ACTIVE | Noted: 2024-10-19

## 2024-10-19 PROBLEM — J69.0: Status: ACTIVE | Noted: 2024-08-14

## 2024-10-19 PROBLEM — J96.01 ACUTE HYPOXIC RESPIRATORY FAILURE (MULTI): Status: ACTIVE | Noted: 2023-10-04

## 2024-10-19 LAB
ANION GAP SERPL CALCULATED.3IONS-SCNC: 11 MMOL/L (ref 10–20)
BACTERIA BLD CULT: NORMAL
BACTERIA BLD CULT: NORMAL
BUN SERPL-MCNC: 23 MG/DL (ref 6–23)
CALCIUM SERPL-MCNC: 8.4 MG/DL (ref 8.6–10.3)
CHLORIDE SERPL-SCNC: 104 MMOL/L (ref 98–107)
CO2 SERPL-SCNC: 28 MMOL/L (ref 21–32)
CREAT SERPL-MCNC: 1.17 MG/DL (ref 0.5–1.05)
EGFRCR SERPLBLD CKD-EPI 2021: 50 ML/MIN/1.73M*2
ERYTHROCYTE [DISTWIDTH] IN BLOOD BY AUTOMATED COUNT: 16.5 % (ref 11.5–14.5)
GLUCOSE SERPL-MCNC: 106 MG/DL (ref 74–99)
HCT VFR BLD AUTO: 27.7 % (ref 36–46)
HGB BLD-MCNC: 8.8 G/DL (ref 12–16)
MCH RBC QN AUTO: 31.9 PG (ref 26–34)
MCHC RBC AUTO-ENTMCNC: 31.8 G/DL (ref 32–36)
MCV RBC AUTO: 100 FL (ref 80–100)
NRBC BLD-RTO: 0 /100 WBCS (ref 0–0)
PLATELET # BLD AUTO: 266 X10*3/UL (ref 150–450)
POTASSIUM SERPL-SCNC: 4.3 MMOL/L (ref 3.5–5.3)
RBC # BLD AUTO: 2.76 X10*6/UL (ref 4–5.2)
SODIUM SERPL-SCNC: 139 MMOL/L (ref 136–145)
WBC # BLD AUTO: 5 X10*3/UL (ref 4.4–11.3)

## 2024-10-19 PROCEDURE — 2500000005 HC RX 250 GENERAL PHARMACY W/O HCPCS: Performed by: NURSE PRACTITIONER

## 2024-10-19 PROCEDURE — 94667 MNPJ CHEST WALL 1ST: CPT

## 2024-10-19 PROCEDURE — 2060000001 HC INTERMEDIATE ICU ROOM DAILY

## 2024-10-19 PROCEDURE — 99232 SBSQ HOSP IP/OBS MODERATE 35: CPT | Performed by: STUDENT IN AN ORGANIZED HEALTH CARE EDUCATION/TRAINING PROGRAM

## 2024-10-19 PROCEDURE — 2500000002 HC RX 250 W HCPCS SELF ADMINISTERED DRUGS (ALT 637 FOR MEDICARE OP, ALT 636 FOR OP/ED): Performed by: NURSE PRACTITIONER

## 2024-10-19 PROCEDURE — 2500000002 HC RX 250 W HCPCS SELF ADMINISTERED DRUGS (ALT 637 FOR MEDICARE OP, ALT 636 FOR OP/ED): Performed by: INTERNAL MEDICINE

## 2024-10-19 PROCEDURE — 2500000004 HC RX 250 GENERAL PHARMACY W/ HCPCS (ALT 636 FOR OP/ED): Performed by: NURSE PRACTITIONER

## 2024-10-19 PROCEDURE — 94640 AIRWAY INHALATION TREATMENT: CPT

## 2024-10-19 PROCEDURE — 36415 COLL VENOUS BLD VENIPUNCTURE: CPT | Performed by: NURSE PRACTITIONER

## 2024-10-19 PROCEDURE — 99232 SBSQ HOSP IP/OBS MODERATE 35: CPT | Performed by: NURSE PRACTITIONER

## 2024-10-19 PROCEDURE — 2500000001 HC RX 250 WO HCPCS SELF ADMINISTERED DRUGS (ALT 637 FOR MEDICARE OP)

## 2024-10-19 PROCEDURE — 80048 BASIC METABOLIC PNL TOTAL CA: CPT | Performed by: NURSE PRACTITIONER

## 2024-10-19 PROCEDURE — 85027 COMPLETE CBC AUTOMATED: CPT | Performed by: NURSE PRACTITIONER

## 2024-10-19 PROCEDURE — 2500000001 HC RX 250 WO HCPCS SELF ADMINISTERED DRUGS (ALT 637 FOR MEDICARE OP): Performed by: NURSE PRACTITIONER

## 2024-10-19 PROCEDURE — 94660 CPAP INITIATION&MGMT: CPT

## 2024-10-19 PROCEDURE — 9420000001 HC RT PATIENT EDUCATION 5 MIN

## 2024-10-19 RX ORDER — IPRATROPIUM BROMIDE AND ALBUTEROL SULFATE 2.5; .5 MG/3ML; MG/3ML
3 SOLUTION RESPIRATORY (INHALATION)
Status: DISCONTINUED | OUTPATIENT
Start: 2024-10-19 | End: 2024-10-23 | Stop reason: HOSPADM

## 2024-10-19 ASSESSMENT — PAIN DESCRIPTION - LOCATION
LOCATION: HIP
LOCATION: LEG

## 2024-10-19 ASSESSMENT — PAIN SCALES - GENERAL
PAINLEVEL_OUTOF10: 0 - NO PAIN
PAINLEVEL_OUTOF10: 0 - NO PAIN
PAINLEVEL_OUTOF10: 10 - WORST POSSIBLE PAIN
PAINLEVEL_OUTOF10: 10 - WORST POSSIBLE PAIN
PAINLEVEL_OUTOF10: 0 - NO PAIN

## 2024-10-19 ASSESSMENT — COGNITIVE AND FUNCTIONAL STATUS - GENERAL
MOBILITY SCORE: 10
MOVING TO AND FROM BED TO CHAIR: TOTAL
DRESSING REGULAR LOWER BODY CLOTHING: A LOT
DAILY ACTIVITIY SCORE: 14
TURNING FROM BACK TO SIDE WHILE IN FLAT BAD: A LOT
EATING MEALS: A LITTLE
MOVING FROM LYING ON BACK TO SITTING ON SIDE OF FLAT BED WITH BEDRAILS: A LOT
TOILETING: A LOT
PERSONAL GROOMING: A LITTLE
CLIMB 3 TO 5 STEPS WITH RAILING: TOTAL
STANDING UP FROM CHAIR USING ARMS: A LOT
DRESSING REGULAR UPPER BODY CLOTHING: A LOT
HELP NEEDED FOR BATHING: A LOT
WALKING IN HOSPITAL ROOM: A LOT

## 2024-10-19 ASSESSMENT — PAIN DESCRIPTION - ORIENTATION
ORIENTATION: RIGHT;LEFT
ORIENTATION: RIGHT

## 2024-10-19 ASSESSMENT — PAIN SCALES - PAIN ASSESSMENT IN ADVANCED DEMENTIA (PAINAD)
TOTALSCORE: MEDICATION (SEE MAR)
TOTALSCORE: MEDICATION (SEE MAR)

## 2024-10-19 NOTE — CARE PLAN
The patient's goals for the shift include      The clinical goals for the shift include breath easier      Problem: Discharge Planning  Goal: Discharge to home or other facility with appropriate resources  Outcome: Progressing     Problem: Chronic Conditions and Co-morbidities  Goal: Patient's chronic conditions and co-morbidity symptoms are monitored and maintained or improved  Outcome: Progressing     Problem: Fall/Injury  Goal: Not fall by end of shift  Outcome: Progressing  Goal: Be free from injury by end of the shift  Outcome: Progressing  Goal: Verbalize understanding of personal risk factors for fall in the hospital  Outcome: Progressing  Goal: Verbalize understanding of risk factor reduction measures to prevent injury from fall in the home  Outcome: Progressing  Goal: Use assistive devices by end of the shift  Outcome: Progressing  Goal: Pace activities to prevent fatigue by end of the shift  Outcome: Progressing     Problem: Skin  Goal: Decreased wound size/increased tissue granulation at next dressing change  Outcome: Progressing  Goal: Participates in plan/prevention/treatment measures  Outcome: Progressing  Goal: Prevent/manage excess moisture  Outcome: Progressing  Goal: Prevent/minimize sheer/friction injuries  Outcome: Progressing  Goal: Promote/optimize nutrition  Outcome: Progressing  Goal: Promote skin healing  Outcome: Progressing     Problem: Respiratory  Goal: Clear secretions with interventions this shift  Outcome: Progressing  Goal: Minimize anxiety/maximize coping throughout shift  Outcome: Progressing  Goal: Minimal/no exertional discomfort or dyspnea this shift  Outcome: Progressing  Goal: No signs of respiratory distress (eg. Use of accessory muscles. Peds grunting)  Outcome: Progressing  Goal: Patent airway maintained this shift  Outcome: Progressing  Goal: Tolerate mechanical ventilation evidenced by VS/agitation level this shift  Outcome: Progressing  Goal: Tolerate pulmonary toileting  this shift  Outcome: Progressing  Goal: Verbalize decreased shortness of breath this shift  Outcome: Progressing  Goal: Wean oxygen to maintain O2 saturation per order/standard this shift  Outcome: Progressing  Goal: Increase self care and/or family involvement in next 24 hours  Outcome: Progressing     Problem: Pain  Goal: Takes deep breaths with improved pain control throughout the shift  Outcome: Progressing  Goal: Turns in bed with improved pain control throughout the shift  Outcome: Progressing  Goal: Walks with improved pain control throughout the shift  Outcome: Progressing  Goal: Performs ADL's with improved pain control throughout shift  Outcome: Progressing  Goal: Participates in PT with improved pain control throughout the shift  Outcome: Progressing  Goal: Free from opioid side effects throughout the shift  Outcome: Progressing  Goal: Free from acute confusion related to pain meds throughout the shift  Outcome: Progressing

## 2024-10-19 NOTE — PROGRESS NOTES
"Kamila Jones \"Elizabeth" is a 69 y.o. female on day 3 of admission presenting with Chronic low back pain without sciatica, unspecified back pain laterality.    Subjective   Increased O2 requirement yesterday after suspected aspiration event. She feels like she has something to cough up but is not able to.        Objective     Physical Exam  Vitals and nursing note reviewed. Exam conducted with a chaperone present.   Constitutional:       Appearance: She is not ill-appearing.   HENT:      Head: Normocephalic and atraumatic.      Nose: Nose normal.   Eyes:      Extraocular Movements: Extraocular movements intact.      Pupils: Pupils are equal, round, and reactive to light.   Cardiovascular:      Rate and Rhythm: Normal rate and regular rhythm.   Pulmonary:      Breath sounds: Wheezing and rales present.   Abdominal:      General: Abdomen is flat. Bowel sounds are normal.      Palpations: Abdomen is soft.   Musculoskeletal:         General: No swelling or tenderness.      Right lower leg: No edema.      Left lower leg: No edema.   Skin:     General: Skin is warm and dry.      Capillary Refill: Capillary refill takes 2 to 3 seconds.   Neurological:      Mental Status: Mental status is at baseline.         Last Recorded Vitals  Blood pressure 148/59, pulse 67, temperature 37.3 °C (99.1 °F), temperature source Oral, resp. rate 20, height 1.676 m (5' 5.98\"), weight 56.2 kg (123 lb 14.4 oz), SpO2 97%.  Intake/Output last 3 Shifts:  I/O last 3 completed shifts:  In: 150 (2.7 mL/kg) [IV Piggyback:150]  Out: 825 (14.7 mL/kg) [Urine:825 (0.4 mL/kg/hr)]  Weight: 56.2 kg     Relevant Results  Results for orders placed or performed during the hospital encounter of 10/14/24 (from the past 24 hours)   CBC and Auto Differential   Result Value Ref Range    WBC 9.2 4.4 - 11.3 x10*3/uL    nRBC 0.0 0.0 - 0.0 /100 WBCs    RBC 2.97 (L) 4.00 - 5.20 x10*6/uL    Hemoglobin 9.4 (L) 12.0 - 16.0 g/dL    Hematocrit 29.6 (L) 36.0 - 46.0 %     " 80 - 100 fL    MCH 31.6 26.0 - 34.0 pg    MCHC 31.8 (L) 32.0 - 36.0 g/dL    RDW 16.3 (H) 11.5 - 14.5 %    Platelets 354 150 - 450 x10*3/uL    Neutrophils % 83.5 40.0 - 80.0 %    Immature Granulocytes %, Automated 0.4 0.0 - 0.9 %    Lymphocytes % 7.3 13.0 - 44.0 %    Monocytes % 5.9 2.0 - 10.0 %    Eosinophils % 2.4 0.0 - 6.0 %    Basophils % 0.5 0.0 - 2.0 %    Neutrophils Absolute 7.64 (H) 1.60 - 5.50 x10*3/uL    Immature Granulocytes Absolute, Automated 0.04 0.00 - 0.50 x10*3/uL    Lymphocytes Absolute 0.67 (L) 0.80 - 3.00 x10*3/uL    Monocytes Absolute 0.54 0.05 - 0.80 x10*3/uL    Eosinophils Absolute 0.22 0.00 - 0.40 x10*3/uL    Basophils Absolute 0.05 0.00 - 0.10 x10*3/uL   Basic metabolic panel   Result Value Ref Range    Glucose 147 (H) 74 - 99 mg/dL    Sodium 141 136 - 145 mmol/L    Potassium 4.6 3.5 - 5.3 mmol/L    Chloride 106 98 - 107 mmol/L    Bicarbonate 26 21 - 32 mmol/L    Anion Gap 14 10 - 20 mmol/L    Urea Nitrogen 19 6 - 23 mg/dL    Creatinine 0.90 0.50 - 1.05 mg/dL    eGFR 68 >60 mL/min/1.73m*2    Calcium 8.4 (L) 8.6 - 10.3 mg/dL   Lactate   Result Value Ref Range    Lactate 1.3 0.4 - 2.0 mmol/L   CBC   Result Value Ref Range    WBC 5.0 4.4 - 11.3 x10*3/uL    nRBC 0.0 0.0 - 0.0 /100 WBCs    RBC 2.76 (L) 4.00 - 5.20 x10*6/uL    Hemoglobin 8.8 (L) 12.0 - 16.0 g/dL    Hematocrit 27.7 (L) 36.0 - 46.0 %     80 - 100 fL    MCH 31.9 26.0 - 34.0 pg    MCHC 31.8 (L) 32.0 - 36.0 g/dL    RDW 16.5 (H) 11.5 - 14.5 %    Platelets 266 150 - 450 x10*3/uL   Basic metabolic panel   Result Value Ref Range    Glucose 106 (H) 74 - 99 mg/dL    Sodium 139 136 - 145 mmol/L    Potassium 4.3 3.5 - 5.3 mmol/L    Chloride 104 98 - 107 mmol/L    Bicarbonate 28 21 - 32 mmol/L    Anion Gap 11 10 - 20 mmol/L    Urea Nitrogen 23 6 - 23 mg/dL    Creatinine 1.17 (H) 0.50 - 1.05 mg/dL    eGFR 50 (L) >60 mL/min/1.73m*2    Calcium 8.4 (L) 8.6 - 10.3 mg/dL                            Assessment/Plan   Assessment & Plan  Chronic  low back pain without sciatica, unspecified back pain laterality  Consider consult to palliative care  Acute hypoxic respiratory failure (Multi)  Wean O2 as able  Aspiration into airway  Aspiration precautions  Aspiration pneumonia of right upper lobe (Multi)  Continue zosyn  Flutter device for airway clearance         I spent 35 minutes in the professional and overall care of this patient.      Sabiha Lutz MD PhD

## 2024-10-19 NOTE — ASSESSMENT & PLAN NOTE
Patient: Lavelle Valenzuela Date: 2019   : 1949 Attending: Homar Bai MD   69 year old male      Chief Complaint: empyema     Subjective:     Feeling well  No shortness of breath, on RA  No fevers, chills  Tolerating PO  Has some chronic leg edema he thinks is \"a bit worse\" than baseline  No chest pains, palpitations  No GI complaints  Thinks fever was side effect of his antineoplastic therapy as it was noted as \"second side effect on the list\"          Problem List:   Patient Active Problem List   Diagnosis   • Prostate cancer (CMS/HCC)   • Hayfever   • Seasonal allergic rhinitis due to pollen   • Irritability and anger   • Arthritis of left knee   • Situational stress   • Right lower lobe lung mass   • Pulmonary nodules/lesions, multiple   • Non-small cell cancer of right lung (CMS/HCC)   • Encounter for central line care   • Dehydration   • Adenocarcinoma of right lung (CMS/HCC)   • Thrombocytopenia (CMS/HCC)   • S/P robotic right lower lobectomy of lung   • s/p thoracic duct ligation   • Loculated pleural effusion   • Hydropneumothorax       Allergies:   ALLERGIES:   Allergen Reactions   • Grass Other (See Comments)      nasal congestion all summer   • Pollen Other (See Comments)     sinus Congestion in early spring.        Medications/Infusions:   • sulfamethoxazole-trimethoprim  1 tablet Oral 2 times per day   • cefTAZidime (FORTAZ) IVPB  2,000 mg Intravenous 3 times per day   • vancomycin (VANCOCIN) IVPB  1,000 mg Intravenous 2 times per day   • VANCOMYCIN - PHARMACIST MONITORED   Does not apply See Admin Instructions   • finasteride  5 mg Oral Daily   • loratadine  10 mg Oral QAM AC   • sertraline  50 mg Oral Daily   • apixaBAN  5 mg Oral 2 times per day   • vitamin - therapeutic multivitamins w/minerals  1 tablet Oral Daily   • sodium chloride (PF)  2 mL Intracatheter 2 times per day         Review of Systems: All systems reviewed and negative except for those mentioned in the history of  Consider consult to palliative care   present illness                Vital Last Value 24 Hour Range   Temperature 97.8 °F (36.6 °C) (11/25/19 0942) Temp  Min: 97.8 °F (36.6 °C)  Max: 98.4 °F (36.9 °C)   Pulse 89 (11/25/19 0815) Pulse  Min: 78  Max: 96   Respiratory 16 (11/25/19 0815) Resp  Min: 16  Max: 18   Non-Invasive  Blood Pressure 106/57 (11/25/19 0815) BP  Min: 96/56  Max: 106/57   Pulse Oximetry 99 % (11/25/19 0815) SpO2  Min: 96 %  Max: 100 %     Vital Today Admitted   Weight 84.4 kg (11/25/19 0619) Weight: 85 kg (11/24/19 1700)   Height N/A Height: 6' 1\" (185.4 cm) (11/24/19 1700)   BMI N/A BMI (Calculated): 24.72 (11/24/19 1700)       Intake/Output:    No intake or output data in the 24 hours ending 11/25/19 1012    Physical Exam:   Elderly male well built in no obvious distress  HEENT PERRLA EOMI  Oral mucosa pale moist no thrush  Neck tracheotomy stoma intact  Chest bilateral equal expansion  Right-sided chest tubes in place  CVS S1-S2 regular  Lungs decreased breath sounds at bases  Abdomen soft nontender bowel sounds are present  Extremities:  2+ edema noted no calf tenderness  CNS alert awake nonfocal  Skin warm and dry  Back no CVA angle tenderness    Laboratory Results:   Recent Labs   Lab 11/24/19  0620 11/23/19  0754 11/22/19  0456 11/21/19  0517 11/18/19  1124   WBC 7.5 7.8 1.4* 2.0* 4.6   HCT 31.4* 31.9* 35.2* 31.7* 40.2   HGB 10.2* 10.4* 11.2* 10.6* 12.6*   * 121* 117* 111* 184   INR  --   --   --  1.0  --    SODIUM  --  134* 132* 130* 136   POTASSIUM  --  4.0 4.1 3.8 4.1   CHLORIDE  --  100 99 97* 100   CO2  --  30 29 25 27   CALCIUM  --  8.5 8.3* 7.9* 9.0   GLUCOSE  --  113* 114* 137* 112*   BUN  --  13 11 17 21*   CREATININE  --  0.76 0.78 0.74 0.79   AST  --   --   --  59* 46*   GPT  --   --   --  31 35   ALKPT  --   --   --  125* 139*   BILIRUBIN  --   --   --  0.4 0.5   ALBUMIN  --   --   --  2.5*  2.5* 3.4*   GFRNA  --  >90 >90 >90 90   PHOS  --  3.2 2.7  --  3.2     cx from the drain chamber + for GNR (AFB,  STENOTROPHOMONAS MALTOPHILIA and Acinetobacter) ? Colonizer vs true pathogen      Imaging:   CT chest wo contrast 11/22/19:  IMPRESSION:  Two chest tubes remain within a thick-walled pleural air cavity in the posterior right chest, with slight decrease in size of the air cavity since comparison PET CT study in October.  Stable right thoracotomy changes. Vague density remains in the right suprahilar lung and in the right paratracheal space at the azygos node region, similar to prior PET/CT. Delineation is limited without intravascular contrast.  Continued clear appearance left lung without infiltrate or mass.           Assessment:   1. Possible empyema  2. Fever/sepsis likely from diagnosis 1  3. Non-small cell lung cancer  4. Right hydropneumothorax  5. Neutropenia  6. Chronic atrial fibrillation  7. H/o PE 7/11/19      Plans/Recommendations:   Cardiothoracic surgery evaluation  eliquis on hold for now - last dose 11/24 PM  Continue antibiotics per ID  Follow AFB  Continue rate control and anticoagulation  monitor reading pulse ox  PPI for GI  Initiate heparin subcu dvt prophy next day or two  Code status:  Full per patient      Janene Chavez NP  11/25/2019  10:12 AM  I saw and evaluated the patient personally. I performed the history, exam and medical decision making & reviewed all radiology/cardiac & lab data for this encounter and agree with the above note & updated

## 2024-10-19 NOTE — PROGRESS NOTES
"Kamila Jones \"Elizabeth" is a 69 y.o. female on day 3 of admission presenting with Chronic low back pain without sciatica, unspecified back pain laterality.      Subjective   Patient seen and examined. Resting in bed. Feeling much better this morning. Breathing improved, she is no longer wheezing. She remains on 8L NC. No further nausea. No further fevers.        Objective     Last Recorded Vitals  /59 (BP Location: Right arm, Patient Position: Lying)   Pulse 54   Temp 36.6 °C (97.9 °F) (Temporal)   Resp 18   Wt 56.2 kg (123 lb 14.4 oz)   SpO2 100%   Intake/Output last 3 Shifts:    Intake/Output Summary (Last 24 hours) at 10/19/2024 0822  Last data filed at 10/19/2024 0524  Gross per 24 hour   Intake 150 ml   Output 300 ml   Net -150 ml       Admission Weight  Weight: 56.2 kg (124 lb) (10/14/24 0942)    Daily Weight  10/14/24 : 56.2 kg (123 lb 14.4 oz)    Image Results  XR chest 1 view  Narrative: Interpreted By:  Bharath Negrete,   STUDY:  XR CHEST 1 VIEW; 10/18/2024 3:54 pm      INDICATION:  Signs/Symptoms:Hypoxia      COMPARISON:  Radiographs from 10/14/2024.      ACCESSION NUMBER(S):  [id number]      ORDERING CLINICIAN:  JOHN LLANOS      FINDINGS:  The study is limited due to  rotation.  The cardiac silhouette is within normal limits for the technique.  There is worsening consolidation in the right upper lobe.  There is no pneumothorax or significant pleural effusion.  Degenerative changes involve the spine and shoulders. Deformity of  the right proximal humerus is again seen due to remote injury.      Impression: Worsening right upper lobe consolidation, possibly due to pneumonia.  Correlate clinically and follow-up to document complete resolution.      Signed by: Bharath Negrete 10/18/2024 4:24 PM  Dictation workstation:   [id number]      Physical Exam    General: Alert and oriented x3, pleasant.   Cardiac: Regular rate and rhythm, S1/S2 , no murmur.   Pulmonary: Diminished on 8L NC mini " bubbler.   Abdomen: Soft, round, nontender. BS +x4.   Extremities: No edema. Equal strength in all extremities.   Skin: No rashes or lesions.    Relevant Results    Scheduled medications  acetaminophen, 1,000 mg, oral, q8h JENNYFER  amiodarone, 200 mg, oral, Daily  apixaban, 5 mg, oral, q12h  escitalopram, 10 mg, oral, Daily  famotidine, 20 mg, oral, Daily  tiotropium, 2 puff, inhalation, Daily   And  fluticasone furoate-vilanteroL, 1 puff, inhalation, Daily  gabapentin, 300 mg, oral, TID  ipratropium-albuteroL, 3 mL, nebulization, 4x daily  lidocaine, 2 patch, transdermal, Daily  metoprolol succinate XL, 25 mg, oral, BID  mirtazapine, 15 mg, oral, Nightly  oxygen, , inhalation, Continuous - Inhalation  piperacillin-tazobactam, 3.375 g, intravenous, q6h  primidone, 200 mg, oral, BID      Continuous medications  oxygen, , Last Rate: 8 L/min (10/18/24 1629)      PRN medications  PRN medications: albuterol, hydrALAZINE, ondansetron ODT **OR** ondansetron, oxyCODONE, polyethylene glycol     Results for orders placed or performed during the hospital encounter of 10/14/24 (from the past 24 hours)   CBC and Auto Differential   Result Value Ref Range    WBC 9.2 4.4 - 11.3 x10*3/uL    nRBC 0.0 0.0 - 0.0 /100 WBCs    RBC 2.97 (L) 4.00 - 5.20 x10*6/uL    Hemoglobin 9.4 (L) 12.0 - 16.0 g/dL    Hematocrit 29.6 (L) 36.0 - 46.0 %     80 - 100 fL    MCH 31.6 26.0 - 34.0 pg    MCHC 31.8 (L) 32.0 - 36.0 g/dL    RDW 16.3 (H) 11.5 - 14.5 %    Platelets 354 150 - 450 x10*3/uL    Neutrophils % 83.5 40.0 - 80.0 %    Immature Granulocytes %, Automated 0.4 0.0 - 0.9 %    Lymphocytes % 7.3 13.0 - 44.0 %    Monocytes % 5.9 2.0 - 10.0 %    Eosinophils % 2.4 0.0 - 6.0 %    Basophils % 0.5 0.0 - 2.0 %    Neutrophils Absolute 7.64 (H) 1.60 - 5.50 x10*3/uL    Immature Granulocytes Absolute, Automated 0.04 0.00 - 0.50 x10*3/uL    Lymphocytes Absolute 0.67 (L) 0.80 - 3.00 x10*3/uL    Monocytes Absolute 0.54 0.05 - 0.80 x10*3/uL    Eosinophils  Absolute 0.22 0.00 - 0.40 x10*3/uL    Basophils Absolute 0.05 0.00 - 0.10 x10*3/uL   Basic metabolic panel   Result Value Ref Range    Glucose 147 (H) 74 - 99 mg/dL    Sodium 141 136 - 145 mmol/L    Potassium 4.6 3.5 - 5.3 mmol/L    Chloride 106 98 - 107 mmol/L    Bicarbonate 26 21 - 32 mmol/L    Anion Gap 14 10 - 20 mmol/L    Urea Nitrogen 19 6 - 23 mg/dL    Creatinine 0.90 0.50 - 1.05 mg/dL    eGFR 68 >60 mL/min/1.73m*2    Calcium 8.4 (L) 8.6 - 10.3 mg/dL   Lactate   Result Value Ref Range    Lactate 1.3 0.4 - 2.0 mmol/L   CBC   Result Value Ref Range    WBC 5.0 4.4 - 11.3 x10*3/uL    nRBC 0.0 0.0 - 0.0 /100 WBCs    RBC 2.76 (L) 4.00 - 5.20 x10*6/uL    Hemoglobin 8.8 (L) 12.0 - 16.0 g/dL    Hematocrit 27.7 (L) 36.0 - 46.0 %     80 - 100 fL    MCH 31.9 26.0 - 34.0 pg    MCHC 31.8 (L) 32.0 - 36.0 g/dL    RDW 16.5 (H) 11.5 - 14.5 %    Platelets 266 150 - 450 x10*3/uL   Basic metabolic panel   Result Value Ref Range    Glucose 106 (H) 74 - 99 mg/dL    Sodium 139 136 - 145 mmol/L    Potassium 4.3 3.5 - 5.3 mmol/L    Chloride 104 98 - 107 mmol/L    Bicarbonate 28 21 - 32 mmol/L    Anion Gap 11 10 - 20 mmol/L    Urea Nitrogen 23 6 - 23 mg/dL    Creatinine 1.17 (H) 0.50 - 1.05 mg/dL    eGFR 50 (L) >60 mL/min/1.73m*2    Calcium 8.4 (L) 8.6 - 10.3 mg/dL             Assessment/Plan      Acute on Chronic Low Back Pain and Bilateral Hip Pain / Inability to Ambulate  -Has known history of significant OA with back and hip pain.   -Followed with pain management and attempted hip injections with no relief.   -Unable to have surgery due to her pulmonary status and co-morbidities.   -On scheduled tylenol and lidocaine patch. PRN Oxy for now, monitor respiratory status closely- stable with better pain control.   -PT/OT recommending moderate intensity therapy, plan for SNF upon DC.   -OOB with assist only.   -All imaging done shows OA and degenerative changes, no acute fractures.      Fever / Healthcare Aquired Pneumonia /  Acute on Chronic Respiratory Failure  -Had 2 isolated fevers. Another yesterday, but feel this was due to aspiration episode.   -UA negative. COVID and flu checked and negative.   -CT chest shows new infiltrate RUL- discussed with pulmonary who reviewed her images, they feel this is a HCAP and we are treating with zosyn, vanco stopped as the MRSA swab was negative.  -Episode of coughing, vomiting 10/18, spiked fever with worsening respiratory status and hypoxia. Likely aspirated. CXR checked with worsening RUL consolidation. She was increased to 8L NC mini bubbler, given breathing treatments and IV steroids. She is much better this morning, still on 8L NC bubbler, wean as able.   -Blood cultures pending so far without growth.    -Strep/legionella negative.     RADHA   -Creat now stable.  -Suspect from dehydration as she has had poor fluid intake and had emesis.   -IVF given x24 hours.    -Post void bladder scan showed no residual.       COPD   -No wheeze on exam this AM, s/p 1x dose IV steroid yesterday.   -Continue home inhalers.      Chronic Respiratory Failure / RAMNO  -Baseline O2 3L NC continuous at home.   -She also uses BiPAP at HS, this is ordered.   -CXR with patchy nodular consolidation RUL with known lung mass, she just had PET CT as outpatient and is currently following with her PCP for this.  -Continue home inhalers.      Atrial Fibrillation with RVR  -On Eliquis and BB.   -Cardio follows, currently on amiodarone and metoprolol, continue.   -Monitor on telemetry.      HTN  -Meds adjusted and BP is stable.   -Monitor.     DVT Risk  -On Eliquis.   -SCDs.      Plan  Pulmonary follows. Cardio signed off.   DC was cancelled yesterday due to worsening respiratory status and fever. Likely had aspiration episode.   Continue IV zosyn.   Wean O2 as sats allow to her baseline, she remains on 8L NC mini bubbler.   Anticipate she may need to be here through the weekend to wean her O2 and monitor her breathing. She has  precert which is good through 10/21/24 for [place] [facility]. Will DC when she is medically stable.        Rupal Sosa, APRN-CNP

## 2024-10-19 NOTE — PROGRESS NOTES
Not medically clear today.    [place] [facility] - precert good through MON. 10/21.    Shannon Borden RN

## 2024-10-20 VITALS
OXYGEN SATURATION: 92 % | WEIGHT: 123.9 LBS | RESPIRATION RATE: 19 BRPM | BODY MASS INDEX: 19.91 KG/M2 | HEART RATE: 68 BPM | DIASTOLIC BLOOD PRESSURE: 57 MMHG | SYSTOLIC BLOOD PRESSURE: 104 MMHG | TEMPERATURE: 98.8 F | HEIGHT: 66 IN

## 2024-10-20 LAB
ANION GAP SERPL CALCULATED.3IONS-SCNC: 14 MMOL/L (ref 10–20)
BACTERIA BLD CULT: NORMAL
BACTERIA BLD CULT: NORMAL
BUN SERPL-MCNC: 22 MG/DL (ref 6–23)
CALCIUM SERPL-MCNC: 8.1 MG/DL (ref 8.6–10.3)
CHLORIDE SERPL-SCNC: 105 MMOL/L (ref 98–107)
CO2 SERPL-SCNC: 26 MMOL/L (ref 21–32)
CREAT SERPL-MCNC: 1.26 MG/DL (ref 0.5–1.05)
EGFRCR SERPLBLD CKD-EPI 2021: 46 ML/MIN/1.73M*2
ERYTHROCYTE [DISTWIDTH] IN BLOOD BY AUTOMATED COUNT: 16.5 % (ref 11.5–14.5)
FERRITIN SERPL-MCNC: 233 NG/ML (ref 8–150)
FOLATE SERPL-MCNC: 5.6 NG/ML
GLUCOSE SERPL-MCNC: 106 MG/DL (ref 74–99)
HCT VFR BLD AUTO: 26.7 % (ref 36–46)
HGB BLD-MCNC: 8.2 G/DL (ref 12–16)
IRON SATN MFR SERPL: 5 % (ref 25–45)
IRON SERPL-MCNC: 10 UG/DL (ref 35–150)
MCH RBC QN AUTO: 31.9 PG (ref 26–34)
MCHC RBC AUTO-ENTMCNC: 30.7 G/DL (ref 32–36)
MCV RBC AUTO: 104 FL (ref 80–100)
NRBC BLD-RTO: 0 /100 WBCS (ref 0–0)
PLATELET # BLD AUTO: 296 X10*3/UL (ref 150–450)
POTASSIUM SERPL-SCNC: 4.4 MMOL/L (ref 3.5–5.3)
RBC # BLD AUTO: 2.57 X10*6/UL (ref 4–5.2)
SODIUM SERPL-SCNC: 141 MMOL/L (ref 136–145)
TIBC SERPL-MCNC: 190 UG/DL (ref 240–445)
UIBC SERPL-MCNC: 180 UG/DL (ref 110–370)
VIT B12 SERPL-MCNC: 279 PG/ML (ref 211–911)
WBC # BLD AUTO: 7 X10*3/UL (ref 4.4–11.3)

## 2024-10-20 PROCEDURE — 2500000001 HC RX 250 WO HCPCS SELF ADMINISTERED DRUGS (ALT 637 FOR MEDICARE OP)

## 2024-10-20 PROCEDURE — 2500000001 HC RX 250 WO HCPCS SELF ADMINISTERED DRUGS (ALT 637 FOR MEDICARE OP): Performed by: NURSE PRACTITIONER

## 2024-10-20 PROCEDURE — 2500000002 HC RX 250 W HCPCS SELF ADMINISTERED DRUGS (ALT 637 FOR MEDICARE OP, ALT 636 FOR OP/ED): Performed by: NURSE PRACTITIONER

## 2024-10-20 PROCEDURE — 36415 COLL VENOUS BLD VENIPUNCTURE: CPT | Performed by: NURSE PRACTITIONER

## 2024-10-20 PROCEDURE — 2500000004 HC RX 250 GENERAL PHARMACY W/ HCPCS (ALT 636 FOR OP/ED): Performed by: NURSE PRACTITIONER

## 2024-10-20 PROCEDURE — G0316 PR PROLONGED INPATIENT/OBSERVATION EM SVC EA 15 MIN: HCPCS | Performed by: NURSE PRACTITIONER

## 2024-10-20 PROCEDURE — 2500000002 HC RX 250 W HCPCS SELF ADMINISTERED DRUGS (ALT 637 FOR MEDICARE OP, ALT 636 FOR OP/ED): Performed by: INTERNAL MEDICINE

## 2024-10-20 PROCEDURE — 87040 BLOOD CULTURE FOR BACTERIA: CPT | Mod: TRILAB | Performed by: NURSE PRACTITIONER

## 2024-10-20 PROCEDURE — 94660 CPAP INITIATION&MGMT: CPT

## 2024-10-20 PROCEDURE — 82607 VITAMIN B-12: CPT | Mod: TRILAB,WESLAB | Performed by: NURSE PRACTITIONER

## 2024-10-20 PROCEDURE — 99223 1ST HOSP IP/OBS HIGH 75: CPT | Performed by: NURSE PRACTITIONER

## 2024-10-20 PROCEDURE — 83550 IRON BINDING TEST: CPT | Performed by: NURSE PRACTITIONER

## 2024-10-20 PROCEDURE — 99497 ADVNCD CARE PLAN 30 MIN: CPT | Performed by: NURSE PRACTITIONER

## 2024-10-20 PROCEDURE — 80048 BASIC METABOLIC PNL TOTAL CA: CPT | Performed by: NURSE PRACTITIONER

## 2024-10-20 PROCEDURE — 82746 ASSAY OF FOLIC ACID SERUM: CPT | Mod: TRILAB,WESLAB | Performed by: NURSE PRACTITIONER

## 2024-10-20 PROCEDURE — 94640 AIRWAY INHALATION TREATMENT: CPT

## 2024-10-20 PROCEDURE — 99232 SBSQ HOSP IP/OBS MODERATE 35: CPT | Performed by: NURSE PRACTITIONER

## 2024-10-20 PROCEDURE — 82728 ASSAY OF FERRITIN: CPT | Performed by: NURSE PRACTITIONER

## 2024-10-20 PROCEDURE — 85027 COMPLETE CBC AUTOMATED: CPT | Performed by: NURSE PRACTITIONER

## 2024-10-20 PROCEDURE — 94668 MNPJ CHEST WALL SBSQ: CPT

## 2024-10-20 PROCEDURE — 2500000005 HC RX 250 GENERAL PHARMACY W/O HCPCS: Performed by: NURSE PRACTITIONER

## 2024-10-20 PROCEDURE — 2060000001 HC INTERMEDIATE ICU ROOM DAILY

## 2024-10-20 PROCEDURE — 9420000001 HC RT PATIENT EDUCATION 5 MIN

## 2024-10-20 RX ORDER — OXYCODONE HYDROCHLORIDE 5 MG/1
10 TABLET ORAL EVERY 6 HOURS PRN
Status: DISCONTINUED | OUTPATIENT
Start: 2024-10-20 | End: 2024-10-23 | Stop reason: HOSPADM

## 2024-10-20 RX ORDER — DULOXETIN HYDROCHLORIDE 60 MG/1
60 CAPSULE, DELAYED RELEASE ORAL DAILY
Status: DISCONTINUED | OUTPATIENT
Start: 2024-10-27 | End: 2024-10-23 | Stop reason: HOSPADM

## 2024-10-20 RX ORDER — CYCLOBENZAPRINE HCL 10 MG
5 TABLET ORAL 3 TIMES DAILY
Status: DISCONTINUED | OUTPATIENT
Start: 2024-10-20 | End: 2024-10-20

## 2024-10-20 RX ORDER — SODIUM CHLORIDE 9 MG/ML
75 INJECTION, SOLUTION INTRAVENOUS CONTINUOUS
Status: DISCONTINUED | OUTPATIENT
Start: 2024-10-20 | End: 2024-10-21

## 2024-10-20 RX ORDER — OXYCODONE HYDROCHLORIDE 5 MG/1
5 TABLET ORAL EVERY 6 HOURS PRN
Status: DISCONTINUED | OUTPATIENT
Start: 2024-10-20 | End: 2024-10-23 | Stop reason: HOSPADM

## 2024-10-20 RX ORDER — CYCLOBENZAPRINE HCL 10 MG
5 TABLET ORAL 3 TIMES DAILY PRN
Status: DISCONTINUED | OUTPATIENT
Start: 2024-10-20 | End: 2024-10-23 | Stop reason: HOSPADM

## 2024-10-20 RX ORDER — DULOXETIN HYDROCHLORIDE 30 MG/1
30 CAPSULE, DELAYED RELEASE ORAL DAILY
Status: DISCONTINUED | OUTPATIENT
Start: 2024-10-20 | End: 2024-10-23 | Stop reason: HOSPADM

## 2024-10-20 ASSESSMENT — COGNITIVE AND FUNCTIONAL STATUS - GENERAL
MOVING TO AND FROM BED TO CHAIR: A LITTLE
CLIMB 3 TO 5 STEPS WITH RAILING: A LOT
WALKING IN HOSPITAL ROOM: A LITTLE
DRESSING REGULAR LOWER BODY CLOTHING: A LITTLE
DAILY ACTIVITIY SCORE: 14
TURNING FROM BACK TO SIDE WHILE IN FLAT BAD: A LOT
MOVING TO AND FROM BED TO CHAIR: TOTAL
WALKING IN HOSPITAL ROOM: A LOT
PERSONAL GROOMING: A LITTLE
MOBILITY SCORE: 10
DRESSING REGULAR LOWER BODY CLOTHING: A LOT
MOBILITY SCORE: 19
HELP NEEDED FOR BATHING: A LITTLE
STANDING UP FROM CHAIR USING ARMS: A LOT
CLIMB 3 TO 5 STEPS WITH RAILING: TOTAL
STANDING UP FROM CHAIR USING ARMS: A LITTLE
DRESSING REGULAR UPPER BODY CLOTHING: A LOT
EATING MEALS: A LITTLE
TOILETING: A LOT
DAILY ACTIVITIY SCORE: 21
HELP NEEDED FOR BATHING: A LOT
MOVING FROM LYING ON BACK TO SITTING ON SIDE OF FLAT BED WITH BEDRAILS: A LOT
TOILETING: A LITTLE

## 2024-10-20 ASSESSMENT — ENCOUNTER SYMPTOMS
TROUBLE SWALLOWING: 0
WEAKNESS: 1
BACK PAIN: 1
CHILLS: 0
ARTHRALGIAS: 1
SHORTNESS OF BREATH: 1
CONFUSION: 0
COUGH: 1
FEVER: 0
CONSTIPATION: 0
DIFFICULTY URINATING: 0
SPEECH DIFFICULTY: 0

## 2024-10-20 ASSESSMENT — PAIN SCALES - GENERAL
PAINLEVEL_OUTOF10: 6
PAINLEVEL_OUTOF10: 0 - NO PAIN
PAINLEVEL_OUTOF10: 0 - NO PAIN
PAINLEVEL_OUTOF10: 2

## 2024-10-20 NOTE — CARE PLAN
The patient's goals for the shift include      The clinical goals for the shift include maintain SpO2 > 92%      Problem: Discharge Planning  Goal: Discharge to home or other facility with appropriate resources  Outcome: Progressing     Problem: Chronic Conditions and Co-morbidities  Goal: Patient's chronic conditions and co-morbidity symptoms are monitored and maintained or improved  Outcome: Progressing     Problem: Fall/Injury  Goal: Verbalize understanding of risk factor reduction measures to prevent injury from fall in the home  Outcome: Progressing     Problem: Skin  Goal: Decreased wound size/increased tissue granulation at next dressing change  Outcome: Progressing  Goal: Participates in plan/prevention/treatment measures  Outcome: Progressing  Goal: Prevent/manage excess moisture  Outcome: Progressing  Flowsheets (Taken 10/17/2024 0423 by Marta Denis RN)  Prevent/manage excess moisture: Cleanse incontinence/protect with barrier cream  Goal: Prevent/minimize sheer/friction injuries  Outcome: Progressing  Flowsheets (Taken 10/20/2024 0248)  Prevent/minimize sheer/friction injuries:   Use pull sheet   Increase activity/out of bed for meals  Goal: Promote/optimize nutrition  Outcome: Progressing  Goal: Promote skin healing  Outcome: Progressing     Problem: Respiratory  Goal: Wean oxygen to maintain O2 saturation per order/standard this shift  Outcome: Progressing     Problem: Fall/Injury  Goal: Not fall by end of shift  Outcome: Met  Goal: Be free from injury by end of the shift  Outcome: Met  Goal: Verbalize understanding of personal risk factors for fall in the hospital  Outcome: Met  Goal: Use assistive devices by end of the shift  Outcome: Met  Goal: Pace activities to prevent fatigue by end of the shift  Outcome: Met     Problem: Respiratory  Goal: Clear secretions with interventions this shift  Outcome: Met  Goal: Minimize anxiety/maximize coping throughout shift  Outcome: Met  Goal: Minimal/no  exertional discomfort or dyspnea this shift  Outcome: Met  Goal: No signs of respiratory distress (eg. Use of accessory muscles. Peds grunting)  Outcome: Met  Goal: Patent airway maintained this shift  Outcome: Met  Goal: Tolerate pulmonary toileting this shift  Outcome: Met  Goal: Verbalize decreased shortness of breath this shift  Outcome: Met  Goal: Increase self care and/or family involvement in next 24 hours  Outcome: Met     Problem: Pain  Goal: Takes deep breaths with improved pain control throughout the shift  Outcome: Met  Goal: Turns in bed with improved pain control throughout the shift  Outcome: Met  Goal: Walks with improved pain control throughout the shift  Outcome: Met  Goal: Performs ADL's with improved pain control throughout shift  Outcome: Met  Goal: Free from opioid side effects throughout the shift  Outcome: Met  Goal: Free from acute confusion related to pain meds throughout the shift  Outcome: Met

## 2024-10-20 NOTE — CARE PLAN
Problem: Respiratory  Goal: Tolerate mechanical ventilation evidenced by VS/agitation level this shift  Outcome: Progressing  Goal: Wean oxygen to maintain O2 saturation per order/standard this shift  Outcome: Progressing  Goal: No signs of respiratory distress (eg. Use of accessory muscles. Peds grunting)  Outcome: Progressing

## 2024-10-20 NOTE — PROGRESS NOTES
"Kamila Jones \"Elizabeth" is a 69 y.o. female on day 4 of admission presenting with Chronic low back pain without sciatica, unspecified back pain laterality.      Subjective   Patient seen and examined. Resting in bed. Feeling better then she did earlier this AM. She denies any worsening SOB. She did spike fevers overnight with temp max 39.4. Remains on 8L mini bubbler. She overall just feels tired and fatigued.        Objective     Last Recorded Vitals  /52   Pulse 65   Temp 37 °C (98.6 °F) (Temporal)   Resp 17   Wt 56.2 kg (123 lb 14.4 oz)   SpO2 98%   Intake/Output last 3 Shifts:    Intake/Output Summary (Last 24 hours) at 10/20/2024 1136  Last data filed at 10/20/2024 0800  Gross per 24 hour   Intake --   Output 450 ml   Net -450 ml       Admission Weight  Weight: 56.2 kg (124 lb) (10/14/24 0942)    Daily Weight  10/14/24 : 56.2 kg (123 lb 14.4 oz)    Image Results    No new imaging to review.     Physical Exam    General: Alert and oriented x3, pleasant.   Cardiac: Regular rate and rhythm, S1/S2 , no murmur.   Pulmonary: Diminished on 8L NC mini bubbler.   Abdomen: Soft, round, nontender. BS +x4.   Extremities: No edema. Equal strength in all extremities.   Skin: No rashes or lesions.    Relevant Results    Scheduled medications  acetaminophen, 1,000 mg, oral, q8h JENNYFER  amiodarone, 200 mg, oral, Daily  apixaban, 5 mg, oral, q12h  escitalopram, 10 mg, oral, Daily  famotidine, 20 mg, oral, Daily  tiotropium, 2 puff, inhalation, Daily   And  fluticasone furoate-vilanteroL, 1 puff, inhalation, Daily  gabapentin, 300 mg, oral, TID  ipratropium-albuteroL, 3 mL, nebulization, TID  lidocaine, 2 patch, transdermal, Daily  metoprolol succinate XL, 25 mg, oral, BID  mirtazapine, 15 mg, oral, Nightly  oxygen, , inhalation, Continuous - Inhalation  piperacillin-tazobactam, 3.375 g, intravenous, q6h  primidone, 200 mg, oral, BID      Continuous medications  oxygen, , Last Rate: 8 L/min (10/18/24 0872)      PRN " medications  PRN medications: albuterol, hydrALAZINE, ondansetron ODT **OR** ondansetron, oxyCODONE, polyethylene glycol     Results for orders placed or performed during the hospital encounter of 10/14/24 (from the past 24 hours)   CBC   Result Value Ref Range    WBC 7.0 4.4 - 11.3 x10*3/uL    nRBC 0.0 0.0 - 0.0 /100 WBCs    RBC 2.57 (L) 4.00 - 5.20 x10*6/uL    Hemoglobin 8.2 (L) 12.0 - 16.0 g/dL    Hematocrit 26.7 (L) 36.0 - 46.0 %     (H) 80 - 100 fL    MCH 31.9 26.0 - 34.0 pg    MCHC 30.7 (L) 32.0 - 36.0 g/dL    RDW 16.5 (H) 11.5 - 14.5 %    Platelets 296 150 - 450 x10*3/uL   Basic Metabolic Panel   Result Value Ref Range    Glucose 106 (H) 74 - 99 mg/dL    Sodium 141 136 - 145 mmol/L    Potassium 4.4 3.5 - 5.3 mmol/L    Chloride 105 98 - 107 mmol/L    Bicarbonate 26 21 - 32 mmol/L    Anion Gap 14 10 - 20 mmol/L    Urea Nitrogen 22 6 - 23 mg/dL    Creatinine 1.26 (H) 0.50 - 1.05 mg/dL    eGFR 46 (L) >60 mL/min/1.73m*2    Calcium 8.1 (L) 8.6 - 10.3 mg/dL   Iron and TIBC   Result Value Ref Range    Iron 10 (L) 35 - 150 ug/dL    UIBC 180 110 - 370 ug/dL    TIBC 190 (L) 240 - 445 ug/dL    % Saturation 5 (L) 25 - 45 %   Ferritin   Result Value Ref Range    Ferritin 233 (H) 8 - 150 ng/mL             Assessment/Plan      Acute on Chronic Low Back Pain and Bilateral Hip Pain / Inability to Ambulate  -Has known history of significant OA with back and hip pain.   -Followed with pain management and attempted hip injections with no relief.   -Unable to have surgery due to her pulmonary status and co-morbidities.   -On scheduled tylenol and lidocaine patch. PRN Oxy for now, monitor respiratory status closely- stable with better pain control.   -PT/OT recommending moderate intensity therapy, plan for SNF upon DC.   -OOB with assist only.   -All imaging done shows OA and degenerative changes, no acute fractures.  -Pall care was consulted.       Fever / Healthcare Aquired Pneumonia / Acute on Chronic Respiratory  Failure  -Continues to have fever. Temp max 39.4 overnight.   -UA negative. COVID and flu checked and negative.   -CT chest shows new infiltrate RUL- discussed with pulmonary who reviewed her images, they feel this is a HCAP and we are treating with zosyn, vanco stopped as the MRSA swab was negative.  -Episode of coughing, vomiting 10/18, spiked fever with worsening respiratory status and hypoxia. Likely aspirated. CXR checked with worsening RUL consolidation. She was increased to 8L NC mini bubbler, given breathing treatments and IV steroid.   -Still spiking fevers overnight and this AM, repeated her blood cultures. Continue zosyn.  -Blood cultures sent on admission finalized without growth.    -Strep/legionella negative.     RADHA   -Creat up a little again today.   -Suspect from dehydration as she has had poor fluid intake and had emesis.   -Will give 24 hours IVF again as this helped prior.   -Post void bladder scan showed no residual.       COPD   -No wheeze on exam today.  -Continue home inhalers.      Chronic Respiratory Failure / RAMON  -Baseline O2 3L NC continuous at home.   -She also uses BiPAP at , this is ordered.   -CXR with patchy nodular consolidation RUL with known lung mass, she just had PET CT as outpatient and is currently following with her PCP for this.  -Continue home inhalers.      Atrial Fibrillation with RVR  -On Eliquis.   -Cardio follows, currently on amiodarone and metoprolol, continue.   -Monitor on telemetry.      HTN  -Meds adjusted and BP is stable.   -Monitor.     DVT Risk  -On Eliquis.   -SCDs.      Plan  Pulmonary follows. Cardio signed off.   DC was cancelled due to worsening respiratory status and fever. Likely had aspiration episode 10/18.   Continue IV zosyn.   Wean O2 as sats allow to her baseline, she remains on 8L NC mini bubbler.   Repeat blood cultures sent today as she continues to have fever. If no improvement consider ID consult.   Pall care was consulted yesterday to  assist with pain.   She has precert which is good through 10/21/24 for [facility]. Will DC when she is medically stable.        Rupal Sosa, APRN-CNP

## 2024-10-20 NOTE — CONSULTS
"Inpatient consult to Palliative Care  Consult performed by: Karla Goldsmith, APRN-CNP  Consult ordered by: Sabiha Lutz MD PhD      Patient Location     Reason For Consult  Reason for Consult: communication / medical decision making and symptom management     History Of Present Illness  Kamila Jones \"Elizabeth" is a 69 y.o. female with past medical history of Chronic Obstructive Pulmonary Disease (COPD) who presented to the ED 10/14 with worsening low back and bilateral hip pain with falls. She has chronic pain for which at home she takes only tylenol. She states the pain is left shoulder, bilat hips, knees, and low back. She states the pain radiates down her legs. She denies numbness/tingling/paresthesias. She is getting oxy IR 5 mg prn with little relief in addition to ATC Tylenol. She is also on hiral 300 mg tid.   Pt also has h/o COPD and likely aspirated during thsi hospitalization now has increased O2 requirements, increased consolidation on CXR, being treated for HCAP vs. Aspiration pna on Zosyn. Pulmonology is following with her. Cardiology also consulted for Afib RVR while earlier in hospitalization, she has been rate controlled now. She is on Eliquis.   Pt was hospitalized back in August with pneumonia and went to SNF for a couple of weeks, was ambulating with a walker and returned home with her daughter. According to the daughter last weekend she was unable to ambulate, incontinent urine and she did not feel she could provide safe care, so brought patient to the hospital. She has progressively declined and has had 3 falls in the past week or so prior to admission. All imaging studies without evidence of fracture or other acute process, only demonstrating severe OA.     Past Medical History  She has a past medical history of Alcohol dependence, in remission, Anemia, Arthritis, Atrial fibrillation (Multi), COPD (chronic obstructive pulmonary disease) (Multi), Dyspnea, Edentulous, History of blood " transfusion, HTN (hypertension), Lung nodule, RAMON on CPAP, Oxygen deficiency, Personal history of other venous thrombosis and embolism, Personal history of suicidal behavior, and Uses roller walker.    Surgical History  She has a past surgical history that includes Tonsillectomy (06/15/2017); Other surgical history (Right, 06/15/2017); MR angio head wo IV contrast (10/24/2017); MR angio neck wo IV contrast (10/24/2017); and Ankle fracture surgery.     Social History  She reports that she has been smoking cigarettes. She started smoking about 54 years ago. She has a 13.5 pack-year smoking history. She has never been exposed to tobacco smoke. She has never used smokeless tobacco. She reports that she does not currently use alcohol. She reports that she does not use drugs.    Caregiving/Caregiver Support  Does the patient require assistance in some or all components of his care, including coordination of medical care? Yes  If Yes, which person serves that role?  child-in-law and daughter   Caregiver emotional or practical needs:  practical     Family History  Family History   Problem Relation Name Age of Onset    Accidental death Mother      Stroke Father      Other (cerbral hemmorrhage) Father      Cancer Sister      Lung cancer Sister      Colon cancer Brother      Cancer Brother      Lung disease Brother      Heart attack Brother         Allergies  Propranolol hcl and Lisinopril    Scheduled medications  acetaminophen, 1,000 mg, oral, q8h JENNYFER  amiodarone, 200 mg, oral, Daily  apixaban, 5 mg, oral, q12h  escitalopram, 10 mg, oral, Daily  famotidine, 20 mg, oral, Daily  tiotropium, 2 puff, inhalation, Daily   And  fluticasone furoate-vilanteroL, 1 puff, inhalation, Daily  gabapentin, 300 mg, oral, TID  ipratropium-albuteroL, 3 mL, nebulization, TID  lidocaine, 2 patch, transdermal, Daily  metoprolol succinate XL, 25 mg, oral, BID  mirtazapine, 15 mg, oral, Nightly  oxygen, , inhalation, Continuous -  Inhalation  piperacillin-tazobactam, 3.375 g, intravenous, q6h  primidone, 200 mg, oral, BID      Continuous medications  oxygen, , Last Rate: 8 L/min (10/18/24 1629)  sodium chloride 0.9%, 75 mL/hr, Last Rate: 75 mL/hr (10/20/24 1147)      PRN medications  PRN medications: albuterol, hydrALAZINE, ondansetron ODT **OR** ondansetron, oxyCODONE, polyethylene glycol     Relevant Results  Bun creat 22, 1.26; hgb 8.2, wbc 7.0, plt 296; K 4.4  Results for orders placed or performed during the hospital encounter of 10/14/24 (from the past 24 hours)   CBC   Result Value Ref Range    WBC 7.0 4.4 - 11.3 x10*3/uL    nRBC 0.0 0.0 - 0.0 /100 WBCs    RBC 2.57 (L) 4.00 - 5.20 x10*6/uL    Hemoglobin 8.2 (L) 12.0 - 16.0 g/dL    Hematocrit 26.7 (L) 36.0 - 46.0 %     (H) 80 - 100 fL    MCH 31.9 26.0 - 34.0 pg    MCHC 30.7 (L) 32.0 - 36.0 g/dL    RDW 16.5 (H) 11.5 - 14.5 %    Platelets 296 150 - 450 x10*3/uL   Basic Metabolic Panel   Result Value Ref Range    Glucose 106 (H) 74 - 99 mg/dL    Sodium 141 136 - 145 mmol/L    Potassium 4.4 3.5 - 5.3 mmol/L    Chloride 105 98 - 107 mmol/L    Bicarbonate 26 21 - 32 mmol/L    Anion Gap 14 10 - 20 mmol/L    Urea Nitrogen 22 6 - 23 mg/dL    Creatinine 1.26 (H) 0.50 - 1.05 mg/dL    eGFR 46 (L) >60 mL/min/1.73m*2    Calcium 8.1 (L) 8.6 - 10.3 mg/dL   Iron and TIBC   Result Value Ref Range    Iron 10 (L) 35 - 150 ug/dL    UIBC 180 110 - 370 ug/dL    TIBC 190 (L) 240 - 445 ug/dL    % Saturation 5 (L) 25 - 45 %   Ferritin   Result Value Ref Range    Ferritin 233 (H) 8 - 150 ng/mL      XR chest 1 view  Result Date: 10/18/2024  FINDINGS: The study is limited due to  rotation. The cardiac silhouette is within normal limits for the technique. There is worsening consolidation in the right upper lobe. There is no pneumothorax or significant pleural effusion. Degenerative changes involve the spine and shoulders. Deformity of the right proximal humerus is again seen due to remote injury.        Worsening right upper lobe consolidation, possibly due to pneumonia. Correlate clinically and follow-up to document complete resolution.   Signed by: Bharath Negrete 10/18/2024 4:24 PM Dictation workstation:   [id number]    Transthoracic Echo (TTE) Complete  Result Date: 10/16/2024  PHYSICIAN INTERPRETATION: Left Ventricle: The left ventricular systolic function is normal, with a visually estimated ejection fraction of 60-65%. There are no regional wall motion abnormalities. The left ventricular cavity size is normal. Spectral Doppler shows a normal pattern of left ventricular diastolic filling. Left Atrium: The left atrium is mildly dilated. Right Ventricle: The right ventricle is normal in size. There is normal right ventricular global systolic function. Right Atrium: The right atrium is mildly dilated. Aortic Valve: The aortic valve is trileaflet. The aortic valve dimensionless index is 0.86. There is no evidence of aortic valve regurgitation. The peak instantaneous gradient of the aortic valve is 13.4 mmHg. The mean gradient of the aortic valve is 6.0 mmHg. Mitral Valve: The mitral valve is normal in structure. There is mild mitral valve regurgitation. Tricuspid Valve: The tricuspid valve is structurally normal. There is trace tricuspid regurgitation. The Doppler estimated RVSP is slightly elevated right ventricular systolic pressure at 32.4 mmHg. Pulmonic Valve: The pulmonic valve is structurally normal. There is physiologic pulmonic valve regurgitation. Pericardium: No pericardial effusion noted. Aorta: The aortic root is normal. Systemic Veins: The inferior vena cava appears normal in size, with IVC inspiratory collapse greater than 50%.  CONCLUSIONS:  1. The left ventricular systolic function is normal, with a visually estimated ejection fraction of 60-65%.  2. There is normal right ventricular global systolic function.  3. Mild mitral valve regurgitation.  4. Slightly elevated right ventricular  systolic pressure.  5. Trace tricuspid regurgitation is visualized.     CT chest wo IV contrast  Result Date: 10/16/2024  FINDINGS: LIMITATIONS/LINES:   None.   HEART:   Heart is within normal limits of size.  No significant pericardial effusion. There is rather extensive coronary artery calcification observed.   MEDIASTINUM/HAIR:   There is no aneurysm of the thoracic aorta. Atherosclerosis of the thoracic aorta is seen. There is stable precarinal adenopathy measuring 11 mm in short axis diameter with no additional enlarged mediastinal lymph nodes observed. No obvious hilar mass or adenopathy is seen on this study limited by lack of intravenous contrast administration.   PLEURA:   Unremarkable.   LUNG PARENCHYMA:   Pulmonary emphysema is observed. There is a 3 mm left upper lobe pulmonary nodule seen on series 7 axial image 121 unchanged. A 4 mm right lower lobe pulmonary nodule is present on series 7 axial image 160 with adjacent 3 mm right lower lobe pulmonary nodule on axial image 156. These can be seen on prior study as well. Re-identified is an irregularly marginated and spiculated nodule at the base of the right lower lobe measuring 1.3 x 1.9 cm in size without interval change.   When compared with prior examination, the infiltrate and airspace consolidation seen within the left lower lobe has nearly completely resolved with a small amount of residual airspace consolidation within left lower lobe posteromedially. However, there is now new infiltrate and airspace consolidation within the posterior segment of the right upper lobe.   BONES:   No acute osseous abnormality is observed. There are degenerative changes of the thoracic spine.   CHEST WALL/OTHER:   On images taken through upper abdomen, renal artery calcification is observed and there is ectasia of the abdominal aorta.       There is new infiltrate and airspace consolidation in the posterior segment of right upper lobe with 5 mm cavitary component  within the consolidated pulmonary parenchyma. Appearance is consistent with a pneumonia of the right upper lobe.   Near complete resolution of the left lower lobe pneumonia.   Stable pulmonary nodules including a 1.3 x 1.9 cm spiculated nodule at the base of the right lower lobe.   Stable mild precarinal lymphadenopathy.   Rather extensive coronary artery calcifications.   MACRO: None   Signed by: Kiara Garnica 10/16/2024 8:23 AM Dictation workstation:   [id number]    XR knee left 4+ views  Result Date: 10/14/2024  Interpreted By:  Bharath Negrete, STUDY: XR KNEE LEFT 4+ VIEWS; 10/14/2024 11:13 am   INDICATION: Signs/Symptoms:Left knee pain   COMPARISON: None.   ACCESSION NUMBER(S): [id number]   ORDERING CLINICIAN: СВЕТЛАНА VASQUEZ   TECHNIQUE: Number of films: Four view radiographs of the left knee.   FINDINGS: No fractures or destructive lesions are identified. Bone island is noted in the medial tibial condyle. The joint spaces and articular surfaces are maintained considering patient's age. The alignment is anatomic. Vascular calcifications are noted. There is no significant effusion in the suprapatellar bursa.       Unremarkable left knee radiographs.   Signed by: Bharath Negrete 10/14/2024 11:58 AM Dictation workstation:   [id number]    XR lumbar spine 2-3 views  Result Date: 10/14/2024  FINDINGS: There is mild loss of height involving the L3 and L5 vertebra, however no acute fracture is identified. There is narrowing of the L1-L2, L2-L3, L3-L4 and mostly the L4-L5 disc spaces, with marginal osteophytes. The alignment is anatomic, without spondylolysis or spondylolisthesis. Hypertrophic changes involve the facet joints of the lower lumbar spine. Calcifications of the aorta and its branches are present.   No acute pelvic fracture is seen. Extensive osteoarthritis involves the right hip joint, with complete obliteration of the joint space, subchondral bone sclerosis, subchondral cysts and marginal osteophytes.  Osteoarthritis also involves the left hip joint and to a lesser degree the sacroiliac joints bilaterally. The alignment is anatomic. Vascular calcifications involve iliac and femoral arteries bilaterally.       1. Marked lumbar spondylosis and remote compression deformities of the L3 and L5 vertebra, without acute vertebral fracture or subluxation. 2. Extensive right hip osteoarthritis again noted. Additional degenerative changes in the pelvis as above. No pelvic fracture or subluxation.   Signed by: Bharath Negrete 10/14/2024 11:57 AM Dictation workstation:   [id number]    XR hips bilateral 5+ VW w pelvis when performed  Result Date: 10/14/2024  FINDINGS: There is mild loss of height involving the L3 and L5 vertebra, however no acute fracture is identified. There is narrowing of the L1-L2, L2-L3, L3-L4 and mostly the L4-L5 disc spaces, with marginal osteophytes. The alignment is anatomic, without spondylolysis or spondylolisthesis. Hypertrophic changes involve the facet joints of the lower lumbar spine. Calcifications of the aorta and its branches are present.   No acute pelvic fracture is seen. Extensive osteoarthritis involves the right hip joint, with complete obliteration of the joint space, subchondral bone sclerosis, subchondral cysts and marginal osteophytes. Osteoarthritis also involves the left hip joint and to a lesser degree the sacroiliac joints bilaterally. The alignment is anatomic. Vascular calcifications involve iliac and femoral arteries bilaterally.       1. Marked lumbar spondylosis and remote compression deformities of the L3 and L5 vertebra, without acute vertebral fracture or subluxation. 2. Extensive right hip osteoarthritis again noted. Additional degenerative changes in the pelvis as above. No pelvic fracture or subluxation.   Signed by: Bharath Negrete 10/14/2024 11:57 AM Dictation workstation:   [id number]    XR chest 1 view  Result Date: 10/14/2024  Interpreted By:  Bharath Negrete,  STUDY: XR CHEST 1 VIEW; 10/14/2024 11:13 am   INDICATION: Signs/Symptoms:Fall rib pain   COMPARISON: August 2024.   ACCESSION NUMBER(S): [id number]   ORDERING CLINICIAN: СВЕТЛАНА VASQUEZ   FINDINGS: The study is limited due to  lordotic projection and overlying artifacts obscuring some detail. The cardiomediastinal silhouette is within normal limits for the technique. Patchy/nodular infiltrates are seen in the right suprahilar region, with mild volume loss of the right upper lobe and elevation of the minor fissure. There is no pneumothorax or significant effusion. Marked degenerative changes involve the shoulders. There is also partially included deformity in the right humeral neck consistent with a healing fracture.       Limited study. Patchy/nodular consolidation in the right upper lobe, possibly pneumonia or atelectasis, however in the setting of injury, lung contusion can not be excluded. Correlate clinically follow-up in 1-2 weeks to document complete resolution.   Signed by: Bharath Negrete 10/14/2024 11:44 AM Dictation workstation:   [id number]    Review of Systems   Constitutional:  Negative for chills and fever.   HENT:  Negative for trouble swallowing.    Respiratory:  Positive for cough and shortness of breath.    Cardiovascular:  Negative for chest pain and leg swelling.   Gastrointestinal:  Negative for constipation.   Genitourinary:  Negative for difficulty urinating.   Musculoskeletal:  Positive for arthralgias, back pain and gait problem.   Neurological:  Positive for weakness. Negative for speech difficulty.        Pain radiating hips to legs   Psychiatric/Behavioral:  Negative for confusion.      Functional Status  Activities of Daily Living:  Basic ADLs: (I= independent, A= assistance, D= dependent)  Bathing: D, Dressing: A, Toileting: A, Transferring: A, Continence: I, Feeding: I    Vivar Index:     Instrumental ADLs: (I= independent, A= assistance, D= dependent)  Ability to use phone: I,  "Shopping: D, Cooking: D, Housekeeping: D, Laundry: D, Transportation: D, Medications: A, Handle Finances: A   Bernice Scale:       Physical Exam  Vitals and nursing note reviewed.   Constitutional:       Appearance: She is ill-appearing. She is not toxic-appearing.      Comments: Thin, frail chronically ill appearing elderly female resting in bed   HENT:      Mouth/Throat:      Mouth: Mucous membranes are dry.      Pharynx: Oropharynx is clear.      Comments: edentulous  Eyes:      General: No scleral icterus.     Extraocular Movements: Extraocular movements intact.   Cardiovascular:      Rate and Rhythm: Normal rate. Rhythm irregular.      Pulses: Normal pulses.      Heart sounds: Normal heart sounds.   Pulmonary:      Breath sounds: Wheezing and rhonchi present.      Comments: Respirations are even, shallow. She is on 8 liters  Abdominal:      General: Abdomen is flat. There is no distension.      Palpations: Abdomen is soft.      Tenderness: There is no abdominal tenderness. There is no guarding.   Musculoskeletal:      Right lower leg: No edema.      Left lower leg: No edema.   Skin:     General: Skin is warm and dry.   Neurological:      General: No focal deficit present.      Mental Status: She is alert and oriented to person, place, and time.      Sensory: No sensory deficit.   Psychiatric:         Mood and Affect: Mood normal.         Behavior: Behavior normal.         Thought Content: Thought content normal.         Judgment: Judgment normal.         Last Recorded Vitals  Blood pressure 128/52, pulse 65, temperature 37 °C (98.6 °F), temperature source Temporal, resp. rate 17, height 1.676 m (5' 5.98\"), weight 56.2 kg (123 lb 14.4 oz), SpO2 97%.      PALLIATIVE MEDICINE PALLIATIVE PERFORMANCE SCALE     Palliative Performance Scale % (PPS) 30-40%   Oral Intake Moderately reduced (> mouthfuls) (1.0)   Edema Absent (0)   Dyspnea at Rest Present (3.5)   Delirium Absent (0)   Palliative Prognostic Index (PPI) " "Total Score 4.5-6   Note: The scores from each prognostic domain are added.  A score of 0 to 2.0 was associated with a median survival of 90 days; score of 2.1 to 4.0 is 61 days, and score of >4.0 is 12 days.        Assessment/Plan   IMP:    Acute on chronic respiratory failure/COPD exacerbation/HCAP - on Zosyn, probably some aspiration. Still on 8 litres.  RADHA on CKD - getting IVF, avoid nephrotoxic agents  Acute on chronic pain - primary sites of pain are left shoulder, bilat hips, knees and low back pain radiates down legs. Denies numbness or tingling or paresthesias. She is getting tylenol ATC. Continue this. Added the following increased oxy IR frequency to q6 prn. Added 10 mg oxy IR dose for moderate to severe pain. Added cyclobenzaprine TID prn. Added duloxetine 30 mg daily x1 week then increase to 60 mg daily. Lexapro dc'd.   Afib - rate controlled. On [facility]    Palliative Care Encounter  DNR-CCA-DNI  Pt is capable  No HPOA or LW. Has two daughters, Taylor and Madhavi. If needed, they will share medical decision making authority.   Pt verbalized to me that she feels \"I don't want to live like this anymore. This is no way to live.\"  She also stated that she does not feel rehab will be beneficial for her. She tells me it was not helpful last time she went. We discussed shifting from the present treatment model of care to comfort measures only. We reviewed hospice and what that would entail if she were to decide she only wanted to be kept comfortable. She is agreeable to meet with [facility] for INFORMATIONAL PURPOSES prior to hospital discharge. Referral placed, care coordination to send info via [employer]. They will contact daughter Taylor to arrange appointment time.  D/w daughter Taylor over phone.     D/w Rupal Sosa CNP and secure chat sent to Dr. Lutz to update.    Code status:   DNR-CCA-DNI  Patient/proxy preference for information  Prefers full information    Provider estimate of survival: 1-6 months  Patient " Prognostic Awareness: Yes - congruent with provider estimation    Is the patient hospice-eligible?   Yes  Was a discussion held re hospice services?   yes  Was a decision made re hospice services?  No    Goals of Care  Treatment model within confines of [place] DNR-DNI  Symptom Management  Pain: not adequately controlled  Medications recommended for pain?  Yes  Tiredness: yes  Nausea: no  Depression: yes  Anxiety: yes  Drowsiness: no  Appetite: fair  Wellbeing: poor  Dyspnea: yes  Intervention recommended for dyspnea?  yes  Other: none  Intervention recommended for constipation?  No    Other Palliative Support  Hospice informational pending    Thank you for this consultation. We will follow.    I spent 125 minutes in the professional and overall care of this patient incl 35 min ACP.      Karla Goldsmith, APRN-CNP

## 2024-10-20 NOTE — NURSING NOTE
Informed Tal of temp 38.1 with recent dose tylenol 1000mg 0659 with orders received for repeat blood cultures, pt. Iced to lucero axilla, and behind neck. Will follow POC.

## 2024-10-21 LAB
ANION GAP SERPL CALCULATED.3IONS-SCNC: 11 MMOL/L (ref 10–20)
BUN SERPL-MCNC: 16 MG/DL (ref 6–23)
CALCIUM SERPL-MCNC: 8.1 MG/DL (ref 8.6–10.3)
CHLORIDE SERPL-SCNC: 108 MMOL/L (ref 98–107)
CO2 SERPL-SCNC: 28 MMOL/L (ref 21–32)
CREAT SERPL-MCNC: 0.95 MG/DL (ref 0.5–1.05)
EGFRCR SERPLBLD CKD-EPI 2021: 64 ML/MIN/1.73M*2
ERYTHROCYTE [DISTWIDTH] IN BLOOD BY AUTOMATED COUNT: 16.2 % (ref 11.5–14.5)
GLUCOSE SERPL-MCNC: 113 MG/DL (ref 74–99)
HCT VFR BLD AUTO: 27.1 % (ref 36–46)
HGB BLD-MCNC: 8.4 G/DL (ref 12–16)
MCH RBC QN AUTO: 31.8 PG (ref 26–34)
MCHC RBC AUTO-ENTMCNC: 31 G/DL (ref 32–36)
MCV RBC AUTO: 103 FL (ref 80–100)
NRBC BLD-RTO: 0 /100 WBCS (ref 0–0)
PLATELET # BLD AUTO: 317 X10*3/UL (ref 150–450)
POTASSIUM SERPL-SCNC: 4.2 MMOL/L (ref 3.5–5.3)
RBC # BLD AUTO: 2.64 X10*6/UL (ref 4–5.2)
SODIUM SERPL-SCNC: 143 MMOL/L (ref 136–145)
WBC # BLD AUTO: 7.4 X10*3/UL (ref 4.4–11.3)

## 2024-10-21 PROCEDURE — 94668 MNPJ CHEST WALL SBSQ: CPT

## 2024-10-21 PROCEDURE — 99232 SBSQ HOSP IP/OBS MODERATE 35: CPT | Performed by: NURSE PRACTITIONER

## 2024-10-21 PROCEDURE — 2500000005 HC RX 250 GENERAL PHARMACY W/O HCPCS: Performed by: NURSE PRACTITIONER

## 2024-10-21 PROCEDURE — 2500000005 HC RX 250 GENERAL PHARMACY W/O HCPCS: Performed by: INTERNAL MEDICINE

## 2024-10-21 PROCEDURE — 9420000001 HC RT PATIENT EDUCATION 5 MIN

## 2024-10-21 PROCEDURE — 2500000002 HC RX 250 W HCPCS SELF ADMINISTERED DRUGS (ALT 637 FOR MEDICARE OP, ALT 636 FOR OP/ED): Performed by: INTERNAL MEDICINE

## 2024-10-21 PROCEDURE — 2500000001 HC RX 250 WO HCPCS SELF ADMINISTERED DRUGS (ALT 637 FOR MEDICARE OP): Performed by: NURSE PRACTITIONER

## 2024-10-21 PROCEDURE — 2500000002 HC RX 250 W HCPCS SELF ADMINISTERED DRUGS (ALT 637 FOR MEDICARE OP, ALT 636 FOR OP/ED): Performed by: NURSE PRACTITIONER

## 2024-10-21 PROCEDURE — 80048 BASIC METABOLIC PNL TOTAL CA: CPT | Performed by: NURSE PRACTITIONER

## 2024-10-21 PROCEDURE — 2060000001 HC INTERMEDIATE ICU ROOM DAILY

## 2024-10-21 PROCEDURE — 94640 AIRWAY INHALATION TREATMENT: CPT

## 2024-10-21 PROCEDURE — 2500000001 HC RX 250 WO HCPCS SELF ADMINISTERED DRUGS (ALT 637 FOR MEDICARE OP)

## 2024-10-21 PROCEDURE — 85027 COMPLETE CBC AUTOMATED: CPT | Performed by: NURSE PRACTITIONER

## 2024-10-21 PROCEDURE — 2500000004 HC RX 250 GENERAL PHARMACY W/ HCPCS (ALT 636 FOR OP/ED): Performed by: NURSE PRACTITIONER

## 2024-10-21 PROCEDURE — 99233 SBSQ HOSP IP/OBS HIGH 50: CPT | Performed by: NURSE PRACTITIONER

## 2024-10-21 PROCEDURE — 94760 N-INVAS EAR/PLS OXIMETRY 1: CPT

## 2024-10-21 PROCEDURE — 94660 CPAP INITIATION&MGMT: CPT

## 2024-10-21 RX ORDER — ACETAMINOPHEN 500 MG
1000 TABLET ORAL EVERY 8 HOURS SCHEDULED
Status: COMPLETED | OUTPATIENT
Start: 2024-10-21 | End: 2024-10-23

## 2024-10-21 ASSESSMENT — COGNITIVE AND FUNCTIONAL STATUS - GENERAL
MOBILITY SCORE: 19
DRESSING REGULAR LOWER BODY CLOTHING: A LITTLE
WALKING IN HOSPITAL ROOM: A LITTLE
MOVING TO AND FROM BED TO CHAIR: A LITTLE
HELP NEEDED FOR BATHING: A LITTLE
CLIMB 3 TO 5 STEPS WITH RAILING: A LOT
TOILETING: A LITTLE
DAILY ACTIVITIY SCORE: 21
STANDING UP FROM CHAIR USING ARMS: A LITTLE

## 2024-10-21 ASSESSMENT — PAIN SCALES - WONG BAKER: WONGBAKER_NUMERICALRESPONSE: HURTS LITTLE MORE

## 2024-10-21 ASSESSMENT — PAIN DESCRIPTION - LOCATION
LOCATION: BACK
LOCATION: BACK

## 2024-10-21 ASSESSMENT — PAIN SCALES - GENERAL
PAINLEVEL_OUTOF10: 3
PAINLEVEL_OUTOF10: 3
PAINLEVEL_OUTOF10: 6

## 2024-10-21 ASSESSMENT — PAIN - FUNCTIONAL ASSESSMENT
PAIN_FUNCTIONAL_ASSESSMENT: 0-10
PAIN_FUNCTIONAL_ASSESSMENT: 0-10

## 2024-10-21 NOTE — NURSING NOTE
Hospice in and met with pt and her daughter , they are deciding if they will sign with Hospice or noot.

## 2024-10-21 NOTE — PROGRESS NOTES
10/21/24 0925   Discharge Planning   Living Arrangements Children;Family members   Support Systems Children;Family members   Who is requesting discharge planning? Provider   Home or Post Acute Services Post acute facilities (Rehab/SNF/etc)   Type of Post Acute Facility Services Skilled nursing   Type of Home Care Services DME or oxygen;Hospice   Expected Discharge Disposition   (Hospice meeting today 530-600 PM)     [facility] Referral was placed and an appointment scheduled for today 10/21 between 530-6pm at bedside.    Precert expiries today 10/21    PLAN: Has a Hospice meeting today, plan to be determined,

## 2024-10-21 NOTE — PROGRESS NOTES
"Palliative Care Progress Note    Date of Admission: 10/14/2024    Patient is a 69 y.o. female admitted with Chronic low back pain without sciatica, unspecified back pain laterality. Febrile overnight, felt like she \"was out of my mind.\" Feeling much better now. Chest CT with new infiltrate. She is on 7 liters nasal canula. Meeting with [facility] today at 1730.    Mental/Cognitive Status: awake,alert, looks tired    Respiratory Status: 7 liters ; + cough ; + sob    Pain Assessment:left shoulder, back, hips, knees, legs  - at present states pain is \"OK\"    Pertinent Symptoms: none    Diet/Nutrition: appetite fair    Bowel Regimen: moving bowels    Patient's current condition/Anticipated Prognosis: poor.  Family/Healthcare Proxy involvement: daughter Taylor.    Scheduled medications  acetaminophen, 1,000 mg, oral, q8h JENNYFER  amiodarone, 200 mg, oral, Daily  apixaban, 5 mg, oral, q12h  DULoxetine, 30 mg, oral, Daily  [START ON 10/27/2024] DULoxetine, 60 mg, oral, Daily  famotidine, 20 mg, oral, Daily  tiotropium, 2 puff, inhalation, Daily   And  fluticasone furoate-vilanteroL, 1 puff, inhalation, Daily  gabapentin, 300 mg, oral, TID  ipratropium-albuteroL, 3 mL, nebulization, TID  lidocaine, 2 patch, transdermal, Daily  metoprolol succinate XL, 25 mg, oral, BID  mirtazapine, 15 mg, oral, Nightly  oxygen, , inhalation, Continuous - Inhalation  piperacillin-tazobactam, 3.375 g, intravenous, q6h  primidone, 200 mg, oral, BID      Continuous medications  oxygen, , Last Rate: 8 L/min (10/21/24 1337)      PRN medications  PRN medications: albuterol, cyclobenzaprine, hydrALAZINE, ondansetron ODT **OR** ondansetron, oxyCODONE, oxyCODONE, polyethylene glycol     Results for orders placed or performed during the hospital encounter of 10/14/24 (from the past 24 hours)   CBC   Result Value Ref Range    WBC 7.4 4.4 - 11.3 x10*3/uL    nRBC 0.0 0.0 - 0.0 /100 WBCs    RBC 2.64 (L) 4.00 - 5.20 x10*6/uL    Hemoglobin 8.4 (L) 12.0 - 16.0 g/dL    " Hematocrit 27.1 (L) 36.0 - 46.0 %     (H) 80 - 100 fL    MCH 31.8 26.0 - 34.0 pg    MCHC 31.0 (L) 32.0 - 36.0 g/dL    RDW 16.2 (H) 11.5 - 14.5 %    Platelets 317 150 - 450 x10*3/uL   Basic Metabolic Panel   Result Value Ref Range    Glucose 113 (H) 74 - 99 mg/dL    Sodium 143 136 - 145 mmol/L    Potassium 4.2 3.5 - 5.3 mmol/L    Chloride 108 (H) 98 - 107 mmol/L    Bicarbonate 28 21 - 32 mmol/L    Anion Gap 11 10 - 20 mmol/L    Urea Nitrogen 16 6 - 23 mg/dL    Creatinine 0.95 0.50 - 1.05 mg/dL    eGFR 64 >60 mL/min/1.73m*2    Calcium 8.1 (L) 8.6 - 10.3 mg/dL        XR chest 1 view  Result Date: 10/18/2024  Interpreted By:  Bharath Negrete, STUDY: XR CHEST 1 VIEW; 10/18/2024 3:54 pm   INDICATION: Signs/Symptoms:Hypoxia   COMPARISON: Radiographs from 10/14/2024.   ACCESSION NUMBER(S): [id number]   ORDERING CLINICIAN: JOHN LLANOS   FINDINGS: The study is limited due to  rotation. The cardiac silhouette is within normal limits for the technique. There is worsening consolidation in the right upper lobe. There is no pneumothorax or significant pleural effusion. Degenerative changes involve the spine and shoulders. Deformity of the right proximal humerus is again seen due to remote injury.       Worsening right upper lobe consolidation, possibly due to pneumonia. Correlate clinically and follow-up to document complete resolution.   Signed by: Bharath Negrete 10/18/2024 4:24 PM Dictation workstation:   [id number]    Transthoracic Echo (TTE) Complete  Result Date: 10/16/2024  PHYSICIAN INTERPRETATION: Left Ventricle: The left ventricular systolic function is normal, with a visually estimated ejection fraction of 60-65%. There are no regional wall motion abnormalities. The left ventricular cavity size is normal. Spectral Doppler shows a normal pattern of left ventricular diastolic filling. Left Atrium: The left atrium is mildly dilated. Right Ventricle: The right ventricle is normal in size. There is normal  right ventricular global systolic function. Right Atrium: The right atrium is mildly dilated. Aortic Valve: The aortic valve is trileaflet. The aortic valve dimensionless index is 0.86. There is no evidence of aortic valve regurgitation. The peak instantaneous gradient of the aortic valve is 13.4 mmHg. The mean gradient of the aortic valve is 6.0 mmHg. Mitral Valve: The mitral valve is normal in structure. There is mild mitral valve regurgitation. Tricuspid Valve: The tricuspid valve is structurally normal. There is trace tricuspid regurgitation. The Doppler estimated RVSP is slightly elevated right ventricular systolic pressure at 32.4 mmHg. Pulmonic Valve: The pulmonic valve is structurally normal. There is physiologic pulmonic valve regurgitation. Pericardium: No pericardial effusion noted. Aorta: The aortic root is normal. Systemic Veins: The inferior vena cava appears normal in size, with IVC inspiratory collapse greater than 50%.  CONCLUSIONS:  1. The left ventricular systolic function is normal, with a visually estimated ejection fraction of 60-65%.  2. There is normal right ventricular global systolic function.  3. Mild mitral valve regurgitation.  4. Slightly elevated right ventricular systolic pressure.  5. Trace tricuspid regurgitation is visualized.     CT chest wo IV contrast  Result Date: 10/16/2024  FINDINGS: LIMITATIONS/LINES:   None.   HEART:   Heart is within normal limits of size.  No significant pericardial effusion. There is rather extensive coronary artery calcification observed.   MEDIASTINUM/HAIR:   There is no aneurysm of the thoracic aorta. Atherosclerosis of the thoracic aorta is seen. There is stable precarinal adenopathy measuring 11 mm in short axis diameter with no additional enlarged mediastinal lymph nodes observed. No obvious hilar mass or adenopathy is seen on this study limited by lack of intravenous contrast administration.   PLEURA:   Unremarkable.   LUNG PARENCHYMA:   Pulmonary  emphysema is observed. There is a 3 mm left upper lobe pulmonary nodule seen on series 7 axial image 121 unchanged. A 4 mm right lower lobe pulmonary nodule is present on series 7 axial image 160 with adjacent 3 mm right lower lobe pulmonary nodule on axial image 156. These can be seen on prior study as well. Re-identified is an irregularly marginated and spiculated nodule at the base of the right lower lobe measuring 1.3 x 1.9 cm in size without interval change.   When compared with prior examination, the infiltrate and airspace consolidation seen within the left lower lobe has nearly completely resolved with a small amount of residual airspace consolidation within left lower lobe posteromedially. However, there is now new infiltrate and airspace consolidation within the posterior segment of the right upper lobe.   BONES:   No acute osseous abnormality is observed. There are degenerative changes of the thoracic spine.   CHEST WALL/OTHER:   On images taken through upper abdomen, renal artery calcification is observed and there is ectasia of the abdominal aorta.       There is new infiltrate and airspace consolidation in the posterior segment of right upper lobe with 5 mm cavitary component within the consolidated pulmonary parenchyma. Appearance is consistent with a pneumonia of the right upper lobe.   Near complete resolution of the left lower lobe pneumonia.   Stable pulmonary nodules including a 1.3 x 1.9 cm spiculated nodule at the base of the right lower lobe.   Stable mild precarinal lymphadenopathy.   Rather extensive coronary artery calcifications.   MACRO: None   Signed by: Kiara Garnica 10/16/2024 8:23 AM Dictation workstation:   [id number]    XR knee left 4+ views  Result Date: 10/14/2024  Interpreted By:  Bharath Negrete, STUDY: XR KNEE LEFT 4+ VIEWS; 10/14/2024 11:13 am   INDICATION: Signs/Symptoms:Left knee pain   COMPARISON: None.   ACCESSION NUMBER(S): [id number]   ORDERING CLINICIAN: СВЕТЛАНА VASQUEZ    TECHNIQUE: Number of films: Four view radiographs of the left knee.   FINDINGS: No fractures or destructive lesions are identified. Bone island is noted in the medial tibial condyle. The joint spaces and articular surfaces are maintained considering patient's age. The alignment is anatomic. Vascular calcifications are noted. There is no significant effusion in the suprapatellar bursa.       Unremarkable left knee radiographs.   Signed by: Bharath Negrete 10/14/2024 11:58 AM Dictation workstation:   [id number]    XR lumbar spine 2-3 views  Result Date: 10/14/2024  Interpreted By:  Bharath Negrete, STUDY: XR LUMBAR SPINE 2-3 VIEWS; XR HIPS BILATERAL 5+ VW WITH PELVIS WHEN PERFORMED; 10/14/2024 11:13 am   INDICATION: Signs/Symptoms:Low back pain, fall; Signs/Symptoms:Bilateral hip pain, fall   COMPARISON: Radiographs of the pelvis from March 2024.   ACCESSION NUMBER(S): [id number]; [id number]   ORDERING CLINICIAN: СВЕТЛАНА VASQUEZ   TECHNIQUE: Three-view radiographs of the lumbar spine and additional five view radiographs of the pelvis and both hips were obtained.   FINDINGS: There is mild loss of height involving the L3 and L5 vertebra, however no acute fracture is identified. There is narrowing of the L1-L2, L2-L3, L3-L4 and mostly the L4-L5 disc spaces, with marginal osteophytes. The alignment is anatomic, without spondylolysis or spondylolisthesis. Hypertrophic changes involve the facet joints of the lower lumbar spine. Calcifications of the aorta and its branches are present.   No acute pelvic fracture is seen. Extensive osteoarthritis involves the right hip joint, with complete obliteration of the joint space, subchondral bone sclerosis, subchondral cysts and marginal osteophytes. Osteoarthritis also involves the left hip joint and to a lesser degree the sacroiliac joints bilaterally. The alignment is anatomic. Vascular calcifications involve iliac and femoral arteries bilaterally.       1. Marked lumbar  spondylosis and remote compression deformities of the L3 and L5 vertebra, without acute vertebral fracture or subluxation. 2. Extensive right hip osteoarthritis again noted. Additional degenerative changes in the pelvis as above. No pelvic fracture or subluxation.   Signed by: Bharath Negrete 10/14/2024 11:57 AM Dictation workstation:   [id number]    XR hips bilateral 5+ VW w pelvis when performed  Result Date: 10/14/2024  Interpreted By:  Bharath Negrete, STUDY: XR LUMBAR SPINE 2-3 VIEWS; XR HIPS BILATERAL 5+ VW WITH PELVIS WHEN PERFORMED; 10/14/2024 11:13 am   INDICATION: Signs/Symptoms:Low back pain, fall; Signs/Symptoms:Bilateral hip pain, fall   COMPARISON: Radiographs of the pelvis from March 2024.   ACCESSION NUMBER(S): [id number]; [id number]   ORDERING CLINICIAN: СВЕТЛАНА VASQUEZ   TECHNIQUE: Three-view radiographs of the lumbar spine and additional five view radiographs of the pelvis and both hips were obtained.   FINDINGS: There is mild loss of height involving the L3 and L5 vertebra, however no acute fracture is identified. There is narrowing of the L1-L2, L2-L3, L3-L4 and mostly the L4-L5 disc spaces, with marginal osteophytes. The alignment is anatomic, without spondylolysis or spondylolisthesis. Hypertrophic changes involve the facet joints of the lower lumbar spine. Calcifications of the aorta and its branches are present.   No acute pelvic fracture is seen. Extensive osteoarthritis involves the right hip joint, with complete obliteration of the joint space, subchondral bone sclerosis, subchondral cysts and marginal osteophytes. Osteoarthritis also involves the left hip joint and to a lesser degree the sacroiliac joints bilaterally. The alignment is anatomic. Vascular calcifications involve iliac and femoral arteries bilaterally.       1. Marked lumbar spondylosis and remote compression deformities of the L3 and L5 vertebra, without acute vertebral fracture or subluxation. 2. Extensive right hip  osteoarthritis again noted. Additional degenerative changes in the pelvis as above. No pelvic fracture or subluxation.   Signed by: Bharath Negrete 10/14/2024 11:57 AM Dictation workstation:   [id number]    XR chest 1 view  Result Date: 10/14/2024  Interpreted By:  Bharath Negrete, STUDY: XR CHEST 1 VIEW; 10/14/2024 11:13 am   INDICATION: Signs/Symptoms:Fall rib pain   COMPARISON: August 2024.   ACCESSION NUMBER(S): [id number]   ORDERING CLINICIAN: СВЕТЛАНА VASQUEZ   FINDINGS: The study is limited due to  lordotic projection and overlying artifacts obscuring some detail. The cardiomediastinal silhouette is within normal limits for the technique. Patchy/nodular infiltrates are seen in the right suprahilar region, with mild volume loss of the right upper lobe and elevation of the minor fissure. There is no pneumothorax or significant effusion. Marked degenerative changes involve the shoulders. There is also partially included deformity in the right humeral neck consistent with a healing fracture.       Limited study. Patchy/nodular consolidation in the right upper lobe, possibly pneumonia or atelectasis, however in the setting of injury, lung contusion can not be excluded. Correlate clinically follow-up in 1-2 weeks to document complete resolution.   Signed by: Bharath Negrete 10/14/2024 11:44 AM Dictation workstation:   [id number]    Visit Vitals  /84 (BP Location: Right leg, Patient Position: Lying)   Pulse 84   Temp 36.3 °C (97.3 °F)   Resp 20      Physical Exam  Vitals and nursing note reviewed.   Constitutional:       General: She is not in acute distress.     Appearance: She is ill-appearing.   HENT:      Mouth/Throat:      Mouth: Mucous membranes are dry.   Eyes:      General: No scleral icterus.     Extraocular Movements: Extraocular movements intact.   Cardiovascular:      Rate and Rhythm: Normal rate and regular rhythm.      Pulses: Normal pulses.      Heart sounds: Normal heart sounds.      Comments:  "Rhythm sounds regular today  Pulmonary:      Breath sounds: Wheezing and rhonchi present.      Comments: On 7 liters nasal canula. Presently on nebulizer  Abdominal:      General: Abdomen is flat. There is no distension.      Palpations: Abdomen is soft.      Tenderness: There is no abdominal tenderness. There is no guarding.   Musculoskeletal:      Right lower leg: No edema.      Left lower leg: No edema.   Skin:     General: Skin is warm and dry.      Coloration: Skin is pale.      Findings: Bruising present.   Neurological:      General: No focal deficit present.      Mental Status: She is alert and oriented to person, place, and time.   Psychiatric:         Mood and Affect: Mood normal.         Behavior: Behavior normal.         Thought Content: Thought content normal.         Judgment: Judgment normal.          Assessment/Plan   IMP:    Acute on chronic respiratory failure/COPD exacerbation/HCAP - on Zosyn, probably some aspiration. Now on 7 liters  RADHA on CKD - getting IVF, avoid nephrotoxic agents. Creat better today 1.26 -> 0.95, AG 11  Acute on chronic pain - primary sites of pain are left shoulder, bilat hips, knees and low back pain radiates down legs. Denies numbness or tingling or paresthesias. She is getting tylenol ATC. Continue this. Added the following increased oxy IR frequency to q6 prn. Added 10 mg oxy IR dose for moderate to severe pain. Added cyclobenzaprine TID prn. Added duloxetine 30 mg daily x1 week then increase to 60 mg daily. Lexapro dc'd.   [facility] - [facility]     Palliative Care Encounter  DNR-CCA-DNI  Pt is capable  No HPOA or LW. Has two daughters, Taylor and Madhavi. If needed, they will share medical decision making authority.   Pt verbalized to me that she feels \"I don't want to live like this anymore. This is no way to live.\"  She also stated that she does not feel rehab will be beneficial for her. She tells me it was not helpful last time she went. We discussed " "shifting from the present treatment model of care to comfort measures only. We reviewed hospice and what that would entail if she were to decide she only wanted to be kept comfortable. She is agreeable to meet with [facility] for INFORMATIONAL PURPOSES prior to hospital discharge. Referral placed, care coordination to send info via [employer]. They will contact daughter Taylor to arrange appointment time.  D/w daughter Taylor over phone.      D/w Rupal Sosa CNP and secure chat sent to Dr. Lutz to update.    10/21/2024  No changes today from palliative perspective. She did not try the higher dose of oxy IR or Flexeril, but at time of visit states pain is \"OK\". If goes home with hospice would consider adding either Oxycontin or Fentanyl patch for basal pain control. She is meeting with [facility] today 1730 with her daughter Taylor. Will follow up tomorrow await outcome of hospice meeting.    Advance Directives Info: Patient does not have advance directive  Discharge Planning: either SNF or home with hospice  Palliative Care Team will continue to follow patient.      Karla Goldsmith, MOISES-CNP   "

## 2024-10-21 NOTE — PROGRESS NOTES
Spiritual Care Visit    Clinical Encounter Type  Visited With: Patient  Routine Visit: Introduction    Annotation:  provided patient support while rounding the Unit.  introduced  services of    explained the role of the  in providing emotional and spiritual support for patient's and family while in admitted to the hospital. Patient was in an upright position with face Bypap.  Patient indicated that she did not have any needs and politely declined a visit today. No spiritual or Hinduism needs were expressed. Spiritual care will remain available for support as requested.                                            Taxonomy  Intended Effects: Establish rapport and connectedness, Build relationship of care and support, Demonstrate caring and concern

## 2024-10-21 NOTE — NURSING NOTE
RN Hospice Note    Crystal Jones is a Hospice Patient.   Hospice terminal diagnosis: copd  Physician: dr. wong  Visit type: Informational visit    Comments/recommendations: This RN present, met with pt, her dtr/judy Vazquez at bedside.  Reviewed hospice philosophy of care, services, all options with [facility] including gip at [facility], GIP at [facility], hospice at home or [facility].  Pt/family aware hospice could not be involved if pt pursuing skilled rehab at home or snf.  Pt/family aware pt eligible for gip at [facility] d/t sx's, aware of residential loc with r/b charges if pt should desire to remain at [facility].  Pt/dtr accepting of information only this visit, accepted cg guide, [facility] contact cards and ipu booklets to review.  Updated [facility] medical (with exception of attending dr. Wong d/t offline on epic) and cm team via epic chat.   Thanks for the consult.     Discharge Planning:  Patient to be discharged to D    Plan of care reviewed with patient/family members pt, dtr/judy Vazquez   Plan of care reviewed with hospital staff members: Karla Goldsmith [facility]/jonah valera/carlos kumar     Please notify [facility] of any changes in condition. Thank you.  Office: [phone] (8 am-6:30 pm M-F and 8 am-4:30 pm weekends and holidays)   [phone] (6:30 pm-8 am M-F and 4:30 pm-8 am weekends and holidays)    Mili Ghotra RN

## 2024-10-21 NOTE — PROGRESS NOTES
"Occupational Therapy                 Therapy Communication Note    Patient Name: Kamila Jones \"Crystal\"  MRN: [id number]  Department: [facility]  Room: [address] [place], [place] [address]A  Today's Date: 10/21/2024     Discipline: Occupational Therapy    Missed Visit Reason: Missed Visit Reason: Other (Comment) (Pt not cleared by RN)    Missed Time: Attempt    Comment: Pt not cleared by RN d/t medical status, SOB, fever.   "

## 2024-10-21 NOTE — CARE PLAN
Problem: Respiratory  Goal: Wean oxygen to maintain O2 saturation per order/standard this shift  Outcome: Not Progressing  Goal: No signs of respiratory distress (eg. Use of accessory muscles. Peds grunting)  Outcome: Not Progressing  Goal: Clear secretions with interventions this shift  Outcome: Not Progressing     Problem: Respiratory  Goal: Minimal/no exertional discomfort or dyspnea this shift  Outcome: Progressing     Problem: Respiratory  Goal: Wean oxygen to maintain O2 saturation per order/standard this shift  Outcome: Not Progressing  Goal: No signs of respiratory distress (eg. Use of accessory muscles. Peds grunting)  Outcome: Not Progressing  Goal: Clear secretions with interventions this shift  Outcome: Not Progressing

## 2024-10-21 NOTE — PROGRESS NOTES
"Kamila Jones \"Elizabeth" is a 69 y.o. female on day 5 of admission seen in follow-up for abnormal chest imaging, fevers     Subjective   Currently on BiPAP.  Endorses dyspnea.  Denies cough.  Denies pain.  Tmax: 38.2 C @ 0827.       Objective     Physical Exam  Vitals and nursing note reviewed.   Constitutional:       General: She is awake.      Appearance: She is obese. She is ill-appearing.   HENT:      Head: Normocephalic and atraumatic.      Nose: Nose normal.      Mouth/Throat:      Mouth: Mucous membranes are moist.   Eyes:      Extraocular Movements: Extraocular movements intact.      Conjunctiva/sclera: Conjunctivae normal.      Pupils: Pupils are equal, round, and reactive to light.   Neck:      Thyroid: No thyroid mass.      Trachea: Phonation normal.   Cardiovascular:      Rate and Rhythm: Normal rate and regular rhythm.      Pulses: Normal pulses.      Heart sounds: Normal heart sounds. No murmur heard.     No gallop.   Pulmonary:      Effort: Pulmonary effort is normal. No respiratory distress.      Breath sounds: Normal air entry. No decreased breath sounds, wheezing, rhonchi or rales.      Comments: Coarse breath sounds bilaterally.  Abdominal:      General: Bowel sounds are normal. There is no distension.      Palpations: Abdomen is soft.      Tenderness: There is no abdominal tenderness.   Musculoskeletal:         General: Normal range of motion.      Cervical back: Neck supple.      Right lower leg: No edema.      Left lower leg: No edema.   Skin:     General: Skin is warm and dry.      Capillary Refill: Capillary refill takes less than 2 seconds.   Neurological:      General: No focal deficit present.      Mental Status: She is alert and oriented to person, place, and time. Mental status is at baseline.      Cranial Nerves: Cranial nerves 2-12 are intact.      Motor: Motor function is intact.   Psychiatric:         Attention and Perception: Attention and perception normal.         Mood and Affect: " "Mood normal.         Speech: Speech normal.         Behavior: Behavior normal.         Last Recorded Vitals  Blood pressure 132/84, pulse 84, temperature (!) 38.2 °C (100.8 °F), temperature source Temporal, resp. rate 20, height 1.676 m (5' 5.98\"), weight 56.2 kg (123 lb 14.4 oz), SpO2 96%.  Intake/Output last 3 Shifts:  I/O last 3 completed shifts:  In: 1288.8 (22.9 mL/kg) [I.V.:1288.8 (22.9 mL/kg)]  Out: 550 (9.8 mL/kg) [Urine:550 (0.3 mL/kg/hr)]  Weight: 56.2 kg       Intake/Output Summary (Last 24 hours) at 10/21/2024 0853  Last data filed at 10/21/2024 0458  Gross per 24 hour   Intake 1288.75 ml   Output 200 ml   Net 1088.75 ml      Scheduled medications:  acetaminophen, 1,000 mg, oral, q8h JENNYFER  amiodarone, 200 mg, oral, Daily  apixaban, 5 mg, oral, q12h  DULoxetine, 30 mg, oral, Daily  [START ON 10/27/2024] DULoxetine, 60 mg, oral, Daily  famotidine, 20 mg, oral, Daily  tiotropium, 2 puff, inhalation, Daily   And  fluticasone furoate-vilanteroL, 1 puff, inhalation, Daily  gabapentin, 300 mg, oral, TID  ipratropium-albuteroL, 3 mL, nebulization, TID  lidocaine, 2 patch, transdermal, Daily  metoprolol succinate XL, 25 mg, oral, BID  mirtazapine, 15 mg, oral, Nightly  oxygen, , inhalation, Continuous - Inhalation  piperacillin-tazobactam, 3.375 g, intravenous, q6h  primidone, 200 mg, oral, BID     PRN medications: albuterol, cyclobenzaprine, hydrALAZINE, ondansetron ODT **OR** ondansetron, oxyCODONE, oxyCODONE, polyethylene glycol     oxygen, , Last Rate: 8 L/min (10/18/24 1629)  sodium chloride 0.9%, 75 mL/hr, Last Rate: 75 mL/hr (10/21/24 0205)       Relevant Results  Results for orders placed or performed during the hospital encounter of 10/14/24 (from the past 24 hours)   CBC   Result Value Ref Range    WBC 7.4 4.4 - 11.3 x10*3/uL    nRBC 0.0 0.0 - 0.0 /100 WBCs    RBC 2.64 (L) 4.00 - 5.20 x10*6/uL    Hemoglobin 8.4 (L) 12.0 - 16.0 g/dL    Hematocrit 27.1 (L) 36.0 - 46.0 %     (H) 80 - 100 fL    MCH " 31.8 26.0 - 34.0 pg    MCHC 31.0 (L) 32.0 - 36.0 g/dL    RDW 16.2 (H) 11.5 - 14.5 %    Platelets 317 150 - 450 x10*3/uL   Basic Metabolic Panel   Result Value Ref Range    Glucose 113 (H) 74 - 99 mg/dL    Sodium 143 136 - 145 mmol/L    Potassium 4.2 3.5 - 5.3 mmol/L    Chloride 108 (H) 98 - 107 mmol/L    Bicarbonate 28 21 - 32 mmol/L    Anion Gap 11 10 - 20 mmol/L    Urea Nitrogen 16 6 - 23 mg/dL    Creatinine 0.95 0.50 - 1.05 mg/dL    eGFR 64 >60 mL/min/1.73m*2    Calcium 8.1 (L) 8.6 - 10.3 mg/dL     XR chest 1 view  Result Date: 10/18/2024  FINDINGS: The study is limited due to  rotation. The cardiac silhouette is within normal limits for the technique. There is worsening consolidation in the right upper lobe. There is no pneumothorax or significant pleural effusion. Degenerative changes involve the spine and shoulders. Deformity of the right proximal humerus is again seen due to remote injury. IMPRESSION: Worsening right upper lobe consolidation, possibly due to pneumonia. Correlate clinically and follow-up to document complete resolution.     Transthoracic Echo (TTE) Complete  Result Date: 10/16/2024  CONCLUSIONS: 1. The left ventricular systolic function is normal, with a visually estimated ejection fraction of 60-65%. 2. There is normal right ventricular global systolic function. 3. Mild mitral valve regurgitation. 4. Slightly elevated right ventricular systolic pressure. 5. Trace tricuspid regurgitation is visualized.    CT chest wo IV contrast  Result Date: 10/16/2024  FINDINGS: LIMITATIONS/LINES:   None.   HEART:   Heart is within normal limits of size.  No significant pericardial effusion. There is rather extensive coronary artery calcification observed.   MEDIASTINUM/HAIR:   There is no aneurysm of the thoracic aorta. Atherosclerosis of the thoracic aorta is seen. There is stable precarinal adenopathy measuring 11 mm in short axis diameter with no additional enlarged mediastinal lymph nodes observed. No  obvious hilar mass or adenopathy is seen on this study limited by lack of intravenous contrast administration.   PLEURA:   Unremarkable.   LUNG PARENCHYMA:   Pulmonary emphysema is observed. There is a 3 mm left upper lobe pulmonary nodule seen on series 7 axial image 121 unchanged. A 4 mm right lower lobe pulmonary nodule is present on series 7 axial image 160 with adjacent 3 mm right lower lobe pulmonary nodule on axial image 156. These can be seen on prior study as well. Re-identified is an irregularly marginated and spiculated nodule at the base of the right lower lobe measuring 1.3 x 1.9 cm in size without interval change.   When compared with prior examination, the infiltrate and airspace consolidation seen within the left lower lobe has nearly completely resolved with a small amount of residual airspace consolidation within left lower lobe posteromedially. However, there is now new infiltrate and airspace consolidation within the posterior segment of the right upper lobe.   BONES:   No acute osseous abnormality is observed. There are degenerative changes of the thoracic spine.   CHEST WALL/OTHER:   On images taken through upper abdomen, renal artery calcification is observed and there is ectasia of the abdominal aorta.  There is new infiltrate and airspace consolidation in the posterior segment of right upper lobe with 5 mm cavitary component within the consolidated pulmonary parenchyma. Appearance is consistent with a pneumonia of the right upper lobe.   Near complete resolution of the left lower lobe pneumonia.   Stable pulmonary nodules including a 1.3 x 1.9 cm spiculated nodule at the base of the right lower lobe.   Stable mild precarinal lymphadenopathy.   Rather extensive coronary artery calcifications.       XR knee left 4+ views  Result Date: 10/14/2024  FINDINGS: No fractures or destructive lesions are identified. Bone island is noted in the medial tibial condyle. The joint spaces and articular  surfaces are maintained considering patient's age. The alignment is anatomic. Vascular calcifications are noted. There is no significant effusion in the suprapatellar bursa.  Unremarkable left knee radiographs.       XR lumbar spine 2-3 views  Result Date: 10/14/2024  FINDINGS: There is mild loss of height involving the L3 and L5 vertebra, however no acute fracture is identified. There is narrowing of the L1-L2, L2-L3, L3-L4 and mostly the L4-L5 disc spaces, with marginal osteophytes. The alignment is anatomic, without spondylolysis or spondylolisthesis. Hypertrophic changes involve the facet joints of the lower lumbar spine. Calcifications of the aorta and its branches are present.   No acute pelvic fracture is seen. Extensive osteoarthritis involves the right hip joint, with complete obliteration of the joint space, subchondral bone sclerosis, subchondral cysts and marginal osteophytes. Osteoarthritis also involves the left hip joint and to a lesser degree the sacroiliac joints bilaterally. The alignment is anatomic. Vascular calcifications involve iliac and femoral arteries bilaterally. 1. Marked lumbar spondylosis and remote compression deformities of the L3 and L5 vertebra, without acute vertebral fracture or subluxation. 2. Extensive right hip osteoarthritis again noted. Additional degenerative changes in the pelvis as above. No pelvic fracture or subluxation.     XR hips bilateral 5+ VW w pelvis when performed  Result Date: 10/14/2024  FINDINGS: There is mild loss of height involving the L3 and L5 vertebra, however no acute fracture is identified. There is narrowing of the L1-L2, L2-L3, L3-L4 and mostly the L4-L5 disc spaces, with marginal osteophytes. The alignment is anatomic, without spondylolysis or spondylolisthesis. Hypertrophic changes involve the facet joints of the lower lumbar spine. Calcifications of the aorta and its branches are present.   No acute pelvic fracture is seen. Extensive  osteoarthritis involves the right hip joint, with complete obliteration of the joint space, subchondral bone sclerosis, subchondral cysts and marginal osteophytes. Osteoarthritis also involves the left hip joint and to a lesser degree the sacroiliac joints bilaterally. The alignment is anatomic. Vascular calcifications involve iliac and femoral arteries bilaterally.  1. Marked lumbar spondylosis and remote compression deformities of the L3 and L5 vertebra, without acute vertebral fracture or subluxation. 2. Extensive right hip osteoarthritis again noted. Additional degenerative changes in the pelvis as above. No pelvic fracture or subluxation.     XR chest 1 view  Result Date: 10/14/2024  FINDINGS: The study is limited due to  lordotic projection and overlying artifacts obscuring some detail. The cardiomediastinal silhouette is within normal limits for the technique. Patchy/nodular infiltrates are seen in the right suprahilar region, with mild volume loss of the right upper lobe and elevation of the minor fissure. There is no pneumothorax or significant effusion. Marked degenerative changes involve the shoulders. There is also partially included deformity in the right humeral neck consistent with a healing fracture.  Limited study. Patchy/nodular consolidation in the right upper lobe, possibly pneumonia or atelectasis, however in the setting of injury, lung contusion can not be excluded. Correlate clinically follow-up in 1-2 weeks to document complete resolution.       Assessment/Plan   Chronic hypoxic respiratory failure  Oxygen at baseline  BiPAP nightly while in hospital-continue NIV at home, she is monitored is  using device consistently and benefiting from it  Continuous pulse oximetry  Incentive spirometry/pulmonary hygiene     Healthcare acquired pneumonia  Continue IV Zosyn and discharge on Augmentin  Follow-up cultures    Acute on chronic bilateral hip pain/low back pain  Analgesia   Follows with Pain  Management     Right lower lobe spiculated lung nodule   CT scan chest without contrast reviewed with collaborating physician Dr. Davis  Follow-up CT scan chest in 3-6 weeks     COPD  Continue IBD/ICS     Atrial fibrillation  On Eliquis     Obstructive sleep apnea  BiPAP nightly while in hospital     PT/OT/out of bed  [facility] placed-informational meeting scheduled for today    Emmie Interiano, APRN-CNP

## 2024-10-21 NOTE — NURSING NOTE
Pt diaphoretic  temp 38.2  complete bed bath given .Short of breath with minimal exertion and when speaking moist NPC ,rhonchi bilateral upper lower lobes .

## 2024-10-21 NOTE — NURSING NOTE
Assumed care of  she is resting in bed HOB elevated on Hi Flow  8 l ,side rails up x 3 bed alarm set for safety .

## 2024-10-21 NOTE — PROGRESS NOTES
"Kamila Jones \"Elizabeth" is a 69 y.o. female on day 5 of admission presenting with Chronic low back pain without sciatica, unspecified back pain laterality.      Subjective   Pt lethargic this am. Remains on O2 8 liters. To have Hospice informational meeting today.      Objective     Last Recorded Vitals  /84 (BP Location: Right leg, Patient Position: Lying)   Pulse 84   Temp 36.3 °C (97.3 °F)   Resp 20   Wt 56.2 kg (123 lb 14.4 oz)   SpO2 100%   Intake/Output last 3 Shifts:    Intake/Output Summary (Last 24 hours) at 10/21/2024 1510  Last data filed at 10/21/2024 1301  Gross per 24 hour   Intake 1288.75 ml   Output 200 ml   Net 1088.75 ml       Admission Weight  Weight: 56.2 kg (124 lb) (10/14/24 0942)    Daily Weight  10/14/24 : 56.2 kg (123 lb 14.4 oz)    Image Results    No new imaging to review.     Physical Exam  Constitutional:       Appearance: She is ill-appearing.      Comments: Lethargic   HENT:      Head: Normocephalic and atraumatic.      Nose: Nose normal.      Mouth/Throat:      Mouth: Mucous membranes are moist.      Pharynx: Oropharynx is clear.   Eyes:      Extraocular Movements: Extraocular movements intact.      Pupils: Pupils are equal, round, and reactive to light.   Cardiovascular:      Rate and Rhythm: Normal rate and regular rhythm.      Pulses: Normal pulses.   Pulmonary:      Effort: Pulmonary effort is normal.      Breath sounds: Rhonchi present.   Abdominal:      General: Abdomen is flat. Bowel sounds are normal.      Palpations: Abdomen is soft.   Musculoskeletal:         General: Normal range of motion.   Skin:     General: Skin is warm and dry.      Capillary Refill: Capillary refill takes less than 2 seconds.   Neurological:      General: No focal deficit present.      Mental Status: She is oriented to person, place, and time.   Psychiatric:         Mood and Affect: Mood normal.         Relevant Results    Results for orders placed or performed during the hospital encounter " of 10/14/24 (from the past 24 hours)   CBC   Result Value Ref Range    WBC 7.4 4.4 - 11.3 x10*3/uL    nRBC 0.0 0.0 - 0.0 /100 WBCs    RBC 2.64 (L) 4.00 - 5.20 x10*6/uL    Hemoglobin 8.4 (L) 12.0 - 16.0 g/dL    Hematocrit 27.1 (L) 36.0 - 46.0 %     (H) 80 - 100 fL    MCH 31.8 26.0 - 34.0 pg    MCHC 31.0 (L) 32.0 - 36.0 g/dL    RDW 16.2 (H) 11.5 - 14.5 %    Platelets 317 150 - 450 x10*3/uL   Basic Metabolic Panel   Result Value Ref Range    Glucose 113 (H) 74 - 99 mg/dL    Sodium 143 136 - 145 mmol/L    Potassium 4.2 3.5 - 5.3 mmol/L    Chloride 108 (H) 98 - 107 mmol/L    Bicarbonate 28 21 - 32 mmol/L    Anion Gap 11 10 - 20 mmol/L    Urea Nitrogen 16 6 - 23 mg/dL    Creatinine 0.95 0.50 - 1.05 mg/dL    eGFR 64 >60 mL/min/1.73m*2    Calcium 8.1 (L) 8.6 - 10.3 mg/dL             Assessment/Plan      Acute on Chronic Low Back Pain and Bilateral Hip Pain / Inability to Ambulate  -Has known history of significant OA with back and hip pain.   -Followed with pain management and attempted hip injections with no relief.   -Unable to have surgery due to her pulmonary status and co-morbidities.   -On scheduled tylenol and lidocaine patch. PRN Oxy for now, monitor respiratory status closely- stable with better pain control.   -PT/OT recommending moderate intensity therapy, plan for SNF upon DC.   -OOB with assist only.   -All imaging done shows OA and degenerative changes, no acute fractures.  -Pall care was consulted.       Fever / Healthcare Aquired Pneumonia / Acute on Chronic Respiratory Failure  -Continues to have fever.    -UA negative. COVID and flu checked and negative.   -CT chest shows new infiltrate RUL- discussed with pulmonary who reviewed her images, they feel this is a HCAP and we are treating with zosyn, vanco stopped as the MRSA swab was negative.  -Episode of coughing, vomiting 10/18, spiked fever with worsening respiratory status and hypoxia. Likely aspirated. CXR checked with worsening RUL  consolidation. She was increased to 8L NC mini bubbler, given breathing treatments and IV steroid.   -Blood cultures sent on admission finalized without growth.    -Strep/legionella negative.     RADHA   -Resolved     COPD   -No wheeze on exam today.  -Continue home inhalers.      Chronic Respiratory Failure / RAMON  -Baseline O2 3L NC continuous at home.   -She also uses BiPAP at HS, this is ordered.   -CXR with patchy nodular consolidation RUL with known lung mass, she just had PET CT as outpatient and is currently following with her PCP for this.  -Continue home inhalers.      Atrial Fibrillation with RVR  -On Eliquis.   -Cardio follows, currently on amiodarone and metoprolol, continue.   -Monitor on telemetry.      HTN  -Meds adjusted and BP is stable.   -Monitor.     DVT Risk  -On Eliquis.   -SCDs.      Dispo  To have Hospice informational meeting today      MOISES Priest-CNP

## 2024-10-21 NOTE — PROGRESS NOTES
"Occupational Therapy                 Therapy Communication Note    Patient Name: Kamila Jones \"Crystal\"  MRN: [id number]  Department: [facility] [address]  Room: [address] [place], [place] [address]  Today's Date: 10/21/2024     Discipline: Occupational Therapy    Missed Visit Reason:      Missed Time: Cancel    Comment:  Cancel per nursing, pt w/ increased difficulty breathing. Requesting therapy defer treatment for today.   "

## 2024-10-21 NOTE — CARE PLAN
The patient's goals for the shift include      The clinical goals for the shift include comfort      Problem: Discharge Planning  Goal: Discharge to home or other facility with appropriate resources  Outcome: Progressing     Problem: Chronic Conditions and Co-morbidities  Goal: Patient's chronic conditions and co-morbidity symptoms are monitored and maintained or improved  Outcome: Progressing     Problem: Fall/Injury  Goal: Verbalize understanding of risk factor reduction measures to prevent injury from fall in the home  Outcome: Progressing     Problem: Skin  Goal: Participates in plan/prevention/treatment measures  Outcome: Progressing  Goal: Prevent/manage excess moisture  Outcome: Progressing  Goal: Prevent/minimize sheer/friction injuries  Outcome: Progressing  Flowsheets (Taken 10/20/2024 6268)  Prevent/minimize sheer/friction injuries:   Use pull sheet   Increase activity/out of bed for meals  Goal: Promote/optimize nutrition  Outcome: Progressing  Goal: Promote skin healing  Outcome: Progressing

## 2024-10-22 ENCOUNTER — APPOINTMENT (OUTPATIENT)
Dept: RADIOLOGY | Facility: HOSPITAL | Age: 69
DRG: 177 | End: 2024-10-22
Payer: COMMERCIAL

## 2024-10-22 LAB
ANION GAP SERPL CALCULATED.3IONS-SCNC: 13 MMOL/L (ref 10–20)
BUN SERPL-MCNC: 13 MG/DL (ref 6–23)
CALCIUM SERPL-MCNC: 8.7 MG/DL (ref 8.6–10.3)
CHLORIDE SERPL-SCNC: 104 MMOL/L (ref 98–107)
CO2 SERPL-SCNC: 27 MMOL/L (ref 21–32)
CREAT SERPL-MCNC: 0.92 MG/DL (ref 0.5–1.05)
EGFRCR SERPLBLD CKD-EPI 2021: 67 ML/MIN/1.73M*2
ERYTHROCYTE [DISTWIDTH] IN BLOOD BY AUTOMATED COUNT: 16.4 % (ref 11.5–14.5)
GLUCOSE SERPL-MCNC: 106 MG/DL (ref 74–99)
HCT VFR BLD AUTO: 29.7 % (ref 36–46)
HGB BLD-MCNC: 8.8 G/DL (ref 12–16)
MCH RBC QN AUTO: 31.4 PG (ref 26–34)
MCHC RBC AUTO-ENTMCNC: 29.6 G/DL (ref 32–36)
MCV RBC AUTO: 106 FL (ref 80–100)
NRBC BLD-RTO: 0 /100 WBCS (ref 0–0)
PLATELET # BLD AUTO: 340 X10*3/UL (ref 150–450)
POTASSIUM SERPL-SCNC: 4.1 MMOL/L (ref 3.5–5.3)
RBC # BLD AUTO: 2.8 X10*6/UL (ref 4–5.2)
SARS-COV-2 RNA RESP QL NAA+PROBE: NOT DETECTED
SODIUM SERPL-SCNC: 140 MMOL/L (ref 136–145)
WBC # BLD AUTO: 7 X10*3/UL (ref 4.4–11.3)

## 2024-10-22 PROCEDURE — 2500000001 HC RX 250 WO HCPCS SELF ADMINISTERED DRUGS (ALT 637 FOR MEDICARE OP): Performed by: NURSE PRACTITIONER

## 2024-10-22 PROCEDURE — 2500000001 HC RX 250 WO HCPCS SELF ADMINISTERED DRUGS (ALT 637 FOR MEDICARE OP)

## 2024-10-22 PROCEDURE — 87635 SARS-COV-2 COVID-19 AMP PRB: CPT | Performed by: NURSE PRACTITIONER

## 2024-10-22 PROCEDURE — 94664 DEMO&/EVAL PT USE INHALER: CPT

## 2024-10-22 PROCEDURE — 2060000001 HC INTERMEDIATE ICU ROOM DAILY

## 2024-10-22 PROCEDURE — 9420000001 HC RT PATIENT EDUCATION 5 MIN

## 2024-10-22 PROCEDURE — 94640 AIRWAY INHALATION TREATMENT: CPT

## 2024-10-22 PROCEDURE — 2500000004 HC RX 250 GENERAL PHARMACY W/ HCPCS (ALT 636 FOR OP/ED): Performed by: NURSE PRACTITIONER

## 2024-10-22 PROCEDURE — 2500000005 HC RX 250 GENERAL PHARMACY W/O HCPCS: Performed by: NURSE PRACTITIONER

## 2024-10-22 PROCEDURE — 99232 SBSQ HOSP IP/OBS MODERATE 35: CPT | Performed by: NURSE PRACTITIONER

## 2024-10-22 PROCEDURE — 2500000002 HC RX 250 W HCPCS SELF ADMINISTERED DRUGS (ALT 637 FOR MEDICARE OP, ALT 636 FOR OP/ED): Performed by: NURSE PRACTITIONER

## 2024-10-22 PROCEDURE — 2500000002 HC RX 250 W HCPCS SELF ADMINISTERED DRUGS (ALT 637 FOR MEDICARE OP, ALT 636 FOR OP/ED): Performed by: INTERNAL MEDICINE

## 2024-10-22 PROCEDURE — 85027 COMPLETE CBC AUTOMATED: CPT | Performed by: NURSE PRACTITIONER

## 2024-10-22 PROCEDURE — 31720 CLEARANCE OF AIRWAYS: CPT

## 2024-10-22 PROCEDURE — 94660 CPAP INITIATION&MGMT: CPT

## 2024-10-22 PROCEDURE — 71045 X-RAY EXAM CHEST 1 VIEW: CPT

## 2024-10-22 PROCEDURE — 80048 BASIC METABOLIC PNL TOTAL CA: CPT | Performed by: NURSE PRACTITIONER

## 2024-10-22 PROCEDURE — 71045 X-RAY EXAM CHEST 1 VIEW: CPT | Performed by: RADIOLOGY

## 2024-10-22 PROCEDURE — 99233 SBSQ HOSP IP/OBS HIGH 50: CPT | Performed by: NURSE PRACTITIONER

## 2024-10-22 RX ORDER — ACETYLCYSTEINE 200 MG/ML
3 SOLUTION ORAL; RESPIRATORY (INHALATION)
Status: DISCONTINUED | OUTPATIENT
Start: 2024-10-22 | End: 2024-10-23 | Stop reason: HOSPADM

## 2024-10-22 RX ORDER — FENTANYL 12.5 UG/1
1 PATCH TRANSDERMAL
Status: DISCONTINUED | OUTPATIENT
Start: 2024-10-22 | End: 2024-10-23 | Stop reason: HOSPADM

## 2024-10-22 ASSESSMENT — PAIN SCALES - GENERAL
PAINLEVEL_OUTOF10: 0 - NO PAIN
PAINLEVEL_OUTOF10: 0 - NO PAIN
PAINLEVEL_OUTOF10: 8
PAINLEVEL_OUTOF10: 10 - WORST POSSIBLE PAIN

## 2024-10-22 ASSESSMENT — PAIN - FUNCTIONAL ASSESSMENT
PAIN_FUNCTIONAL_ASSESSMENT: 0-10
PAIN_FUNCTIONAL_ASSESSMENT: 0-10

## 2024-10-22 ASSESSMENT — PAIN SCALES - PAIN ASSESSMENT IN ADVANCED DEMENTIA (PAINAD): TOTALSCORE: MEDICATION (SEE MAR)

## 2024-10-22 ASSESSMENT — PAIN DESCRIPTION - LOCATION: LOCATION: BACK

## 2024-10-22 NOTE — CARE PLAN
The patient's goals for the shift include  monitor breathing, comfort    The clinical goals for the shift include safety        Problem: Skin  Goal: Prevent/minimize sheer/friction injuries  Outcome: Not Progressing  Flowsheets (Taken 10/22/2024 1127)  Prevent/minimize sheer/friction injuries:   Use pull sheet   Increase activity/out of bed for meals   Complete micro-shifts as needed if patient unable. Adjust patient position to relieve pressure points, not a full turn   HOB 30 degrees or less   Turn/reposition every 2 hours/use positioning/transfer devices   Utilize specialty bed per algorithm

## 2024-10-22 NOTE — PROGRESS NOTES
10/22/24 1125   Discharge Planning   Living Arrangements Children;Family members   Support Systems Children;Family members   Type of Residence Private residence   Who is requesting discharge planning? Provider   Type of Home Care Services DME or oxygen;Hospice   Expected Discharge Disposition [place]  (TBD)     Patient and daughter had a meeting with [facility] 10/21 at 1730. Will follow up tomorrow await outcome of hospice meeting.    PLAN: TBD

## 2024-10-22 NOTE — PROGRESS NOTES
"Palliative Care Progress Note    Date of Admission: 10/14/2024    Patient is a 69 y.o. female admitted with Chronic low back pain without sciatica, unspecified back pain laterality. Had hospice meeting yesterday evening, pt cont to decline SNF. Daughter is looking into options hospice IPU vs LTC vs home with hospice. Pt still having tremendous amount of pain that is exacerbated by any movement.    Mental/Cognitive Status: awake,alert, looks exhausted    Respiratory Status: 7 liters ; + cough ; + sob    Pain Assessment:left shoulder, back, hips, knees, legs  - at present states pain is \"OK\" as long as she is not moving    Pertinent Symptoms: none    Diet/Nutrition: appetite decreased    Bowel Regimen: moving bowels    Patient's current condition/Anticipated Prognosis: poor.  Family/Healthcare Proxy involvement: daughter Taylor.    Scheduled medications  acetaminophen, 1,000 mg, oral, q8h JENNYFER  amiodarone, 200 mg, oral, Daily  apixaban, 5 mg, oral, q12h  DULoxetine, 30 mg, oral, Daily  [START ON 10/27/2024] DULoxetine, 60 mg, oral, Daily  famotidine, 20 mg, oral, Daily  fentaNYL, 1 patch, transdermal, q72h  tiotropium, 2 puff, inhalation, Daily   And  fluticasone furoate-vilanteroL, 1 puff, inhalation, Daily  gabapentin, 300 mg, oral, TID  ipratropium-albuteroL, 3 mL, nebulization, TID  lidocaine, 2 patch, transdermal, Daily  metoprolol succinate XL, 25 mg, oral, BID  mirtazapine, 15 mg, oral, Nightly  oxygen, , inhalation, Continuous - Inhalation  piperacillin-tazobactam, 3.375 g, intravenous, q6h  primidone, 200 mg, oral, BID      Continuous medications  oxygen, , Last Rate: 8 L/min (10/21/24 1337)      PRN medications  PRN medications: albuterol, cyclobenzaprine, hydrALAZINE, HYDROmorphone, ondansetron ODT **OR** ondansetron, oxyCODONE, oxyCODONE, polyethylene glycol     Results for orders placed or performed during the hospital encounter of 10/14/24 (from the past 24 hours)   Basic metabolic panel   Result Value Ref " Range    Glucose 106 (H) 74 - 99 mg/dL    Sodium 140 136 - 145 mmol/L    Potassium 4.1 3.5 - 5.3 mmol/L    Chloride 104 98 - 107 mmol/L    Bicarbonate 27 21 - 32 mmol/L    Anion Gap 13 10 - 20 mmol/L    Urea Nitrogen 13 6 - 23 mg/dL    Creatinine 0.92 0.50 - 1.05 mg/dL    eGFR 67 >60 mL/min/1.73m*2    Calcium 8.7 8.6 - 10.3 mg/dL   CBC   Result Value Ref Range    WBC 7.0 4.4 - 11.3 x10*3/uL    nRBC 0.0 0.0 - 0.0 /100 WBCs    RBC 2.80 (L) 4.00 - 5.20 x10*6/uL    Hemoglobin 8.8 (L) 12.0 - 16.0 g/dL    Hematocrit 29.7 (L) 36.0 - 46.0 %     (H) 80 - 100 fL    MCH 31.4 26.0 - 34.0 pg    MCHC 29.6 (L) 32.0 - 36.0 g/dL    RDW 16.4 (H) 11.5 - 14.5 %    Platelets 340 150 - 450 x10*3/uL        XR chest 1 view  Result Date: 10/18/2024  Interpreted By:  Bharath Negrete, STUDY: XR CHEST 1 VIEW; 10/18/2024 3:54 pm   INDICATION: Signs/Symptoms:Hypoxia   COMPARISON: Radiographs from 10/14/2024.   ACCESSION NUMBER(S): [id number]   ORDERING CLINICIAN: JOHN LLANOS   FINDINGS: The study is limited due to  rotation. The cardiac silhouette is within normal limits for the technique. There is worsening consolidation in the right upper lobe. There is no pneumothorax or significant pleural effusion. Degenerative changes involve the spine and shoulders. Deformity of the right proximal humerus is again seen due to remote injury.       Worsening right upper lobe consolidation, possibly due to pneumonia. Correlate clinically and follow-up to document complete resolution.   Signed by: Bharath Negrete 10/18/2024 4:24 PM Dictation workstation:   [id number]    Transthoracic Echo (TTE) Complete  Result Date: 10/16/2024  PHYSICIAN INTERPRETATION: Left Ventricle: The left ventricular systolic function is normal, with a visually estimated ejection fraction of 60-65%. There are no regional wall motion abnormalities. The left ventricular cavity size is normal. Spectral Doppler shows a normal pattern of left ventricular diastolic filling.  Left Atrium: The left atrium is mildly dilated. Right Ventricle: The right ventricle is normal in size. There is normal right ventricular global systolic function. Right Atrium: The right atrium is mildly dilated. Aortic Valve: The aortic valve is trileaflet. The aortic valve dimensionless index is 0.86. There is no evidence of aortic valve regurgitation. The peak instantaneous gradient of the aortic valve is 13.4 mmHg. The mean gradient of the aortic valve is 6.0 mmHg. Mitral Valve: The mitral valve is normal in structure. There is mild mitral valve regurgitation. Tricuspid Valve: The tricuspid valve is structurally normal. There is trace tricuspid regurgitation. The Doppler estimated RVSP is slightly elevated right ventricular systolic pressure at 32.4 mmHg. Pulmonic Valve: The pulmonic valve is structurally normal. There is physiologic pulmonic valve regurgitation. Pericardium: No pericardial effusion noted. Aorta: The aortic root is normal. Systemic Veins: The inferior vena cava appears normal in size, with IVC inspiratory collapse greater than 50%.  CONCLUSIONS:  1. The left ventricular systolic function is normal, with a visually estimated ejection fraction of 60-65%.  2. There is normal right ventricular global systolic function.  3. Mild mitral valve regurgitation.  4. Slightly elevated right ventricular systolic pressure.  5. Trace tricuspid regurgitation is visualized.     CT chest wo IV contrast  Result Date: 10/16/2024  FINDINGS: LIMITATIONS/LINES:   None.   HEART:   Heart is within normal limits of size.  No significant pericardial effusion. There is rather extensive coronary artery calcification observed.   MEDIASTINUM/HAIR:   There is no aneurysm of the thoracic aorta. Atherosclerosis of the thoracic aorta is seen. There is stable precarinal adenopathy measuring 11 mm in short axis diameter with no additional enlarged mediastinal lymph nodes observed. No obvious hilar mass or adenopathy is seen on  this study limited by lack of intravenous contrast administration.   PLEURA:   Unremarkable.   LUNG PARENCHYMA:   Pulmonary emphysema is observed. There is a 3 mm left upper lobe pulmonary nodule seen on series 7 axial image 121 unchanged. A 4 mm right lower lobe pulmonary nodule is present on series 7 axial image 160 with adjacent 3 mm right lower lobe pulmonary nodule on axial image 156. These can be seen on prior study as well. Re-identified is an irregularly marginated and spiculated nodule at the base of the right lower lobe measuring 1.3 x 1.9 cm in size without interval change.   When compared with prior examination, the infiltrate and airspace consolidation seen within the left lower lobe has nearly completely resolved with a small amount of residual airspace consolidation within left lower lobe posteromedially. However, there is now new infiltrate and airspace consolidation within the posterior segment of the right upper lobe.   BONES:   No acute osseous abnormality is observed. There are degenerative changes of the thoracic spine.   CHEST WALL/OTHER:   On images taken through upper abdomen, renal artery calcification is observed and there is ectasia of the abdominal aorta.       There is new infiltrate and airspace consolidation in the posterior segment of right upper lobe with 5 mm cavitary component within the consolidated pulmonary parenchyma. Appearance is consistent with a pneumonia of the right upper lobe.   Near complete resolution of the left lower lobe pneumonia.   Stable pulmonary nodules including a 1.3 x 1.9 cm spiculated nodule at the base of the right lower lobe.   Stable mild precarinal lymphadenopathy.   Rather extensive coronary artery calcifications.   MACRO: None   Signed by: Kiara Garnica 10/16/2024 8:23 AM Dictation workstation:   [id number]    XR knee left 4+ views  Result Date: 10/14/2024  Interpreted By:  Bharath Negrete, STUDY: XR KNEE LEFT 4+ VIEWS; 10/14/2024 11:13 am    INDICATION: Signs/Symptoms:Left knee pain   COMPARISON: None.   ACCESSION NUMBER(S): [id number]   ORDERING CLINICIAN: СВЕТЛАНА VASQUEZ   TECHNIQUE: Number of films: Four view radiographs of the left knee.   FINDINGS: No fractures or destructive lesions are identified. Bone island is noted in the medial tibial condyle. The joint spaces and articular surfaces are maintained considering patient's age. The alignment is anatomic. Vascular calcifications are noted. There is no significant effusion in the suprapatellar bursa.       Unremarkable left knee radiographs.   Signed by: Bharath Negrete 10/14/2024 11:58 AM Dictation workstation:   [id number]    XR lumbar spine 2-3 views  Result Date: 10/14/2024  Interpreted By:  Bharath Negrete, STUDY: XR LUMBAR SPINE 2-3 VIEWS; XR HIPS BILATERAL 5+ VW WITH PELVIS WHEN PERFORMED; 10/14/2024 11:13 am   INDICATION: Signs/Symptoms:Low back pain, fall; Signs/Symptoms:Bilateral hip pain, fall   COMPARISON: Radiographs of the pelvis from March 2024.   ACCESSION NUMBER(S): [id number]; [id number]   ORDERING CLINICIAN: СВЕТЛАНА VASQUEZ   TECHNIQUE: Three-view radiographs of the lumbar spine and additional five view radiographs of the pelvis and both hips were obtained.   FINDINGS: There is mild loss of height involving the L3 and L5 vertebra, however no acute fracture is identified. There is narrowing of the L1-L2, L2-L3, L3-L4 and mostly the L4-L5 disc spaces, with marginal osteophytes. The alignment is anatomic, without spondylolysis or spondylolisthesis. Hypertrophic changes involve the facet joints of the lower lumbar spine. Calcifications of the aorta and its branches are present.   No acute pelvic fracture is seen. Extensive osteoarthritis involves the right hip joint, with complete obliteration of the joint space, subchondral bone sclerosis, subchondral cysts and marginal osteophytes. Osteoarthritis also involves the left hip joint and to a lesser degree the sacroiliac joints  bilaterally. The alignment is anatomic. Vascular calcifications involve iliac and femoral arteries bilaterally.       1. Marked lumbar spondylosis and remote compression deformities of the L3 and L5 vertebra, without acute vertebral fracture or subluxation. 2. Extensive right hip osteoarthritis again noted. Additional degenerative changes in the pelvis as above. No pelvic fracture or subluxation.   Signed by: Bharath Negrete 10/14/2024 11:57 AM Dictation workstation:   [id number]    XR hips bilateral 5+ VW w pelvis when performed  Result Date: 10/14/2024  Interpreted By:  Bharath Negrete, STUDY: XR LUMBAR SPINE 2-3 VIEWS; XR HIPS BILATERAL 5+ VW WITH PELVIS WHEN PERFORMED; 10/14/2024 11:13 am   INDICATION: Signs/Symptoms:Low back pain, fall; Signs/Symptoms:Bilateral hip pain, fall   COMPARISON: Radiographs of the pelvis from March 2024.   ACCESSION NUMBER(S): [id number]; [id number]   ORDERING CLINICIAN: СВЕТЛАНА VASQUEZ   TECHNIQUE: Three-view radiographs of the lumbar spine and additional five view radiographs of the pelvis and both hips were obtained.   FINDINGS: There is mild loss of height involving the L3 and L5 vertebra, however no acute fracture is identified. There is narrowing of the L1-L2, L2-L3, L3-L4 and mostly the L4-L5 disc spaces, with marginal osteophytes. The alignment is anatomic, without spondylolysis or spondylolisthesis. Hypertrophic changes involve the facet joints of the lower lumbar spine. Calcifications of the aorta and its branches are present.   No acute pelvic fracture is seen. Extensive osteoarthritis involves the right hip joint, with complete obliteration of the joint space, subchondral bone sclerosis, subchondral cysts and marginal osteophytes. Osteoarthritis also involves the left hip joint and to a lesser degree the sacroiliac joints bilaterally. The alignment is anatomic. Vascular calcifications involve iliac and femoral arteries bilaterally.       1. Marked lumbar spondylosis and  remote compression deformities of the L3 and L5 vertebra, without acute vertebral fracture or subluxation. 2. Extensive right hip osteoarthritis again noted. Additional degenerative changes in the pelvis as above. No pelvic fracture or subluxation.   Signed by: Bharath Negrete 10/14/2024 11:57 AM Dictation workstation:   [id number]    XR chest 1 view  Result Date: 10/14/2024  Interpreted By:  Bharath Negrete, STUDY: XR CHEST 1 VIEW; 10/14/2024 11:13 am   INDICATION: Signs/Symptoms:Fall rib pain   COMPARISON: August 2024.   ACCESSION NUMBER(S): [id number]   ORDERING CLINICIAN: СВЕТЛАНА VASQUEZ   FINDINGS: The study is limited due to  lordotic projection and overlying artifacts obscuring some detail. The cardiomediastinal silhouette is within normal limits for the technique. Patchy/nodular infiltrates are seen in the right suprahilar region, with mild volume loss of the right upper lobe and elevation of the minor fissure. There is no pneumothorax or significant effusion. Marked degenerative changes involve the shoulders. There is also partially included deformity in the right humeral neck consistent with a healing fracture.       Limited study. Patchy/nodular consolidation in the right upper lobe, possibly pneumonia or atelectasis, however in the setting of injury, lung contusion can not be excluded. Correlate clinically follow-up in 1-2 weeks to document complete resolution.   Signed by: Bharath Negrete 10/14/2024 11:44 AM Dictation workstation:   [id number]    Visit Vitals  /60   Pulse 61   Temp 36.4 °C (97.5 °F) (Oral)   Resp 20      Physical Exam  Vitals and nursing note reviewed.   Constitutional:       General: She is not in acute distress.     Appearance: She is ill-appearing.   HENT:      Mouth/Throat:      Mouth: Mucous membranes are dry.   Eyes:      General: No scleral icterus.     Extraocular Movements: Extraocular movements intact.   Cardiovascular:      Rate and Rhythm: Normal rate and regular  "rhythm.      Pulses: Normal pulses.      Heart sounds: Normal heart sounds.      Comments: Rhythm sounds regular today  Pulmonary:      Breath sounds: Wheezing and rhonchi present.      Comments: On 7 liters nasal canula. Pulse ox 99%. Conversational dyspnea noted  Abdominal:      General: Abdomen is flat. There is no distension.      Palpations: Abdomen is soft.      Tenderness: There is no abdominal tenderness. There is no guarding.   Musculoskeletal:      Right lower leg: No edema.      Left lower leg: No edema.   Skin:     General: Skin is warm and dry.      Coloration: Skin is pale.      Findings: Bruising present.   Neurological:      General: No focal deficit present.      Mental Status: She is alert and oriented to person, place, and time.   Psychiatric:         Mood and Affect: Mood normal.         Behavior: Behavior normal.         Thought Content: Thought content normal.         Judgment: Judgment normal.          Assessment/Plan   IMP:    Acute on chronic respiratory failure/COPD exacerbation/HCAP - on Zosyn, probably some aspiration. Now on 7 liters  RADHA on CKD - getting IVF, avoid nephrotoxic agents. Creat better today 1.26 -> 0.95, AG 11  Acute on chronic pain - primary sites of pain are left shoulder, bilat hips, knees and low back pain radiates down legs. Denies numbness or tingling or paresthesias. She is getting tylenol ATC. Continue this. Added the following increased oxy IR frequency to q6 prn. Added 10 mg oxy IR dose for moderate to severe pain. Added cyclobenzaprine TID prn. Added duloxetine 30 mg daily x1 week then increase to 60 mg daily. Lexapro dc'd.   Afib - rate controlled. On [facility]     Palliative Care Encounter  DNR-CCA-DNI  Pt is capable  No HPOA or LW. Has two daughters, Taylor and Madhavi. If needed, they will share medical decision making authority.   Pt verbalized to me that she feels \"I don't want to live like this anymore. This is no way to live.\"  She also stated that she does not " "feel rehab will be beneficial for her. She tells me it was not helpful last time she went. We discussed shifting from the present treatment model of care to comfort measures only. We reviewed hospice and what that would entail if she were to decide she only wanted to be kept comfortable. She is agreeable to meet with [facility] for INFORMATIONAL PURPOSES prior to hospital discharge. Referral placed, care coordination to send info via [facility]. They will contact daughter Taylor to arrange appointment time.  D/w daughter Taylor over phone.      D/w Rupal Sosa CNP and secure chat sent to Dr. Lutz to update.    10/21/2024  No changes today from palliative perspective. She did not try the higher dose of oxy IR or Flexeril, but at time of visit states pain is \"OK\". If goes home with hospice would consider adding either Oxycontin or Fentanyl patch for basal pain control. She is meeting with [facility] today 1730 with her daughter Taylor. Will follow up tomorrow await outcome of hospice meeting.    10/22/2024  Had hospice meeting yesterday evening, pt cont to decline SNF. Daughter is looking into options hospice IPU vs LTC vs home with hospice. Asked Care Coordinator Mary MALLOY to follow up with her to review options. Pt still having tremendous amount of pain that is exacerbated by any movement. Added prn dose 0.2 mg Dilaudid IV asked nurse Basurto to pre-medicate for repositioning, etc. Added 12 mcg Fentanyl Duragesic patch for basal pain control.   D/w Esha Carlisle CNP.    Advance Directives Info: Patient does not have advance directive  Discharge Planning: either [facility] or LTC with hospice  Palliative Care Team will continue to follow patient.      Karla Goldsmith, APRN-CNP   "

## 2024-10-22 NOTE — PROGRESS NOTES
"Kamila Jones \"Elizabeth" is a 69 y.o. female on day 6 of admission seen in follow-up for abnormal chest imaging, fevers     Subjective   On 7 L mini high flow nasal cannula oxygen; oxygen sats 78%. Increased to 10 L to maintain oxygen sats greater than or equal to 92%. Endorses dyspnea and nonproductive cough. Tmax: 38.2 yesterday @ 0827.       Objective     Physical Exam  Vitals and nursing note reviewed.   Constitutional:       General: She is awake.      Appearance: Normal appearance. She is obese. She is ill-appearing.   HENT:      Head: Normocephalic and atraumatic.      Nose: Nose normal.      Mouth/Throat:      Mouth: Mucous membranes are moist.   Eyes:      Extraocular Movements: Extraocular movements intact.      Conjunctiva/sclera: Conjunctivae normal.      Pupils: Pupils are equal, round, and reactive to light.   Neck:      Thyroid: No thyroid mass.      Trachea: Phonation normal.   Cardiovascular:      Rate and Rhythm: Normal rate and regular rhythm.      Pulses: Normal pulses.      Heart sounds: Normal heart sounds. No murmur heard.     No gallop.   Pulmonary:      Effort: Pulmonary effort is normal. No respiratory distress.      Breath sounds: Normal air entry. No decreased breath sounds, wheezing, rhonchi or rales.      Comments: Coarse breath sounds bilaterally.  Abdominal:      General: Bowel sounds are normal. There is no distension.      Palpations: Abdomen is soft.      Tenderness: There is no abdominal tenderness.   Musculoskeletal:         General: Normal range of motion.      Cervical back: Neck supple.      Right lower leg: No edema.      Left lower leg: No edema.   Skin:     General: Skin is warm and dry.      Capillary Refill: Capillary refill takes less than 2 seconds.   Neurological:      General: No focal deficit present.      Mental Status: She is alert and oriented to person, place, and time. Mental status is at baseline.      Cranial Nerves: Cranial nerves 2-12 are intact.      Motor: " "Motor function is intact.   Psychiatric:         Attention and Perception: Attention and perception normal.         Mood and Affect: Mood normal.         Speech: Speech normal.         Behavior: Behavior normal.         Last Recorded Vitals  Blood pressure (!) 114/45, pulse 58, temperature 36.6 °C (97.9 °F), temperature source Temporal, resp. rate 21, height 1.676 m (5' 5.98\"), weight 56.2 kg (123 lb 14.4 oz), SpO2 97%.  Intake/Output last 3 Shifts:  I/O last 3 completed shifts:  In: 1288.8 (22.9 mL/kg) [I.V.:1288.8 (22.9 mL/kg)]  Out: 550 (9.8 mL/kg) [Urine:550 (0.3 mL/kg/hr)]  Weight: 56.2 kg       Intake/Output Summary (Last 24 hours) at 10/22/2024 0832  Last data filed at 10/21/2024 2322  Gross per 24 hour   Intake 0 ml   Output 350 ml   Net -350 ml      Scheduled medications:  acetaminophen, 1,000 mg, oral, q8h JENNYFER  amiodarone, 200 mg, oral, Daily  apixaban, 5 mg, oral, q12h  DULoxetine, 30 mg, oral, Daily  [START ON 10/27/2024] DULoxetine, 60 mg, oral, Daily  famotidine, 20 mg, oral, Daily  tiotropium, 2 puff, inhalation, Daily   And  fluticasone furoate-vilanteroL, 1 puff, inhalation, Daily  gabapentin, 300 mg, oral, TID  ipratropium-albuteroL, 3 mL, nebulization, TID  lidocaine, 2 patch, transdermal, Daily  metoprolol succinate XL, 25 mg, oral, BID  mirtazapine, 15 mg, oral, Nightly  oxygen, , inhalation, Continuous - Inhalation  piperacillin-tazobactam, 3.375 g, intravenous, q6h  primidone, 200 mg, oral, BID     PRN medications: albuterol, cyclobenzaprine, hydrALAZINE, ondansetron ODT **OR** ondansetron, oxyCODONE, oxyCODONE, polyethylene glycol     oxygen, , Last Rate: 8 L/min (10/21/24 1337)       Relevant Results  Results for orders placed or performed during the hospital encounter of 10/14/24 (from the past 24 hours)   Basic metabolic panel   Result Value Ref Range    Glucose 106 (H) 74 - 99 mg/dL    Sodium 140 136 - 145 mmol/L    Potassium 4.1 3.5 - 5.3 mmol/L    Chloride 104 98 - 107 mmol/L    " Bicarbonate 27 21 - 32 mmol/L    Anion Gap 13 10 - 20 mmol/L    Urea Nitrogen 13 6 - 23 mg/dL    Creatinine 0.92 0.50 - 1.05 mg/dL    eGFR 67 >60 mL/min/1.73m*2    Calcium 8.7 8.6 - 10.3 mg/dL   CBC   Result Value Ref Range    WBC 7.0 4.4 - 11.3 x10*3/uL    nRBC 0.0 0.0 - 0.0 /100 WBCs    RBC 2.80 (L) 4.00 - 5.20 x10*6/uL    Hemoglobin 8.8 (L) 12.0 - 16.0 g/dL    Hematocrit 29.7 (L) 36.0 - 46.0 %     (H) 80 - 100 fL    MCH 31.4 26.0 - 34.0 pg    MCHC 29.6 (L) 32.0 - 36.0 g/dL    RDW 16.4 (H) 11.5 - 14.5 %    Platelets 340 150 - 450 x10*3/uL     *Note: Due to a large number of results and/or encounters for the requested time period, some results have not been displayed. A complete set of results can be found in Results Review.     XR chest 1 view  Result Date: 10/18/2024  FINDINGS: The study is limited due to  rotation. The cardiac silhouette is within normal limits for the technique. There is worsening consolidation in the right upper lobe. There is no pneumothorax or significant pleural effusion. Degenerative changes involve the spine and shoulders. Deformity of the right proximal humerus is again seen due to remote injury. IMPRESSION: Worsening right upper lobe consolidation, possibly due to pneumonia. Correlate clinically and follow-up to document complete resolution.     Transthoracic Echo (TTE) Complete  Result Date: 10/16/2024  CONCLUSIONS: 1. The left ventricular systolic function is normal, with a visually estimated ejection fraction of 60-65%. 2. There is normal right ventricular global systolic function. 3. Mild mitral valve regurgitation. 4. Slightly elevated right ventricular systolic pressure. 5. Trace tricuspid regurgitation is visualized.    CT chest wo IV contrast  Result Date: 10/16/2024  FINDINGS: LIMITATIONS/LINES:   None.   HEART:   Heart is within normal limits of size.  No significant pericardial effusion. There is rather extensive coronary artery calcification observed.    MEDIASTINUM/HAIR:   There is no aneurysm of the thoracic aorta. Atherosclerosis of the thoracic aorta is seen. There is stable precarinal adenopathy measuring 11 mm in short axis diameter with no additional enlarged mediastinal lymph nodes observed. No obvious hilar mass or adenopathy is seen on this study limited by lack of intravenous contrast administration.   PLEURA:   Unremarkable.   LUNG PARENCHYMA:   Pulmonary emphysema is observed. There is a 3 mm left upper lobe pulmonary nodule seen on series 7 axial image 121 unchanged. A 4 mm right lower lobe pulmonary nodule is present on series 7 axial image 160 with adjacent 3 mm right lower lobe pulmonary nodule on axial image 156. These can be seen on prior study as well. Re-identified is an irregularly marginated and spiculated nodule at the base of the right lower lobe measuring 1.3 x 1.9 cm in size without interval change.   When compared with prior examination, the infiltrate and airspace consolidation seen within the left lower lobe has nearly completely resolved with a small amount of residual airspace consolidation within left lower lobe posteromedially. However, there is now new infiltrate and airspace consolidation within the posterior segment of the right upper lobe.   BONES:   No acute osseous abnormality is observed. There are degenerative changes of the thoracic spine.   CHEST WALL/OTHER:   On images taken through upper abdomen, renal artery calcification is observed and there is ectasia of the abdominal aorta.  There is new infiltrate and airspace consolidation in the posterior segment of right upper lobe with 5 mm cavitary component within the consolidated pulmonary parenchyma. Appearance is consistent with a pneumonia of the right upper lobe.   Near complete resolution of the left lower lobe pneumonia.   Stable pulmonary nodules including a 1.3 x 1.9 cm spiculated nodule at the base of the right lower lobe.   Stable mild precarinal lymphadenopathy.    Rather extensive coronary artery calcifications.       XR knee left 4+ views  Result Date: 10/14/2024  FINDINGS: No fractures or destructive lesions are identified. Bone island is noted in the medial tibial condyle. The joint spaces and articular surfaces are maintained considering patient's age. The alignment is anatomic. Vascular calcifications are noted. There is no significant effusion in the suprapatellar bursa.  Unremarkable left knee radiographs.       XR lumbar spine 2-3 views  Result Date: 10/14/2024  FINDINGS: There is mild loss of height involving the L3 and L5 vertebra, however no acute fracture is identified. There is narrowing of the L1-L2, L2-L3, L3-L4 and mostly the L4-L5 disc spaces, with marginal osteophytes. The alignment is anatomic, without spondylolysis or spondylolisthesis. Hypertrophic changes involve the facet joints of the lower lumbar spine. Calcifications of the aorta and its branches are present.   No acute pelvic fracture is seen. Extensive osteoarthritis involves the right hip joint, with complete obliteration of the joint space, subchondral bone sclerosis, subchondral cysts and marginal osteophytes. Osteoarthritis also involves the left hip joint and to a lesser degree the sacroiliac joints bilaterally. The alignment is anatomic. Vascular calcifications involve iliac and femoral arteries bilaterally. 1. Marked lumbar spondylosis and remote compression deformities of the L3 and L5 vertebra, without acute vertebral fracture or subluxation. 2. Extensive right hip osteoarthritis again noted. Additional degenerative changes in the pelvis as above. No pelvic fracture or subluxation.     XR hips bilateral 5+ VW w pelvis when performed  Result Date: 10/14/2024  FINDINGS: There is mild loss of height involving the L3 and L5 vertebra, however no acute fracture is identified. There is narrowing of the L1-L2, L2-L3, L3-L4 and mostly the L4-L5 disc spaces, with marginal osteophytes. The alignment  is anatomic, without spondylolysis or spondylolisthesis. Hypertrophic changes involve the facet joints of the lower lumbar spine. Calcifications of the aorta and its branches are present.   No acute pelvic fracture is seen. Extensive osteoarthritis involves the right hip joint, with complete obliteration of the joint space, subchondral bone sclerosis, subchondral cysts and marginal osteophytes. Osteoarthritis also involves the left hip joint and to a lesser degree the sacroiliac joints bilaterally. The alignment is anatomic. Vascular calcifications involve iliac and femoral arteries bilaterally.  1. Marked lumbar spondylosis and remote compression deformities of the L3 and L5 vertebra, without acute vertebral fracture or subluxation. 2. Extensive right hip osteoarthritis again noted. Additional degenerative changes in the pelvis as above. No pelvic fracture or subluxation.     XR chest 1 view  Result Date: 10/14/2024  FINDINGS: The study is limited due to  lordotic projection and overlying artifacts obscuring some detail. The cardiomediastinal silhouette is within normal limits for the technique. Patchy/nodular infiltrates are seen in the right suprahilar region, with mild volume loss of the right upper lobe and elevation of the minor fissure. There is no pneumothorax or significant effusion. Marked degenerative changes involve the shoulders. There is also partially included deformity in the right humeral neck consistent with a healing fracture.  Limited study. Patchy/nodular consolidation in the right upper lobe, possibly pneumonia or atelectasis, however in the setting of injury, lung contusion can not be excluded. Correlate clinically follow-up in 1-2 weeks to document complete resolution.       Assessment/Plan   Acute on chronic hypoxic respiratory failure  Wean oxygen as sats allow  BiPAP nightly while in hospital-continue NIV at home, she is monitored is  using device consistently and benefiting from  it  Continuous pulse oximetry  Incentive spirometry/pulmonary hygiene/NT suction     Healthcare acquired pneumonia  Continue IV Zosyn and discharge on Augmentin  Follow-up cultures  Stat portable chest x-ray  Re swab for COVID-19    Acute on chronic bilateral hip pain/low back pain  Analgesia   Follows with Pain Management     Right lower lobe spiculated lung nodule   CT scan chest without contrast reviewed with collaborating physician Dr. Davis  Follow-up CT scan chest in 3-6 weeks     COPD  Continue IBD/ICS-will add Mucomyst  NT suction per RT     Atrial fibrillation  On Eliquis     Obstructive sleep apnea  BiPAP nightly while in hospital     PT/OT/out of bed  Palliative Medicine following  Code Status: DNR CCA DNI  [facility] referral placed-informational meeting yesterday    Emmie Interiano, MOISES-CNP

## 2024-10-22 NOTE — PROGRESS NOTES
"Physical Therapy                 Therapy Communication Note    Patient Name: Kamila Jones \"Crystal\"  MRN: [id number]  Department: [facility] S  Room: [address] [place] [address]-A  Today's Date: 10/22/2024     Discipline: Physical Therapy    Missed Visit Reason: Missed Visit Reason: Other (Comment) (Nurse reports having low Sp02 at this time with increased congestion)    Missed Time: Cancel    Comment:  "

## 2024-10-22 NOTE — PROGRESS NOTES
"Kamila Jones \"Elizabeth" is a 69 y.o. female on day 6 of admission presenting with Chronic low back pain without sciatica, unspecified back pain laterality.      Subjective   Pt more alert today. C/o bilateral leg pain. She in sure she has no intention or going to SNF. Daughter is deciding on signing with Hospice. Currently checking insurance benefits. Pt back on O2 8 liters for O2 desats. C/o intermittent dyspnea. Receiving IV Abx for pneumonia.      Objective     Last Recorded Vitals  /60   Pulse 61   Temp 36.4 °C (97.5 °F) (Oral)   Resp 20   Wt 56.2 kg (123 lb 14.4 oz)   SpO2 98%   Intake/Output last 3 Shifts:    Intake/Output Summary (Last 24 hours) at 10/22/2024 1329  Last data filed at 10/21/2024 2322  Gross per 24 hour   Intake --   Output 350 ml   Net -350 ml       Admission Weight  Weight: 56.2 kg (124 lb) (10/14/24 0942)    Daily Weight  10/14/24 : 56.2 kg (123 lb 14.4 oz)    Image Results    No new imaging to review.     Physical Exam  Constitutional:       Appearance: She is ill-appearing.      Comments: Lethargic   HENT:      Head: Normocephalic and atraumatic.      Nose: Nose normal.      Mouth/Throat:      Mouth: Mucous membranes are moist.      Pharynx: Oropharynx is clear.   Eyes:      Extraocular Movements: Extraocular movements intact.      Pupils: Pupils are equal, round, and reactive to light.   Cardiovascular:      Rate and Rhythm: Normal rate and regular rhythm.      Pulses: Normal pulses.   Pulmonary:      Effort: Pulmonary effort is normal.      Breath sounds: Rhonchi present.   Abdominal:      General: Abdomen is flat. Bowel sounds are normal.      Palpations: Abdomen is soft.   Musculoskeletal:         General: Normal range of motion.   Skin:     General: Skin is warm and dry.      Capillary Refill: Capillary refill takes less than 2 seconds.   Neurological:      General: No focal deficit present.      Mental Status: She is oriented to person, place, and time.   Psychiatric:       "   Mood and Affect: Mood normal.         Relevant Results    Results for orders placed or performed during the hospital encounter of 10/14/24 (from the past 24 hours)   Basic metabolic panel   Result Value Ref Range    Glucose 106 (H) 74 - 99 mg/dL    Sodium 140 136 - 145 mmol/L    Potassium 4.1 3.5 - 5.3 mmol/L    Chloride 104 98 - 107 mmol/L    Bicarbonate 27 21 - 32 mmol/L    Anion Gap 13 10 - 20 mmol/L    Urea Nitrogen 13 6 - 23 mg/dL    Creatinine 0.92 0.50 - 1.05 mg/dL    eGFR 67 >60 mL/min/1.73m*2    Calcium 8.7 8.6 - 10.3 mg/dL   CBC   Result Value Ref Range    WBC 7.0 4.4 - 11.3 x10*3/uL    nRBC 0.0 0.0 - 0.0 /100 WBCs    RBC 2.80 (L) 4.00 - 5.20 x10*6/uL    Hemoglobin 8.8 (L) 12.0 - 16.0 g/dL    Hematocrit 29.7 (L) 36.0 - 46.0 %     (H) 80 - 100 fL    MCH 31.4 26.0 - 34.0 pg    MCHC 29.6 (L) 32.0 - 36.0 g/dL    RDW 16.4 (H) 11.5 - 14.5 %    Platelets 340 150 - 450 x10*3/uL             Assessment/Plan      Acute on Chronic Low Back Pain and Bilateral Hip Pain / Inability to Ambulate  -Has known history of significant OA with back and hip pain.   -Followed with pain management and attempted hip injections with no relief.   -Unable to have surgery due to her pulmonary status and co-morbidities.   -Palliative follows and is adjusting pain meds.   -PT/OT recommending moderate intensity. Due to overall decline pt has decided she does not want SNF.   -All imaging done shows OA and degenerative changes, no acute fractures.     Healthcare Aquired Pneumonia / Acute on Chronic Respiratory Failure  -CT chest showed new infiltrate RUL, likely HCAP and we are treating with zosyn, vanco stopped as the MRSA swab was negative.  -Episode of coughing, vomiting 10/18, spiked fever with worsening respiratory status and hypoxia. Likely aspirated. CXR checked with worsening RUL consolidation. She was increased to 8L NC mini bubbler, given breathing treatments and IV steroid x1.   -Remains on 8 liters, wean as able  -Blood  cultures no growth.    -Strep/legionella negative.     RADHA   -Resolved     COPD   -No wheeze on exam today.  -Continue home inhalers.      Chronic Respiratory Failure / RAMON  -Baseline O2 3L NC continuous at home. Currently requiring 8 liters, wean as able.  -Continue BiPAP at HS.   -CXR with patchy nodular consolidation RUL with known lung mass, she just had PET CT as outpatient and is currently following with her PCP for this.  -Continue home inhalers.      Atrial Fibrillation with RVR  -On Eliquis.   -Cardio follows, currently on amiodarone and metoprolol, continue.   -Monitor on telemetry.      HTN  -Meds adjusted and BP is stable.   -Monitor.     DVT Risk  -On Eliquis.   -SCDs.      Dispo  Will likely sign with Hospice. To home with Hospice vs Hospice House.      MOISES Priest-CNP

## 2024-10-22 NOTE — CARE PLAN
Problem: Respiratory  Goal: Wean oxygen to maintain O2 saturation per order/standard this shift  Outcome: Not Progressing  Goal: No signs of respiratory distress (eg. Use of accessory muscles. Peds grunting)  Outcome: Not Progressing  Goal: Minimal/no exertional discomfort or dyspnea this shift  Outcome: Not Progressing     Problem: Respiratory  Goal: Clear secretions with interventions this shift  Outcome: Progressing     Problem: Respiratory  Goal: Wean oxygen to maintain O2 saturation per order/standard this shift  Outcome: Not Progressing  Goal: No signs of respiratory distress (eg. Use of accessory muscles. Peds grunting)  Outcome: Not Progressing  Goal: Minimal/no exertional discomfort or dyspnea this shift  Outcome: Not Progressing

## 2024-10-22 NOTE — PROGRESS NOTES
"Occupational Therapy                 Therapy Communication Note    Patient Name: Kamila Jones \"Crystal\"  MRN: [id number]  Department: [facility] 3 S  Room: [address] [place], [place] [address]  Today's Date: 10/22/2024     Discipline: Occupational Therapy    Missed Visit Reason: Missed Visit Reason: Cancel (nurse cancel due to change in respiratory status)    Missed Time: Cancel    Comment:  "

## 2024-10-22 NOTE — PROGRESS NOTES
10/22/24 1427   Discharge Planning   Living Arrangements Children;Family members   Support Systems Children;Family members   Type of Residence Private residence   Who is requesting discharge planning? Provider   Home or Post Acute Services Other (Comment)  (Spoke with daughter and is leaning towards hospice house.)   Type of Post Acute Facility Services Other (Comment)  (Daughter is leaning towards Hospice House)   Type of Home Care Services [facility]osp[facility]   [facility]charge Disposition [facility]   Does the patient need discharge transport arranged? Yes   RoundTrip coordination needed? Yes   Has discharge transport been arranged? No     Spoke with daughter Nan,patient is leaning towards hospice house for symptom control and then once patient stabilizes and symptoms under control daughter is will to take her home under hospice vs nursing home with hospice; depending if she is able to care for the patient. Daughter is going to call Hospice  and start the process. List of nursing facilities emailed to [email or web address] as resource in case she does go to a nursing facility.

## 2024-10-23 VITALS
HEART RATE: 55 BPM | TEMPERATURE: 97 F | SYSTOLIC BLOOD PRESSURE: 112 MMHG | RESPIRATION RATE: 16 BRPM | DIASTOLIC BLOOD PRESSURE: 53 MMHG | WEIGHT: 123.9 LBS | HEIGHT: 66 IN | OXYGEN SATURATION: 92 % | BODY MASS INDEX: 19.91 KG/M2

## 2024-10-23 LAB
ANION GAP SERPL CALCULATED.3IONS-SCNC: 12 MMOL/L (ref 10–20)
BUN SERPL-MCNC: 14 MG/DL (ref 6–23)
CALCIUM SERPL-MCNC: 8.9 MG/DL (ref 8.6–10.3)
CHLORIDE SERPL-SCNC: 102 MMOL/L (ref 98–107)
CO2 SERPL-SCNC: 31 MMOL/L (ref 21–32)
CREAT SERPL-MCNC: 0.91 MG/DL (ref 0.5–1.05)
EGFRCR SERPLBLD CKD-EPI 2021: 68 ML/MIN/1.73M*2
ERYTHROCYTE [DISTWIDTH] IN BLOOD BY AUTOMATED COUNT: 15.9 % (ref 11.5–14.5)
GLUCOSE SERPL-MCNC: 91 MG/DL (ref 74–99)
HCT VFR BLD AUTO: 28.4 % (ref 36–46)
HGB BLD-MCNC: 8.5 G/DL (ref 12–16)
MCH RBC QN AUTO: 31.1 PG (ref 26–34)
MCHC RBC AUTO-ENTMCNC: 29.9 G/DL (ref 32–36)
MCV RBC AUTO: 104 FL (ref 80–100)
NRBC BLD-RTO: 0 /100 WBCS (ref 0–0)
PLATELET # BLD AUTO: 366 X10*3/UL (ref 150–450)
POTASSIUM SERPL-SCNC: 3.9 MMOL/L (ref 3.5–5.3)
RBC # BLD AUTO: 2.73 X10*6/UL (ref 4–5.2)
SODIUM SERPL-SCNC: 141 MMOL/L (ref 136–145)
WBC # BLD AUTO: 9.5 X10*3/UL (ref 4.4–11.3)

## 2024-10-23 PROCEDURE — 99232 SBSQ HOSP IP/OBS MODERATE 35: CPT | Performed by: NURSE PRACTITIONER

## 2024-10-23 PROCEDURE — 94640 AIRWAY INHALATION TREATMENT: CPT

## 2024-10-23 PROCEDURE — 99239 HOSP IP/OBS DSCHRG MGMT >30: CPT | Performed by: NURSE PRACTITIONER

## 2024-10-23 PROCEDURE — 2500000001 HC RX 250 WO HCPCS SELF ADMINISTERED DRUGS (ALT 637 FOR MEDICARE OP): Performed by: NURSE PRACTITIONER

## 2024-10-23 PROCEDURE — 36415 COLL VENOUS BLD VENIPUNCTURE: CPT | Performed by: NURSE PRACTITIONER

## 2024-10-23 PROCEDURE — 2500000001 HC RX 250 WO HCPCS SELF ADMINISTERED DRUGS (ALT 637 FOR MEDICARE OP)

## 2024-10-23 PROCEDURE — 2500000004 HC RX 250 GENERAL PHARMACY W/ HCPCS (ALT 636 FOR OP/ED): Performed by: NURSE PRACTITIONER

## 2024-10-23 PROCEDURE — 85027 COMPLETE CBC AUTOMATED: CPT | Performed by: NURSE PRACTITIONER

## 2024-10-23 PROCEDURE — 2500000002 HC RX 250 W HCPCS SELF ADMINISTERED DRUGS (ALT 637 FOR MEDICARE OP, ALT 636 FOR OP/ED): Performed by: INTERNAL MEDICINE

## 2024-10-23 PROCEDURE — 2500000002 HC RX 250 W HCPCS SELF ADMINISTERED DRUGS (ALT 637 FOR MEDICARE OP, ALT 636 FOR OP/ED): Performed by: NURSE PRACTITIONER

## 2024-10-23 PROCEDURE — 80048 BASIC METABOLIC PNL TOTAL CA: CPT | Performed by: NURSE PRACTITIONER

## 2024-10-23 PROCEDURE — 94668 MNPJ CHEST WALL SBSQ: CPT

## 2024-10-23 PROCEDURE — 2500000005 HC RX 250 GENERAL PHARMACY W/O HCPCS: Performed by: NURSE PRACTITIONER

## 2024-10-23 PROCEDURE — 9420000001 HC RT PATIENT EDUCATION 5 MIN

## 2024-10-23 PROCEDURE — 99233 SBSQ HOSP IP/OBS HIGH 50: CPT | Performed by: NURSE PRACTITIONER

## 2024-10-23 PROCEDURE — 94760 N-INVAS EAR/PLS OXIMETRY 1: CPT

## 2024-10-23 RX ORDER — DEXAMETHASONE 4 MG/1
4 TABLET ORAL
Status: DISCONTINUED | OUTPATIENT
Start: 2024-10-24 | End: 2024-10-23 | Stop reason: HOSPADM

## 2024-10-23 RX ORDER — ACETAMINOPHEN 500 MG
1000 TABLET ORAL EVERY 8 HOURS SCHEDULED
Status: DISCONTINUED | OUTPATIENT
Start: 2024-10-23 | End: 2024-10-23 | Stop reason: HOSPADM

## 2024-10-23 RX ORDER — PANTOPRAZOLE SODIUM 40 MG/10ML
40 INJECTION, POWDER, LYOPHILIZED, FOR SOLUTION INTRAVENOUS ONCE
Status: COMPLETED | OUTPATIENT
Start: 2024-10-23 | End: 2024-10-23

## 2024-10-23 ASSESSMENT — PAIN SCALES - GENERAL
PAINLEVEL_OUTOF10: 10 - WORST POSSIBLE PAIN
PAINLEVEL_OUTOF10: 0 - NO PAIN
PAINLEVEL_OUTOF10: 10 - WORST POSSIBLE PAIN
PAINLEVEL_OUTOF10: 0 - NO PAIN

## 2024-10-23 ASSESSMENT — PAIN - FUNCTIONAL ASSESSMENT
PAIN_FUNCTIONAL_ASSESSMENT: 0-10
PAIN_FUNCTIONAL_ASSESSMENT: 0-10
PAIN_FUNCTIONAL_ASSESSMENT: WONG-BAKER FACES
PAIN_FUNCTIONAL_ASSESSMENT: 0-10

## 2024-10-23 ASSESSMENT — PAIN DESCRIPTION - ORIENTATION
ORIENTATION: RIGHT;LEFT
ORIENTATION: RIGHT;LEFT

## 2024-10-23 ASSESSMENT — PAIN SCALES - WONG BAKER: WONGBAKER_NUMERICALRESPONSE: NO HURT

## 2024-10-23 ASSESSMENT — PAIN DESCRIPTION - LOCATION
LOCATION: SHOULDER
LOCATION: SHOULDER

## 2024-10-23 NOTE — CARE PLAN
Problem: Discharge Planning  Goal: Discharge to home or other facility with appropriate resources  Outcome: Progressing     Problem: Chronic Conditions and Co-morbidities  Goal: Patient's chronic conditions and co-morbidity symptoms are monitored and maintained or improved  Outcome: Progressing     Problem: Fall/Injury  Goal: Verbalize understanding of risk factor reduction measures to prevent injury from fall in the home  Outcome: Progressing     Problem: Skin  Goal: Prevent/minimize sheer/friction injuries  Outcome: Progressing  Flowsheets (Taken 10/23/2024 0149)  Prevent/minimize sheer/friction injuries:   Complete micro-shifts as needed if patient unable. Adjust patient position to relieve pressure points, not a full turn   Turn/reposition every 2 hours/use positioning/transfer devices

## 2024-10-23 NOTE — PROGRESS NOTES
10/23/24 0844   Discharge Planning   [facility]   Does the patient need discharge transport arranged? Yes   RoundTrip coordination needed? Yes   Has discharge transport been arranged? No     MSW reached out to [facility] at this time to check on status of referral. Made them aware provider is ready to discharge.     9:00 AM  Per [facility] patient/family cancelled referral yesterday and only want information at this time. Provider updated and she states patient does not want SNF and did want hospice.     MSW met with patient to discuss goals of care. She confirmed no SNF and wants to be kept comfortable. She confirmed she does want hospice and requests we speak with daughter Taylor regarding hospice care at home vs hospice house. Provider updated and palliative medicine also to contact the family.     9:20 AM MSW called patient's daughter, Taylor. She states things happened quickly, but states understanding the process once she saw her on Monday.     Patient lives with daughter in a spare bedroom, but does not have the support that she would need to bring her home.     They are open to hospice house with transition to a nursing home. [facility] updated to the same. Reached out to [facility] to check if they are able to accept under hospice services.     Medical team updated to the above.     10:16 AM [facility] advised they will reach out to dispatch and advise.     [facility] is checking bed availability for long-term care/Medicaid with hospice. Requested patient's current oxygen requirement and were updated to the same.     11:46 AM  Reached out to [facility] to check on status; they advised they will update once something is scheduled.     [facility] will advise regarding room availability when able, but would like to be kept updated regarding oxygen needs at 10lpm it higher than they typically accept/manage on site. They did also state patient would have to transition to [facility] if she were to come to their  facility; daughter is aware of the same. Care Transitions will follow.

## 2024-10-23 NOTE — PROGRESS NOTES
"Occupational Therapy                 Therapy Communication Note    Patient Name: Kamila Jones \"Crystal\"  MRN: [id number]  Department: [facility] 3 S  Room: [facility]  Today's Date: 10/23/2024     Discipline: Occupational Therapy    Missed Visit Reason:      Missed Time: Cancel    Comment:  Spoke w/ nurse Basurto, per nurse, cancel therapy as arrangements are being made for pt to go to [facility]osp[facility].  Will cancel OT at this time.   "

## 2024-10-23 NOTE — CARE PLAN
Problem: Respiratory  Goal: Wean oxygen to maintain O2 saturation per order/standard this shift  Outcome: Progressing  Goal: No signs of respiratory distress (eg. Use of accessory muscles. Peds grunting)  Outcome: Progressing  Goal: Clear secretions with interventions this shift  Outcome: Progressing  Goal: Minimize anxiety/maximize coping throughout shift  Outcome: Progressing  Goal: Minimal/no exertional discomfort or dyspnea this shift  Outcome: Progressing  Goal: Verbalize decreased shortness of breath this shift  Outcome: Progressing

## 2024-10-23 NOTE — PROGRESS NOTES
10/23/24 1634   Discharge Planning   Living Arrangements Children;Family members   Support Systems Children;Family members   Home or Post Acute Services Other (Comment)  ([facility])   Type of Home Care Services Hospice   Expected Discharge Disposition [facility]  ([facility])   Does the patient need discharge transport arranged? Yes   RoundTrip coordination needed? Yes   Has discharge transport been arranged? Yes   What day is the transport expected? 10/23/24   What time is the transport expected? 2000     Patient will be discharged to [facility] this evening at 2000.

## 2024-10-23 NOTE — PROGRESS NOTES
"Kamila Jones \"Elizabeth" is a 69 y.o. female on day 7 of admission seen in follow-up for abnormal chest imaging, fevers     Subjective   On 7 L mini high flow nasal cannula oxygen; oxygen sats 99%. Endorses breathing and an intermittently productive cough.  Afebrile.        Objective     Physical Exam  Vitals and nursing note reviewed.   Constitutional:       General: She is awake.      Appearance: Normal appearance. She is obese. She is ill-appearing.   HENT:      Head: Normocephalic and atraumatic.      Nose: Nose normal.      Mouth/Throat:      Mouth: Mucous membranes are moist.   Eyes:      Extraocular Movements: Extraocular movements intact.      Conjunctiva/sclera: Conjunctivae normal.      Pupils: Pupils are equal, round, and reactive to light.   Neck:      Thyroid: No thyroid mass.      Trachea: Phonation normal.   Cardiovascular:      Rate and Rhythm: Normal rate and regular rhythm.      Pulses: Normal pulses.      Heart sounds: Normal heart sounds. No murmur heard.     No gallop.   Pulmonary:      Effort: Pulmonary effort is normal. No respiratory distress.      Breath sounds: Normal air entry. No decreased breath sounds, wheezing, rhonchi or rales.      Comments: Coarse breath sounds bilaterally.  Abdominal:      General: Bowel sounds are normal. There is no distension.      Palpations: Abdomen is soft.      Tenderness: There is no abdominal tenderness.   Musculoskeletal:         General: Normal range of motion.      Cervical back: Neck supple.      Right lower leg: No edema.      Left lower leg: No edema.   Skin:     General: Skin is warm and dry.      Capillary Refill: Capillary refill takes less than 2 seconds.   Neurological:      General: No focal deficit present.      Mental Status: She is alert and oriented to person, place, and time. Mental status is at baseline.      Cranial Nerves: Cranial nerves 2-12 are intact.      Motor: Motor function is intact.   Psychiatric:         Attention and " "Perception: Attention and perception normal.         Mood and Affect: Mood normal.         Speech: Speech normal.         Behavior: Behavior normal.         Last Recorded Vitals  Blood pressure 154/64, pulse 58, temperature 37.1 °C (98.8 °F), temperature source Oral, resp. rate 20, height 1.676 m (5' 5.98\"), weight 56.2 kg (123 lb 14.4 oz), SpO2 98%.  Intake/Output last 3 Shifts:  I/O last 3 completed shifts:  In: 800 (14.2 mL/kg) [IV Piggyback:800]  Out: 350 (6.2 mL/kg) [Urine:350 (0.2 mL/kg/hr)]  Weight: 56.2 kg       Intake/Output Summary (Last 24 hours) at 10/23/2024 0839  Last data filed at 10/23/2024 0552  Gross per 24 hour   Intake 800 ml   Output --   Net 800 ml      Scheduled medications:  acetylcysteine, 3 mL, nebulization, TID  amiodarone, 200 mg, oral, Daily  apixaban, 5 mg, oral, q12h  DULoxetine, 30 mg, oral, Daily  [START ON 10/27/2024] DULoxetine, 60 mg, oral, Daily  famotidine, 20 mg, oral, Daily  fentaNYL, 1 patch, transdermal, q72h  tiotropium, 2 puff, inhalation, Daily   And  fluticasone furoate-vilanteroL, 1 puff, inhalation, Daily  gabapentin, 300 mg, oral, TID  ipratropium-albuteroL, 3 mL, nebulization, TID  lidocaine, 2 patch, transdermal, Daily  metoprolol succinate XL, 25 mg, oral, BID  mirtazapine, 15 mg, oral, Nightly  oxygen, , inhalation, Continuous - Inhalation  piperacillin-tazobactam, 3.375 g, intravenous, q6h  primidone, 200 mg, oral, BID     PRN medications: albuterol, cyclobenzaprine, hydrALAZINE, HYDROmorphone, ondansetron ODT **OR** ondansetron, oxyCODONE, oxyCODONE, polyethylene glycol     oxygen, , Last Rate: 8 L/min (10/21/24 1337)       Relevant Results  Results for orders placed or performed during the hospital encounter of 10/14/24 (from the past 24 hours)   Sars-CoV-2 PCR   Result Value Ref Range    Coronavirus 2019, PCR Not Detected Not Detected   CBC   Result Value Ref Range    WBC 9.5 4.4 - 11.3 x10*3/uL    nRBC 0.0 0.0 - 0.0 /100 WBCs    RBC 2.73 (L) 4.00 - 5.20 " x10*6/uL    Hemoglobin 8.5 (L) 12.0 - 16.0 g/dL    Hematocrit 28.4 (L) 36.0 - 46.0 %     (H) 80 - 100 fL    MCH 31.1 26.0 - 34.0 pg    MCHC 29.9 (L) 32.0 - 36.0 g/dL    RDW 15.9 (H) 11.5 - 14.5 %    Platelets 366 150 - 450 x10*3/uL   Basic metabolic panel   Result Value Ref Range    Glucose 91 74 - 99 mg/dL    Sodium 141 136 - 145 mmol/L    Potassium 3.9 3.5 - 5.3 mmol/L    Chloride 102 98 - 107 mmol/L    Bicarbonate 31 21 - 32 mmol/L    Anion Gap 12 10 - 20 mmol/L    Urea Nitrogen 14 6 - 23 mg/dL    Creatinine 0.91 0.50 - 1.05 mg/dL    eGFR 68 >60 mL/min/1.73m*2    Calcium 8.9 8.6 - 10.3 mg/dL     XR chest 1 view  Result Date: 10/22/2024  FINDINGS: The heart is normal in size. The lungs are hyperinflated. There is bilateral parenchymal infiltration. There is dense consolidation in the right upper lobe. There are atherosclerotic changes of the aorta. There is an old right rib fracture. There are degenerative changes of the spine. There is an old nonunited fracture of the right humerus. COMPARISON OF FINDING: The lungs appear worse. IMPRESSION: Worsening consolidation bilaterally.    XR chest 1 view  Result Date: 10/18/2024  FINDINGS: The study is limited due to  rotation. The cardiac silhouette is within normal limits for the technique. There is worsening consolidation in the right upper lobe. There is no pneumothorax or significant pleural effusion. Degenerative changes involve the spine and shoulders. Deformity of the right proximal humerus is again seen due to remote injury. IMPRESSION: Worsening right upper lobe consolidation, possibly due to pneumonia. Correlate clinically and follow-up to document complete resolution.     Transthoracic Echo (TTE) Complete  Result Date: 10/16/2024  CONCLUSIONS: 1. The left ventricular systolic function is normal, with a visually estimated ejection fraction of 60-65%. 2. There is normal right ventricular global systolic function. 3. Mild mitral valve regurgitation. 4.  Slightly elevated right ventricular systolic pressure. 5. Trace tricuspid regurgitation is visualized.    CT chest wo IV contrast  Result Date: 10/16/2024  FINDINGS: LIMITATIONS/LINES:   None.   HEART:   Heart is within normal limits of size.  No significant pericardial effusion. There is rather extensive coronary artery calcification observed.   MEDIASTINUM/HAIR:   There is no aneurysm of the thoracic aorta. Atherosclerosis of the thoracic aorta is seen. There is stable precarinal adenopathy measuring 11 mm in short axis diameter with no additional enlarged mediastinal lymph nodes observed. No obvious hilar mass or adenopathy is seen on this study limited by lack of intravenous contrast administration.   PLEURA:   Unremarkable.   LUNG PARENCHYMA:   Pulmonary emphysema is observed. There is a 3 mm left upper lobe pulmonary nodule seen on series 7 axial image 121 unchanged. A 4 mm right lower lobe pulmonary nodule is present on series 7 axial image 160 with adjacent 3 mm right lower lobe pulmonary nodule on axial image 156. These can be seen on prior study as well. Re-identified is an irregularly marginated and spiculated nodule at the base of the right lower lobe measuring 1.3 x 1.9 cm in size without interval change.   When compared with prior examination, the infiltrate and airspace consolidation seen within the left lower lobe has nearly completely resolved with a small amount of residual airspace consolidation within left lower lobe posteromedially. However, there is now new infiltrate and airspace consolidation within the posterior segment of the right upper lobe.   BONES:   No acute osseous abnormality is observed. There are degenerative changes of the thoracic spine.   CHEST WALL/OTHER:   On images taken through upper abdomen, renal artery calcification is observed and there is ectasia of the abdominal aorta.  There is new infiltrate and airspace consolidation in the posterior segment of right upper lobe with  5 mm cavitary component within the consolidated pulmonary parenchyma. Appearance is consistent with a pneumonia of the right upper lobe.   Near complete resolution of the left lower lobe pneumonia.   Stable pulmonary nodules including a 1.3 x 1.9 cm spiculated nodule at the base of the right lower lobe.   Stable mild precarinal lymphadenopathy.   Rather extensive coronary artery calcifications.       XR knee left 4+ views  Result Date: 10/14/2024  FINDINGS: No fractures or destructive lesions are identified. Bone island is noted in the medial tibial condyle. The joint spaces and articular surfaces are maintained considering patient's age. The alignment is anatomic. Vascular calcifications are noted. There is no significant effusion in the suprapatellar bursa.  Unremarkable left knee radiographs.       XR lumbar spine 2-3 views  Result Date: 10/14/2024  FINDINGS: There is mild loss of height involving the L3 and L5 vertebra, however no acute fracture is identified. There is narrowing of the L1-L2, L2-L3, L3-L4 and mostly the L4-L5 disc spaces, with marginal osteophytes. The alignment is anatomic, without spondylolysis or spondylolisthesis. Hypertrophic changes involve the facet joints of the lower lumbar spine. Calcifications of the aorta and its branches are present.   No acute pelvic fracture is seen. Extensive osteoarthritis involves the right hip joint, with complete obliteration of the joint space, subchondral bone sclerosis, subchondral cysts and marginal osteophytes. Osteoarthritis also involves the left hip joint and to a lesser degree the sacroiliac joints bilaterally. The alignment is anatomic. Vascular calcifications involve iliac and femoral arteries bilaterally. 1. Marked lumbar spondylosis and remote compression deformities of the L3 and L5 vertebra, without acute vertebral fracture or subluxation. 2. Extensive right hip osteoarthritis again noted. Additional degenerative changes in the pelvis as above.  No pelvic fracture or subluxation.     XR hips bilateral 5+ VW w pelvis when performed  Result Date: 10/14/2024  FINDINGS: There is mild loss of height involving the L3 and L5 vertebra, however no acute fracture is identified. There is narrowing of the L1-L2, L2-L3, L3-L4 and mostly the L4-L5 disc spaces, with marginal osteophytes. The alignment is anatomic, without spondylolysis or spondylolisthesis. Hypertrophic changes involve the facet joints of the lower lumbar spine. Calcifications of the aorta and its branches are present.   No acute pelvic fracture is seen. Extensive osteoarthritis involves the right hip joint, with complete obliteration of the joint space, subchondral bone sclerosis, subchondral cysts and marginal osteophytes. Osteoarthritis also involves the left hip joint and to a lesser degree the sacroiliac joints bilaterally. The alignment is anatomic. Vascular calcifications involve iliac and femoral arteries bilaterally.  1. Marked lumbar spondylosis and remote compression deformities of the L3 and L5 vertebra, without acute vertebral fracture or subluxation. 2. Extensive right hip osteoarthritis again noted. Additional degenerative changes in the pelvis as above. No pelvic fracture or subluxation.     XR chest 1 view  Result Date: 10/14/2024  FINDINGS: The study is limited due to  lordotic projection and overlying artifacts obscuring some detail. The cardiomediastinal silhouette is within normal limits for the technique. Patchy/nodular infiltrates are seen in the right suprahilar region, with mild volume loss of the right upper lobe and elevation of the minor fissure. There is no pneumothorax or significant effusion. Marked degenerative changes involve the shoulders. There is also partially included deformity in the right humeral neck consistent with a healing fracture.  Limited study. Patchy/nodular consolidation in the right upper lobe, possibly pneumonia or atelectasis, however in the setting of  injury, lung contusion can not be excluded. Correlate clinically follow-up in 1-2 weeks to document complete resolution.       Assessment/Plan   Acute on chronic hypoxic respiratory failure  Wean oxygen as sats allow  BiPAP nightly while in hospital-continue NIV at home, she is monitored is  using device consistently and benefiting from it  Continuous pulse oximetry  Incentive spirometry/pulmonary hygiene/NT suction     Healthcare acquired pneumonia  Continue IV Zosyn and discharge on Augmentin  Follow-up cultures    Acute on chronic bilateral hip pain/low back pain  Analgesia   Follows with Pain Management     Right lower lobe spiculated lung nodule   CT scan chest without contrast reviewed with collaborating physician Dr. Davis  Follow-up CT scan chest in 3-6 weeks     COPD  Continue IBD/ICS-will add Mucomyst  NT suction per RT     Atrial fibrillation  On Eliquis     Obstructive sleep apnea  BiPAP nightly while in hospital     PT/OT/out of bed  Palliative Medicine following  Code Status: DNR CCA DNI  Discharge planning-home with hospice versus hospice house    Emmie Interiano, APRN-CNP

## 2024-10-23 NOTE — PROGRESS NOTES
Crystal Jones is a 69 y.o. female on day 7 of admission presenting with Chronic low back pain without sciatica, unspecified back pain laterality.    Subjective   Symptoms (0 - 10, Best to Worst)  [place] Symptom Assessment System  0-10 (Numeric) Pain Score: 0 - No pain  She is miserable with 8-10 out of 10 pain in her back and hip any sort of movement in bed causes severe discomfort.  Tells me she is completely done, take anywhere she states.  Thing is bad coughing with shortness of breath that comes and goes and exhausted so-so appetite no nausea or vomiting.  She tells me she is agreeable for hospice and her family the same right now disposition is being established.    I spoke in detail earlier this morning with Esha Carlisle CNP.  Discussed in detail with the nurse caring for her, Sherine       Objective     Physical Exam  Nurses notes and vitals reviewed  Chronically ill-appearing elderly white female she is in severe discomfort with her right hip and back at this point tells me her breathing is not good either.  Normocephalic appears to be atraumatic  Pupils look to be reactive to light mucous membranes are moist neck supple no JVD trachea seems to be midline  Anterior breath sounds some wheezing some rhonchi diminished bases  On nasal cannula looks to be about 9- 10 L presently  Rhythm rate seems to be regular no easily appreciated murmur gallop or rub  Soft abdomen nontender nondistended positive bowel sounds   deferred  Extremities warm well-perfused no peripheral edema  Neurologically she is intact but she is in distress because of significant pain  She can move all extremities but difficult in the lower because of severe right hip and back pain grasps are equal moderate strength  No confusion no hemifacial droop no dysarthria no deviation of tongue for midline  She is anxious because of severe pain  To me that she is done cannot go on like this, she is suffering far too much.    Last Recorded  "Vitals  Blood pressure 154/64, pulse 58, temperature 37.1 °C (98.8 °F), temperature source Oral, resp. rate 20, height 1.676 m (5' 5.98\"), weight 56.2 kg (123 lb 14.4 oz), SpO2 98%.  Intake/Output last 3 Shifts:  I/O last 3 completed shifts:  In: 800 (14.2 mL/kg) [IV Piggyback:800]  Out: 350 (6.2 mL/kg) [Urine:350 (0.2 mL/kg/hr)]  Weight: 56.2 kg     Relevant Results  Results for orders placed or performed during the hospital encounter of 10/14/24 (from the past 24 hours)   Sars-CoV-2 PCR   Result Value Ref Range    Coronavirus 2019, PCR Not Detected Not Detected   CBC   Result Value Ref Range    WBC 9.5 4.4 - 11.3 x10*3/uL    nRBC 0.0 0.0 - 0.0 /100 WBCs    RBC 2.73 (L) 4.00 - 5.20 x10*6/uL    Hemoglobin 8.5 (L) 12.0 - 16.0 g/dL    Hematocrit 28.4 (L) 36.0 - 46.0 %     (H) 80 - 100 fL    MCH 31.1 26.0 - 34.0 pg    MCHC 29.9 (L) 32.0 - 36.0 g/dL    RDW 15.9 (H) 11.5 - 14.5 %    Platelets 366 150 - 450 x10*3/uL   Basic metabolic panel   Result Value Ref Range    Glucose 91 74 - 99 mg/dL    Sodium 141 136 - 145 mmol/L    Potassium 3.9 3.5 - 5.3 mmol/L    Chloride 102 98 - 107 mmol/L    Bicarbonate 31 21 - 32 mmol/L    Anion Gap 12 10 - 20 mmol/L    Urea Nitrogen 14 6 - 23 mg/dL    Creatinine 0.91 0.50 - 1.05 mg/dL    eGFR 68 >60 mL/min/1.73m*2    Calcium 8.9 8.6 - 10.3 mg/dL     XR chest 1 view    Result Date: 10/22/2024  Interpreted By:  Jaquelin Mejia, STUDY: XR CHEST 1 VIEW;  10/22/2024 2:43 pm   INDICATION: Signs/Symptoms:Hypoxia.   COMPARISON: 10/18/2024   ACCESSION NUMBER(S): [id number]   ORDERING CLINICIAN: BETO RICHMOND   FINDINGS: The heart is normal in size.   The lungs are hyperinflated. There is bilateral parenchymal infiltration. There is dense consolidation in the right upper lobe.   There are atherosclerotic changes of the aorta.   There is an old right rib fracture. There are degenerative changes of the spine. There is an old nonunited fracture of the right humerus.   COMPARISON OF FINDING: The " lungs appear worse.       Worsening consolidation bilaterally.   MACRO: none   Signed by: Jaquelin Mejia 10/22/2024 3:21 PM Dictation workstation:   [id number]   Scheduled medications  acetaminophen, 1,000 mg, oral, q8h JENNYFER  acetylcysteine, 3 mL, nebulization, TID  amiodarone, 200 mg, oral, Daily  apixaban, 5 mg, oral, q12h  dexAMETHasone, 8 mg, intravenous, Once  [START ON 10/24/2024] dexAMETHasone, 4 mg, oral, Daily  DULoxetine, 30 mg, oral, Daily  [START ON 10/27/2024] DULoxetine, 60 mg, oral, Daily  famotidine, 20 mg, oral, Daily  fentaNYL, 1 patch, transdermal, q72h  tiotropium, 2 puff, inhalation, Daily   And  fluticasone furoate-vilanteroL, 1 puff, inhalation, Daily  gabapentin, 300 mg, oral, TID  ipratropium-albuteroL, 3 mL, nebulization, TID  lidocaine, 2 patch, transdermal, Daily  metoprolol succinate XL, 25 mg, oral, BID  mirtazapine, 15 mg, oral, Nightly  oxygen, , inhalation, Continuous - Inhalation  pantoprazole, 40 mg, intravenous, Once  piperacillin-tazobactam, 3.375 g, intravenous, q6h  primidone, 200 mg, oral, BID      Continuous medications  oxygen, , Last Rate: 8 L/min (10/21/24 1337)      PRN medications  PRN medications: albuterol, cyclobenzaprine, hydrALAZINE, HYDROmorphone, ondansetron ODT **OR** ondansetron, oxyCODONE, oxyCODONE, polyethylene glycol        Assessment/Plan   Acute on chronic respiratory failure/COPD exacerbation/HCAP probably also some component of aspiration worsening respiratory failure increasing oxygen requirement now around 9 to 10 L O2 being needed  2. RADHA on CKD improving creatinine down to 0.91  3.  Acute on chronic pain, both sites today most serious is the right hip and low back  -Continue with around-the-clock Tylenol, gabapentin, Cymbalta, Duragesic patch, and oxycodone as needed Flexeril  -Added a dose of 8 mg IV dexamethasone x 1 starting on 4 mg p.o. dexamethasone tomorrow  -1 Dose of Protonix, remains on Pepcid we will reevaluate tomorrow might wind up changing  ending up on how she is feeling.  4. A-fib-Eliquis for AC  5.  Palliative care      CODE STATUS DNR CCA/DNI  Pt is fully capable  No HPOA or LW. Has two daughters, Taylor and Madhavi. If needed, they will share medical decision making authority.     As per multiple conversations this morning with Esha Carlisle CNP and [facility]  [facility]  Plan is for hospice, disposition being determined.  With her worsening respiratory status could perhaps possibly qualify for hospice house if she winds up going to long-term care at the [place] would need to engage with [facility] hospice.    Right now while this is getting sorted out I have adjusted her pain regimen and she was miserable today.  Please see adjustments above.    Updated Esha Carlisle CNP  Discussed in detail with nurse Sherine.    Will follow-up on her pain levels tomorrow and discussed with care coordination about dispo.  Perhaps adding steroids will additionally help her breathing, we shall see..    Total time which has not been continuous has been over 53 min.     Vel Haq, APREUGENE-CNP

## 2024-10-23 NOTE — PROGRESS NOTES
10/23/24 1425   Discharge Planning   Expected Discharge Disposition [facility]   Does the patient need discharge transport arranged? Yes   RoundTrip coordination needed? Yes     Spoke with Suzanne with [facility], [phone].  Plan is to meet with patient/family today and further discussion discharge arrangements potentially to hospice house.   Will update TCC once meeting is complete

## 2024-10-23 NOTE — CARE PLAN
The patient's goals for the shift include  pain control, comfort    The clinical goals for the shift include safety and comfort      Problem: Discharge Planning  Goal: Discharge to home or other facility with appropriate resources  Outcome: Progressing     Problem: Chronic Conditions and Co-morbidities  Goal: Patient's chronic conditions and co-morbidity symptoms are monitored and maintained or improved  Outcome: Progressing     Problem: Fall/Injury  Goal: Verbalize understanding of risk factor reduction measures to prevent injury from fall in the home  Outcome: Progressing  Goal: Be free from injury by end of the shift  Outcome: Progressing  Goal: Verbalize understanding of personal risk factors for fall in the hospital  Outcome: Progressing  Goal: Use assistive devices by end of the shift  Outcome: Progressing  Goal: Pace activities to prevent fatigue by end of the shift  Outcome: Progressing     Problem: Skin  Goal: Decreased wound size/increased tissue granulation at next dressing change  Outcome: Progressing  Goal: Participates in plan/prevention/treatment measures  Outcome: Progressing  Goal: Prevent/manage excess moisture  Outcome: Progressing  Goal: Prevent/minimize sheer/friction injuries  Outcome: Not Progressing  Flowsheets (Taken 10/23/2024 4101)  Prevent/minimize sheer/friction injuries:   Use pull sheet   Increase activity/out of bed for meals   Complete micro-shifts as needed if patient unable. Adjust patient position to relieve pressure points, not a full turn   Turn/reposition every 2 hours/use positioning/transfer devices   HOB 30 degrees or less   Utilize specialty bed per algorithm  Goal: Promote/optimize nutrition  Outcome: Progressing  Goal: Promote skin healing  Outcome: Progressing     Problem: Pain  Goal: Participates in PT with improved pain control throughout the shift  Outcome: Progressing       Problem: Skin  Goal: Prevent/minimize sheer/friction injuries  Outcome: Not  Progressing  Flowsheets (Taken 10/23/2024 4387)  Prevent/minimize sheer/friction injuries:   Use pull sheet   Increase activity/out of bed for meals   Complete micro-shifts as needed if patient unable. Adjust patient position to relieve pressure points, not a full turn   Turn/reposition every 2 hours/use positioning/transfer devices   HOB 30 degrees or less   Utilize specialty bed per algorithm

## 2024-10-23 NOTE — PROGRESS NOTES
"Kamila Jones \"Elizabeth" is a 69 y.o. female on day 7 of admission presenting with Chronic low back pain without sciatica, unspecified back pain laterality.      Subjective   Pt alert. Endorses bilateral leg pain. Had O2 desats during the night. O2 increased to 10 liters. I was informed per SW that family has decided not to pursue Hospice. Pt tells me that she wants Hospice and has no plan to rehab due to pain. SW to speak with family. I have informed Palliative.        Objective     Last Recorded Vitals  /64 (BP Location: Right arm, Patient Position: Sitting)   Pulse 58   Temp 37.1 °C (98.8 °F) (Oral)   Resp 20   Wt 56.2 kg (123 lb 14.4 oz)   SpO2 98%   Intake/Output last 3 Shifts:    Intake/Output Summary (Last 24 hours) at 10/23/2024 0920  Last data filed at 10/23/2024 0552  Gross per 24 hour   Intake 800 ml   Output --   Net 800 ml       Admission Weight  Weight: 56.2 kg (124 lb) (10/14/24 0942)    Daily Weight  10/14/24 : 56.2 kg (123 lb 14.4 oz)    Image Results    No new imaging to review.     Physical Exam  Constitutional:       Appearance: She is ill-appearing.      Comments: Lethargic   HENT:      Head: Normocephalic and atraumatic.      Nose: Nose normal.      Mouth/Throat:      Mouth: Mucous membranes are moist.      Pharynx: Oropharynx is clear.   Eyes:      Extraocular Movements: Extraocular movements intact.      Pupils: Pupils are equal, round, and reactive to light.   Cardiovascular:      Rate and Rhythm: Normal rate and regular rhythm.      Pulses: Normal pulses.   Pulmonary:      Effort: Pulmonary effort is normal.      Breath sounds: Rhonchi present.   Abdominal:      General: Abdomen is flat. Bowel sounds are normal.      Palpations: Abdomen is soft.   Musculoskeletal:         General: Normal range of motion.   Skin:     General: Skin is warm and dry.      Capillary Refill: Capillary refill takes less than 2 seconds.   Neurological:      General: No focal deficit present.      Mental " Status: She is oriented to person, place, and time.   Psychiatric:         Mood and Affect: Mood normal.         Relevant Results    Results for orders placed or performed during the hospital encounter of 10/14/24 (from the past 24 hours)   Sars-CoV-2 PCR   Result Value Ref Range    Coronavirus 2019, PCR Not Detected Not Detected   CBC   Result Value Ref Range    WBC 9.5 4.4 - 11.3 x10*3/uL    nRBC 0.0 0.0 - 0.0 /100 WBCs    RBC 2.73 (L) 4.00 - 5.20 x10*6/uL    Hemoglobin 8.5 (L) 12.0 - 16.0 g/dL    Hematocrit 28.4 (L) 36.0 - 46.0 %     (H) 80 - 100 fL    MCH 31.1 26.0 - 34.0 pg    MCHC 29.9 (L) 32.0 - 36.0 g/dL    RDW 15.9 (H) 11.5 - 14.5 %    Platelets 366 150 - 450 x10*3/uL   Basic metabolic panel   Result Value Ref Range    Glucose 91 74 - 99 mg/dL    Sodium 141 136 - 145 mmol/L    Potassium 3.9 3.5 - 5.3 mmol/L    Chloride 102 98 - 107 mmol/L    Bicarbonate 31 21 - 32 mmol/L    Anion Gap 12 10 - 20 mmol/L    Urea Nitrogen 14 6 - 23 mg/dL    Creatinine 0.91 0.50 - 1.05 mg/dL    eGFR 68 >60 mL/min/1.73m*2    Calcium 8.9 8.6 - 10.3 mg/dL             Assessment/Plan      Acute on Chronic Low Back Pain/Bilateral Hip Pain/ Bilateral Leg Pain/Inability to Ambulate  -Has known history of significant OA with back and hip pain.   -Followed with pain management and attempted hip injections with no relief.   -Unable to have surgery due to her pulmonary status and co-morbidities.   -Palliative follows and is adjusting pain meds.   -PT/OT recommending moderate intensity. Due to overall decline pt has decided she does not want SNF.  -All imaging done shows OA and degenerative changes, no acute fractures.     Healthcare Aquired Pneumonia / Acute on Chronic Respiratory Failure  -CT chest showed new infiltrate RUL, likely HCAP and we are treating with zosyn, vanco stopped as the MRSA swab was negative.  -Episode of coughing, vomiting 10/18, spiked fever with worsening respiratory status and hypoxia. Likely aspirated. CXR  checked with worsening RUL consolidation. She was increased to 8L NC mini bubbler, given breathing treatments and IV steroid x1.   -Now with increasing O2 requirements, currently on 10 liter bubbler. Repeat CXR 10/22 again with worsening, consolidation bilaterally  -Blood cultures no growth.    -Strep/legionella negative.  -Pulmonology follows     RADHA   -Resolved     COPD   -Continue home inhalers.      Chronic Respiratory Failure / RAMON  -Baseline O2 3L NC continuous at home. Currently requiring 10 liters.  -Continue BiPAP at HS.   -CXR with patchy nodular consolidation RUL with known lung mass, she just had PET CT as outpatient and is currently following with her PCP for this.  -Continue home inhalers.      Atrial Fibrillation with RVR  -On Eliquis.   -Cardio follows, currently on amiodarone and metoprolol, continue.   -Monitor on telemetry.      HTN  -Meds adjusted and BP is stable.   -Monitor.     DVT Risk  -On Eliquis.   -SCDs.      Dispo  TBD. Will attempt to call family as they have revoked any Hospice services. Pt adamantly wants Hospice and does not want SNF. Seems to conflict with family wishes. Pt with poor prognosis and overall decline with increasing O2 requirements. Recommend Hospice. Palliative is following and to assist.        Esha Carlisle, MOISES-CNP

## 2024-10-24 LAB
BACTERIA BLD CULT: NORMAL
BACTERIA BLD CULT: NORMAL